# Patient Record
Sex: FEMALE | Race: WHITE | NOT HISPANIC OR LATINO | Employment: OTHER | URBAN - METROPOLITAN AREA
[De-identification: names, ages, dates, MRNs, and addresses within clinical notes are randomized per-mention and may not be internally consistent; named-entity substitution may affect disease eponyms.]

---

## 2018-05-22 ENCOUNTER — CONVERSION ENCOUNTER (OUTPATIENT)
Dept: FAMILY MEDICINE CLINIC | Facility: CLINIC | Age: 73
End: 2018-05-22

## 2018-05-22 ENCOUNTER — OFFICE VISIT CONVERTED (OUTPATIENT)
Dept: FAMILY MEDICINE CLINIC | Facility: CLINIC | Age: 73
End: 2018-05-22
Attending: FAMILY MEDICINE

## 2018-06-29 ENCOUNTER — OFFICE VISIT CONVERTED (OUTPATIENT)
Dept: FAMILY MEDICINE CLINIC | Facility: CLINIC | Age: 73
End: 2018-06-29
Attending: FAMILY MEDICINE

## 2018-06-29 ENCOUNTER — CONVERSION ENCOUNTER (OUTPATIENT)
Dept: FAMILY MEDICINE CLINIC | Facility: CLINIC | Age: 73
End: 2018-06-29

## 2018-09-25 ENCOUNTER — OFFICE VISIT CONVERTED (OUTPATIENT)
Dept: FAMILY MEDICINE CLINIC | Facility: CLINIC | Age: 73
End: 2018-09-25
Attending: FAMILY MEDICINE

## 2018-10-22 ENCOUNTER — OFFICE VISIT CONVERTED (OUTPATIENT)
Dept: FAMILY MEDICINE CLINIC | Facility: CLINIC | Age: 73
End: 2018-10-22
Attending: FAMILY MEDICINE

## 2018-10-22 ENCOUNTER — CONVERSION ENCOUNTER (OUTPATIENT)
Dept: FAMILY MEDICINE CLINIC | Facility: CLINIC | Age: 73
End: 2018-10-22

## 2018-11-26 ENCOUNTER — OFFICE VISIT CONVERTED (OUTPATIENT)
Dept: FAMILY MEDICINE CLINIC | Facility: CLINIC | Age: 73
End: 2018-11-26
Attending: FAMILY MEDICINE

## 2018-12-14 ENCOUNTER — CONVERSION ENCOUNTER (OUTPATIENT)
Dept: ORTHOPEDIC SURGERY | Facility: CLINIC | Age: 73
End: 2018-12-14

## 2018-12-14 ENCOUNTER — OFFICE VISIT CONVERTED (OUTPATIENT)
Dept: ORTHOPEDIC SURGERY | Facility: CLINIC | Age: 73
End: 2018-12-14
Attending: ORTHOPAEDIC SURGERY

## 2019-02-27 ENCOUNTER — HOSPITAL ENCOUNTER (OUTPATIENT)
Dept: OTHER | Facility: HOSPITAL | Age: 74
Discharge: HOME | End: 2019-02-27
Attending: FAMILY MEDICINE

## 2019-02-27 LAB
T4 FREE SERPL-MCNC: 2 NG/DL (ref 0.9–1.8)
TSH SERPL-ACNC: 0.93 M[IU]/L (ref 0.27–4.2)

## 2019-03-04 ENCOUNTER — OFFICE VISIT CONVERTED (OUTPATIENT)
Dept: FAMILY MEDICINE CLINIC | Facility: CLINIC | Age: 74
End: 2019-03-04
Attending: FAMILY MEDICINE

## 2019-03-06 ENCOUNTER — HOSPITAL ENCOUNTER (OUTPATIENT)
Dept: CARDIOLOGY | Facility: HOSPITAL | Age: 74
Discharge: HOME | End: 2019-03-06
Attending: FAMILY MEDICINE

## 2019-05-16 ENCOUNTER — HOSPITAL ENCOUNTER (OUTPATIENT)
Dept: OTHER | Facility: HOSPITAL | Age: 74
Discharge: HOME | End: 2019-05-16
Attending: FAMILY MEDICINE

## 2019-05-16 LAB
25(OH)D3 SERPL-MCNC: 15.6 NG/ML (ref 30–100)
ALBUMIN SERPL-MCNC: 4.1 G/DL (ref 3.5–5)
ALBUMIN/GLOB SERPL: 1.3 {RATIO} (ref 1.4–2.6)
ALP SERPL-CCNC: 87 U/L (ref 43–160)
ALT SERPL-CCNC: 10 U/L (ref 10–40)
ANION GAP SERPL CALC-SCNC: 17 MMOL/L (ref 8–19)
AST SERPL-CCNC: 15 U/L (ref 15–50)
BASOPHILS # BLD AUTO: 0.07 10*3/UL (ref 0–0.2)
BASOPHILS NFR BLD AUTO: 1.1 % (ref 0–3)
BILIRUB SERPL-MCNC: 0.68 MG/DL (ref 0.2–1.3)
BUN SERPL-MCNC: 23 MG/DL (ref 5–25)
BUN/CREAT SERPL: 22 {RATIO} (ref 6–20)
CALCIUM SERPL-MCNC: 9 MG/DL (ref 8.7–10.4)
CHLORIDE SERPL-SCNC: 102 MMOL/L (ref 99–111)
CHOLEST SERPL-MCNC: 175 MG/DL (ref 107–200)
CHOLEST/HDLC SERPL: 2.3 {RATIO} (ref 3–6)
CONV ABS IMM GRAN: 0.02 10*3/UL (ref 0–0.2)
CONV CO2: 25 MMOL/L (ref 22–32)
CONV IMMATURE GRAN: 0.3 % (ref 0–1.8)
CONV TOTAL PROTEIN: 7.2 G/DL (ref 6.3–8.2)
CREAT UR-MCNC: 1.05 MG/DL (ref 0.5–0.9)
DEPRECATED RDW RBC AUTO: 42 FL (ref 36.4–46.3)
EOSINOPHIL # BLD AUTO: 0.15 10*3/UL (ref 0–0.7)
EOSINOPHIL # BLD AUTO: 2.4 % (ref 0–7)
ERYTHROCYTE [DISTWIDTH] IN BLOOD BY AUTOMATED COUNT: 12.7 % (ref 11.7–14.4)
FOLATE SERPL-MCNC: >20 NG/ML (ref 4.8–20)
GFR SERPLBLD BASED ON 1.73 SQ M-ARVRAT: 51 ML/MIN/{1.73_M2}
GLOBULIN UR ELPH-MCNC: 3.1 G/DL (ref 2–3.5)
GLUCOSE SERPL-MCNC: 88 MG/DL (ref 65–99)
HBA1C MFR BLD: 11.9 G/DL (ref 12–16)
HCT VFR BLD AUTO: 37.9 % (ref 37–47)
HDLC SERPL-MCNC: 76 MG/DL (ref 40–60)
LDLC SERPL CALC-MCNC: 90 MG/DL (ref 70–100)
LYMPHOCYTES # BLD AUTO: 1.83 10*3/UL (ref 1–5)
MCH RBC QN AUTO: 28.2 PG (ref 27–31)
MCHC RBC AUTO-ENTMCNC: 31.4 G/DL (ref 33–37)
MCV RBC AUTO: 89.8 FL (ref 81–99)
MONOCYTES # BLD AUTO: 0.4 10*3/UL (ref 0.2–1.2)
MONOCYTES NFR BLD AUTO: 6.3 % (ref 3–10)
NEUTROPHILS # BLD AUTO: 3.88 10*3/UL (ref 2–8)
NEUTROPHILS NFR BLD AUTO: 61.1 % (ref 30–85)
NRBC CBCN: 0 % (ref 0–0.7)
OSMOLALITY SERPL CALC.SUM OF ELEC: 293 MOSM/KG (ref 273–304)
PLATELET # BLD AUTO: 276 10*3/UL (ref 130–400)
PMV BLD AUTO: 9.6 FL (ref 9.4–12.3)
POTASSIUM SERPL-SCNC: 3.7 MMOL/L (ref 3.5–5.3)
RBC # BLD AUTO: 4.22 10*6/UL (ref 4.2–5.4)
SODIUM SERPL-SCNC: 140 MMOL/L (ref 135–147)
T4 FREE SERPL-MCNC: 1.5 NG/DL (ref 0.9–1.8)
TRIGL SERPL-MCNC: 45 MG/DL (ref 40–150)
TSH SERPL-ACNC: 0.84 M[IU]/L (ref 0.27–4.2)
VARIANT LYMPHS NFR BLD MANUAL: 28.8 % (ref 20–45)
VIT B12 SERPL-MCNC: 434 PG/ML (ref 211–911)
VLDLC SERPL-MCNC: 9 MG/DL (ref 5–37)
WBC # BLD AUTO: 6.35 10*3/UL (ref 4.8–10.8)

## 2019-05-20 ENCOUNTER — CONVERSION ENCOUNTER (OUTPATIENT)
Dept: FAMILY MEDICINE CLINIC | Facility: CLINIC | Age: 74
End: 2019-05-20

## 2019-05-20 ENCOUNTER — HOSPITAL ENCOUNTER (OUTPATIENT)
Dept: GENERAL RADIOLOGY | Facility: HOSPITAL | Age: 74
Discharge: HOME | End: 2019-05-20
Attending: FAMILY MEDICINE

## 2019-05-20 ENCOUNTER — OFFICE VISIT CONVERTED (OUTPATIENT)
Dept: FAMILY MEDICINE CLINIC | Facility: CLINIC | Age: 74
End: 2019-05-20
Attending: FAMILY MEDICINE

## 2019-06-27 ENCOUNTER — HOSPITAL ENCOUNTER (OUTPATIENT)
Dept: OTHER | Facility: HOSPITAL | Age: 74
Discharge: HOME | End: 2019-06-27
Attending: INTERNAL MEDICINE

## 2019-06-27 LAB
ALBUMIN SERPL-MCNC: 3.9 G/DL (ref 3.5–5)
ALBUMIN/GLOB SERPL: 1.3 {RATIO} (ref 1.4–2.6)
ALP SERPL-CCNC: 81 U/L (ref 43–160)
ALT SERPL-CCNC: 10 U/L (ref 10–40)
ANION GAP SERPL CALC-SCNC: 19 MMOL/L (ref 8–19)
APPEARANCE UR: CLEAR
AST SERPL-CCNC: 11 U/L (ref 15–50)
BASOPHILS # BLD AUTO: 0.07 10*3/UL (ref 0–0.2)
BASOPHILS NFR BLD AUTO: 1 % (ref 0–3)
BILIRUB SERPL-MCNC: 0.65 MG/DL (ref 0.2–1.3)
BILIRUB UR QL: NEGATIVE
BUN SERPL-MCNC: 17 MG/DL (ref 5–25)
BUN/CREAT SERPL: 15 {RATIO} (ref 6–20)
CALCIUM SERPL-MCNC: 9.4 MG/DL (ref 8.7–10.4)
CHLORIDE SERPL-SCNC: 107 MMOL/L (ref 99–111)
COLOR UR: YELLOW
CONV ABS IMM GRAN: 0.02 10*3/UL (ref 0–0.2)
CONV BACTERIA: NEGATIVE
CONV CO2: 25 MMOL/L (ref 22–32)
CONV COLLECTION SOURCE (UA): ABNORMAL
CONV CREATININE URINE, RANDOM: 143.8 MG/DL (ref 10–300)
CONV IMMATURE GRAN: 0.3 % (ref 0–1.8)
CONV TOTAL PROTEIN: 7 G/DL (ref 6.3–8.2)
CONV UROBILINOGEN IN URINE BY AUTOMATED TEST STRIP: 0.2 {EHRLICHU}/DL (ref 0.1–1)
CREAT UR-MCNC: 1.17 MG/DL (ref 0.5–0.9)
DEPRECATED RDW RBC AUTO: 43.4 FL (ref 36.4–46.3)
EOSINOPHIL # BLD AUTO: 0.4 10*3/UL (ref 0–0.7)
EOSINOPHIL # BLD AUTO: 5.8 % (ref 0–7)
ERYTHROCYTE [DISTWIDTH] IN BLOOD BY AUTOMATED COUNT: 12.8 % (ref 11.7–14.4)
GFR SERPLBLD BASED ON 1.73 SQ M-ARVRAT: 45 ML/MIN/{1.73_M2}
GLOBULIN UR ELPH-MCNC: 3.1 G/DL (ref 2–3.5)
GLUCOSE SERPL-MCNC: 96 MG/DL (ref 65–99)
GLUCOSE UR QL: NEGATIVE MG/DL
HBA1C MFR BLD: 12.1 G/DL (ref 12–16)
HCT VFR BLD AUTO: 40.2 % (ref 37–47)
HGB UR QL STRIP: NEGATIVE
KETONES UR QL STRIP: NEGATIVE MG/DL
LEUKOCYTE ESTERASE UR QL STRIP: ABNORMAL
LYMPHOCYTES # BLD AUTO: 2.56 10*3/UL (ref 1–5)
MCH RBC QN AUTO: 27.7 PG (ref 27–31)
MCHC RBC AUTO-ENTMCNC: 30.1 G/DL (ref 33–37)
MCV RBC AUTO: 92 FL (ref 81–99)
MONOCYTES # BLD AUTO: 0.5 10*3/UL (ref 0.2–1.2)
MONOCYTES NFR BLD AUTO: 7.3 % (ref 3–10)
NEUTROPHILS # BLD AUTO: 3.33 10*3/UL (ref 2–8)
NEUTROPHILS NFR BLD AUTO: 48.4 % (ref 30–85)
NITRITE UR QL STRIP: NEGATIVE
NRBC CBCN: 0 % (ref 0–0.7)
OSMOLALITY SERPL CALC.SUM OF ELEC: 303 MOSM/KG (ref 273–304)
PH UR STRIP.AUTO: 5.5 [PH] (ref 5–8)
PHOSPHATE SERPL-MCNC: 2.6 MG/DL (ref 2.4–4.5)
PLATELET # BLD AUTO: 277 10*3/UL (ref 130–400)
PMV BLD AUTO: 10 FL (ref 9.4–12.3)
POTASSIUM SERPL-SCNC: 5.4 MMOL/L (ref 3.5–5.3)
PROT UR QL: NEGATIVE MG/DL
PROT UR-MCNC: 14.8 MG/DL
PTH-INTACT SERPL-MCNC: 108.1 PG/ML (ref 11.1–79.5)
RBC # BLD AUTO: 4.37 10*6/UL (ref 4.2–5.4)
RBC #/AREA URNS HPF: ABNORMAL /[HPF]
SODIUM SERPL-SCNC: 146 MMOL/L (ref 135–147)
SP GR UR: 1.02 (ref 1–1.03)
SQUAMOUS SPT QL MICRO: ABNORMAL /[HPF]
VARIANT LYMPHS NFR BLD MANUAL: 37.2 % (ref 20–45)
WBC # BLD AUTO: 6.88 10*3/UL (ref 4.8–10.8)
WBC #/AREA URNS HPF: ABNORMAL /[HPF]

## 2019-07-15 ENCOUNTER — CONVERSION ENCOUNTER (OUTPATIENT)
Dept: FAMILY MEDICINE CLINIC | Facility: CLINIC | Age: 74
End: 2019-07-15

## 2019-07-15 ENCOUNTER — OFFICE VISIT CONVERTED (OUTPATIENT)
Dept: FAMILY MEDICINE CLINIC | Facility: CLINIC | Age: 74
End: 2019-07-15
Attending: NURSE PRACTITIONER

## 2019-10-25 ENCOUNTER — CONVERSION ENCOUNTER (OUTPATIENT)
Dept: FAMILY MEDICINE CLINIC | Facility: CLINIC | Age: 74
End: 2019-10-25

## 2019-10-25 ENCOUNTER — OFFICE VISIT CONVERTED (OUTPATIENT)
Dept: FAMILY MEDICINE CLINIC | Facility: CLINIC | Age: 74
End: 2019-10-25
Attending: FAMILY MEDICINE

## 2019-11-05 ENCOUNTER — HOSPITAL ENCOUNTER (OUTPATIENT)
Dept: OTHER | Facility: HOSPITAL | Age: 74
Discharge: HOME | End: 2019-11-05
Attending: FAMILY MEDICINE

## 2019-11-05 LAB
ALBUMIN SERPL-MCNC: 4.5 G/DL (ref 3.5–5)
ALBUMIN/GLOB SERPL: 1.4 {RATIO} (ref 1.4–2.6)
ALP SERPL-CCNC: 91 U/L (ref 43–160)
ALT SERPL-CCNC: 10 U/L (ref 10–40)
ANION GAP SERPL CALC-SCNC: 21 MMOL/L (ref 8–19)
APPEARANCE UR: CLEAR
AST SERPL-CCNC: 16 U/L (ref 15–50)
BASOPHILS # BLD AUTO: 0.11 10*3/UL (ref 0–0.2)
BASOPHILS NFR BLD AUTO: 1.3 % (ref 0–3)
BILIRUB SERPL-MCNC: 0.52 MG/DL (ref 0.2–1.3)
BILIRUB UR QL: NEGATIVE
BUN SERPL-MCNC: 22 MG/DL (ref 5–25)
BUN/CREAT SERPL: 17 {RATIO} (ref 6–20)
CALCIUM SERPL-MCNC: 10.4 MG/DL (ref 8.7–10.4)
CHLORIDE SERPL-SCNC: 106 MMOL/L (ref 99–111)
COLOR UR: YELLOW
CONV ABS IMM GRAN: 0.02 10*3/UL (ref 0–0.2)
CONV BACTERIA: NEGATIVE
CONV CO2: 26 MMOL/L (ref 22–32)
CONV COLLECTION SOURCE (UA): ABNORMAL
CONV HYALINE CASTS IN URINE MICRO: ABNORMAL /[LPF]
CONV IMMATURE GRAN: 0.2 % (ref 0–1.8)
CONV TOTAL PROTEIN: 7.7 G/DL (ref 6.3–8.2)
CONV UROBILINOGEN IN URINE BY AUTOMATED TEST STRIP: 1 {EHRLICHU}/DL (ref 0.1–1)
CREAT UR-MCNC: 1.3 MG/DL (ref 0.5–0.9)
DEPRECATED RDW RBC AUTO: 43 FL (ref 36.4–46.3)
EOSINOPHIL # BLD AUTO: 0.3 10*3/UL (ref 0–0.7)
EOSINOPHIL # BLD AUTO: 3.5 % (ref 0–7)
ERYTHROCYTE [DISTWIDTH] IN BLOOD BY AUTOMATED COUNT: 12.7 % (ref 11.7–14.4)
FOLATE SERPL-MCNC: 15.6 NG/ML (ref 4.8–20)
GFR SERPLBLD BASED ON 1.73 SQ M-ARVRAT: 40 ML/MIN/{1.73_M2}
GLOBULIN UR ELPH-MCNC: 3.2 G/DL (ref 2–3.5)
GLUCOSE SERPL-MCNC: 111 MG/DL (ref 65–99)
GLUCOSE UR QL: NEGATIVE MG/DL
HCT VFR BLD AUTO: 41.6 % (ref 37–47)
HGB BLD-MCNC: 12.8 G/DL (ref 12–16)
HGB UR QL STRIP: NEGATIVE
KETONES UR QL STRIP: NEGATIVE MG/DL
LEUKOCYTE ESTERASE UR QL STRIP: ABNORMAL
LYMPHOCYTES # BLD AUTO: 2.41 10*3/UL (ref 1–5)
LYMPHOCYTES NFR BLD AUTO: 28 % (ref 20–45)
MAGNESIUM SERPL-MCNC: 2.21 MG/DL (ref 1.6–2.3)
MCH RBC QN AUTO: 28.3 PG (ref 27–31)
MCHC RBC AUTO-ENTMCNC: 30.8 G/DL (ref 33–37)
MCV RBC AUTO: 92 FL (ref 81–99)
MONOCYTES # BLD AUTO: 0.44 10*3/UL (ref 0.2–1.2)
MONOCYTES NFR BLD AUTO: 5.1 % (ref 3–10)
NEUTROPHILS # BLD AUTO: 5.33 10*3/UL (ref 2–8)
NEUTROPHILS NFR BLD AUTO: 61.9 % (ref 30–85)
NITRITE UR QL STRIP: NEGATIVE
NRBC CBCN: 0 % (ref 0–0.7)
OSMOLALITY SERPL CALC.SUM OF ELEC: 310 MOSM/KG (ref 273–304)
PH UR STRIP.AUTO: 5 [PH] (ref 5–8)
PLATELET # BLD AUTO: 309 10*3/UL (ref 130–400)
PMV BLD AUTO: 9.7 FL (ref 9.4–12.3)
POTASSIUM SERPL-SCNC: 5.2 MMOL/L (ref 3.5–5.3)
PROT UR QL: NEGATIVE MG/DL
RBC # BLD AUTO: 4.52 10*6/UL (ref 4.2–5.4)
RBC #/AREA URNS HPF: ABNORMAL /[HPF]
SODIUM SERPL-SCNC: 148 MMOL/L (ref 135–147)
SP GR UR: 1.02 (ref 1–1.03)
SQUAMOUS SPT QL MICRO: ABNORMAL /[HPF]
T4 FREE SERPL-MCNC: 1.9 NG/DL (ref 0.9–1.8)
TSH SERPL-ACNC: 0.36 M[IU]/L (ref 0.27–4.2)
VIT B12 SERPL-MCNC: 539 PG/ML (ref 211–911)
WBC # BLD AUTO: 8.61 10*3/UL (ref 4.8–10.8)
WBC #/AREA URNS HPF: ABNORMAL /[HPF]

## 2019-11-08 ENCOUNTER — OFFICE VISIT CONVERTED (OUTPATIENT)
Dept: FAMILY MEDICINE CLINIC | Facility: CLINIC | Age: 74
End: 2019-11-08
Attending: FAMILY MEDICINE

## 2019-12-19 ENCOUNTER — HOSPITAL ENCOUNTER (OUTPATIENT)
Dept: SURGERY | Facility: HOSPITAL | Age: 74
Setting detail: HOSPITAL OUTPATIENT SURGERY
Discharge: HOME | End: 2019-12-19
Attending: OPHTHALMOLOGY

## 2020-01-02 ENCOUNTER — HOSPITAL ENCOUNTER (OUTPATIENT)
Dept: OTHER | Facility: HOSPITAL | Age: 75
Discharge: HOME | End: 2020-01-02
Attending: FAMILY MEDICINE

## 2020-01-02 LAB
ALBUMIN SERPL-MCNC: 4.5 G/DL (ref 3.5–5)
ALBUMIN/GLOB SERPL: 1.3 {RATIO} (ref 1.4–2.6)
ALP SERPL-CCNC: 98 U/L (ref 43–160)
ALT SERPL-CCNC: 9 U/L (ref 10–40)
ANION GAP SERPL CALC-SCNC: 19 MMOL/L (ref 8–19)
AST SERPL-CCNC: 14 U/L (ref 15–50)
BILIRUB SERPL-MCNC: 0.68 MG/DL (ref 0.2–1.3)
BUN SERPL-MCNC: 21 MG/DL (ref 5–25)
BUN/CREAT SERPL: 19 {RATIO} (ref 6–20)
CALCIUM SERPL-MCNC: 9.8 MG/DL (ref 8.7–10.4)
CHLORIDE SERPL-SCNC: 102 MMOL/L (ref 99–111)
CONV CO2: 26 MMOL/L (ref 22–32)
CONV TOTAL PROTEIN: 7.9 G/DL (ref 6.3–8.2)
CREAT UR-MCNC: 1.12 MG/DL (ref 0.5–0.9)
EST. AVERAGE GLUCOSE BLD GHB EST-MCNC: 111 MG/DL
GFR SERPLBLD BASED ON 1.73 SQ M-ARVRAT: 47 ML/MIN/{1.73_M2}
GLOBULIN UR ELPH-MCNC: 3.4 G/DL (ref 2–3.5)
GLUCOSE SERPL-MCNC: 99 MG/DL (ref 65–99)
HBA1C MFR BLD: 5.5 % (ref 3.5–5.7)
OSMOLALITY SERPL CALC.SUM OF ELEC: 297 MOSM/KG (ref 273–304)
POTASSIUM SERPL-SCNC: 4.6 MMOL/L (ref 3.5–5.3)
SODIUM SERPL-SCNC: 142 MMOL/L (ref 135–147)

## 2020-01-07 ENCOUNTER — OFFICE VISIT CONVERTED (OUTPATIENT)
Dept: FAMILY MEDICINE CLINIC | Facility: CLINIC | Age: 75
End: 2020-01-07
Attending: FAMILY MEDICINE

## 2020-01-09 ENCOUNTER — HOSPITAL ENCOUNTER (OUTPATIENT)
Dept: SURGERY | Facility: HOSPITAL | Age: 75
Setting detail: HOSPITAL OUTPATIENT SURGERY
Discharge: HOME | End: 2020-01-09
Attending: OPHTHALMOLOGY

## 2020-02-03 ENCOUNTER — OFFICE VISIT CONVERTED (OUTPATIENT)
Dept: NEUROLOGY | Facility: CLINIC | Age: 75
End: 2020-02-03
Attending: PSYCHIATRY & NEUROLOGY

## 2020-02-13 ENCOUNTER — OFFICE VISIT CONVERTED (OUTPATIENT)
Dept: FAMILY MEDICINE CLINIC | Facility: CLINIC | Age: 75
End: 2020-02-13
Attending: NURSE PRACTITIONER

## 2020-02-14 ENCOUNTER — CONVERSION ENCOUNTER (OUTPATIENT)
Dept: NEUROLOGY | Facility: CLINIC | Age: 75
End: 2020-02-14

## 2020-02-14 ENCOUNTER — OFFICE VISIT CONVERTED (OUTPATIENT)
Dept: NEUROLOGY | Facility: CLINIC | Age: 75
End: 2020-02-14
Attending: PSYCHIATRY & NEUROLOGY

## 2020-03-02 ENCOUNTER — OFFICE VISIT CONVERTED (OUTPATIENT)
Dept: ORTHOPEDIC SURGERY | Facility: CLINIC | Age: 75
End: 2020-03-02
Attending: ORTHOPAEDIC SURGERY

## 2020-06-23 ENCOUNTER — HOSPITAL ENCOUNTER (OUTPATIENT)
Dept: OTHER | Facility: HOSPITAL | Age: 75
Discharge: HOME | End: 2020-06-23
Attending: INTERNAL MEDICINE

## 2020-06-23 LAB
ALBUMIN SERPL-MCNC: 4.2 G/DL (ref 3.5–5)
ANION GAP SERPL CALC-SCNC: 16 MMOL/L (ref 8–19)
APPEARANCE UR: CLEAR
BASOPHILS # BLD AUTO: 0.07 10*3/UL (ref 0–0.2)
BASOPHILS NFR BLD AUTO: 0.8 % (ref 0–3)
BILIRUB UR QL: NEGATIVE
BUN SERPL-MCNC: 26 MG/DL (ref 5–25)
BUN/CREAT SERPL: 23 {RATIO} (ref 6–20)
CALCIUM SERPL-MCNC: 9.5 MG/DL (ref 8.7–10.4)
CHLORIDE SERPL-SCNC: 106 MMOL/L (ref 99–111)
COLOR UR: YELLOW
CONV ABS IMM GRAN: 0.03 10*3/UL (ref 0–0.2)
CONV BACTERIA: NEGATIVE
CONV CO2: 26 MMOL/L (ref 22–32)
CONV COLLECTION SOURCE (UA): ABNORMAL
CONV CREATININE URINE, RANDOM: 143.7 MG/DL (ref 10–300)
CONV HYALINE CASTS IN URINE MICRO: ABNORMAL /[LPF]
CONV IMMATURE GRAN: 0.3 % (ref 0–1.8)
CONV PROTEIN TO CREATININE RATIO (RANDOM URINE): 0.12 {RATIO} (ref 0–0.1)
CONV UROBILINOGEN IN URINE BY AUTOMATED TEST STRIP: 0.2 {EHRLICHU}/DL (ref 0.1–1)
CREAT UR-MCNC: 1.11 MG/DL (ref 0.5–0.9)
DEPRECATED RDW RBC AUTO: 41 FL (ref 36.4–46.3)
EOSINOPHIL # BLD AUTO: 0.35 10*3/UL (ref 0–0.7)
EOSINOPHIL # BLD AUTO: 4 % (ref 0–7)
ERYTHROCYTE [DISTWIDTH] IN BLOOD BY AUTOMATED COUNT: 12.5 % (ref 11.7–14.4)
GFR SERPLBLD BASED ON 1.73 SQ M-ARVRAT: 48 ML/MIN/{1.73_M2}
GLUCOSE SERPL-MCNC: 100 MG/DL (ref 65–99)
GLUCOSE UR QL: NEGATIVE MG/DL
HCT VFR BLD AUTO: 40.9 % (ref 37–47)
HGB BLD-MCNC: 12.8 G/DL (ref 12–16)
HGB UR QL STRIP: NEGATIVE
KETONES UR QL STRIP: NEGATIVE MG/DL
LEUKOCYTE ESTERASE UR QL STRIP: ABNORMAL
LYMPHOCYTES # BLD AUTO: 2.54 10*3/UL (ref 1–5)
LYMPHOCYTES NFR BLD AUTO: 29.4 % (ref 20–45)
MCH RBC QN AUTO: 28.2 PG (ref 27–31)
MCHC RBC AUTO-ENTMCNC: 31.3 G/DL (ref 33–37)
MCV RBC AUTO: 90.1 FL (ref 81–99)
MONOCYTES # BLD AUTO: 0.64 10*3/UL (ref 0.2–1.2)
MONOCYTES NFR BLD AUTO: 7.4 % (ref 3–10)
MUCOUS THREADS URNS QL MICRO: ABNORMAL
NEUTROPHILS # BLD AUTO: 5.02 10*3/UL (ref 2–8)
NEUTROPHILS NFR BLD AUTO: 58.1 % (ref 30–85)
NITRITE UR QL STRIP: NEGATIVE
NRBC CBCN: 0 % (ref 0–0.7)
PH UR STRIP.AUTO: 5 [PH] (ref 5–8)
PHOSPHATE SERPL-MCNC: 3.5 MG/DL (ref 2.4–4.5)
PLATELET # BLD AUTO: 285 10*3/UL (ref 130–400)
PMV BLD AUTO: 9.6 FL (ref 9.4–12.3)
POTASSIUM SERPL-SCNC: 6.1 MMOL/L (ref 3.5–5.3)
PROT UR QL: NEGATIVE MG/DL
PROT UR-MCNC: 17 MG/DL
RBC # BLD AUTO: 4.54 10*6/UL (ref 4.2–5.4)
RBC #/AREA URNS HPF: ABNORMAL /[HPF]
SODIUM SERPL-SCNC: 142 MMOL/L (ref 135–147)
SP GR UR: 1.02 (ref 1–1.03)
SQUAMOUS SPT QL MICRO: ABNORMAL /[HPF]
WBC # BLD AUTO: 8.65 10*3/UL (ref 4.8–10.8)
WBC #/AREA URNS HPF: ABNORMAL /[HPF]

## 2020-07-16 ENCOUNTER — HOSPITAL ENCOUNTER (OUTPATIENT)
Dept: OTHER | Facility: HOSPITAL | Age: 75
Discharge: HOME | End: 2020-07-16
Attending: FAMILY MEDICINE

## 2020-07-16 LAB
25(OH)D3 SERPL-MCNC: 45 NG/ML (ref 30–100)
ALBUMIN SERPL-MCNC: 4.2 G/DL (ref 3.5–5)
ALBUMIN/GLOB SERPL: 1.3 {RATIO} (ref 1.4–2.6)
ALP SERPL-CCNC: 72 U/L (ref 43–160)
ALT SERPL-CCNC: 14 U/L (ref 10–40)
ANION GAP SERPL CALC-SCNC: 18 MMOL/L (ref 8–19)
APPEARANCE UR: CLEAR
AST SERPL-CCNC: 13 U/L (ref 15–50)
BASOPHILS # BLD AUTO: 0.06 10*3/UL (ref 0–0.2)
BASOPHILS NFR BLD AUTO: 0.9 % (ref 0–3)
BILIRUB SERPL-MCNC: 0.46 MG/DL (ref 0.2–1.3)
BILIRUB UR QL: NEGATIVE
BUN SERPL-MCNC: 18 MG/DL (ref 5–25)
BUN/CREAT SERPL: 15 {RATIO} (ref 6–20)
CALCIUM SERPL-MCNC: 9.8 MG/DL (ref 8.7–10.4)
CHLORIDE SERPL-SCNC: 99 MMOL/L (ref 99–111)
CHOLEST SERPL-MCNC: 158 MG/DL (ref 107–200)
CHOLEST/HDLC SERPL: 2.3 {RATIO} (ref 3–6)
COLOR UR: YELLOW
CONV ABS IMM GRAN: 0.01 10*3/UL (ref 0–0.2)
CONV BACTERIA: NEGATIVE
CONV CO2: 25 MMOL/L (ref 22–32)
CONV COLLECTION SOURCE (UA): ABNORMAL
CONV HYALINE CASTS IN URINE MICRO: ABNORMAL /[LPF]
CONV IMMATURE GRAN: 0.2 % (ref 0–1.8)
CONV TOTAL PROTEIN: 7.4 G/DL (ref 6.3–8.2)
CONV UROBILINOGEN IN URINE BY AUTOMATED TEST STRIP: 0.2 {EHRLICHU}/DL (ref 0.1–1)
CREAT UR-MCNC: 1.22 MG/DL (ref 0.5–0.9)
DEPRECATED RDW RBC AUTO: 41.4 FL (ref 36.4–46.3)
EOSINOPHIL # BLD AUTO: 0.25 10*3/UL (ref 0–0.7)
EOSINOPHIL # BLD AUTO: 3.8 % (ref 0–7)
ERYTHROCYTE [DISTWIDTH] IN BLOOD BY AUTOMATED COUNT: 12.6 % (ref 11.7–14.4)
FOLATE SERPL-MCNC: 19.1 NG/ML (ref 4.8–20)
GFR SERPLBLD BASED ON 1.73 SQ M-ARVRAT: 43 ML/MIN/{1.73_M2}
GLOBULIN UR ELPH-MCNC: 3.2 G/DL (ref 2–3.5)
GLUCOSE SERPL-MCNC: 104 MG/DL (ref 65–99)
GLUCOSE UR QL: NEGATIVE MG/DL
HCT VFR BLD AUTO: 41.1 % (ref 37–47)
HDLC SERPL-MCNC: 68 MG/DL (ref 40–60)
HGB BLD-MCNC: 13 G/DL (ref 12–16)
HGB UR QL STRIP: NEGATIVE
KETONES UR QL STRIP: NEGATIVE MG/DL
LDLC SERPL CALC-MCNC: 74 MG/DL (ref 70–100)
LEUKOCYTE ESTERASE UR QL STRIP: ABNORMAL
LYMPHOCYTES # BLD AUTO: 2.61 10*3/UL (ref 1–5)
LYMPHOCYTES NFR BLD AUTO: 39.7 % (ref 20–45)
MCH RBC QN AUTO: 28.6 PG (ref 27–31)
MCHC RBC AUTO-ENTMCNC: 31.6 G/DL (ref 33–37)
MCV RBC AUTO: 90.3 FL (ref 81–99)
MONOCYTES # BLD AUTO: 0.42 10*3/UL (ref 0.2–1.2)
MONOCYTES NFR BLD AUTO: 6.4 % (ref 3–10)
NEUTROPHILS # BLD AUTO: 3.23 10*3/UL (ref 2–8)
NEUTROPHILS NFR BLD AUTO: 49 % (ref 30–85)
NITRITE UR QL STRIP: NEGATIVE
NRBC CBCN: 0 % (ref 0–0.7)
OSMOLALITY SERPL CALC.SUM OF ELEC: 288 MOSM/KG (ref 273–304)
PH UR STRIP.AUTO: 5 [PH] (ref 5–8)
PLATELET # BLD AUTO: 276 10*3/UL (ref 130–400)
PMV BLD AUTO: 9.9 FL (ref 9.4–12.3)
POTASSIUM SERPL-SCNC: 4.3 MMOL/L (ref 3.5–5.3)
PROT UR QL: NEGATIVE MG/DL
RBC # BLD AUTO: 4.55 10*6/UL (ref 4.2–5.4)
RBC #/AREA URNS HPF: ABNORMAL /[HPF]
SODIUM SERPL-SCNC: 138 MMOL/L (ref 135–147)
SP GR UR: 1.02 (ref 1–1.03)
SQUAMOUS SPT QL MICRO: ABNORMAL /[HPF]
T4 FREE SERPL-MCNC: 2.1 NG/DL (ref 0.9–1.8)
TRIGL SERPL-MCNC: 82 MG/DL (ref 40–150)
TSH SERPL-ACNC: 0.13 M[IU]/L (ref 0.27–4.2)
VIT B12 SERPL-MCNC: 862 PG/ML (ref 211–911)
VLDLC SERPL-MCNC: 16 MG/DL (ref 5–37)
WBC # BLD AUTO: 6.58 10*3/UL (ref 4.8–10.8)
WBC #/AREA URNS HPF: ABNORMAL /[HPF]

## 2020-07-20 ENCOUNTER — OFFICE VISIT CONVERTED (OUTPATIENT)
Dept: FAMILY MEDICINE CLINIC | Facility: CLINIC | Age: 75
End: 2020-07-20
Attending: FAMILY MEDICINE

## 2020-07-25 ENCOUNTER — HOSPITAL ENCOUNTER (OUTPATIENT)
Dept: OTHER | Facility: HOSPITAL | Age: 75
Discharge: HOME | End: 2020-07-25
Attending: INTERNAL MEDICINE

## 2020-07-25 LAB
ALBUMIN SERPL-MCNC: 3.9 G/DL (ref 3.5–5)
ANION GAP SERPL CALC-SCNC: 18 MMOL/L (ref 8–19)
BASOPHILS # BLD AUTO: 0.07 10*3/UL (ref 0–0.2)
BASOPHILS NFR BLD AUTO: 1.1 % (ref 0–3)
BUN SERPL-MCNC: 19 MG/DL (ref 5–25)
BUN/CREAT SERPL: 15 {RATIO} (ref 6–20)
CALCIUM SERPL-MCNC: 10.2 MG/DL (ref 8.7–10.4)
CHLORIDE SERPL-SCNC: 105 MMOL/L (ref 99–111)
CONV ABS IMM GRAN: 0.01 10*3/UL (ref 0–0.2)
CONV CO2: 25 MMOL/L (ref 22–32)
CONV IMMATURE GRAN: 0.2 % (ref 0–1.8)
CREAT UR-MCNC: 1.27 MG/DL (ref 0.5–0.9)
DEPRECATED RDW RBC AUTO: 41.8 FL (ref 36.4–46.3)
EOSINOPHIL # BLD AUTO: 0.25 10*3/UL (ref 0–0.7)
EOSINOPHIL # BLD AUTO: 3.8 % (ref 0–7)
ERYTHROCYTE [DISTWIDTH] IN BLOOD BY AUTOMATED COUNT: 12.7 % (ref 11.7–14.4)
GFR SERPLBLD BASED ON 1.73 SQ M-ARVRAT: 41 ML/MIN/{1.73_M2}
GLUCOSE SERPL-MCNC: 97 MG/DL (ref 65–99)
HCT VFR BLD AUTO: 40.4 % (ref 37–47)
HGB BLD-MCNC: 12.5 G/DL (ref 12–16)
LYMPHOCYTES # BLD AUTO: 2.44 10*3/UL (ref 1–5)
LYMPHOCYTES NFR BLD AUTO: 36.9 % (ref 20–45)
MCH RBC QN AUTO: 27.9 PG (ref 27–31)
MCHC RBC AUTO-ENTMCNC: 30.9 G/DL (ref 33–37)
MCV RBC AUTO: 90.2 FL (ref 81–99)
MONOCYTES # BLD AUTO: 0.51 10*3/UL (ref 0.2–1.2)
MONOCYTES NFR BLD AUTO: 7.7 % (ref 3–10)
NEUTROPHILS # BLD AUTO: 3.34 10*3/UL (ref 2–8)
NEUTROPHILS NFR BLD AUTO: 50.3 % (ref 30–85)
NRBC CBCN: 0 % (ref 0–0.7)
PHOSPHATE SERPL-MCNC: 3.1 MG/DL (ref 2.4–4.5)
PLATELET # BLD AUTO: 271 10*3/UL (ref 130–400)
PMV BLD AUTO: 10 FL (ref 9.4–12.3)
POTASSIUM SERPL-SCNC: 5.4 MMOL/L (ref 3.5–5.3)
RBC # BLD AUTO: 4.48 10*6/UL (ref 4.2–5.4)
SODIUM SERPL-SCNC: 143 MMOL/L (ref 135–147)
WBC # BLD AUTO: 6.62 10*3/UL (ref 4.8–10.8)

## 2020-09-16 ENCOUNTER — HOSPITAL ENCOUNTER (OUTPATIENT)
Dept: OTHER | Facility: HOSPITAL | Age: 75
Discharge: HOME | End: 2020-09-16
Attending: FAMILY MEDICINE

## 2020-09-16 LAB
POTASSIUM SERPL-SCNC: 5.1 MMOL/L (ref 3.5–5.3)
T4 FREE SERPL-MCNC: 1.5 NG/DL (ref 0.9–1.8)
TSH SERPL-ACNC: 0.47 M[IU]/L (ref 0.27–4.2)

## 2020-09-21 ENCOUNTER — OFFICE VISIT CONVERTED (OUTPATIENT)
Dept: FAMILY MEDICINE CLINIC | Facility: CLINIC | Age: 75
End: 2020-09-21
Attending: FAMILY MEDICINE

## 2020-10-15 ENCOUNTER — HOSPITAL ENCOUNTER (OUTPATIENT)
Dept: OTHER | Facility: HOSPITAL | Age: 75
Discharge: HOME | End: 2020-10-15
Attending: FAMILY MEDICINE

## 2020-10-15 LAB
POTASSIUM SERPL-SCNC: 4.8 MMOL/L (ref 3.5–5.3)
T4 FREE SERPL-MCNC: 1.3 NG/DL (ref 0.9–1.8)
TSH SERPL-ACNC: 3.08 M[IU]/L (ref 0.27–4.2)

## 2020-11-18 ENCOUNTER — OFFICE VISIT CONVERTED (OUTPATIENT)
Dept: ORTHOPEDIC SURGERY | Facility: CLINIC | Age: 75
End: 2020-11-18
Attending: ORTHOPAEDIC SURGERY

## 2020-11-24 ENCOUNTER — HOSPITAL ENCOUNTER (OUTPATIENT)
Dept: OTHER | Facility: HOSPITAL | Age: 75
Discharge: HOME | End: 2020-11-24
Attending: NURSE PRACTITIONER

## 2020-11-24 LAB
ALBUMIN SERPL-MCNC: 4.5 G/DL (ref 3.5–5)
ANION GAP SERPL CALC-SCNC: 18 MMOL/L (ref 8–19)
APPEARANCE UR: ABNORMAL
BASOPHILS # BLD AUTO: 0.08 10*3/UL (ref 0–0.2)
BASOPHILS NFR BLD AUTO: 1.2 % (ref 0–3)
BILIRUB UR QL: NEGATIVE
BUN SERPL-MCNC: 22 MG/DL (ref 5–25)
BUN/CREAT SERPL: 18 {RATIO} (ref 6–20)
CALCIUM SERPL-MCNC: 10.5 MG/DL (ref 8.7–10.4)
CHLORIDE SERPL-SCNC: 105 MMOL/L (ref 99–111)
COLOR UR: YELLOW
CONV ABS IMM GRAN: 0.01 10*3/UL (ref 0–0.2)
CONV BACTERIA: NEGATIVE
CONV CO2: 28 MMOL/L (ref 22–32)
CONV COLLECTION SOURCE (UA): ABNORMAL
CONV HYALINE CASTS IN URINE MICRO: ABNORMAL /[LPF]
CONV IMMATURE GRAN: 0.1 % (ref 0–1.8)
CONV UROBILINOGEN IN URINE BY AUTOMATED TEST STRIP: 1 {EHRLICHU}/DL (ref 0.1–1)
CREAT UR-MCNC: 1.2 MG/DL (ref 0.5–0.9)
DEPRECATED RDW RBC AUTO: 41.2 FL (ref 36.4–46.3)
EOSINOPHIL # BLD AUTO: 0.21 10*3/UL (ref 0–0.7)
EOSINOPHIL # BLD AUTO: 3 % (ref 0–7)
ERYTHROCYTE [DISTWIDTH] IN BLOOD BY AUTOMATED COUNT: 12.5 % (ref 11.7–14.4)
GFR SERPLBLD BASED ON 1.73 SQ M-ARVRAT: 43 ML/MIN/{1.73_M2}
GLUCOSE SERPL-MCNC: 98 MG/DL (ref 65–99)
GLUCOSE UR QL: NEGATIVE MG/DL
HCT VFR BLD AUTO: 43.7 % (ref 37–47)
HGB BLD-MCNC: 13.8 G/DL (ref 12–16)
HGB UR QL STRIP: NEGATIVE
KETONES UR QL STRIP: NEGATIVE MG/DL
LEUKOCYTE ESTERASE UR QL STRIP: ABNORMAL
LYMPHOCYTES # BLD AUTO: 2.24 10*3/UL (ref 1–5)
LYMPHOCYTES NFR BLD AUTO: 32.4 % (ref 20–45)
MCH RBC QN AUTO: 28.6 PG (ref 27–31)
MCHC RBC AUTO-ENTMCNC: 31.6 G/DL (ref 33–37)
MCV RBC AUTO: 90.5 FL (ref 81–99)
MONOCYTES # BLD AUTO: 0.34 10*3/UL (ref 0.2–1.2)
MONOCYTES NFR BLD AUTO: 4.9 % (ref 3–10)
NEUTROPHILS # BLD AUTO: 4.03 10*3/UL (ref 2–8)
NEUTROPHILS NFR BLD AUTO: 58.4 % (ref 30–85)
NITRITE UR QL STRIP: NEGATIVE
NRBC CBCN: 0 % (ref 0–0.7)
PH UR STRIP.AUTO: 5 [PH] (ref 5–8)
PHOSPHATE SERPL-MCNC: 4.2 MG/DL (ref 2.4–4.5)
PLATELET # BLD AUTO: 321 10*3/UL (ref 130–400)
PMV BLD AUTO: 9.6 FL (ref 9.4–12.3)
POTASSIUM SERPL-SCNC: 5 MMOL/L (ref 3.5–5.3)
PROT UR QL: NEGATIVE MG/DL
RBC # BLD AUTO: 4.83 10*6/UL (ref 4.2–5.4)
RBC #/AREA URNS HPF: ABNORMAL /[HPF]
SODIUM SERPL-SCNC: 146 MMOL/L (ref 135–147)
SP GR UR: 1.02 (ref 1–1.03)
WBC # BLD AUTO: 6.91 10*3/UL (ref 4.8–10.8)
WBC #/AREA URNS HPF: ABNORMAL /[HPF]

## 2020-12-10 ENCOUNTER — HOSPITAL ENCOUNTER (OUTPATIENT)
Dept: OTHER | Facility: HOSPITAL | Age: 75
Discharge: HOME | End: 2020-12-10
Attending: FAMILY MEDICINE

## 2020-12-10 LAB
25(OH)D3 SERPL-MCNC: 37.7 NG/ML (ref 30–100)
ALBUMIN SERPL-MCNC: 4 G/DL (ref 3.5–5)
ALBUMIN/GLOB SERPL: 1.3 {RATIO} (ref 1.4–2.6)
ALP SERPL-CCNC: 89 U/L (ref 43–160)
ALT SERPL-CCNC: 10 U/L (ref 10–40)
ANION GAP SERPL CALC-SCNC: 15 MMOL/L (ref 8–19)
AST SERPL-CCNC: 14 U/L (ref 15–50)
BASOPHILS # BLD AUTO: 0.1 10*3/UL (ref 0–0.2)
BASOPHILS NFR BLD AUTO: 1.3 % (ref 0–3)
BILIRUB SERPL-MCNC: 0.29 MG/DL (ref 0.2–1.3)
BUN SERPL-MCNC: 23 MG/DL (ref 5–25)
BUN/CREAT SERPL: 22 {RATIO} (ref 6–20)
CALCIUM SERPL-MCNC: 9.7 MG/DL (ref 8.7–10.4)
CHLORIDE SERPL-SCNC: 109 MMOL/L (ref 99–111)
CHOLEST SERPL-MCNC: 186 MG/DL (ref 107–200)
CHOLEST/HDLC SERPL: 2.7 {RATIO} (ref 3–6)
CONV ABS IMM GRAN: 0.01 10*3/UL (ref 0–0.2)
CONV CO2: 30 MMOL/L (ref 22–32)
CONV IMMATURE GRAN: 0.1 % (ref 0–1.8)
CONV TOTAL PROTEIN: 7.2 G/DL (ref 6.3–8.2)
CREAT UR-MCNC: 1.04 MG/DL (ref 0.5–0.9)
DEPRECATED RDW RBC AUTO: 43.1 FL (ref 36.4–46.3)
EOSINOPHIL # BLD AUTO: 0.42 10*3/UL (ref 0–0.7)
EOSINOPHIL # BLD AUTO: 5.4 % (ref 0–7)
ERYTHROCYTE [DISTWIDTH] IN BLOOD BY AUTOMATED COUNT: 12.6 % (ref 11.7–14.4)
FOLATE SERPL-MCNC: 12.8 NG/ML (ref 4.8–20)
GFR SERPLBLD BASED ON 1.73 SQ M-ARVRAT: 51 ML/MIN/{1.73_M2}
GLOBULIN UR ELPH-MCNC: 3.2 G/DL (ref 2–3.5)
GLUCOSE SERPL-MCNC: 108 MG/DL (ref 65–99)
HCT VFR BLD AUTO: 40.9 % (ref 37–47)
HDLC SERPL-MCNC: 70 MG/DL (ref 40–60)
HGB BLD-MCNC: 12.5 G/DL (ref 12–16)
LDLC SERPL CALC-MCNC: 103 MG/DL (ref 70–100)
LYMPHOCYTES # BLD AUTO: 3.39 10*3/UL (ref 1–5)
LYMPHOCYTES NFR BLD AUTO: 43.6 % (ref 20–45)
MCH RBC QN AUTO: 28.3 PG (ref 27–31)
MCHC RBC AUTO-ENTMCNC: 30.6 G/DL (ref 33–37)
MCV RBC AUTO: 92.7 FL (ref 81–99)
MONOCYTES # BLD AUTO: 0.48 10*3/UL (ref 0.2–1.2)
MONOCYTES NFR BLD AUTO: 6.2 % (ref 3–10)
NEUTROPHILS # BLD AUTO: 3.38 10*3/UL (ref 2–8)
NEUTROPHILS NFR BLD AUTO: 43.4 % (ref 30–85)
NRBC CBCN: 0 % (ref 0–0.7)
OSMOLALITY SERPL CALC.SUM OF ELEC: 312 MOSM/KG (ref 273–304)
PLATELET # BLD AUTO: 285 10*3/UL (ref 130–400)
PMV BLD AUTO: 9.6 FL (ref 9.4–12.3)
POTASSIUM SERPL-SCNC: 5.1 MMOL/L (ref 3.5–5.3)
RBC # BLD AUTO: 4.41 10*6/UL (ref 4.2–5.4)
SODIUM SERPL-SCNC: 149 MMOL/L (ref 135–147)
T4 FREE SERPL-MCNC: 1.6 NG/DL (ref 0.9–1.8)
TRIGL SERPL-MCNC: 64 MG/DL (ref 40–150)
TSH SERPL-ACNC: 1.49 M[IU]/L (ref 0.27–4.2)
VIT B12 SERPL-MCNC: 499 PG/ML (ref 211–911)
VLDLC SERPL-MCNC: 13 MG/DL (ref 5–37)
WBC # BLD AUTO: 7.78 10*3/UL (ref 4.8–10.8)

## 2021-02-25 ENCOUNTER — HOSPITAL ENCOUNTER (OUTPATIENT)
Dept: VACCINE CLINIC | Facility: HOSPITAL | Age: 76
Discharge: HOME | End: 2021-02-25
Attending: INTERNAL MEDICINE

## 2021-03-24 ENCOUNTER — HOSPITAL ENCOUNTER (OUTPATIENT)
Dept: OTHER | Facility: HOSPITAL | Age: 76
Discharge: HOME | End: 2021-03-24
Attending: FAMILY MEDICINE

## 2021-03-24 LAB
25(OH)D3 SERPL-MCNC: 34.6 NG/ML (ref 30–100)
ALBUMIN SERPL-MCNC: 3.7 G/DL (ref 3.5–5)
ALBUMIN/GLOB SERPL: 1.1 {RATIO} (ref 1.4–2.6)
ALP SERPL-CCNC: 97 U/L (ref 43–160)
ALT SERPL-CCNC: 11 U/L (ref 10–40)
ANION GAP SERPL CALC-SCNC: 14 MMOL/L (ref 8–19)
APPEARANCE UR: CLEAR
AST SERPL-CCNC: 12 U/L (ref 15–50)
BASOPHILS # BLD AUTO: 0.07 10*3/UL (ref 0–0.2)
BASOPHILS NFR BLD AUTO: 1.1 % (ref 0–3)
BILIRUB SERPL-MCNC: 0.4 MG/DL (ref 0.2–1.3)
BILIRUB UR QL: NEGATIVE
BUN SERPL-MCNC: 18 MG/DL (ref 5–25)
BUN/CREAT SERPL: 18 {RATIO} (ref 6–20)
CALCIUM SERPL-MCNC: 9.5 MG/DL (ref 8.7–10.4)
CHLORIDE SERPL-SCNC: 105 MMOL/L (ref 99–111)
CHOLEST SERPL-MCNC: 184 MG/DL (ref 107–200)
CHOLEST/HDLC SERPL: 2.7 {RATIO} (ref 3–6)
COLOR UR: YELLOW
CONV ABS IMM GRAN: 0 10*3/UL (ref 0–0.2)
CONV BACTERIA: NEGATIVE
CONV CO2: 27 MMOL/L (ref 22–32)
CONV COLLECTION SOURCE (UA): ABNORMAL
CONV IMMATURE GRAN: 0 % (ref 0–1.8)
CONV TOTAL PROTEIN: 7.1 G/DL (ref 6.3–8.2)
CONV UROBILINOGEN IN URINE BY AUTOMATED TEST STRIP: 0.2 {EHRLICHU}/DL (ref 0.1–1)
CREAT UR-MCNC: 0.98 MG/DL (ref 0.5–0.9)
DEPRECATED RDW RBC AUTO: 41.4 FL (ref 36.4–46.3)
EOSINOPHIL # BLD AUTO: 0.35 10*3/UL (ref 0–0.7)
EOSINOPHIL # BLD AUTO: 5.7 % (ref 0–7)
ERYTHROCYTE [DISTWIDTH] IN BLOOD BY AUTOMATED COUNT: 12.5 % (ref 11.7–14.4)
FOLATE SERPL-MCNC: 19.6 NG/ML (ref 4.8–20)
GFR SERPLBLD BASED ON 1.73 SQ M-ARVRAT: 55 ML/MIN/{1.73_M2}
GLOBULIN UR ELPH-MCNC: 3.4 G/DL (ref 2–3.5)
GLUCOSE SERPL-MCNC: 102 MG/DL (ref 65–99)
GLUCOSE UR QL: NEGATIVE MG/DL
HCT VFR BLD AUTO: 39.2 % (ref 37–47)
HDLC SERPL-MCNC: 68 MG/DL (ref 40–60)
HGB BLD-MCNC: 12.4 G/DL (ref 12–16)
HGB UR QL STRIP: NEGATIVE
KETONES UR QL STRIP: NEGATIVE MG/DL
LDLC SERPL CALC-MCNC: 94 MG/DL (ref 70–100)
LEUKOCYTE ESTERASE UR QL STRIP: ABNORMAL
LYMPHOCYTES # BLD AUTO: 2.2 10*3/UL (ref 1–5)
LYMPHOCYTES NFR BLD AUTO: 35.5 % (ref 20–45)
MCH RBC QN AUTO: 28.6 PG (ref 27–31)
MCHC RBC AUTO-ENTMCNC: 31.6 G/DL (ref 33–37)
MCV RBC AUTO: 90.3 FL (ref 81–99)
MONOCYTES # BLD AUTO: 0.44 10*3/UL (ref 0.2–1.2)
MONOCYTES NFR BLD AUTO: 7.1 % (ref 3–10)
NEUTROPHILS # BLD AUTO: 3.13 10*3/UL (ref 2–8)
NEUTROPHILS NFR BLD AUTO: 50.6 % (ref 30–85)
NITRITE UR QL STRIP: NEGATIVE
NRBC CBCN: 0 % (ref 0–0.7)
OSMOLALITY SERPL CALC.SUM OF ELEC: 294 MOSM/KG (ref 273–304)
PH UR STRIP.AUTO: 6 [PH] (ref 5–8)
PLATELET # BLD AUTO: 262 10*3/UL (ref 130–400)
PMV BLD AUTO: 9.4 FL (ref 9.4–12.3)
POTASSIUM SERPL-SCNC: 4.5 MMOL/L (ref 3.5–5.3)
PROT UR QL: NEGATIVE MG/DL
RBC # BLD AUTO: 4.34 10*6/UL (ref 4.2–5.4)
RBC #/AREA URNS HPF: ABNORMAL /[HPF]
SODIUM SERPL-SCNC: 141 MMOL/L (ref 135–147)
SP GR UR: 1.01 (ref 1–1.03)
T4 FREE SERPL-MCNC: 1.5 NG/DL (ref 0.9–1.8)
TRIGL SERPL-MCNC: 111 MG/DL (ref 40–150)
TSH SERPL-ACNC: 1.8 M[IU]/L (ref 0.27–4.2)
VIT B12 SERPL-MCNC: 737 PG/ML (ref 211–911)
VLDLC SERPL-MCNC: 22 MG/DL (ref 5–37)
WBC # BLD AUTO: 6.19 10*3/UL (ref 4.8–10.8)
WBC #/AREA URNS HPF: ABNORMAL /[HPF]

## 2021-03-25 ENCOUNTER — HOSPITAL ENCOUNTER (OUTPATIENT)
Dept: VACCINE CLINIC | Facility: HOSPITAL | Age: 76
Discharge: HOME | End: 2021-03-25
Attending: INTERNAL MEDICINE

## 2021-03-30 ENCOUNTER — OFFICE VISIT CONVERTED (OUTPATIENT)
Dept: FAMILY MEDICINE CLINIC | Facility: CLINIC | Age: 76
End: 2021-03-30
Attending: FAMILY MEDICINE

## 2021-03-30 ENCOUNTER — CONVERSION ENCOUNTER (OUTPATIENT)
Dept: FAMILY MEDICINE CLINIC | Facility: CLINIC | Age: 76
End: 2021-03-30

## 2021-05-10 NOTE — H&P
History and Physical      Patient Name: Berna Leong   Patient ID: [id number]   Sex: Female   YOB: 1945    Primary Care Provider: Cesar Weber MD   Referring Provider: Cesar Weber MD    Visit Date: November 18, 2020    Provider: Lorenzo Wick MD   Location: [facility]   Location Address: [address] [place] [address]  [id number]   Location Phone: [phone]          Chief Complaint  · Right Knee Pain      History Of Present Illness  Berna Leong is a 75 year old /White female who presents today to [facility].      Patient presents today for an evaluation of right knee pain. Patient states her thyroid was low and potassium was high and she was experiencing dizzy spells. She went out to get [employer] the following day and when she was leaving, she had a fall sustaining a right knee injury. She states this happened 10 days ago. The pain has improved since but she still is having a lot of pain.       Past Medical History  Arthritis; Heart Attack; Hypothyroidism; Kidney Disease; Leg pain; Leg swelling; Limb Swelling; Muscle cramps; Shortness of Breath; Thyroid disease; Thyroid disorder         Past Surgical History  Hysterectomy; Joint Surgery         Medication List  Eye Caps; Florajen3 460 mg (7.5-6- 1.5 bill. cell) oral capsule; Synthroid 75 mcg oral tablet; Vitamin C 1,000 mg oral tablet; Vitamin D3 125 mcg (5,000 unit) oral tablet         Allergy List  PENICILLINS; SULFA (SULFONAMIDES)       Allergies Reconciled  Family Medical History  Heart Disease; Cancer, Unspecified; Family history of certain chronic disabling diseases; arthritis; Family history of Arthritis; Family history of cancer; Family history of heart disease         Social History  Alcohol (Never); Alcohol Use (Never); lives alone; Recreational Drug Use (Never); Retired; Retired.; Single.; Tobacco (Never)         Immunizations  Name Date Admin   Influenza Refused   Influenza Refused         Review  "of Systems  · Constitutional  o Denies  o : fever, chills, weight loss  · Cardiovascular  o Denies  o : chest pain, shortness of breath  · Gastrointestinal  o Denies  o : liver disease, heartburn, nausea, blood in stools  · Genitourinary  o Denies  o : painful urination, blood in urine  · Integument  o Denies  o : rash, itching  · Neurologic  o Denies  o : headache, weakness, loss of consciousness  · Musculoskeletal  o Denies  o : painful, swollen joints  · Psychiatric  o Denies  o : drug/alcohol addiction, anxiety, depression      Vitals  Date Time BP Position Site L\R Cuff Size HR RR TEMP (F) WT  HT  BMI kg/m2 BSA m2 O2 Sat FR L/min FiO2        11/18/2020 08:50 AM         174lbs 0oz 5'  2\" 31.82 1.86             Physical Examination  · Constitutional  o Appearance  o : well developed, well-nourished, no obvious deformities present  · Head and Face  o Head  o :   § Inspection  § : normocephalic  o Face  o :   § Inspection  § : no facial lesions  · Eyes  o Conjunctivae  o : conjunctivae normal  o Sclerae  o : sclerae white  · Ears, Nose, Mouth and Throat  o Ears  o :   § External Ears  § : appearance within normal limits  § Hearing  § : intact  o Nose  o :   § External Nose  § : appearance normal  · Neck  o Inspection/Palpation  o : normal appearance  o Range of Motion  o : full range of motion  · Respiratory  o Respiratory Effort  o : breathing unlabored  o Inspection of Chest  o : normal appearance  o Auscultation of Lungs  o : no audible wheezing or rales  · Cardiovascular  o Heart  o : regular rate  · Gastrointestinal  o Abdominal Examination  o : soft and non-tender  · Skin and Subcutaneous Tissue  o General Inspection  o : intact, no rashes  · Psychiatric  o General  o : Alert and oriented x3  o Judgement and Insight  o : judgment and insight intact  o Mood and Affect  o : mood normal, affect appropriate  · Right Knee  o Inspection  o : Sensation grossly intact. Neurovascular intact. Skin intact. Mild " swelling. No skin discoloration or atrophy. Calf supple, non-tender. Full weightbearing. Positive crepitus. Stable to valgus/varus stress. Good strength in quadriceps, hamstrings, dorsiflexors, and plantar flexors. Dorsal Pedal Pulse 2+, posterior tibialis pulse 2+. Full flexion and extension.   · Imaging  o Imaging  o : 11/8/20 XRAY: 1. No acute fracture or malalignment. 2. Moderate osteoarthritis 3. Small knee joint effusion           Assessment  · Primary osteoarthritis of right knee     715.16/M17.11  · Right knee pain, unspecified chronicity     719.46/M25.561      Plan  · Medications  o Medications have been Reconciled  o Transition of Care or Provider Policy  · Instructions  o Dr. Wick saw and examined the patient and agrees with plan.   o X-rays reviewed by Dr. Wick.  o Reviewed the patient's Past Medical, Social, and Family history as well as the ROS at today's visit, no changes.  o Call or return if worsening symptoms.  o Follow Up PRN.  o This note was transcribed by Naida Deleon. jose r  o Discussed diagnosis and treatment options with the patient. Patient opted to save the injection for when her knee worsens. Since pain is improving she will just continue her at home conservative treatment and watch her right knee.            Electronically Signed by: Naida Deleon-, Other -Author on November 19, 2020 02:48:16 PM  Electronically Co-signed by: Lorenzo Wick MD -Reviewer on November 20, 2020 10:45:42 PM

## 2021-05-13 NOTE — PROGRESS NOTES
Progress Note      Patient Name: Berna Leong   Patient ID: [id number]   Sex: Female   YOB: 1945    Primary Care Provider: Cesar Weber MD   Referring Provider: Cesar Weber MD    Visit Date: July 20, 2020    Provider: Cesar Weber MD   Location: [facility]   Location Address: [address] [place] [address], [address] [place] [address]  [id number]   Location Phone: [phone]          Chief Complaint     Follow up 3 months       History Of Present Illness  Berna Leong is a 75 year old /White female who presents for evaluation and treatment of:      Pt presents for f/u.     Hx of hypothyroidism. She is compliaint with synthroid. She has seen Dr. Arreguin in past.   She is on 88 mcg..the thyroid is hyperfunctioning today.      Hx of low vitamin C. she is on vitamin C.. helps with arthritis also per pt.     Hx of low vitamin D. she is currently on 5000 from 1000..the vitamin D is now normal.       Hx of CKD. stable.    hx of b12 was low normal. The b12 injections cuases side effects per pt. She can tolerate pill.    hx of elevated potassium level.... Dr. Dobbins correted it. today potassium is normal.       Past Medical History  Disease Name Date Onset Notes   Arthritis --  --    Heart Attack --  --    Hypothyroidism --  --    Kidney Disease --  --    Leg pain --  --    Leg swelling --  --    Limb Swelling --  --    Muscle cramps --  --    Shortness of Breath --  --    Thyroid disease --  --    Thyroid disorder --  --          Past Surgical History  Procedure Name Date Notes   Hysterectomy --  --    Joint Surgery 2008 Rt knee         Medication List  Name Date Started Instructions   Eye Caps  --    Florajen3 460 mg (7.5-6- 1.5 bill. cell) oral capsule 07/20/2020 take 1 capsule by oral route daily for 90 days   Synthroid 75 mcg oral tablet 07/20/2020 take 1 tablet po daily   Vitamin C 1,000 mg oral tablet  --    Vitamin D3 125 mcg (5,000 unit) oral tablet 03/17/2020 take 1 tablet by oral route  "daily for 90 days         Allergy List  Allergen Name Date Reaction Notes   PENICILLINS --  --  --    SULFA (SULFONAMIDES) --  --  --        Allergies Reconciled  Family Medical History  Disease Name Relative/Age Notes   Heart Disease Father/  Mother/   Mother; Father  Father; Mother   Cancer, Unspecified Brother/   Brother   Family history of certain chronic disabling diseases; arthritis Mother/   Mother   Family history of Arthritis Mother/   Mother   Family history of cancer Brother/   Brother   Family history of heart disease Father/  Mother/   Mother; Father         Social History  Finding Status Start/Stop Quantity Notes   Alcohol Never --/-- --  02/03/2020 - does not drink   Alcohol Use Never --/-- --  does not drink   lives alone --  --/-- --  --    Recreational Drug Use Never --/-- --  no  02/14/2020 - no   Retired --  --/-- --  --    Retired. --  --/-- --  --    Single. --  --/-- --  --    Tobacco Never --/-- --  never smoker  02/14/2020 - 02/03/2020 - never smoker         Immunizations  NameDate Admin Mfg Trade Name Lot Number Route Inj VIS Given VIS Publication   InfluenzaRefused 10/25/2019 NE Not Entered  NE NE     Comments:    InfluenzaRefused 05/20/2019 NE Not Entered  NE NE     Comments:          Review of Systems  · Constitutional  o Denies  o : fever, weight loss, weight gain  · Cardiovascular  o Denies  o : pedal edema, claudication, chest pressure, palpitations  · Respiratory  o Denies  o : shortness of breath, wheezing, cough, hemoptysis, dyspnea on exertion  · Gastrointestinal  o Denies  o : nausea, vomiting, diarrhea, constipation, abdominal pain      Vitals  Date Time BP Position Site L\R Cuff Size HR RR TEMP (F) WT  HT  BMI kg/m2 BSA m2 O2 Sat        07/20/2020 09:54 /52 Sitting    82 - R  98.5 176lbs 16oz 4'  5.2\" 43.97 1.74 97 %          Physical Examination  · Constitutional  o Appearance  o : alert, in no acute distress, well developed, well-nourished  · Head and " Face  o Head  o : normocephalic, atraumatic, non tender, no palpable masses or nodules.  o Face  o : no facial lesions  · Eyes  o Vision  o : Acuity: grossly normal at distance, Conjuntivae: Normal, Sclerae white  · Respiratory  o Auscultation of Lungs  o : normal breath sounds throughout  · Cardiovascular  o Heart  o : Regular rate and rhythm, Normal S1,S2   · Psychiatric  o Mood and Affect  o : normal mood and affect          Assessment  · Hypothyroidism     244.9/E03.9  · Screening for cardiovascular condition     V81.2/Z13.6  · Vitamin D deficiency     268.9/E55.9  · Vitamin B12 deficiency     266.2/E53.8  · Chronic kidney disease     585.9/N18.9  · Routine lab draw     V72.60/Z01.89  · Hypokalemia     276.8/E87.6       f/u as directe.  reduce the thyroid med to 75 mcg.  repeat in 1 month along with potassoium level  next viist medicare CPX.    also reduce vitamin D to 2000 units next visit.     Problems Reconciled  Plan  · Orders  o Urinalysis with Reflex Microscopy if abnormal ([facility]) (83084) - 585.9/N18.9, V72.60/Z01.89 - 01/20/2021  o Urine Culture [facility] (12798) - 585.9/N18.9, V72.60/Z01.89 - 01/20/2021  o Physical, Primary Care Panel (CBC, CMP, Lipid, TSH) [facility] (65774, 36733, 17577, 22273) - 244.9/E03.9, V81.2/Z13.6, V72.60/Z01.89 - 01/20/2021  o Free T4 (27910) - 244.9/E03.9, V72.60/Z01.89 - 01/20/2021  o Vitamin D (25-Hydroxy) Level (45027) - 268.9/E55.9, V72.60/Z01.89 - 01/20/2021  o B12 Folate levels (B12FO) - 266.2/E53.8, V72.60/Z01.89 - 01/20/2021  o Thyroid Profile (TSH, FT4) (01040, 37729, THYII) - 244.9/E03.9 - 08/17/2020  o Potassium (Serum) (57249) - 276.8/E87.6 - 08/17/2020  o ACO-14: Influenza immunization was not administered for reasons documented () - - 07/20/2020  o ACO-39: Current medications updated and reviewed () - - 07/20/2020  · Medications  o Medications have been Reconciled  o Transition of Care or Provider Policy  · Instructions  o Electronically Identified Patient Education  Materials Provided Electronically  · Disposition  o Call or Return if symptoms worsen or persist.  o Care Transition            Electronically Signed by: Cesar Weber MD -Author on July 20, 2020 12:21:57 PM

## 2021-05-13 NOTE — PROGRESS NOTES
Progress Note      Patient Name: Berna Leong   Patient ID: [id number]   Sex: Female   YOB: 1945    Primary Care Provider: Cesar Weber MD   Referring Provider: Cesar Weber MD    Visit Date: September 21, 2020    Provider: Cesar Weber MD   [facility]   Location Address: [address] [place] [address], [address] [place] [address]  [id number]   Location Phone: [phone]          Chief Complaint     Follow up on medicines       History Of Present Illness  Berna Leong is a 75 year old /White female who presents for evaluation and treatment of:      Hx of hypothyroidism. She is compliaint with synthroid. She has seen Dr. Arreguin in past.   She is on 75 mcg daily... the thyroid is now controlled.         hx of elevated potassium level.... Dr. Dobbins correted it. THe potassium is normal.       Past Medical History  Disease Name Date Onset Notes   Arthritis --  --    Heart Attack --  --    Hypothyroidism --  --    Kidney Disease --  --    Leg pain --  --    Leg swelling --  --    Limb Swelling --  --    Muscle cramps --  --    Shortness of Breath --  --    Thyroid disease --  --    Thyroid disorder --  --          Past Surgical History  Procedure Name Date Notes   Hysterectomy --  --    Joint Surgery 2008 Rt knee         Medication List  Name Date Started Instructions   Eye Caps  --    Florajen3 460 mg (7.5-6- 1.5 bill. cell) oral capsule 07/20/2020 take 1 capsule by oral route daily for 90 days   Synthroid 75 mcg oral tablet 07/20/2020 take 1 tablet po daily   Vitamin C 1,000 mg oral tablet  --    Vitamin D3 125 mcg (5,000 unit) oral tablet 03/17/2020 take 1 tablet by oral route daily for 90 days         Allergy List  Allergen Name Date Reaction Notes   PENICILLINS --  --  --    SULFA (SULFONAMIDES) --  --  --        Allergies Reconciled  Family Medical History  Disease Name Relative/Age Notes   Heart Disease Father/  Mother/   Mother; Father  Father; Mother   Cancer,  "Unspecified Brother/   Brother   Family history of certain chronic disabling diseases; arthritis Mother/   Mother   Family history of Arthritis Mother/   Mother   Family history of cancer Brother/   Brother   Family history of heart disease Father/  Mother/   Mother; Father         Social History  Finding Status Start/Stop Quantity Notes   Alcohol Never --/-- --  02/03/2020 - does not drink   Alcohol Use Never --/-- --  does not drink   lives alone --  --/-- --  --    Recreational Drug Use Never --/-- --  no  02/14/2020 - no   Retired --  --/-- --  --    Retired. --  --/-- --  --    Single. --  --/-- --  --    Tobacco Never --/-- --  never smoker  02/14/2020 - 02/03/2020 - never smoker         Immunizations  NameDate Admin Mfg Trade Name Lot Number Route Inj VIS Given VIS Publication   InfluenzaRefused 10/25/2019 NE Not Entered  NE NE     Comments:    InfluenzaRefused 05/20/2019 NE Not Entered  NE NE     Comments:          Review of Systems  · Constitutional  o Denies  o : fever, weight loss, weight gain  · Cardiovascular  o Denies  o : pedal edema, claudication, chest pressure, palpitations  · Respiratory  o Denies  o : shortness of breath, wheezing, cough, hemoptysis, dyspnea on exertion  · Gastrointestinal  o Denies  o : nausea, vomiting, diarrhea, constipation, abdominal pain      Vitals  Date Time BP Position Site L\R Cuff Size HR RR TEMP (F) WT  HT  BMI kg/m2 BSA m2 O2 Sat        09/21/2020 09:25 /67 Sitting    78 - R  97.8 174lbs 7oz 5'  2\" 31.9 1.86 94 %          Physical Examination  · Constitutional  o Appearance  o : alert, in no acute distress, well developed, well-nourished  · Head and Face  o Head  o : normocephalic, atraumatic, non tender, no palpable masses or nodules.  o Face  o : no facial lesions  · Eyes  o Vision  o : Acuity: grossly normal at distance, Conjuntivae: Normal, Sclerae white  · Respiratory  o Auscultation of Lungs  o : normal breath sounds " throughout  · Cardiovascular  o Heart  o : Regular rate and rhythm, Normal S1,S2   · Psychiatric  o Mood and Affect  o : normal mood and affect          Assessment  · Hypothyroidism     244.9/E03.9  · Elevated glucose     790.29/R73.09       f/u as directed  recheck labs in 1 month  will monitor potassium level.       Plan  · Orders  o Hgb A1c [facility] (07283) - 244.9/E03.9, 790.29/R73.09 - 10/21/2020  o CMP [facility] (41643) - 244.9/E03.9, 790.29/R73.09 - 10/21/2020  o TSH and FT4 (18406, 06429, THYII) - 244.9/E03.9, 790.29/R73.09 - 10/21/2020  o ACO-14: Influenza immunization was not administered for reasons documented () - - 09/22/2020  o ACO-39: Current medications updated and reviewed () - - 09/22/2020  · Medications  o Medications have been Reconciled  o Transition of Care or Provider Policy  · Instructions  o Electronically Identified Patient Education Materials Provided Electronically  · Disposition  o Call or Return if symptoms worsen or persist.  o Care Transition            Electronically Signed by: Cesar Weber MD -Author on September 22, 2020 05:16:12 AM

## 2021-05-14 VITALS
BODY MASS INDEX: 32.94 KG/M2 | TEMPERATURE: 97.3 F | WEIGHT: 179 LBS | DIASTOLIC BLOOD PRESSURE: 80 MMHG | RESPIRATION RATE: 18 BRPM | OXYGEN SATURATION: 95 % | SYSTOLIC BLOOD PRESSURE: 132 MMHG | HEART RATE: 75 BPM | HEIGHT: 62 IN

## 2021-05-14 VITALS — WEIGHT: 174 LBS | HEIGHT: 62 IN | BODY MASS INDEX: 32.02 KG/M2

## 2021-05-14 VITALS
WEIGHT: 174.44 LBS | DIASTOLIC BLOOD PRESSURE: 67 MMHG | BODY MASS INDEX: 32.1 KG/M2 | TEMPERATURE: 97.8 F | OXYGEN SATURATION: 94 % | HEART RATE: 78 BPM | HEIGHT: 62 IN | SYSTOLIC BLOOD PRESSURE: 138 MMHG

## 2021-05-14 NOTE — PROGRESS NOTES
Progress Note      Patient Name: Berna Leong   Patient ID: [id number]   Sex: Female   YOB: 1945    Primary Care Provider: Cesar Weber MD   Referring Provider: Cesar Weber MD    Visit Date: March 30, 2021    Provider: Cesar Weber MD   Location: [facility]   Location Address: [address] [place] [address], [address] [place] [address]  [id number]   Location Phone: [phone]          Chief Complaint     Follow up       History Of Present Illness  Berna Leong is a 76 year old /White female who presents for evaluation and treatment of:      Hx of hypothyroidism. She is compliaint with synthroid. She has seen Dr. Arreguin in past.   She is on 75 mcg daily...Controlled.        hx of elevated potassium level.... It is normal today.    labs otherwise in good range.          Past Medical History  Disease Name Date Onset Notes   Arthritis --  --    Heart Attack --  --    Hypothyroidism --  --    Kidney Disease --  --    Leg pain --  --    Leg swelling --  --    Limb Swelling --  --    Muscle cramps --  --    Shortness of Breath --  --    Thyroid disease --  --    Thyroid disorder --  --          Past Surgical History  Procedure Name Date Notes   Hysterectomy --  --    Joint Surgery 2008 Rt knee         Medication List  Name Date Started Instructions   Eye Caps  --    Florajen3 460 mg (7.5-6- 1.5 bill. cell) oral capsule 07/20/2020 take 1 capsule by oral route daily for 90 days   Synthroid 75 mcg oral tablet 03/30/2021 TAKE ONE TABLET BY MOUTH EVERY DAY   Vitamin C 1,000 mg oral tablet  --    Vitamin D3 125 mcg (5,000 unit) oral tablet 03/17/2020 take 1 tablet by oral route daily for 90 days         Allergy List  Allergen Name Date Reaction Notes   PENICILLINS --  --  --    SULFA (SULFONAMIDES) --  --  --        Allergies Reconciled  Family Medical History  Disease Name Relative/Age Notes   Heart Disease Father/  Mother/   Mother; Father  Father; Mother   Cancer, Unspecified  "Brother/   Brother   Family history of certain chronic disabling diseases; arthritis Mother/   Mother   Family history of Arthritis Mother/   Mother   Family history of cancer Brother/   Brother   Family history of heart disease Father/  Mother/   Mother; Father         Social History  Finding Status Start/Stop Quantity Notes   Alcohol Never --/-- --  02/03/2020 - does not drink   Alcohol Use Never --/-- --  does not drink   lives alone --  --/-- --  --    Recreational Drug Use Never --/-- --  no  02/14/2020 - no   Retired --  --/-- --  --    Retired. --  --/-- --  --    Single. --  --/-- --  --    Tobacco Never --/-- --  never smoker  02/14/2020 - 02/03/2020 - never smoker         Immunizations  NameDate Admin Mfg Trade Name Lot Number Route Inj VIS Given VIS Publication   COVID [id number]/25/2020 MOD Moderna COVID-19 Vaccine  NE NE 03/30/2021    Comments:    COVID Ixietyv9102/25/2020 MOD Moderna COVID-19 Vaccine  NE NE 03/30/2021    Comments:    InfluenzaRefused 10/25/2019 NE Not Entered  NE NE     Comments:          Review of Systems  · Constitutional  o Denies  o : fever, weight loss, weight gain  · Cardiovascular  o Denies  o : pedal edema, claudication, chest pressure, palpitations  · Respiratory  o Denies  o : shortness of breath, wheezing, cough, hemoptysis, dyspnea on exertion  · Gastrointestinal  o Denies  o : nausea, vomiting, diarrhea, constipation, abdominal pain      Vitals  Date Time BP Position Site L\R Cuff Size HR RR TEMP (F) WT  HT  BMI kg/m2 BSA m2 O2 Sat FR L/min FiO2        03/30/2021 09:35 /80 Sitting    75 - R 18 97.3 179lbs 0oz 5'  2\" 32.74 1.88 95 %  21%          Physical Examination  · Constitutional  o Appearance  o : alert, in no acute distress, well developed, well-nourished  · Head and Face  o Head  o : normocephalic, atraumatic, non tender, no palpable masses or nodules.  o Face  o : no facial lesions  · Eyes  o Vision  o : Acuity: grossly normal at distance, Conjuntivae: " Normal, Sclerae white  · Respiratory  o Auscultation of Lungs  o : normal breath sounds throughout  · Cardiovascular  o Heart  o : Regular rate and rhythm, Normal S1,S2   · Psychiatric  o Mood and Affect  o : normal mood and affect          Assessment  · Vitamin D deficiency     268.9/E55.9  · Hypothyroidism     244.9/E03.9       f/u as directed.  labs in 3 months  she wants to be seen every 3 months.       Plan  · Orders  o CMP [facility] (48243) - 268.9/E55.9, 244.9/E03.9 - 06/30/2021  o TSH and FT4 (78148, 72543, THYII) - 268.9/E55.9, 244.9/E03.9 - 06/30/2021  o Vitamin D (25-Hydroxy) Level (82558) - 268.9/E55.9, 244.9/E03.9 - 06/30/2021  o ACO-14: Influenza immunization was not administered for reasons documented () - - 03/30/2021  o ACO-39: Current medications updated and reviewed (1159F, ) - - 03/30/2021  · Medications  o Medications have been Reconciled  o Transition of Care or Provider Policy  · Instructions  o Electronically Identified Patient Education Materials Provided Electronically  · Disposition  o Call or Return if symptoms worsen or persist.  o Care Transition            Electronically Signed by: Cesar Weber MD -Author on March 30, 2021 12:40:38 PM

## 2021-05-15 VITALS
BODY MASS INDEX: 33.36 KG/M2 | HEART RATE: 96 BPM | WEIGHT: 181.31 LBS | SYSTOLIC BLOOD PRESSURE: 162 MMHG | DIASTOLIC BLOOD PRESSURE: 62 MMHG | HEIGHT: 62 IN | OXYGEN SATURATION: 98 % | RESPIRATION RATE: 21 BRPM | TEMPERATURE: 95.8 F

## 2021-05-15 VITALS
DIASTOLIC BLOOD PRESSURE: 47 MMHG | WEIGHT: 181 LBS | SYSTOLIC BLOOD PRESSURE: 129 MMHG | HEART RATE: 83 BPM | HEIGHT: 62 IN | BODY MASS INDEX: 33.31 KG/M2

## 2021-05-15 VITALS
BODY MASS INDEX: 34.14 KG/M2 | OXYGEN SATURATION: 97 % | HEIGHT: 62 IN | HEART RATE: 76 BPM | WEIGHT: 185.5 LBS | DIASTOLIC BLOOD PRESSURE: 57 MMHG | SYSTOLIC BLOOD PRESSURE: 140 MMHG | TEMPERATURE: 97.6 F

## 2021-05-15 VITALS
BODY MASS INDEX: 32.76 KG/M2 | DIASTOLIC BLOOD PRESSURE: 46 MMHG | HEART RATE: 65 BPM | OXYGEN SATURATION: 94 % | TEMPERATURE: 97.9 F | WEIGHT: 178 LBS | SYSTOLIC BLOOD PRESSURE: 133 MMHG | HEIGHT: 62 IN

## 2021-05-15 VITALS
BODY MASS INDEX: 40.96 KG/M2 | TEMPERATURE: 98.5 F | HEART RATE: 82 BPM | WEIGHT: 177 LBS | HEIGHT: 55 IN | DIASTOLIC BLOOD PRESSURE: 52 MMHG | OXYGEN SATURATION: 97 % | SYSTOLIC BLOOD PRESSURE: 133 MMHG

## 2021-05-15 VITALS
TEMPERATURE: 97.8 F | WEIGHT: 177.5 LBS | DIASTOLIC BLOOD PRESSURE: 68 MMHG | BODY MASS INDEX: 32.66 KG/M2 | HEIGHT: 62 IN | SYSTOLIC BLOOD PRESSURE: 140 MMHG | HEART RATE: 73 BPM | OXYGEN SATURATION: 96 %

## 2021-05-15 VITALS — WEIGHT: 185.25 LBS | HEART RATE: 80 BPM | HEIGHT: 62 IN | OXYGEN SATURATION: 95 % | BODY MASS INDEX: 34.09 KG/M2

## 2021-05-15 VITALS
BODY MASS INDEX: 32.85 KG/M2 | DIASTOLIC BLOOD PRESSURE: 56 MMHG | HEART RATE: 78 BPM | OXYGEN SATURATION: 98 % | HEIGHT: 62 IN | WEIGHT: 178.5 LBS | TEMPERATURE: 97.6 F | SYSTOLIC BLOOD PRESSURE: 136 MMHG

## 2021-05-15 VITALS
RESPIRATION RATE: 24 BRPM | SYSTOLIC BLOOD PRESSURE: 135 MMHG | BODY MASS INDEX: 33.53 KG/M2 | WEIGHT: 182.19 LBS | TEMPERATURE: 97.4 F | OXYGEN SATURATION: 98 % | HEART RATE: 80 BPM | DIASTOLIC BLOOD PRESSURE: 56 MMHG | HEIGHT: 62 IN

## 2021-05-15 VITALS
SYSTOLIC BLOOD PRESSURE: 152 MMHG | HEART RATE: 90 BPM | DIASTOLIC BLOOD PRESSURE: 69 MMHG | WEIGHT: 178 LBS | BODY MASS INDEX: 32.76 KG/M2 | HEIGHT: 62 IN

## 2021-05-16 VITALS
WEIGHT: 185.12 LBS | TEMPERATURE: 96.6 F | DIASTOLIC BLOOD PRESSURE: 74 MMHG | SYSTOLIC BLOOD PRESSURE: 155 MMHG | HEIGHT: 62 IN | OXYGEN SATURATION: 96 % | BODY MASS INDEX: 34.07 KG/M2 | HEART RATE: 71 BPM

## 2021-05-16 VITALS
HEIGHT: 62 IN | BODY MASS INDEX: 33.58 KG/M2 | HEART RATE: 73 BPM | WEIGHT: 182.5 LBS | OXYGEN SATURATION: 98 % | SYSTOLIC BLOOD PRESSURE: 131 MMHG | DIASTOLIC BLOOD PRESSURE: 56 MMHG | RESPIRATION RATE: 21 BRPM | TEMPERATURE: 97.4 F

## 2021-05-16 VITALS — HEIGHT: 62 IN | WEIGHT: 185 LBS | BODY MASS INDEX: 34.04 KG/M2

## 2021-05-16 VITALS
RESPIRATION RATE: 20 BRPM | HEIGHT: 62 IN | HEART RATE: 82 BPM | SYSTOLIC BLOOD PRESSURE: 144 MMHG | WEIGHT: 181.06 LBS | BODY MASS INDEX: 33.32 KG/M2 | TEMPERATURE: 98.1 F | OXYGEN SATURATION: 98 % | DIASTOLIC BLOOD PRESSURE: 62 MMHG

## 2021-05-16 VITALS
WEIGHT: 181.44 LBS | HEIGHT: 62 IN | TEMPERATURE: 97.5 F | DIASTOLIC BLOOD PRESSURE: 61 MMHG | BODY MASS INDEX: 33.39 KG/M2 | OXYGEN SATURATION: 98 % | SYSTOLIC BLOOD PRESSURE: 152 MMHG | HEART RATE: 86 BPM

## 2021-05-16 VITALS
DIASTOLIC BLOOD PRESSURE: 68 MMHG | HEIGHT: 62 IN | HEART RATE: 79 BPM | OXYGEN SATURATION: 97 % | WEIGHT: 180.56 LBS | TEMPERATURE: 96.9 F | SYSTOLIC BLOOD PRESSURE: 148 MMHG | BODY MASS INDEX: 33.23 KG/M2

## 2021-05-16 VITALS
TEMPERATURE: 97.5 F | HEIGHT: 62 IN | BODY MASS INDEX: 33.38 KG/M2 | DIASTOLIC BLOOD PRESSURE: 68 MMHG | HEART RATE: 96 BPM | WEIGHT: 181.37 LBS | SYSTOLIC BLOOD PRESSURE: 128 MMHG | OXYGEN SATURATION: 97 %

## 2021-05-28 ENCOUNTER — HOSPITAL ENCOUNTER (OUTPATIENT)
Dept: OTHER | Facility: HOSPITAL | Age: 76
Discharge: HOME | End: 2021-05-28
Attending: INTERNAL MEDICINE

## 2021-05-28 LAB
25(OH)D3 SERPL-MCNC: 36 NG/ML (ref 30–100)
ALBUMIN SERPL-MCNC: 4.1 G/DL (ref 3.5–5)
ANION GAP SERPL CALC-SCNC: 16 MMOL/L (ref 8–19)
APPEARANCE UR: CLEAR
BILIRUB UR QL: NEGATIVE
BUN SERPL-MCNC: 21 MG/DL (ref 5–25)
BUN/CREAT SERPL: 17 {RATIO} (ref 6–20)
CALCIUM SERPL-MCNC: 8.8 MG/DL (ref 8.7–10.4)
CHLORIDE SERPL-SCNC: 103 MMOL/L (ref 99–111)
COLOR UR: ABNORMAL
CONV BACTERIA: ABNORMAL
CONV CO2: 25 MMOL/L (ref 22–32)
CONV COLLECTION SOURCE (UA): ABNORMAL
CONV CREATININE URINE, RANDOM: 265.9 MG/DL (ref 10–300)
CONV PROTEIN TO CREATININE RATIO (RANDOM URINE): 0.07 {RATIO} (ref 0–0.1)
CONV UROBILINOGEN IN URINE BY AUTOMATED TEST STRIP: 0.2 {EHRLICHU}/DL (ref 0.1–1)
CREAT UR-MCNC: 1.22 MG/DL (ref 0.5–0.9)
ERYTHROCYTE [DISTWIDTH] IN BLOOD BY AUTOMATED COUNT: 12.6 % (ref 11.7–14.4)
GFR SERPLBLD BASED ON 1.73 SQ M-ARVRAT: 42 ML/MIN/{1.73_M2}
GLUCOSE SERPL-MCNC: 93 MG/DL (ref 65–99)
GLUCOSE UR QL: NEGATIVE MG/DL
HCT VFR BLD AUTO: 37.6 % (ref 37–47)
HGB BLD-MCNC: 12.1 G/DL (ref 12–16)
HGB UR QL STRIP: NEGATIVE
KETONES UR QL STRIP: NEGATIVE MG/DL
LEUKOCYTE ESTERASE UR QL STRIP: ABNORMAL
MCH RBC QN AUTO: 28.5 PG (ref 27–31)
MCHC RBC AUTO-ENTMCNC: 32.2 G/DL (ref 33–37)
MCV RBC AUTO: 88.5 FL (ref 81–99)
NITRITE UR QL STRIP: NEGATIVE
PH UR STRIP.AUTO: 5.5 [PH] (ref 5–8)
PHOSPHATE SERPL-MCNC: 2.8 MG/DL (ref 2.4–4.5)
PLATELET # BLD AUTO: 239 10*3/UL (ref 130–400)
PMV BLD AUTO: 9.4 FL (ref 9.4–12.3)
POTASSIUM SERPL-SCNC: 4.6 MMOL/L (ref 3.5–5.3)
PROT UR QL: NEGATIVE MG/DL
PROT UR-MCNC: 17.4 MG/DL
PTH-INTACT SERPL-MCNC: 97.9 PG/ML (ref 11.1–79.5)
RBC # BLD AUTO: 4.25 10*6/UL (ref 4.2–5.4)
RBC #/AREA URNS HPF: ABNORMAL /[HPF]
SODIUM SERPL-SCNC: 139 MMOL/L (ref 135–147)
SP GR UR: 1.02 (ref 1–1.03)
SQUAMOUS SPT QL MICRO: ABNORMAL /[HPF]
WBC # BLD AUTO: 8.17 10*3/UL (ref 4.8–10.8)
WBC #/AREA URNS HPF: ABNORMAL /[HPF]

## 2021-09-07 ENCOUNTER — OFFICE VISIT (OUTPATIENT)
Dept: FAMILY MEDICINE CLINIC | Facility: CLINIC | Age: 76
End: 2021-09-07

## 2021-09-07 VITALS
SYSTOLIC BLOOD PRESSURE: 118 MMHG | HEART RATE: 86 BPM | DIASTOLIC BLOOD PRESSURE: 72 MMHG | TEMPERATURE: 96.9 F | OXYGEN SATURATION: 98 % | RESPIRATION RATE: 18 BRPM | WEIGHT: 173.3 LBS | BODY MASS INDEX: 31.7 KG/M2

## 2021-09-07 DIAGNOSIS — M79.89 SWELLING OF LEFT LOWER EXTREMITY: ICD-10-CM

## 2021-09-07 DIAGNOSIS — M79.605 PAIN OF LEFT LOWER EXTREMITY: Primary | ICD-10-CM

## 2021-09-07 DIAGNOSIS — L03.116 CELLULITIS OF LEFT LOWER EXTREMITY: ICD-10-CM

## 2021-09-07 PROCEDURE — 96372 THER/PROPH/DIAG INJ SC/IM: CPT | Performed by: FAMILY MEDICINE

## 2021-09-07 PROCEDURE — 99213 OFFICE O/P EST LOW 20 MIN: CPT | Performed by: FAMILY MEDICINE

## 2021-09-07 RX ORDER — METHYLPREDNISOLONE ACETATE 80 MG/ML
80 INJECTION, SUSPENSION INTRA-ARTICULAR; INTRALESIONAL; INTRAMUSCULAR; SOFT TISSUE ONCE
Status: COMPLETED | OUTPATIENT
Start: 2021-09-07 | End: 2021-09-07

## 2021-09-07 RX ADMIN — METHYLPREDNISOLONE ACETATE 80 MG: 80 INJECTION, SUSPENSION INTRA-ARTICULAR; INTRALESIONAL; INTRAMUSCULAR; SOFT TISSUE at 17:12

## 2021-09-07 NOTE — PROGRESS NOTES
Chief Complaint   Patient presents with   • Edema     REDDNESS LEFT LEG     Subjective   Berna Leong is a 76 y.o. female who presents to the office for follow-up.   Pt presents for eval. She was seen in Urgent care twice,  On Sept 1st, and 3rd for a redness/swollen leg. Per pt, it occurred after a hard fall at home. She was initially given keflex, but didn't help much, so she was then given doxycyline on the 3rd. Per pt, she still has some redness and warmth of the area. The redness has not spread. No fever or chills.     The following portions of the patient's history were reviewed and updated as appropriate: allergies, current medications, past family history, past medical history, past social history, past surgical history and problem list.    Review of Systems   Constitutional: Negative for chills, fever and unexpected weight loss.   Respiratory: Negative for cough, chest tightness and shortness of breath.    Cardiovascular: Negative for chest pain and palpitations.   Gastrointestinal: Negative for abdominal pain, constipation, diarrhea, nausea and vomiting.        Objective   Vitals:    09/07/21 1611   BP: 118/72   BP Location: Left arm   Patient Position: Sitting   Cuff Size: Adult   Pulse: 86   Resp: 18   Temp: 96.9 °F (36.1 °C)   SpO2: 98%   Weight: 78.6 kg (173 lb 4.8 oz)     Body mass index is 31.7 kg/m².  Physical Exam  Vitals reviewed.   Constitutional:       General: She is not in acute distress.     Appearance: Normal appearance. She is not ill-appearing.   HENT:      Head: Normocephalic.   Eyes:      Conjunctiva/sclera: Conjunctivae normal.   Cardiovascular:      Rate and Rhythm: Normal rate and regular rhythm.   Pulmonary:      Effort: Pulmonary effort is normal.      Breath sounds: Normal breath sounds.   Musculoskeletal:      Left lower leg: Edema (left lower extremity localized. ) present.   Skin:     Findings: Bruising (inflammation/redness/hematoma of the left  mid lower extremity) present.  No lesion or rash.   Neurological:      Mental Status: She is alert and oriented to person, place, and time.   Psychiatric:         Mood and Affect: Mood normal.          Assessment/Plan   Diagnoses and all orders for this visit:    1. Pain of left lower extremity (Primary)  -     Duplex Venous Lower Extremity - Left CAR    2. Swelling of left lower extremity  -     Duplex Venous Lower Extremity - Left CAR    3. Cellulitis of left lower extremity  -     cefTRIAXone (ROCEPHIN) 350 mg/ml in lidocaine 1% IM syringe (1 gm vial)  -     methylPREDNISolone acetate (DEPO-medrol) injection 80 mg         More inflammation,very little cellulitis. Rocephin shot. Continue with doxycycline given at urgent care  Alternate between ice and heat.  Steroid shot.    F/u on next week.      Return in about 1 week (around 9/14/2021).   PHQ-2/PHQ-9 Depression Screening 9/7/2021   Little interest or pleasure in doing things 0   Feeling down, depressed, or hopeless 0   Total Score 0

## 2021-09-14 ENCOUNTER — OFFICE VISIT (OUTPATIENT)
Dept: FAMILY MEDICINE CLINIC | Facility: CLINIC | Age: 76
End: 2021-09-14

## 2021-09-14 VITALS
BODY MASS INDEX: 31.76 KG/M2 | OXYGEN SATURATION: 97 % | WEIGHT: 172.6 LBS | HEART RATE: 75 BPM | RESPIRATION RATE: 17 BRPM | TEMPERATURE: 97.5 F | HEIGHT: 62 IN | SYSTOLIC BLOOD PRESSURE: 138 MMHG | DIASTOLIC BLOOD PRESSURE: 79 MMHG

## 2021-09-14 DIAGNOSIS — K59.04 CHRONIC IDIOPATHIC CONSTIPATION: ICD-10-CM

## 2021-09-14 DIAGNOSIS — M79.605 LEFT LEG PAIN: Primary | ICD-10-CM

## 2021-09-14 PROBLEM — N28.9 KIDNEY DISEASE: Status: ACTIVE | Noted: 2021-09-14

## 2021-09-14 PROBLEM — N94.9 ADNEXAL CYST: Status: ACTIVE | Noted: 2019-10-18

## 2021-09-14 PROBLEM — M19.90 ARTHRITIS: Status: ACTIVE | Noted: 2021-09-14

## 2021-09-14 PROBLEM — I21.9 HEART ATTACK (HCC): Status: ACTIVE | Noted: 2021-09-14

## 2021-09-14 PROBLEM — R06.02 SHORTNESS OF BREATH: Status: ACTIVE | Noted: 2021-09-14

## 2021-09-14 PROBLEM — E03.9 HYPOTHYROIDISM: Status: ACTIVE | Noted: 2021-09-14

## 2021-09-14 PROBLEM — R25.2 MUSCLE CRAMPS: Status: ACTIVE | Noted: 2021-09-14

## 2021-09-14 PROBLEM — M79.89 LIMB SWELLING: Status: ACTIVE | Noted: 2021-09-14

## 2021-09-14 PROBLEM — M79.606 LEG PAIN: Status: ACTIVE | Noted: 2021-09-14

## 2021-09-14 PROCEDURE — 99214 OFFICE O/P EST MOD 30 MIN: CPT | Performed by: FAMILY MEDICINE

## 2021-09-14 PROCEDURE — 96372 THER/PROPH/DIAG INJ SC/IM: CPT | Performed by: FAMILY MEDICINE

## 2021-09-14 RX ORDER — DOCUSATE SODIUM 100 MG/1
100 CAPSULE, LIQUID FILLED ORAL DAILY
Qty: 30 CAPSULE | Refills: 6 | Status: SHIPPED | OUTPATIENT
Start: 2021-09-14 | End: 2021-09-14 | Stop reason: SDUPTHER

## 2021-09-14 RX ORDER — DOCUSATE SODIUM 100 MG/1
100 CAPSULE, LIQUID FILLED ORAL DAILY
Qty: 30 CAPSULE | Refills: 6 | Status: SHIPPED | OUTPATIENT
Start: 2021-09-14 | End: 2022-04-21

## 2021-09-14 RX ORDER — METHYLPREDNISOLONE ACETATE 80 MG/ML
80 INJECTION, SUSPENSION INTRA-ARTICULAR; INTRALESIONAL; INTRAMUSCULAR; SOFT TISSUE ONCE
Status: COMPLETED | OUTPATIENT
Start: 2021-09-14 | End: 2021-09-14

## 2021-09-14 RX ADMIN — METHYLPREDNISOLONE ACETATE 80 MG: 80 INJECTION, SUSPENSION INTRA-ARTICULAR; INTRALESIONAL; INTRAMUSCULAR; SOFT TISSUE at 08:36

## 2021-09-14 NOTE — PROGRESS NOTES
"  Chief Complaint   Patient presents with   • Leg Pain     edema of left leg     Subjective   Berna Leong is a 76 y.o. female who presents to the office for follow-up.     Pt presents for f/u. On last visit the left lower extremity was evaluated. Diagnosed with inflaqmmation/very mild celluliitis after a fall.   She was given rocephin/and steroid shot along with doxycycline. Redness is better. She still has periods of pain of the left leg, especially with walking. The xrays showed no fracture, and the ultrasound showed no DVT.    She also c/o chronic constipation. And would like a stool softener.    The following portions of the patient's history were reviewed and updated as appropriate: allergies, current medications, past family history, past medical history, past social history, past surgical history and problem list.    Review of Systems   Constitutional: Negative for chills, fever and unexpected weight loss.   Respiratory: Negative for cough, chest tightness and shortness of breath.    Cardiovascular: Negative for chest pain and palpitations.   Gastrointestinal: Negative for abdominal pain, constipation, diarrhea, nausea and vomiting.        Objective   Vitals:    09/14/21 0736   BP: 138/79   Pulse: 75   Resp: 17   Temp: 97.5 °F (36.4 °C)   SpO2: 97%   Weight: 78.3 kg (172 lb 9.6 oz)   Height: 157.5 cm (62\")     Body mass index is 31.57 kg/m².  Physical Exam  Vitals reviewed.   Constitutional:       General: She is not in acute distress.     Appearance: Normal appearance. She is not ill-appearing.   HENT:      Head: Normocephalic.   Eyes:      Conjunctiva/sclera: Conjunctivae normal.   Cardiovascular:      Rate and Rhythm: Normal rate and regular rhythm.   Pulmonary:      Effort: Pulmonary effort is normal.      Breath sounds: Normal breath sounds.   Skin:     Findings: No lesion or rash.   Neurological:      Mental Status: She is alert and oriented to person, place, and time.   Psychiatric:         Mood and " Affect: Mood normal.          Assessment/Plan   Diagnoses and all orders for this visit:    1. Left leg pain (Primary)  -     methylPREDNISolone acetate (DEPO-medrol) injection 80 mg    2. Chronic idiopathic constipation    Other orders  -     Discontinue: docusate sodium (Colace) 100 MG capsule; Take 1 capsule by mouth Daily.  Dispense: 30 capsule; Refill: 6  -     docusate sodium (Colace) 100 MG capsule; Take 1 capsule by mouth Daily.  Dispense: 30 capsule; Refill: 6           Will do one more steroid injection.   Continue with ice/and heat.    Docusate for stool softener.    No follow-ups on file.   PHQ-2/PHQ-9 Depression Screening 9/7/2021   Little interest or pleasure in doing things 0   Feeling down, depressed, or hopeless 0   Total Score 0

## 2021-10-15 RX ORDER — LEVOTHYROXINE SODIUM 75 MCG
TABLET ORAL
Qty: 90 TABLET | Refills: 1 | Status: SHIPPED | OUTPATIENT
Start: 2021-10-15 | End: 2021-11-04

## 2021-10-28 ENCOUNTER — LAB (OUTPATIENT)
Dept: LAB | Facility: HOSPITAL | Age: 76
End: 2021-10-28

## 2021-10-28 ENCOUNTER — TRANSCRIBE ORDERS (OUTPATIENT)
Dept: FAMILY MEDICINE CLINIC | Facility: CLINIC | Age: 76
End: 2021-10-28

## 2021-10-28 ENCOUNTER — TRANSCRIBE ORDERS (OUTPATIENT)
Dept: LAB | Facility: HOSPITAL | Age: 76
End: 2021-10-28

## 2021-10-28 DIAGNOSIS — E55.9 VITAMIN D DEFICIENCY: ICD-10-CM

## 2021-10-28 DIAGNOSIS — E03.9 HYPOTHYROIDISM, UNSPECIFIED TYPE: ICD-10-CM

## 2021-10-28 DIAGNOSIS — E55.9 VITAMIN D DEFICIENCY: Primary | ICD-10-CM

## 2021-10-29 ENCOUNTER — LAB (OUTPATIENT)
Dept: LAB | Facility: HOSPITAL | Age: 76
End: 2021-10-29

## 2021-10-29 ENCOUNTER — TRANSCRIBE ORDERS (OUTPATIENT)
Dept: ADMINISTRATIVE | Facility: HOSPITAL | Age: 76
End: 2021-10-29

## 2021-10-29 DIAGNOSIS — E03.9 HYPOTHYROIDISM, UNSPECIFIED TYPE: ICD-10-CM

## 2021-10-29 DIAGNOSIS — E55.9 VITAMIN D DEFICIENCY: ICD-10-CM

## 2021-10-29 DIAGNOSIS — E55.9 VITAMIN D DEFICIENCY: Primary | ICD-10-CM

## 2021-10-29 LAB
25(OH)D3 SERPL-MCNC: 38.2 NG/ML (ref 30–100)
ALBUMIN SERPL-MCNC: 4.2 G/DL (ref 3.5–5.2)
ALBUMIN/GLOB SERPL: 1.3 G/DL
ALP SERPL-CCNC: 115 U/L (ref 39–117)
ALT SERPL W P-5'-P-CCNC: 22 U/L (ref 1–33)
ANION GAP SERPL CALCULATED.3IONS-SCNC: 9.1 MMOL/L (ref 5–15)
AST SERPL-CCNC: 24 U/L (ref 1–32)
BILIRUB SERPL-MCNC: 0.3 MG/DL (ref 0–1.2)
BUN SERPL-MCNC: 25 MG/DL (ref 8–23)
BUN/CREAT SERPL: 22.5 (ref 7–25)
CALCIUM SPEC-SCNC: 9.5 MG/DL (ref 8.6–10.5)
CHLORIDE SERPL-SCNC: 104 MMOL/L (ref 98–107)
CO2 SERPL-SCNC: 29.9 MMOL/L (ref 22–29)
CREAT SERPL-MCNC: 1.11 MG/DL (ref 0.57–1)
GFR SERPL CREATININE-BSD FRML MDRD: 48 ML/MIN/1.73
GLOBULIN UR ELPH-MCNC: 3.2 GM/DL
GLUCOSE SERPL-MCNC: 108 MG/DL (ref 65–99)
POTASSIUM SERPL-SCNC: 4.6 MMOL/L (ref 3.5–5.2)
PROT SERPL-MCNC: 7.4 G/DL (ref 6–8.5)
SODIUM SERPL-SCNC: 143 MMOL/L (ref 136–145)
T4 FREE SERPL-MCNC: 1.34 NG/DL (ref 0.93–1.7)
TSH SERPL DL<=0.05 MIU/L-ACNC: 4.42 UIU/ML (ref 0.27–4.2)

## 2021-10-29 PROCEDURE — 84443 ASSAY THYROID STIM HORMONE: CPT

## 2021-10-29 PROCEDURE — 84439 ASSAY OF FREE THYROXINE: CPT

## 2021-10-29 PROCEDURE — 82306 VITAMIN D 25 HYDROXY: CPT

## 2021-10-29 PROCEDURE — 80053 COMPREHEN METABOLIC PANEL: CPT

## 2021-10-29 PROCEDURE — 36415 COLL VENOUS BLD VENIPUNCTURE: CPT

## 2021-11-04 ENCOUNTER — OFFICE VISIT (OUTPATIENT)
Dept: FAMILY MEDICINE CLINIC | Facility: CLINIC | Age: 76
End: 2021-11-04

## 2021-11-04 VITALS
TEMPERATURE: 98.3 F | HEIGHT: 62 IN | RESPIRATION RATE: 19 BRPM | OXYGEN SATURATION: 98 % | SYSTOLIC BLOOD PRESSURE: 125 MMHG | HEART RATE: 65 BPM | WEIGHT: 178.6 LBS | BODY MASS INDEX: 32.87 KG/M2 | DIASTOLIC BLOOD PRESSURE: 74 MMHG

## 2021-11-04 DIAGNOSIS — R73.09 ELEVATED GLUCOSE: ICD-10-CM

## 2021-11-04 DIAGNOSIS — E03.9 ACQUIRED HYPOTHYROIDISM: ICD-10-CM

## 2021-11-04 DIAGNOSIS — E03.9 HYPOTHYROIDISM, ACQUIRED: ICD-10-CM

## 2021-11-04 DIAGNOSIS — R53.83 FATIGUE, UNSPECIFIED TYPE: ICD-10-CM

## 2021-11-04 DIAGNOSIS — E55.9 VITAMIN D DEFICIENCY: ICD-10-CM

## 2021-11-04 PROCEDURE — 99213 OFFICE O/P EST LOW 20 MIN: CPT | Performed by: FAMILY MEDICINE

## 2021-11-04 RX ORDER — LEVOTHYROXINE SODIUM 75 MCG
75 TABLET ORAL DAILY
Qty: 90 TABLET | Refills: 3 | Status: CANCELLED | OUTPATIENT
Start: 2021-11-04

## 2021-11-04 RX ORDER — LEVOTHYROXINE SODIUM 88 MCG
88 TABLET ORAL DAILY
Qty: 30 TABLET | Refills: 2 | Status: SHIPPED | OUTPATIENT
Start: 2021-11-04 | End: 2022-02-11 | Stop reason: SDUPTHER

## 2021-11-04 NOTE — PROGRESS NOTES
"  Chief Complaint   Patient presents with   • Results     3 months labs      Subjective   Berna Leong is a 76 y.o. female who presents to the office for follow-up.          Pt presents for f/u.     Hx of hypothyroidism. She is compliaint with synthroid. She has seen Dr. Arreguin in past.   She is on 88 mcg..the thyroid is underrfunctioning today.      Hx of low vitamin C. she is on vitamin C.. helps with arthritis also per pt.     Hx of low vitamin D. she is currently on vitamin D. The vitamin D is normal.            hx of b12 was low normal. The b12 injections cuases side effects per pt. She can tolerate pill.    hx of elevated potassium level. Normal today/    Hx of abnoraml kidney function CKD. Stable. Established with Dr. Dobbins         The following portions of the patient's history were reviewed and updated as appropriate: allergies, current medications, past family history, past medical history, past social history, past surgical history and problem list.    Review of Systems   Constitutional: Negative for chills, fever and unexpected weight loss.   Respiratory: Negative for cough, chest tightness and shortness of breath.    Cardiovascular: Negative for chest pain and palpitations.   Gastrointestinal: Negative for abdominal pain, constipation, diarrhea, nausea and vomiting.        Objective   Vitals:    11/04/21 0808   BP: 125/74   Pulse: 65   Resp: 19   Temp: 98.3 °F (36.8 °C)   SpO2: 98%   Weight: 81 kg (178 lb 9.6 oz)   Height: 157.5 cm (62\")     Body mass index is 32.67 kg/m².  Physical Exam  Vitals reviewed.   Constitutional:       General: She is not in acute distress.     Appearance: Normal appearance. She is not ill-appearing.   HENT:      Head: Normocephalic.   Eyes:      Conjunctiva/sclera: Conjunctivae normal.   Cardiovascular:      Rate and Rhythm: Normal rate and regular rhythm.   Pulmonary:      Effort: Pulmonary effort is normal.      Breath sounds: Normal breath sounds.   Skin:     Findings: No " lesion or rash.   Neurological:      Mental Status: She is alert and oriented to person, place, and time.   Psychiatric:         Mood and Affect: Mood normal.          Assessment/Plan   Diagnoses and all orders for this visit:    1. Acquired hypothyroidism  Comments:  increase synthroid from 75 to 88 mcg  Orders:  -     TSH+Free T4; Future  -     CBC Auto Differential; Future  -     Comprehensive Metabolic Panel; Future  -     Lipid Panel; Future  -     TSH; Future  -     T4, Free; Future  -     Vitamin D 25 Hydroxy; Future    2. Hypothyroidism, acquired  -     Microalbumin / Creatinine Urine Ratio - Urine, Clean Catch; Future    3. Vitamin D deficiency  Comments:  controlled.  Orders:  -     Vitamin D 25 Hydroxy; Future    4. Fatigue, unspecified type  -     Vitamin B12 & Folate; Future    5. Elevated glucose  Comments:  monitor.   Orders:  -     Hemoglobin A1c; Future    Other orders  -     Synthroid 88 MCG tablet; Take 1 tablet by mouth Daily.  Dispense: 30 tablet; Refill: 2               Return in about 3 months (around 2/4/2022).   PHQ-2/PHQ-9 Depression Screening 9/7/2021   Little interest or pleasure in doing things 0   Feeling down, depressed, or hopeless 0   Total Score 0

## 2021-12-20 ENCOUNTER — APPOINTMENT (OUTPATIENT)
Dept: GENERAL RADIOLOGY | Facility: HOSPITAL | Age: 76
End: 2021-12-20

## 2021-12-20 ENCOUNTER — HOSPITAL ENCOUNTER (EMERGENCY)
Facility: HOSPITAL | Age: 76
Discharge: HOME | End: 2021-12-20
Attending: EMERGENCY MEDICINE | Admitting: EMERGENCY MEDICINE

## 2021-12-20 VITALS
HEART RATE: 80 BPM | BODY MASS INDEX: 33.47 KG/M2 | DIASTOLIC BLOOD PRESSURE: 55 MMHG | WEIGHT: 181.88 LBS | TEMPERATURE: 97.7 F | SYSTOLIC BLOOD PRESSURE: 138 MMHG | RESPIRATION RATE: 17 BRPM | OXYGEN SATURATION: 93 % | HEIGHT: 62 IN

## 2021-12-20 VITALS
HEART RATE: 80 BPM | WEIGHT: 178.35 LBS | BODY MASS INDEX: 32.82 KG/M2 | SYSTOLIC BLOOD PRESSURE: 138 MMHG | RESPIRATION RATE: 17 BRPM | TEMPERATURE: 97.7 F | OXYGEN SATURATION: 93 % | HEIGHT: 62 IN | DIASTOLIC BLOOD PRESSURE: 55 MMHG

## 2021-12-20 DIAGNOSIS — S81.811A LACERATION OF RIGHT LOWER LEG, INITIAL ENCOUNTER: Primary | ICD-10-CM

## 2021-12-20 DIAGNOSIS — M79.606 PAIN OF LOWER EXTREMITY, UNSPECIFIED LATERALITY: ICD-10-CM

## 2021-12-20 DIAGNOSIS — Z79.899 PRESCRIPTION MEDICATION STARTED: Primary | ICD-10-CM

## 2021-12-20 PROCEDURE — 90471 IMMUNIZATION ADMIN: CPT | Performed by: EMERGENCY MEDICINE

## 2021-12-20 PROCEDURE — 99283 EMERGENCY DEPT VISIT LOW MDM: CPT

## 2021-12-20 PROCEDURE — 25010000002 TETANUS-DIPHTH-ACELL PERTUSSIS 5-2.5-18.5 LF-MCG/0.5 SUSPENSION PREFILLED SYRINGE: Performed by: EMERGENCY MEDICINE

## 2021-12-20 PROCEDURE — 90715 TDAP VACCINE 7 YRS/> IM: CPT | Performed by: EMERGENCY MEDICINE

## 2021-12-20 PROCEDURE — 73590 X-RAY EXAM OF LOWER LEG: CPT

## 2021-12-20 RX ORDER — HYDROCODONE BITARTRATE AND ACETAMINOPHEN 5; 325 MG/1; MG/1
1 TABLET ORAL EVERY 6 HOURS PRN
Status: DISCONTINUED | OUTPATIENT
Start: 2021-12-20 | End: 2021-12-20 | Stop reason: HOSPADM

## 2021-12-20 RX ORDER — LIDOCAINE HYDROCHLORIDE 10 MG/ML
10 INJECTION, SOLUTION EPIDURAL; INFILTRATION; INTRACAUDAL; PERINEURAL ONCE
Status: COMPLETED | OUTPATIENT
Start: 2021-12-20 | End: 2021-12-20

## 2021-12-20 RX ORDER — CEPHALEXIN 500 MG/1
500 CAPSULE ORAL 2 TIMES DAILY
Qty: 14 CAPSULE | Refills: 0 | Status: SHIPPED | OUTPATIENT
Start: 2021-12-20 | End: 2021-12-29

## 2021-12-20 RX ORDER — GINSENG 100 MG
1 CAPSULE ORAL ONCE
Status: COMPLETED | OUTPATIENT
Start: 2021-12-20 | End: 2021-12-20

## 2021-12-20 RX ORDER — TRAMADOL HYDROCHLORIDE 50 MG/1
50 TABLET ORAL EVERY 8 HOURS PRN
Qty: 3 TABLET | Refills: 0 | Status: SHIPPED | OUTPATIENT
Start: 2021-12-20 | End: 2022-04-21

## 2021-12-20 RX ORDER — CEPHALEXIN 500 MG/1
500 CAPSULE ORAL 2 TIMES DAILY
Qty: 14 CAPSULE | Refills: 0 | Status: SHIPPED | OUTPATIENT
Start: 2021-12-20 | End: 2021-12-20 | Stop reason: SDUPTHER

## 2021-12-20 RX ORDER — IBUPROFEN 400 MG/1
800 TABLET ORAL ONCE
Status: COMPLETED | OUTPATIENT
Start: 2021-12-20 | End: 2021-12-20

## 2021-12-20 RX ADMIN — HYDROCODONE BITARTRATE AND ACETAMINOPHEN 1 TABLET: 5; 325 TABLET ORAL at 11:47

## 2021-12-20 RX ADMIN — IBUPROFEN 800 MG: 400 TABLET, FILM COATED ORAL at 19:29

## 2021-12-20 RX ADMIN — LIDOCAINE HYDROCHLORIDE 10 ML: 10 INJECTION, SOLUTION EPIDURAL; INFILTRATION; INTRACAUDAL; PERINEURAL at 14:16

## 2021-12-20 RX ADMIN — Medication 1 APPLICATION: at 14:09

## 2021-12-20 RX ADMIN — TETANUS TOXOID, REDUCED DIPHTHERIA TOXOID AND ACELLULAR PERTUSSIS VACCINE, ADSORBED 0.5 ML: 5; 2.5; 8; 8; 2.5 SUSPENSION INTRAMUSCULAR at 11:48

## 2021-12-20 RX ADMIN — HYDROCODONE BITARTRATE AND ACETAMINOPHEN 1 TABLET: 5; 325 TABLET ORAL at 14:08

## 2021-12-20 NOTE — ED PROVIDER NOTES
Subjective   Pt states she was picking up plastic tote this AM and tote accidentally hit right lower leg causing a laceration.      History provided by:  Patient  Leg Injury  Location:  R lower leg  Severity:  Moderate  Onset quality:  Sudden  Duration:  2 hours  Timing:  Constant  Progression:  Unchanged  Chronicity:  New  Associated symptoms: no fatigue and no fever        Review of Systems   Constitutional: Negative for appetite change, chills, fatigue and fever.   HENT: Negative.    Eyes: Negative.  Negative for photophobia.   Respiratory: Negative.    Gastrointestinal: Negative.    Endocrine: Negative.    Genitourinary: Negative.    Musculoskeletal: Negative.    Skin: Positive for wound.   Allergic/Immunologic: Negative.  Negative for immunocompromised state.   Neurological: Negative.    Hematological: Negative.    Psychiatric/Behavioral: Negative.    All other systems reviewed and are negative.      Past Medical History:   Diagnosis Date   • Arthritis    • Heart attack (HCC)    • Hypothyroidism    • Kidney disease    • Leg pain    • Leg swelling    • Limb swelling    • Muscle cramps    • SOB (shortness of breath)    • Thyroid disease    • Thyroid disorder        Allergies   Allergen Reactions   • Penicillins Rash   • Sulfa Antibiotics Rash       Past Surgical History:   Procedure Laterality Date   • HYSTERECTOMY     • KNEE SURGERY  2008    JOINT SURGERY       Family History   Problem Relation Age of Onset   • Heart disease Mother    • Arthritis Mother    • Heart disease Father    • Cancer Brother    • Arthritis Other    • Cancer Other    • Heart disease Other        Social History     Socioeconomic History   • Marital status: Single   Tobacco Use   • Smoking status: Never Smoker   • Smokeless tobacco: Never Used   Vaping Use   • Vaping Use: Never used   Substance and Sexual Activity   • Alcohol use: Never   • Drug use: Never   • Sexual activity: Defer           Objective   Physical Exam  Vitals and nursing  note reviewed.   Constitutional:       General: She is not in acute distress.     Appearance: Normal appearance. She is not ill-appearing or toxic-appearing.   HENT:      Head: Normocephalic and atraumatic.   Pulmonary:      Effort: Pulmonary effort is normal.   Musculoskeletal:        Legs:       Comments: Laceration present  NV in tact   Neurological:      Mental Status: She is alert and oriented to person, place, and time. Mental status is at baseline.   Psychiatric:         Mood and Affect: Mood normal.         Behavior: Behavior normal.         Thought Content: Thought content normal.         Judgment: Judgment normal.         Laceration Repair    Date/Time: 12/20/2021 1:36 PM  Performed by: Cj Metzger PA  Authorized by: Paco Shen MD     Consent:     Consent obtained:  Verbal    Consent given by:  Patient    Risks discussed:  Infection, need for additional repair, poor cosmetic result and pain  Anesthesia (see MAR for exact dosages):     Anesthesia method:  Local infiltration    Local anesthetic:  Lidocaine 1% w/o epi  Laceration details:     Location: right lower leg.    Length (cm):  10  Repair type:     Repair type:  Simple  Pre-procedure details:     Preparation:  Patient was prepped and draped in usual sterile fashion  Exploration:     Wound exploration: wound explored through full range of motion      Contaminated: no    Treatment:     Area cleansed with:  Betadine    Amount of cleaning:  Extensive    Irrigation solution:  Sterile saline    Irrigation method:  Pressure wash  Skin repair:     Repair method:  Sutures    Suture size:  5-0    Suture material:  Nylon    Suture technique:  Simple interrupted    Number of sutures:  12  Approximation:     Approximation:  Loose  Post-procedure details:     Dressing:  Antibiotic ointment    Patient tolerance of procedure:  Tolerated well, no immediate complications          MDM  Number of Diagnoses or Management Options  Laceration of right lower leg,  initial encounter  Diagnosis management comments:          Amount and/or Complexity of Data Reviewed  Tests in the radiology section of CPT®: reviewed  Independent visualization of images, tracings, or specimens: yes    Risk of Complications, Morbidity, and/or Mortality  Presenting problems: moderate  Diagnostic procedures: moderate  Management options: moderate    Patient Progress  Patient progress: stable      Final diagnoses:   Laceration of right lower leg, initial encounter       ED Disposition  ED Disposition     ED Disposition Condition Comment    Discharge Stable           Cesar Weber MD  [address] [facility] [address]  [place] [address]  [phone]    Schedule an appointment as soon as possible for a visit in 1 week  For suture removal         Medication List      New Prescriptions    cephalexin 500 MG capsule  Commonly known as: KEFLEX  Take 1 capsule by mouth 2 (Two) Times a Day.           Where to Get Your Medications      These medications were sent to [id number] - [place], [place] - [address] AT [facility] - [phone] [facility] [phone] FX  [address] [place] [address] [place] [address]    Hours: 24-hours Phone: [phone]   · cephalexin 500 MG capsule          Cj Metzger PA  12/20/21 5266

## 2021-12-21 ENCOUNTER — TELEPHONE (OUTPATIENT)
Dept: FAMILY MEDICINE CLINIC | Facility: CLINIC | Age: 76
End: 2021-12-21

## 2021-12-21 DIAGNOSIS — E03.9 HYPOTHYROIDISM, UNSPECIFIED TYPE: Primary | ICD-10-CM

## 2021-12-21 NOTE — ED PROVIDER NOTES
Subjective   Patient is a 76-year-old female that drove herself to the emergency department this evening for pain medication from her previous encounter on the same date.  Patient states that she was here this morning and seen in the emergency department for a laceration repair, which she received 12 sutures for.  She states the provider advised her she would receive pain medications but she did not receive any.  Patient states her pain to her right lower leg (near sutures) is a 10 on a scale of 0-10.  She states that she was promised recent pain medication and that she cannot tolerate the pain.  Patient is currently awake alert and oriented and ambulating without assistance.      History provided by:  Patient   used: No        Review of Systems   Constitutional: Negative for chills and fever.   HENT: Negative for congestion, ear pain and sore throat.    Eyes: Negative for pain.   Respiratory: Negative for cough, chest tightness and shortness of breath.    Cardiovascular: Negative for chest pain.   Gastrointestinal: Negative for abdominal pain, diarrhea, nausea and vomiting.   Genitourinary: Negative for flank pain and hematuria.   Musculoskeletal: Negative for joint swelling.        Pain to left lower leg near suture line.   Skin: Negative for pallor.   Neurological: Negative for seizures and headaches.   All other systems reviewed and are negative.      Past Medical History:   Diagnosis Date   • Arthritis    • Heart attack (HCC)    • Hypothyroidism    • Kidney disease    • Leg pain    • Leg swelling    • Limb swelling    • Muscle cramps    • SOB (shortness of breath)    • Thyroid disease    • Thyroid disorder        Allergies   Allergen Reactions   • Penicillins Rash   • Sulfa Antibiotics Rash       Past Surgical History:   Procedure Laterality Date   • HYSTERECTOMY     • KNEE SURGERY  2008    JOINT SURGERY       Family History   Problem Relation Age of Onset   • Heart disease Mother    •  Arthritis Mother    • Heart disease Father    • Cancer Brother    • Arthritis Other    • Cancer Other    • Heart disease Other        Social History     Socioeconomic History   • Marital status: Single   Tobacco Use   • Smoking status: Never Smoker   • Smokeless tobacco: Never Used   Vaping Use   • Vaping Use: Never used   Substance and Sexual Activity   • Alcohol use: Never   • Drug use: Never   • Sexual activity: Defer           Objective   Physical Exam  Vitals and nursing note reviewed.   Constitutional:       General: She is not in acute distress.     Appearance: Normal appearance. She is not toxic-appearing.   HENT:      Head: Normocephalic and atraumatic.      Mouth/Throat:      Mouth: Mucous membranes are moist.   Eyes:      General: No scleral icterus.  Cardiovascular:      Rate and Rhythm: Normal rate and regular rhythm.      Pulses: Normal pulses.      Heart sounds: Normal heart sounds.   Pulmonary:      Effort: Pulmonary effort is normal. No respiratory distress.      Breath sounds: Normal breath sounds.   Abdominal:      General: Abdomen is flat.      Palpations: Abdomen is soft.      Tenderness: There is no abdominal tenderness.   Musculoskeletal:         General: Signs of injury present. Normal range of motion.      Cervical back: Normal range of motion and neck supple.      Comments: Patient has a laceration to the left lower leg that was recently prepared at this facility earlier today.  Area is bandaged at this time and is clean dry and intact.  Bandages not removed at this time.  PMS intact cap refill within normal limits.  Patient was able to ambulate back to [facility] without assistance.  Patient drove herself here.   Skin:     General: Skin is warm and dry.   Neurological:      Mental Status: She is alert and oriented to person, place, and time. Mental status is at baseline.      Cranial Nerves: No cranial nerve deficit.      Sensory: No sensory deficit.      Motor: No weakness.          Procedures           ED Course                                               Medications   ibuprofen (ADVIL,MOTRIN) tablet 800 mg (800 mg Oral Given 12/20/21 1929)       MDM  Number of Diagnoses or Management Options  Pain of lower extremity, unspecified laterality  Prescription medication started  Diagnosis management comments: I have spoke with the patient and I have explained the patient´s condition, diagnoses and treatment plan based on the information available to me at this time. I have answered all questions and addressed any concerns. The patient has a good understanding of the patient´s diagnosis, condition, and treatment plan as can be expected at this point. The vital signs have been stable. The patient´s condition is stable and appropriate for discharge from the emergency department.      The patient will pursue further outpatient evaluation with the primary care physician or other designated or consulting physician as outlined in the discharge instructions. They are agreeable to this plan of care and follow-up instructions have been explained in detail. The patient has received these instructions in written format and have expressed an understanding of the discharge instructions. The patient is aware that any significant change in condition or worsening of symptoms should prompt an immediate return to this or the closest emergency department or call to 911.         Amount and/or Complexity of Data Reviewed  Review and summarize past medical records: yes (I have personally reviewed patient's previous medical encounters.  )    Risk of Complications, Morbidity, and/or Mortality  Presenting problems: minimal  Diagnostic procedures: minimal  Management options: minimal    Patient Progress  Patient progress: stable      Final diagnoses:   Prescription medication started   Pain of lower extremity, unspecified laterality       ED Disposition  ED Disposition     ED Disposition Condition Comment     Discharge Stable           Cesar Weber MD  [address] [facility] [address]  [place] [address]  [phone]      As needed         Medication List      New Prescriptions    traMADol 50 MG tablet  Commonly known as: ULTRAM  Take 1 tablet by mouth Every 8 (Eight) Hours As Needed for Moderate Pain .           Where to Get Your Medications      These medications were sent to [id number] [place] [address] [facility] [address] - [phone] [facility] [phone] FX  [address] [facility] [address], [place] [address]    Phone: [phone]   · traMADol 50 MG tablet          Berna Calvin, APRN  12/20/21 5218

## 2021-12-21 NOTE — TELEPHONE ENCOUNTER
PATIENT FEEL AND BUSTED LEG OPEN 12/20/2021 AND GO 12 STITCHES, ER WANTS HER TO BE SEEN IN OFFICE BY 12/27 TO GET IT LOOKED AT & STITCHES REMOVED. PATIENT ALSO WOULD LIKE TO CHECK THYROIDS.

## 2021-12-23 ENCOUNTER — NURSE TRIAGE (OUTPATIENT)
Dept: CALL CENTER | Facility: HOSPITAL | Age: 76
End: 2021-12-23

## 2021-12-23 NOTE — TELEPHONE ENCOUNTER
"Reviewed AVS with caller and instructed to use antibiotic ointment of choice    Reason for Disposition  • Health Information question, no triage required and triager able to answer question    Additional Information  • Negative: [1] Caller is not with the adult (patient) AND [2] reporting urgent symptoms  • Negative: Lab result questions  • Negative: Medication questions  • Negative: Caller can't be reached by phone  • Negative: Caller has already spoken to PCP or another triager  • Negative: RN needs further essential information from caller in order to complete triage  • Negative: Requesting regular office appointment  • Negative: [1] Caller requesting NON-URGENT health information AND [2] PCP's office is the best resource    Answer Assessment - Initial Assessment Questions  1. REASON FOR CALL or QUESTION: \"What is your reason for calling today?\" or \"How can I best help you?\" or \"What question do you have that I can help answer?\"      \"What kind of ointment do I put on my stitches\"    Protocols used: INFORMATION ONLY CALL - NO TRIAGE-ADULT-      "
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 Additional area of induration and erythema extending from cyst anteriorly towards pre-auricular area. full ROM of neck  CVS: S1S2, RRR, no m/r/g  RESPI: CTAB/L  ABD: soft, NTND, +BS  EXT: Full ROM, no c/c/e, no TTP, pulses 2+ bilaterally  NEURO: affect appropriate, good tone  SKIN: no rash or nodules visible

## 2021-12-29 ENCOUNTER — OFFICE VISIT (OUTPATIENT)
Dept: FAMILY MEDICINE CLINIC | Facility: CLINIC | Age: 76
End: 2021-12-29

## 2021-12-29 VITALS
BODY MASS INDEX: 33.62 KG/M2 | HEART RATE: 81 BPM | WEIGHT: 182.7 LBS | TEMPERATURE: 98 F | OXYGEN SATURATION: 98 % | HEIGHT: 62 IN | SYSTOLIC BLOOD PRESSURE: 124 MMHG | DIASTOLIC BLOOD PRESSURE: 82 MMHG

## 2021-12-29 DIAGNOSIS — S81.802D WOUND OF LEFT LOWER EXTREMITY, SUBSEQUENT ENCOUNTER: ICD-10-CM

## 2021-12-29 DIAGNOSIS — M79.605 PAIN OF LEFT LOWER EXTREMITY: ICD-10-CM

## 2021-12-29 DIAGNOSIS — L03.116 CELLULITIS OF LEFT LOWER EXTREMITY: Primary | ICD-10-CM

## 2021-12-29 PROCEDURE — 99214 OFFICE O/P EST MOD 30 MIN: CPT

## 2021-12-29 RX ORDER — CYCLOBENZAPRINE HCL 5 MG
5 TABLET ORAL 3 TIMES DAILY PRN
Qty: 30 TABLET | Refills: 0 | Status: SHIPPED | OUTPATIENT
Start: 2021-12-29 | End: 2022-04-21

## 2021-12-29 RX ORDER — SACCHAROMYCES BOULARDII 250 MG
250 CAPSULE ORAL 2 TIMES DAILY
Qty: 30 CAPSULE | Refills: 1 | Status: SHIPPED | OUTPATIENT
Start: 2021-12-29 | End: 2022-04-21

## 2021-12-29 RX ORDER — CLINDAMYCIN HYDROCHLORIDE 300 MG/1
300 CAPSULE ORAL 3 TIMES DAILY
Qty: 21 CAPSULE | Refills: 0 | Status: SHIPPED | OUTPATIENT
Start: 2021-12-29 | End: 2022-04-21

## 2021-12-29 NOTE — PROGRESS NOTES
"Berna Leong presents to [facility] with complaints of right lower leg wound pain with associated drainage/swelling.       History of Present Illness  This is a 76 year old female who presents to the clinic for further evaluation and treatment of a right lower leg wound that occurred on 12/20 at her home. Patient states she was moving a box of [employer], she dropped the box, which created a deep wound on the anterior part of her lower right leg. She said there was a lot of blood initially, she was able to get this slowed down with ice packs and bandages, and then went to ER for further evaluation. She had 14 stitches placed and was given some pain medicine to help. She was also given a course of keflex to prevent any infection. She was then told to come to our office for suture removal in 1 week and further management.   She presents today with persistent pain with associated redness and swelling. Patient states she does have some drainage noted. It is bloody and does have some slight yellow color. She has completed the course of Keflex, has been taking the tramadol pain medication as needed for severe pain, and has been using ice and elevation to assist with pain. She states she is still in severe pain. She cannot sleep at night because the pain is so severe.     The following portions of the patient's history were personally reviewed and updated as appropriate: allergies, current medications, past medical history, past surgical history, past family history, and past social history.       Objective   Vital Signs:   /82   Pulse 81   Temp 98 °F (36.7 °C)   Ht 157.5 cm (62\")   Wt 82.9 kg (182 lb 11.2 oz)   SpO2 98%   BMI 33.42 kg/m²     Body mass index is 33.42 kg/m².    All labs, imaging, test results, and specialty provider notes reviewed with patient.     Physical Exam  Skin:              There are no signs of compartment syndrome at this time. "         Assessment and Plan:  Diagnoses and all orders for this visit:    1. Cellulitis of left lower extremity (Primary)  -     clindamycin (CLEOCIN) 300 MG capsule; Take 1 capsule by mouth 3 (Three) Times a Day.  Dispense: 21 capsule; Refill: 0  -     saccharomyces boulardii (Florastor) 250 MG capsule; Take 1 capsule by mouth 2 (Two) Times a Day.  Dispense: 30 capsule; Refill: 1    2. Pain of left lower extremity  -     cyclobenzaprine (FLEXERIL) 5 MG tablet; Take 1 tablet by mouth 3 (Three) Times a Day As Needed for Muscle Spasms.  Dispense: 30 tablet; Refill: 0    3. Wound of left lower extremity, subsequent encounter  -     clindamycin (CLEOCIN) 300 MG capsule; Take 1 capsule by mouth 3 (Three) Times a Day.  Dispense: 21 capsule; Refill: 0  -     saccharomyces boulardii (Florastor) 250 MG capsule; Take 1 capsule by mouth 2 (Two) Times a Day.  Dispense: 30 capsule; Refill: 1      We will start patient on clindamycin for further coverage and noticeable cellulitis. We will see patient back in 1 week to further evaluate and determine if further treatment is needed. We will consider taking out sutures at that time as the sutures are not ready to be removed today. Also discussed with patient to continue ice, elevation, and to watch for worsening signs of cellulitis/compartment syndrome. We also discussed using a muscle relaxer to assist with some of the associated muscle spasms. Will also give patient a probiotic due to multiple antibiotic use.     Follow Up:  Return in about 1 week (around 1/5/2022) for Recheck.    Patient was given instructions and counseling regarding her condition or for health maintenance advice. Please see specific information pulled into the AVS if appropriate.

## 2022-01-05 ENCOUNTER — OFFICE VISIT (OUTPATIENT)
Dept: FAMILY MEDICINE CLINIC | Facility: CLINIC | Age: 77
End: 2022-01-05

## 2022-01-05 VITALS
SYSTOLIC BLOOD PRESSURE: 128 MMHG | OXYGEN SATURATION: 98 % | TEMPERATURE: 98.2 F | HEART RATE: 96 BPM | DIASTOLIC BLOOD PRESSURE: 82 MMHG | WEIGHT: 182.7 LBS | BODY MASS INDEX: 33.62 KG/M2 | HEIGHT: 62 IN

## 2022-01-05 DIAGNOSIS — S81.801D OPEN WOUND OF RIGHT LOWER EXTREMITY WITH COMPLICATION, SUBSEQUENT ENCOUNTER: Primary | ICD-10-CM

## 2022-01-05 DIAGNOSIS — Z48.02 VISIT FOR SUTURE REMOVAL: ICD-10-CM

## 2022-01-05 PROCEDURE — 99212 OFFICE O/P EST SF 10 MIN: CPT

## 2022-01-05 NOTE — PROGRESS NOTES
"Berna Leong presents to [facility] is here for follow-up from 1 week ago for right lower extremity wound and cellulitis.      History of Present Illness  This is a 76-year-old female who presents to the clinic for follow-up from 1 week ago when she presented for right lower extremity wound and had associated cellulitis.  Patient has been taking clindamycin, states she is only able to tolerate taking it twice a day as taking it 3 times gets a really bad acid reflux.  Patient states that her pain is completely resolved, there is no longer any pain at all in that right lower wound.  Patient also notes that the redness has decreased significantly, and notices a drastic improvement in the wound altogether.  Patient was concerned as previously she had a brown recluse bite to that same area, ended up developing MRSA, and was difficulties for several months to years with wound healing.  Patient is also here to have sutures removed from the same wound.    The following portions of the patient's history were personally reviewed and updated as appropriate: allergies, current medications, past medical history, past surgical history, past family history, and past social history.       Objective   Vital Signs:   /82   Pulse 96   Temp 98.2 °F (36.8 °C)   Ht 157.5 cm (62\")   Wt 82.9 kg (182 lb 11.2 oz)   SpO2 98%   BMI 33.42 kg/m²     Body mass index is 33.42 kg/m².    All labs, imaging, test results, and specialty provider notes reviewed with patient.     Physical Exam  Vitals reviewed.   Constitutional:       Appearance: Normal appearance.   Skin:         Neurological:      General: No focal deficit present.      Mental Status: She is alert and oriented to person, place, and time.            Assessment and Plan:  Diagnoses and all orders for this visit:    1. Open wound of right lower extremity with complication, subsequent encounter (Primary)  Comments:  Drastically improved.  " Patient to continue full course of clindamycin.  Patient may cover at night, otherwise keep wound open during the day for further healing    2. Visit for suture removal    12 sutures removed, margins well approximated, wound clean/dry/intact.      Follow-up as scheduled, instructed patient to report signs immediately of increased redness, increased drainage, increased pain, fever/chills or other signs of infection.  Otherwise treatment plan discussed, to include continuing elevation, rest, and may cover at night with a bandage to prevent any irritation.  Otherwise keep wound open to air during the day.  May use bacitracin as needed, but recommend not using as wound will need to stay dry.      Follow Up:  No follow-ups on file.    Patient was given instructions and counseling regarding her condition or for health maintenance advice. Please see specific information pulled into the AVS if appropriate.

## 2022-01-11 ENCOUNTER — OFFICE VISIT (OUTPATIENT)
Dept: FAMILY MEDICINE CLINIC | Facility: CLINIC | Age: 77
End: 2022-01-11

## 2022-01-11 VITALS
WEIGHT: 182 LBS | BODY MASS INDEX: 33.49 KG/M2 | HEIGHT: 62 IN | RESPIRATION RATE: 18 BRPM | TEMPERATURE: 98 F | HEART RATE: 91 BPM | DIASTOLIC BLOOD PRESSURE: 80 MMHG | OXYGEN SATURATION: 100 % | SYSTOLIC BLOOD PRESSURE: 140 MMHG

## 2022-01-11 DIAGNOSIS — S81.802D WOUND OF LEFT LOWER EXTREMITY, SUBSEQUENT ENCOUNTER: Primary | ICD-10-CM

## 2022-01-11 PROCEDURE — 99212 OFFICE O/P EST SF 10 MIN: CPT | Performed by: STUDENT IN AN ORGANIZED HEALTH CARE EDUCATION/TRAINING PROGRAM

## 2022-01-11 NOTE — PROGRESS NOTES
"Chief Complaint  Concern about wound    Subjective         Berna Leong is a 76 y.o. female who presents to [facility]    76 years old female comes to the clinic today to follow-up on wound.    Patient had right lower extremity injury on December/20th and had stitches done which were removed after 9 to 10 days.  Patient was already treated with antibiotics for possible cellulitis.  Patient currently reports improved pain, no discharge/bleeding or any open skin noted.  Patient just wanted to make sure everything is healing well and just was concerned.  Patient has been changing her dressing every day, cleaning wound and using triple antibiotics ointment.  Currently not taking any oral antibiotics.    Patient denies any other acute complaint at this time  Review of Systems   Objective   Vital Signs:   Vitals:    01/11/22 1132   BP: 140/80   Pulse: 91   Resp: 18   Temp: 98 °F (36.7 °C)   SpO2: 100%   Weight: 82.6 kg (182 lb)   Height: 157.5 cm (62\")      Body mass index is 33.29 kg/m².   Physical Exam  Skin:            Comments: Irritated external skin with mild inflammation/erythema noted but no sign of any cellulitis/discharge, dressing was clean/dry/intact, no tenderness or swelling or bleeding noted                   Assessment and Plan   Diagnoses and all orders for this visit:    1. Wound of left lower extremity, subsequent encounter (Primary)  Comments:  No sign of infection, sutures were removed in last visit; continue with topical antibiotics and dressing changes.      Patient to continue with wound dressing/cleaning, may use antibiotic ointment.  Patient to call if any worsening or no improvement noted.  At this time there is no concern of any cellulitis or active infection, wound is healing well.  Wound precautions discussed    All the questions were answered      Follow Up   Return if symptoms worsen or fail to improve.  Patient was given instructions and counseling " regarding her condition or for health maintenance advice. Please see specific information pulled into the AVS if appropriate.

## 2022-01-24 ENCOUNTER — TELEPHONE (OUTPATIENT)
Dept: FAMILY MEDICINE CLINIC | Facility: CLINIC | Age: 77
End: 2022-01-24

## 2022-01-24 NOTE — TELEPHONE ENCOUNTER
Caller: Berna Leong    Relationship to patient: Self    Best call back number: [phone]    Chief complaint: SPIDER BITE OOZING    Type of visit: OFFICE    Requested date: ASAP     Additional notes: THE PATIENT STATED HER SPIDER BITE WHERE SUTURES WERE REMOVED IS OOZING AND SHE WOULD LIKE A CALL BACK TO SCHEDULE WITH AN MD OR PCP ASAP

## 2022-01-27 ENCOUNTER — OFFICE VISIT (OUTPATIENT)
Dept: FAMILY MEDICINE CLINIC | Facility: CLINIC | Age: 77
End: 2022-01-27

## 2022-01-27 VITALS
OXYGEN SATURATION: 98 % | HEIGHT: 62 IN | DIASTOLIC BLOOD PRESSURE: 76 MMHG | HEART RATE: 97 BPM | TEMPERATURE: 98.2 F | BODY MASS INDEX: 34.54 KG/M2 | SYSTOLIC BLOOD PRESSURE: 126 MMHG | WEIGHT: 187.7 LBS

## 2022-01-27 DIAGNOSIS — S81.801D WOUND OF RIGHT LOWER EXTREMITY, SUBSEQUENT ENCOUNTER: ICD-10-CM

## 2022-01-27 PROCEDURE — 99213 OFFICE O/P EST LOW 20 MIN: CPT | Performed by: FAMILY MEDICINE

## 2022-01-27 RX ORDER — DOXYCYCLINE HYCLATE 100 MG/1
100 CAPSULE ORAL 2 TIMES DAILY
Qty: 20 CAPSULE | Refills: 0 | Status: SHIPPED | OUTPATIENT
Start: 2022-01-27 | End: 2022-04-21

## 2022-01-27 NOTE — PROGRESS NOTES
"  Chief Complaint   Patient presents with   • Wound Check     Right lower extremity injury 12/20/21   • Follow-up     Treated for cellulitis, pt d/c antibiotics early. Wound draining yellow fluid, pt denies it being warm to the touch     Subjective   Berna Leong is a 76 y.o. female who presents to the office for follow-up.    Pt presents for f/u of a deep wound/laceration of the right lower (anterior ) leg. She was evaluated in the ED on 12/20 and 12 sutures were placed. They were reomved by NP on 1/5/22. Pt states she still has some drainage and pain of the area. She wants to be seen by wound care.   Denies fever or chills.    The following portions of the patient's history were reviewed and updated as appropriate: allergies, current medications, past family history, past medical history, past social history, past surgical history and problem list.    Review of Systems   Constitutional: Negative for chills, fever and unexpected weight loss.   Respiratory: Negative for cough, chest tightness and shortness of breath.    Cardiovascular: Negative for chest pain and palpitations.   Gastrointestinal: Negative for abdominal pain, constipation, diarrhea, nausea and vomiting.        Objective   Vitals:    01/27/22 1447   BP: 126/76   BP Location: Left arm   Pulse: 97   Temp: 98.2 °F (36.8 °C)   SpO2: 98%   Weight: 85.1 kg (187 lb 11.2 oz)   Height: 157.5 cm (62\")   PainSc:   2   PainLoc: Leg     Body mass index is 34.33 kg/m².  Physical Exam  Musculoskeletal:         General: Signs of injury (v shaped deep laceration of the right anterior leg (dark tissue). no active drainage.) present.          Assessment/Plan   Diagnoses and all orders for this visit:    1. Wound of right lower extremity, subsequent encounter  -     Ambulatory Referral to Wound Clinic    Other orders  -     doxycycline (VIBRAMYCIN) 100 MG capsule; Take 1 capsule by mouth 2 (Two) Times a Day.  Dispense: 20 capsule; Refill: 0             Wound " care  Doxycycline  F/u as directed    No follow-ups on file.   PHQ-2/PHQ-9 Depression Screening 9/7/2021   Little interest or pleasure in doing things 0   Feeling down, depressed, or hopeless 0   Total Score 0

## 2022-01-28 ENCOUNTER — LAB (OUTPATIENT)
Dept: LAB | Facility: HOSPITAL | Age: 77
End: 2022-01-28

## 2022-01-28 DIAGNOSIS — R73.09 ELEVATED GLUCOSE: ICD-10-CM

## 2022-01-28 DIAGNOSIS — R53.83 FATIGUE, UNSPECIFIED TYPE: ICD-10-CM

## 2022-01-28 DIAGNOSIS — E55.9 VITAMIN D DEFICIENCY: ICD-10-CM

## 2022-01-28 DIAGNOSIS — E03.9 ACQUIRED HYPOTHYROIDISM: ICD-10-CM

## 2022-01-28 DIAGNOSIS — E03.9 HYPOTHYROIDISM, ACQUIRED: ICD-10-CM

## 2022-01-28 LAB
25(OH)D3 SERPL-MCNC: 39.1 NG/ML (ref 30–100)
ALBUMIN SERPL-MCNC: 4.1 G/DL (ref 3.5–5.2)
ALBUMIN/GLOB SERPL: 1.3 G/DL
ALP SERPL-CCNC: 125 U/L (ref 39–117)
ALT SERPL W P-5'-P-CCNC: 18 U/L (ref 1–33)
ANION GAP SERPL CALCULATED.3IONS-SCNC: 11.5 MMOL/L (ref 5–15)
AST SERPL-CCNC: 23 U/L (ref 1–32)
BASOPHILS # BLD AUTO: 0.08 10*3/MM3 (ref 0–0.2)
BASOPHILS NFR BLD AUTO: 1 % (ref 0–1.5)
BILIRUB SERPL-MCNC: 0.3 MG/DL (ref 0–1.2)
BUN SERPL-MCNC: 21 MG/DL (ref 8–23)
BUN/CREAT SERPL: 20.8 (ref 7–25)
CALCIUM SPEC-SCNC: 9.8 MG/DL (ref 8.6–10.5)
CHLORIDE SERPL-SCNC: 103 MMOL/L (ref 98–107)
CHOLEST SERPL-MCNC: 219 MG/DL (ref 0–200)
CO2 SERPL-SCNC: 27.5 MMOL/L (ref 22–29)
CREAT SERPL-MCNC: 1.01 MG/DL (ref 0.57–1)
DEPRECATED RDW RBC AUTO: 37.3 FL (ref 37–54)
EOSINOPHIL # BLD AUTO: 0.4 10*3/MM3 (ref 0–0.4)
EOSINOPHIL NFR BLD AUTO: 5.1 % (ref 0.3–6.2)
ERYTHROCYTE [DISTWIDTH] IN BLOOD BY AUTOMATED COUNT: 11.8 % (ref 12.3–15.4)
FOLATE SERPL-MCNC: 16.3 NG/ML (ref 4.78–24.2)
GFR SERPL CREATININE-BSD FRML MDRD: 53 ML/MIN/1.73
GLOBULIN UR ELPH-MCNC: 3.1 GM/DL
GLUCOSE SERPL-MCNC: 102 MG/DL (ref 65–99)
HBA1C MFR BLD: 5.99 % (ref 4.8–5.6)
HCT VFR BLD AUTO: 38.6 % (ref 34–46.6)
HDLC SERPL-MCNC: 87 MG/DL (ref 40–60)
HGB BLD-MCNC: 12.9 G/DL (ref 12–15.9)
IMM GRANULOCYTES # BLD AUTO: 0.02 10*3/MM3 (ref 0–0.05)
IMM GRANULOCYTES NFR BLD AUTO: 0.3 % (ref 0–0.5)
LDLC SERPL CALC-MCNC: 122 MG/DL (ref 0–100)
LDLC/HDLC SERPL: 1.38 {RATIO}
LYMPHOCYTES # BLD AUTO: 2.47 10*3/MM3 (ref 0.7–3.1)
LYMPHOCYTES NFR BLD AUTO: 31.5 % (ref 19.6–45.3)
MCH RBC QN AUTO: 28.9 PG (ref 26.6–33)
MCHC RBC AUTO-ENTMCNC: 33.4 G/DL (ref 31.5–35.7)
MCV RBC AUTO: 86.5 FL (ref 79–97)
MONOCYTES # BLD AUTO: 0.39 10*3/MM3 (ref 0.1–0.9)
MONOCYTES NFR BLD AUTO: 5 % (ref 5–12)
NEUTROPHILS NFR BLD AUTO: 4.49 10*3/MM3 (ref 1.7–7)
NEUTROPHILS NFR BLD AUTO: 57.1 % (ref 42.7–76)
NRBC BLD AUTO-RTO: 0 /100 WBC (ref 0–0.2)
PLATELET # BLD AUTO: 324 10*3/MM3 (ref 140–450)
PMV BLD AUTO: 10.2 FL (ref 6–12)
POTASSIUM SERPL-SCNC: 4.4 MMOL/L (ref 3.5–5.2)
PROT SERPL-MCNC: 7.2 G/DL (ref 6–8.5)
RBC # BLD AUTO: 4.46 10*6/MM3 (ref 3.77–5.28)
SODIUM SERPL-SCNC: 142 MMOL/L (ref 136–145)
T4 FREE SERPL-MCNC: 1.3 NG/DL (ref 0.93–1.7)
TRIGL SERPL-MCNC: 58 MG/DL (ref 0–150)
TSH SERPL DL<=0.05 MIU/L-ACNC: 3.99 UIU/ML (ref 0.27–4.2)
VIT B12 BLD-MCNC: 822 PG/ML (ref 211–946)
VLDLC SERPL-MCNC: 10 MG/DL (ref 5–40)
WBC NRBC COR # BLD: 7.85 10*3/MM3 (ref 3.4–10.8)

## 2022-01-28 PROCEDURE — 84439 ASSAY OF FREE THYROXINE: CPT

## 2022-01-28 PROCEDURE — 36415 COLL VENOUS BLD VENIPUNCTURE: CPT

## 2022-01-28 PROCEDURE — 80061 LIPID PANEL: CPT

## 2022-01-28 PROCEDURE — 80053 COMPREHEN METABOLIC PANEL: CPT

## 2022-01-28 PROCEDURE — 82746 ASSAY OF FOLIC ACID SERUM: CPT

## 2022-01-28 PROCEDURE — 82306 VITAMIN D 25 HYDROXY: CPT

## 2022-01-28 PROCEDURE — 83036 HEMOGLOBIN GLYCOSYLATED A1C: CPT

## 2022-01-28 PROCEDURE — 85025 COMPLETE CBC W/AUTO DIFF WBC: CPT

## 2022-01-28 PROCEDURE — 84443 ASSAY THYROID STIM HORMONE: CPT

## 2022-01-28 PROCEDURE — 82607 VITAMIN B-12: CPT

## 2022-02-01 ENCOUNTER — OFFICE VISIT (OUTPATIENT)
Dept: WOUND CARE | Facility: HOSPITAL | Age: 77
End: 2022-02-01

## 2022-02-01 VITALS
TEMPERATURE: 96.8 F | DIASTOLIC BLOOD PRESSURE: 68 MMHG | BODY MASS INDEX: 34.41 KG/M2 | RESPIRATION RATE: 18 BRPM | HEIGHT: 62 IN | SYSTOLIC BLOOD PRESSURE: 122 MMHG | WEIGHT: 187 LBS | HEART RATE: 80 BPM

## 2022-02-01 DIAGNOSIS — E03.9 HYPOTHYROIDISM, UNSPECIFIED TYPE: ICD-10-CM

## 2022-02-01 DIAGNOSIS — T81.33XA TRAUMATIC WOUND DEHISCENCE, INITIAL ENCOUNTER: Primary | ICD-10-CM

## 2022-02-01 DIAGNOSIS — T14.8XXA NONHEALING NONSURGICAL WOUND LIMITED TO BREAKDOWN OF SKIN: ICD-10-CM

## 2022-02-01 DIAGNOSIS — S81.811A LACERATION OF RIGHT LOWER EXTREMITY, INITIAL ENCOUNTER: ICD-10-CM

## 2022-02-01 PROCEDURE — G0463 HOSPITAL OUTPT CLINIC VISIT: HCPCS | Performed by: EMERGENCY MEDICINE

## 2022-02-01 PROCEDURE — 99204 OFFICE O/P NEW MOD 45 MIN: CPT | Performed by: EMERGENCY MEDICINE

## 2022-02-01 NOTE — SIGNIFICANT NOTE
Epithelial %: 0%    Exposed Bone: no    Exposed Tendon: no    Impression:  NEW    Short Term Goals: INCREASE GRANULATION AND DECREASE SIZE

## 2022-02-01 NOTE — PROGRESS NOTES
Chief Complaint  Wound Check (12/20/21: TRAUMATIC WOUND TO RIGHT LEG)    Subjective        History of Present Illness    Patient is a very pleasant 76-year-old female who on 12/20 sustained a laceration to her right anterior lower leg.  She had sutures placed in the emergency department and removed about 10 days later by her PCP.  The wound subsequently broke open and the patient had a lot of drainage.  She has intermittently been on antibiotics for this problem but no wound culture has been done.  She says they told her to leave it open so that it would dry out and she now has a lot of dry crusted scab all over the wound areas but says it is no longer hurting now that it is not draining anymore.  She has been seen here previously but it has been many years she says.    Allergies:  Penicillins and Sulfa antibiotics      Current Outpatient Medications:   •  ascorbic acid (VITAMIN C) 1000 MG tablet, Take 1,000 mg by mouth Daily., Disp: , Rfl:   •  Cholecalciferol 125 MCG (5000 UT) tablet, Take  by mouth Daily., Disp: , Rfl:   •  clindamycin (CLEOCIN) 300 MG capsule, Take 1 capsule by mouth 3 (Three) Times a Day., Disp: 21 capsule, Rfl: 0  •  cyclobenzaprine (FLEXERIL) 5 MG tablet, Take 1 tablet by mouth 3 (Three) Times a Day As Needed for Muscle Spasms., Disp: 30 tablet, Rfl: 0  •  docusate sodium (Colace) 100 MG capsule, Take 1 capsule by mouth Daily., Disp: 30 capsule, Rfl: 6  •  doxycycline (VIBRAMYCIN) 100 MG capsule, Take 1 capsule by mouth 2 (Two) Times a Day., Disp: 20 capsule, Rfl: 0  •  Multiple Vitamins-Minerals (Eye Health) capsule, , Disp: , Rfl:   •  multivitamin (THERAGRAN) tablet tablet, Take  by mouth Daily., Disp: , Rfl:   •  saccharomyces boulardii (Florastor) 250 MG capsule, Take 1 capsule by mouth 2 (Two) Times a Day., Disp: 30 capsule, Rfl: 1  •  Synthroid 88 MCG tablet, Take 1 tablet by mouth Daily., Disp: 30 tablet, Rfl: 2  •  traMADol (ULTRAM) 50 MG tablet, Take 1 tablet by mouth Every 8  "(Eight) Hours As Needed for Moderate Pain ., Disp: 3 tablet, Rfl: 0    Past Medical History:   Diagnosis Date   • Arthritis    • Coma due to low thyroid (HCC) 2015   • Heart attack (HCC)    • Hypothyroidism    • Kidney disease    • Leg pain    • Leg swelling    • Limb swelling    • Muscle cramps    • SOB (shortness of breath)    • Thyroid disease    • Thyroid disorder      Past Surgical History:   Procedure Laterality Date   • HYSTERECTOMY     • KNEE SURGERY  2008    JOINT SURGERY     Social History     Socioeconomic History   • Marital status: Single   Tobacco Use   • Smoking status: Never Smoker   • Smokeless tobacco: Never Used   Vaping Use   • Vaping Use: Never used   Substance and Sexual Activity   • Alcohol use: Never   • Drug use: Never   • Sexual activity: Defer     Family History   Problem Relation Age of Onset   • Heart disease Mother    • Arthritis Mother    • Heart disease Father    • Cancer Brother    • Arthritis Other    • Cancer Other    • Heart disease Other          Objective     Vitals:    02/01/22 1301   BP: 122/68   BP Location: Left arm   Patient Position: Sitting   Pulse: 80   Resp: 18   Temp: 96.8 °F (36 °C)   TempSrc: Temporal   Weight: 84.8 kg (187 lb)   Height: 157.5 cm (62\")   PainSc: 0-No pain     Body mass index is 34.2 kg/m².    STEADI Fall Risk Assessment has not been completed.     Review of Systems     ROS:  No fevers, chills, sweats, or body aches.      I have reviewed the HPI and ROS as documented by MA/RN. Laina Bales MD    Physical Exam     NAD, well dressed, well nourished  Normocephalic, atraumatic  EOMI, no scleral icterus  No respiratory distress, no cough  AAOx3, pleasant, cooperative  Inverted V shaped wound anterior distal RLE with dried drainage and diffuse hard scabbing.  No erythema, induration, fluctuance, or drainage.  Minimal appropriate TTP.  Lidocaine gel applied to numb and soften the scabs.  This was then able to be more thoroughly cleaned and the dried " drainage and scabs were able to be removed.  Significant amount of epithelialization noted with healthy granulation tissue along the portions of the wound that remains open. See photos for details.                Result Review :  The following data was reviewed by: Laina Bales MD on 02/01/2022:    Urgent care notes, PCP notes, labs.    Wound Avatar Documentation     Active Wound LDAs        Wound 12/20/21 1126 Right anterior  Laceration    Placement date 12/20/21   -MM 12/20/21 1128      Placement time 1126   -MM 12/20/21 1128 Days 43    Present on Hospital Admission: Y   -MM 12/20/21 1128 Side: Right   -MM 12/20/21 1128    Orientation: anterior   -MM 12/20/21 1128 Location: --  CALF   - 12/20/21 1128    Primary Wound Type: Laceration   - 12/20/21 1128 Additional Comments: V SHAPED LACERATION TO ANTERIOR R CALF   - 12/20/21 1128    Assessments       02/01/22 1310 12/20/21 11:28:19        Wound Image  View All Images View Images   - 02/01/22 1312    --    Dressing Appearance open to air   - 02/01/22 1312 dressing loose   - 12/20/21 1129   Closure --  None   - 12/20/21 1129   Base --  red   - 12/20/21 1129   Black (%), Wound Tissue Color --  SCAB COVERED   - 02/01/22 1312 --    Red (%), Wound Tissue Color --  SCAB COVERED   - 02/01/22 1312 --    Yellow (%), Wound Tissue Color --  SCAB COVERED   - 02/01/22 1312 --    Periwound intact   - 02/01/22 1312 intact   - 12/20/21 1129   Periwound Temperature warm   - 02/01/22 1312 --    Periwound Skin Turgor soft   - 02/01/22 1312 --    Edges irregular   - 02/01/22 1312 open; irregular   - 12/20/21 1129   Wound Length (cm) 5.4 cm   - 02/01/22 1312 --    Wound Width (cm) 6.1 cm   - 02/01/22 1312 --    Wound Depth (cm) 0.1 cm   - 02/01/22 1312 --    Tunneling [Depth (cm)/Location] 0   - 02/01/22 1312 --    Undermining [Depth (cm)/Location] 0   - 02/01/22 1312 --    Drainage Characteristics/Odor serous   - 02/01/22 1312 bleeding  controlled   - 12/20/21 1129   Drainage Amount moderate   - 02/01/22 1312 --    Care, Wound cleansed with; sterile normal saline   - 02/01/22 1312 dressing removed  NEW DRSG APPLIED   - 12/20/21 1129   Dressing Care --  dressing applied   - 12/20/21 1129   Periwound Care --  --    Adhesive Closure Strips --  --    Number of Adhesive Closure Strips --  --    Sutures Removed Intact --  --    Number of Sutures Removed --  --    Staples Removed Intact --  --    Number of Staples Removed --  --    Wound Output (mL) --  --                    User Key  (r) = Recorded By, (t) = Taken By, (c) = Cosigned By    Initials Name Effective Dates Provider Type Discipline    Oleksandr Fernandez, RN 03/30/21 -  Registered Nurse Nurse    Catrachita Romeo RN 06/09/21 -  Registered Nurse --                         Assessment and Plan   Diagnoses and all orders for this visit:    1. Traumatic wound dehiscence, initial encounter (Primary)    2. Laceration of right lower extremity, initial encounter    3. Nonhealing nonsurgical wound limited to breakdown of skin    4. Hypothyroidism, unspecified type      PolyMem silver to open areas of wound.  Keep covered at all times.  Change dressing every other day.  Recheck 3 weeks.    Patient was given instructions and counseling regarding their condition or for health maintenance advice, as well as the wound care plan and recommendations. They understand and agree with the plan.  They will follow back up here in the clinic but are instructed to contact us in the interim should they have any new, returning, or worsening symptoms or concerns. Please see specific information pulled into the AVS if appropriate.       Follow Up   Return in about 3 weeks (around 2/22/2022) for Recheck.      Laina Bales MD

## 2022-02-02 ENCOUNTER — TELEPHONE (OUTPATIENT)
Dept: WOUND CARE | Facility: HOSPITAL | Age: 77
End: 2022-02-02

## 2022-02-02 NOTE — TELEPHONE ENCOUNTER
SPOKE WITH PATIENT AND ADVISED HER THAT SHE MAY CHANGE THE  POLYMEM SILVER EVERY 3 DAYS IF SHE CONTINUES TO HAVE MINIMAL DRAINAGE. IF SHE IS HAVING INCREASED DRAINAGE, SHE MAY CONTINUE TO CHANGE THE DRESSING EVERY 2 DAYS, AND I INSTRUCTED HER TO LET US KNOW IF SHE RUNS OUT OF POLYMEM SILVER.

## 2022-02-02 NOTE — TELEPHONE ENCOUNTER
RECEIVED A NOTICE FROM [employer] THAT PATIENT'S INSURANCE WILL ONLY COVER FOR THE FOAM TO BE CHANGED 3 TIMES PER WEEK. THEY CONTACTED PATIENT AND ADVISED HER OF THIS, AND SENT HER THE COVERED QUANTITY OF ITEMS.

## 2022-02-09 ENCOUNTER — TELEPHONE (OUTPATIENT)
Dept: FAMILY MEDICINE CLINIC | Facility: CLINIC | Age: 77
End: 2022-02-09

## 2022-02-09 NOTE — TELEPHONE ENCOUNTER
Patient was calling in regard to lab results, let her know dr jeong was not in today but someone would give her a call tomorrow.

## 2022-02-11 ENCOUNTER — TELEPHONE (OUTPATIENT)
Dept: FAMILY MEDICINE CLINIC | Facility: CLINIC | Age: 77
End: 2022-02-11

## 2022-02-11 RX ORDER — LEVOTHYROXINE SODIUM 88 MCG
88 TABLET ORAL DAILY
Qty: 30 TABLET | Refills: 2 | Status: SHIPPED | OUTPATIENT
Start: 2022-02-11 | End: 2022-04-21

## 2022-02-11 NOTE — TELEPHONE ENCOUNTER
Please call and tell pt thanks for calling about her results. I believe she had the appt that fell on the snow day and her next appt is not till March. So her kidney funcition is improving. The liver is normal. Sugars are mildly elevated but stable. The thyroid is 3.9. so it is back in range. I went ahead and sent a refill to her pharmacy. Also the cholesterol has trended up since last visit. Tell pt it could be her diet or the fact that she isnt as active during the winter months. Needs to be monitored and rechecked in 6 months.  Thanks.

## 2022-02-11 NOTE — TELEPHONE ENCOUNTER
Patient is wanting her lab results. Patient stated that she needs her thyroid results so she needs to know if she needs to change her thyroid medication she only has 1 pill left.

## 2022-02-22 ENCOUNTER — OFFICE VISIT (OUTPATIENT)
Dept: WOUND CARE | Facility: HOSPITAL | Age: 77
End: 2022-02-22

## 2022-02-22 VITALS
RESPIRATION RATE: 18 BRPM | TEMPERATURE: 96.7 F | HEART RATE: 105 BPM | DIASTOLIC BLOOD PRESSURE: 76 MMHG | OXYGEN SATURATION: 98 % | SYSTOLIC BLOOD PRESSURE: 148 MMHG

## 2022-02-22 DIAGNOSIS — E03.9 HYPOTHYROIDISM, UNSPECIFIED TYPE: ICD-10-CM

## 2022-02-22 DIAGNOSIS — E66.9 OBESITY (BMI 30.0-34.9): ICD-10-CM

## 2022-02-22 DIAGNOSIS — T14.8XXA NONHEALING NONSURGICAL WOUND LIMITED TO BREAKDOWN OF SKIN: ICD-10-CM

## 2022-02-22 DIAGNOSIS — S81.811D LACERATION OF RIGHT LOWER EXTREMITY, SUBSEQUENT ENCOUNTER: ICD-10-CM

## 2022-02-22 DIAGNOSIS — T81.33XD TRAUMATIC WOUND DEHISCENCE, SUBSEQUENT ENCOUNTER: Primary | ICD-10-CM

## 2022-02-22 PROCEDURE — 99214 OFFICE O/P EST MOD 30 MIN: CPT | Performed by: EMERGENCY MEDICINE

## 2022-02-22 PROCEDURE — G0463 HOSPITAL OUTPT CLINIC VISIT: HCPCS | Performed by: EMERGENCY MEDICINE

## 2022-02-22 NOTE — PROGRESS NOTES
Chief Complaint  Wound Check (RIGHT LEG ULCER)    Subjective        History of Present Illness    Patient is a very pleasant 76-year-old female who on 12/20 sustained a laceration to her right anterior lower leg.  She had sutures placed in the emergency department and removed about 10 days later by her PCP.  The wound subsequently broke open and the patient had a lot of drainage.  She has intermittently been on antibiotics for this problem but no wound culture has been done.  She says they told her to leave it open so that it would dry out and she now has a lot of dry crusted scab all over the wound areas but says it is no longer hurting now that it is not draining anymore.  She has been seen here previously but it has been many years she says.    Allergies:  Penicillins and Sulfa antibiotics      Current Outpatient Medications:   •  ascorbic acid (VITAMIN C) 1000 MG tablet, Take 1,000 mg by mouth Daily., Disp: , Rfl:   •  Cholecalciferol 125 MCG (5000 UT) tablet, Take  by mouth Daily., Disp: , Rfl:   •  clindamycin (CLEOCIN) 300 MG capsule, Take 1 capsule by mouth 3 (Three) Times a Day., Disp: 21 capsule, Rfl: 0  •  cyclobenzaprine (FLEXERIL) 5 MG tablet, Take 1 tablet by mouth 3 (Three) Times a Day As Needed for Muscle Spasms., Disp: 30 tablet, Rfl: 0  •  docusate sodium (Colace) 100 MG capsule, Take 1 capsule by mouth Daily., Disp: 30 capsule, Rfl: 6  •  doxycycline (VIBRAMYCIN) 100 MG capsule, Take 1 capsule by mouth 2 (Two) Times a Day., Disp: 20 capsule, Rfl: 0  •  Multiple Vitamins-Minerals (Eye Health) capsule, , Disp: , Rfl:   •  multivitamin (THERAGRAN) tablet tablet, Take  by mouth Daily., Disp: , Rfl:   •  saccharomyces boulardii (Florastor) 250 MG capsule, Take 1 capsule by mouth 2 (Two) Times a Day., Disp: 30 capsule, Rfl: 1  •  silver sulfadiazine (Silvadene) 1 % cream, Apply 1 application topically to the appropriate area as directed Daily., Disp: 50 g, Rfl: 1  •  Synthroid 88 MCG tablet, Take 1  tablet by mouth Daily., Disp: 30 tablet, Rfl: 2  •  traMADol (ULTRAM) 50 MG tablet, Take 1 tablet by mouth Every 8 (Eight) Hours As Needed for Moderate Pain ., Disp: 3 tablet, Rfl: 0    Past Medical History:   Diagnosis Date   • Arthritis    • Coma due to low thyroid (HCC) 2015   • Heart attack (HCC)    • Hypothyroidism    • Kidney disease    • Leg pain    • Leg swelling    • Limb swelling    • Muscle cramps    • SOB (shortness of breath)    • Thyroid disease    • Thyroid disorder      Past Surgical History:   Procedure Laterality Date   • HYSTERECTOMY     • KNEE SURGERY  2008    JOINT SURGERY     Social History     Socioeconomic History   • Marital status: Single   Tobacco Use   • Smoking status: Never Smoker   • Smokeless tobacco: Never Used   Vaping Use   • Vaping Use: Never used   Substance and Sexual Activity   • Alcohol use: Never   • Drug use: Never   • Sexual activity: Defer     Family History   Problem Relation Age of Onset   • Heart disease Mother    • Arthritis Mother    • Heart disease Father    • Cancer Brother    • Arthritis Other    • Cancer Other    • Heart disease Other          Objective     Vitals:    02/22/22 1331   BP: 148/76   BP Location: Left arm   Patient Position: Sitting   Pulse: 105   Resp: 18   Temp: 96.7 °F (35.9 °C)   TempSrc: Temporal   SpO2: 98%   PainSc: 0-No pain     There is no height or weight on file to calculate BMI.    STEADI Fall Risk Assessment has not been completed.     Review of Systems     ROS:  No fevers, chills, sweats, or body aches.      I have reviewed the HPI and ROS as documented by MA/RN. Laina Bales MD    Physical Exam     NAD, well dressed, well nourished  Normocephalic, atraumatic  EOMI, no scleral icterus  No respiratory distress, no cough  AAOx3, pleasant, cooperative  Inverted V shaped wound anterior distal RLE with dried drainage and diffuse hard scabbing.  No erythema, induration, fluctuance, or drainage.  Minimal appropriate TTP.  Lidocaine gel  applied to numb and soften the scabs.  This was then able to be more thoroughly cleaned and the dried drainage and scabs were able to be removed.  Significant amount of epithelialization noted with healthy granulation tissue along the portions of the wound that remains open. See photos for details.                        Result Review :  The following data was reviewed by: Laina Bales MD on 02/22/2022:    Urgent care notes, PCP notes, labs.    Wound Avatar Documentation     Active Wound LDAs        Wound 12/20/21 1126 Right anterior  Laceration    Placement date 12/20/21   -MM 12/20/21 1128      Placement time 1126   -MM 12/20/21 1128 Days 64    Present on Hospital Admission: Y   -MM 12/20/21 1128 Side: Right   - 12/20/21 1128    Orientation: anterior   -MM 12/20/21 1128 Location: --  CALF   - 12/20/21 1128    Primary Wound Type: Laceration   - 12/20/21 1128 Additional Comments: V SHAPED LACERATION TO ANTERIOR R CALF   - 12/20/21 1128    Assessments       02/22/22 1334 02/01/22 1310 12/20/21 11:28:19         Wound Image  View All Images View Images   - 02/22/22 1346    View Images   - 02/01/22 1410    --    Dressing Appearance moist drainage; dried drainage; intact   - 02/22/22 1335 open to air   - 02/01/22 1312 dressing loose   - 12/20/21 1129   Closure --  --  None   - 12/20/21 1129   Base --  --  red   - 12/20/21 1129   Black (%), Wound Tissue Color --  0   - 02/01/22 1410 --    Red (%), Wound Tissue Color 100   - 02/22/22 1335 0   - 02/01/22 1410 --    Yellow (%), Wound Tissue Color --  0   - 02/01/22 1410 --    Periwound intact; blanchable; edematous   - 02/22/22 1335 intact   - 02/01/22 1312 intact   - 12/20/21 1129   Periwound Temperature warm   - 02/22/22 1335 warm   - 02/01/22 1312 --    Periwound Skin Turgor soft   - 02/22/22 1335 soft   -JOSE 02/01/22 1312 --    Edges open   -BA 02/22/22 1335 irregular   -JOSE 02/01/22 1312 open; irregular   -MM 12/20/21 1128    Wound Length (cm) 2 cm   - 02/22/22 1335 5.4 cm   - 02/01/22 1312 --    Wound Width (cm) 0.7 cm   - 02/22/22 1335 6.1 cm   - 02/01/22 1312 --    Wound Depth (cm) 0.1 cm   - 02/22/22 1335 0.1 cm   - 02/01/22 1312 --    Tunneling [Depth (cm)/Location] --  0   - 02/01/22 1312 --    Undermining [Depth (cm)/Location] --  0   - 02/01/22 1312 --    Drainage Characteristics/Odor serous; serosanguineous   - 02/22/22 1335 serous   - 02/01/22 1312 bleeding controlled   - 12/20/21 1129   Drainage Amount moderate   - 02/22/22 1335 moderate   - 02/01/22 1312 --    Care, Wound cleansed with; sterile normal saline   - 02/22/22 1335 cleansed with; sterile normal saline   - 02/01/22 1312 dressing removed  NEW DRSG APPLIED   - 12/20/21 1129   Dressing Care --  dressing applied; other (see comments)  POLYMEM SILVER, MEPILEX   - 02/01/22 1410 dressing applied   - 12/20/21 1129   Periwound Care --  dry periwound area maintained   - 02/01/22 1410 --    Adhesive Closure Strips --  --  --    Number of Adhesive Closure Strips --  --  --    Sutures Removed Intact --  --  --    Number of Sutures Removed --  --  --    Staples Removed Intact --  --  --    Number of Staples Removed --  --  --    Wound Output (mL) --  --  --                    User Key  (r) = Recorded By, (t) = Taken By, (c) = Cosigned By    Initials Name Effective Dates Provider Type Discipline    Oleksandr Fernandez, RN 03/30/21 -  Registered Nurse Nurse    Aurora Melendez, RN 06/09/21 -  Registered Nurse Nurse    Catrachita Romeo RN 06/09/21 -  Registered Nurse --                         Assessment and Plan   Diagnoses and all orders for this visit:    1. Traumatic wound dehiscence, subsequent encounter (Primary)  -     silver sulfadiazine (Silvadene) 1 % cream; Apply 1 application topically to the appropriate area as directed Daily.  Dispense: 50 g; Refill: 1    2. Laceration of right lower extremity, subsequent encounter    3.  Nonhealing nonsurgical wound limited to breakdown of skin    4. Hypothyroidism, unspecified type    5. Obesity (BMI 30.0-34.9)      Silvadene daily or twice daily to wound.  Keep covered at all times.      Return as needed    Patient was given instructions and counseling regarding their condition or for health maintenance advice, as well as the wound care plan and recommendations. They understand and agree with the plan.  They will follow back up here in the clinic but are instructed to contact us in the interim should they have any new, returning, or worsening symptoms or concerns. Please see specific information pulled into the AVS if appropriate.       Follow Up   Return if symptoms worsen or fail to improve.      Laina Bales MD

## 2022-03-17 ENCOUNTER — LAB (OUTPATIENT)
Dept: LAB | Facility: HOSPITAL | Age: 77
End: 2022-03-17

## 2022-03-17 DIAGNOSIS — E03.9 ACQUIRED HYPOTHYROIDISM: ICD-10-CM

## 2022-03-17 DIAGNOSIS — E03.9 HYPOTHYROIDISM, UNSPECIFIED TYPE: ICD-10-CM

## 2022-03-17 LAB
T4 FREE SERPL-MCNC: 2.08 NG/DL (ref 0.93–1.7)
TSH SERPL DL<=0.05 MIU/L-ACNC: 0.84 UIU/ML (ref 0.27–4.2)

## 2022-03-17 PROCEDURE — 36415 COLL VENOUS BLD VENIPUNCTURE: CPT

## 2022-03-17 PROCEDURE — 84443 ASSAY THYROID STIM HORMONE: CPT

## 2022-03-17 PROCEDURE — 84439 ASSAY OF FREE THYROXINE: CPT

## 2022-03-21 ENCOUNTER — TELEPHONE (OUTPATIENT)
Dept: FAMILY MEDICINE CLINIC | Facility: CLINIC | Age: 77
End: 2022-03-21

## 2022-03-21 ENCOUNTER — OFFICE VISIT (OUTPATIENT)
Dept: FAMILY MEDICINE CLINIC | Facility: CLINIC | Age: 77
End: 2022-03-21

## 2022-03-21 VITALS
HEIGHT: 62 IN | HEART RATE: 80 BPM | DIASTOLIC BLOOD PRESSURE: 77 MMHG | SYSTOLIC BLOOD PRESSURE: 138 MMHG | BODY MASS INDEX: 35.26 KG/M2 | WEIGHT: 191.6 LBS | OXYGEN SATURATION: 97 % | RESPIRATION RATE: 20 BRPM | TEMPERATURE: 98.2 F

## 2022-03-21 DIAGNOSIS — S81.811D LACERATION OF RIGHT LOWER EXTREMITY, SUBSEQUENT ENCOUNTER: ICD-10-CM

## 2022-03-21 DIAGNOSIS — E03.9 HYPOTHYROIDISM, ACQUIRED: ICD-10-CM

## 2022-03-21 PROCEDURE — 99213 OFFICE O/P EST LOW 20 MIN: CPT | Performed by: FAMILY MEDICINE

## 2022-03-21 RX ORDER — PNV NO.95/FERROUS FUM/FOLIC AC 28MG-0.8MG
TABLET ORAL
COMMUNITY
End: 2022-11-02

## 2022-03-21 RX ORDER — LEVOTHYROXINE SODIUM 0.05 MG/1
TABLET ORAL
Qty: 30 TABLET | Refills: 0 | Status: SHIPPED | OUTPATIENT
Start: 2022-03-21 | End: 2022-04-21

## 2022-03-21 NOTE — TELEPHONE ENCOUNTER
Caller: Berna Leong    Relationship to patient: Self    Best call back number: [phone]     Chief complaint:FOLLOW UP FOR THYROID AND RIGHT LEG INJURY     Type of visit: NEW PATIENT, OFF BOARDING FROM DR. ROBLES JOHNSTON     Requested date: AROUND 04/21/2022     If rescheduling, when is the original appointment: 05/26/2022     Additional notes:PATIENT SAW DR. JOHNSTON TODAY 03/21/2022 AND WAS INFORMED THAT SHE NEEDED A FOLLOW UP APPOINTMENT IN FOUR WEEKS.         [facility] SCHEDULED PATIENT BUT HAD NO AVAILABILITY WITH DR. RUSSELL UNTIL 05/26/2022. DR. RUSSELL IS THE PATIENT'S PREFERRED PROVIDER.

## 2022-03-22 NOTE — SIGNIFICANT NOTE
Epithelial %: 25-50%    Exposed Bone: no    Exposed Tendon: no    Impression: improved    Short Term Goals: INCREASE GRANULATION, DECREASE SIZE         Moderna dose 1 and 2

## 2022-04-20 PROBLEM — T81.30XA WOUND DEHISCENCE: Status: ACTIVE | Noted: 2022-04-20

## 2022-04-20 NOTE — PROGRESS NOTES
"CHIEF COMPLAINT  Berna Leong presents to [facility] to Establish Care (Right leg needs checked )     HPI   77-year-old pleasant female here to establish care as a new patient.  She has multiple medical conditions some chronic summary recent and is a former patient of Cesar Weber.  Her med list was reviewed in detail, all records from several other physicians were reviewed as well as prior labs.  Plan of care is as written below.    H/o right leg injury 12/2021-had picked up a heavy plastic container and hit leg with pullling of skin--seen at ER--12 stitches placed--sutures removed by her PCP shorthly then with wound dehiscensce. Followed at wound care until discharged 2 weeks ago.  Right ant leg with good wound healing-no erythema.  H/o bitten by brown recluse surgery--2004.  S/p knee surg 2008  Chronic soa-no wheezing-  H/o chronic fatigue syndrome    Patient was last seen by Dr. Weber 3/21/22: and 11/2021--notes reviewed  Pt presents for f/u of a deep wound/laceration of the right lower (anterior ) leg. The leg pain is better.     The thyroid is mildly hyperfunctioning. She is on the 75 mcg daily.   Only taking Vit D -not B12    Also seen by Dr Weber prior on 11/4/21:   \" Hx of hypothyroidism. She is compliaint with synthroid. She has seen Dr. Arreguin in past.   She is on 88 mcg..the thyroid is underrfunctioning today.  Hx of low vitamin C. she is on vitamin C.. helps with arthritis also per pt.   Hx of low vitamin D. she is currently on vitamin D. The vitamin D is normal.  hx of b12 was low normal. The b12 injections cuases side effects per pt. She can tolerate pill.  hx of elevated potassium level. Normal today  Hx of abnormal kidney function CKD. Stable. Established with Dr. Dobbins\"      SH--single, lives alone-no cigs/no alcohol    Past History:  Allergies: Penicillins and Sulfa antibiotics   Medical History: has a past medical history of Arthritis, Coma due to low " "thyroid (HCC) (2015), Heart attack (HCC), Hypothyroidism, Kidney disease, Leg pain, Leg swelling, Limb swelling, Muscle cramps, SOB (shortness of breath), Thyroid disease, Thyroid disorder, and Wound dehiscence (4/20/2022).   Surgical History: has a past surgical history that includes Knee surgery (2008).   Family History: family history includes Arthritis in her mother and another family member; Cancer in her brother and another family member; Heart disease in her father, mother, and another family member.   Social History: reports that she has never smoked. She has never used smokeless tobacco. She reports that she does not drink alcohol and does not use drugs.  Social History     Social History Narrative   • Not on file         OBJECTIVE  Vital Signs  Vitals:    04/21/22 0926 04/21/22 1055   BP: 143/80 138/80   BP Location:  Left arm   Patient Position:  Sitting   Cuff Size:  Adult   Pulse: 97    Temp: 97.3 °F (36.3 °C)    SpO2: 98%    Weight: 87.3 kg (192 lb 6.4 oz)    Height: 157.5 cm (62.01\")       Body mass index is 35.18 kg/m².    Review of Systems -right leg pain-abnormal weight gain    Physical Exam  Vitals and nursing note reviewed.   Constitutional:       Appearance: Normal appearance.   HENT:      Head: Normocephalic and atraumatic.   Cardiovascular:      Rate and Rhythm: Normal rate and regular rhythm.   Pulmonary:      Effort: Pulmonary effort is normal.      Breath sounds: Normal breath sounds.   Abdominal:      Palpations: Abdomen is soft.   Musculoskeletal:         General: Normal range of motion.      Cervical back: Normal range of motion and neck supple.   Skin:     General: Skin is warm and dry.      Comments: Right lower leg with hyperpigmented changes-well healing scar-no erythema   Neurological:      General: No focal deficit present.      Mental Status: She is alert and oriented to person, place, and time.         RESULTS REVIEW  No results found for: PROBNP, BNP  CMP    CMP 5/28/21 10/29/21 " 1/28/22   Glucose 93 108 (A) 102 (A)   BUN 21 25 (A) 21   Creatinine 1.22 (A) 1.11 (A) 1.01 (A)   eGFR Non  Am  48 (A) 53 (A)   Sodium 139 143 142   Potassium 4.6 4.6 4.4   Chloride 103 104 103   Calcium 8.8 9.5 9.8   Albumin 4.1 4.20 4.10   Total Bilirubin  0.3 0.3   Alkaline Phosphatase  115 125 (A)   AST (SGOT)  24 23   ALT (SGPT)  22 18   (A) Abnormal value            CBC w/diff    CBC w/Diff 5/28/21 1/28/22   WBC 8.17 7.85   RBC 4.25 4.46   Hemoglobin 12.1 12.9   Hematocrit 37.6 38.6   MCV 88.5 86.5   MCH 28.5 28.9   MCHC 32.2 (A) 33.4   RDW 12.6 11.8 (A)   Platelets 239 324   Neutrophil Rel %  57.1   Immature Granulocyte Rel %  0.3   Lymphocyte Rel %  31.5   Monocyte Rel %  5.0   Eosinophil Rel %  5.1   Basophil Rel %  1.0   (A) Abnormal value             Lipid Panel    Lipid Panel 1/28/22   Total Cholesterol 219 (A)   Triglycerides 58   HDL Cholesterol 87 (A)   VLDL Cholesterol 10   LDL Cholesterol  122 (A)   LDL/HDL Ratio 1.38   (A) Abnormal value             Lab Results   Component Value Date    TSH 0.841 03/17/2022    TSH 3.990 01/28/2022    TSH 4.420 (H) 10/29/2021      Lab Results   Component Value Date    FREET4 2.08 (H) 03/17/2022    FREET4 1.30 01/28/2022    FREET4 1.34 10/29/2021      A1C Last 3 Results    HGBA1C Last 3 Results 1/28/22   Hemoglobin A1C 5.99 (A)   (A) Abnormal value                No Images in the past 120 days found..              ASSESSMENT & PLAN  Diagnoses and all orders for this visit:    1. Hypothyroidism, unspecified type (Primary)  Assessment & Plan:  S/p   Radiation in past--now with hypothyroidism-Currently taking levothyroxine 75 mcg every day except 50 mcg Saturday and Sunday.  Last TSH normal in February with slightly elevated free T4.  Continue with Synthroid brand name 75 mcg once daily for now    Orders:  -     Comprehensive Metabolic Panel; Future  -     Lipid Panel; Future  -     CBC & Differential; Future  -     Vitamin B12; Future  -     Folate; Future  -      Vitamin D 25 Hydroxy; Future  -     Magnesium; Future  -     Microalbumin / Creatinine Urine Ratio - Urine, Clean Catch; Future  -     Hemoglobin A1c; Future  -     TSH+Free T4; Future  -     T3, Free; Future    2. Arthritis  Assessment & Plan:  On legs and back-but not needing any meds-    Orders:  -     Comprehensive Metabolic Panel; Future  -     Lipid Panel; Future  -     CBC & Differential; Future  -     Vitamin B12; Future  -     Folate; Future  -     Vitamin D 25 Hydroxy; Future  -     Magnesium; Future  -     Microalbumin / Creatinine Urine Ratio - Urine, Clean Catch; Future  -     Hemoglobin A1c; Future  -     TSH+Free T4; Future  -     T3, Free; Future    3. History of MI (myocardial infarction)  -     Comprehensive Metabolic Panel; Future  -     Lipid Panel; Future  -     CBC & Differential; Future  -     Vitamin B12; Future  -     Folate; Future  -     Vitamin D 25 Hydroxy; Future  -     Magnesium; Future  -     Microalbumin / Creatinine Urine Ratio - Urine, Clean Catch; Future  -     Hemoglobin A1c; Future  -     TSH+Free T4; Future  -     T3, Free; Future  -     Adult Transthoracic Echo Complete W/ Cont if Necessary Per Protocol; Future    4. Stage 3a chronic kidney disease (HCC)  Assessment & Plan:  Followed by Dr. Oh has appointment April 25.      5. Obesity (BMI 35.0-39.9 without comorbidity)  -     Comprehensive Metabolic Panel; Future  -     Lipid Panel; Future  -     CBC & Differential; Future  -     Vitamin B12; Future  -     Folate; Future  -     Vitamin D 25 Hydroxy; Future  -     Magnesium; Future  -     Microalbumin / Creatinine Urine Ratio - Urine, Clean Catch; Future  -     Hemoglobin A1c; Future  -     TSH+Free T4; Future  -     T3, Free; Future    6. Macular degeneration, unspecified laterality, unspecified type  -     Comprehensive Metabolic Panel; Future  -     Lipid Panel; Future  -     CBC & Differential; Future  -     Vitamin B12; Future  -     Folate; Future  -     Vitamin D 25  Hydroxy; Future  -     Magnesium; Future  -     Microalbumin / Creatinine Urine Ratio - Urine, Clean Catch; Future  -     Hemoglobin A1c; Future  -     TSH+Free T4; Future  -     T3, Free; Future    7. Adnexal cyst  Assessment & Plan:  Follow-up with OB/GYN in [place] in May.    Orders:  -     Comprehensive Metabolic Panel; Future  -     Lipid Panel; Future  -     CBC & Differential; Future  -     Vitamin B12; Future  -     Folate; Future  -     Vitamin D 25 Hydroxy; Future  -     Magnesium; Future  -     Microalbumin / Creatinine Urine Ratio - Urine, Clean Catch; Future  -     Hemoglobin A1c; Future  -     TSH+Free T4; Future  -     T3, Free; Future    8. Vitamin D deficiency  -     Comprehensive Metabolic Panel; Future  -     Vitamin D 25 Hydroxy; Future    9. Hyperglycemia   -     Hemoglobin A1c; Future    10. Osteoporosis with current pathological fracture, unspecified osteoporosis type, initial encounter  -     DEXA Bone Density Axial; Future    11. Shortness of breath  -     Adult Transthoracic Echo Complete W/ Cont if Necessary Per Protocol; Future        FOLLOW UP  Return in about 2 weeks (around 5/5/2022) for Medicare Wellness.     Recommend Shingrix vaccine at pharmacy  Due for Tdap vaccine at next visit.  Needs DEXA scan scheduled.  And wellness visit.  Do Echo to evaluate shortness of air and history of MI in the past.  Do labs  Has appointment with Dr. Dobbins/renal end of April    Patient was given instructions and counseling regarding her condition or for health maintenance advice. Please see specific information pulled into the AVS if appropriate.

## 2022-04-20 NOTE — ASSESSMENT & PLAN NOTE
S/p   Radiation in past--now with hypothyroidism-Currently taking levothyroxine 75 mcg every day except 50 mcg Saturday and Sunday.  Last TSH normal in February with slightly elevated free T4.  Continue with Synthroid brand name 75 mcg once daily for now

## 2022-04-21 ENCOUNTER — OFFICE VISIT (OUTPATIENT)
Dept: INTERNAL MEDICINE | Facility: CLINIC | Age: 77
End: 2022-04-21

## 2022-04-21 VITALS
TEMPERATURE: 97.3 F | OXYGEN SATURATION: 98 % | HEART RATE: 97 BPM | HEIGHT: 62 IN | WEIGHT: 192.4 LBS | DIASTOLIC BLOOD PRESSURE: 80 MMHG | SYSTOLIC BLOOD PRESSURE: 138 MMHG | BODY MASS INDEX: 35.41 KG/M2

## 2022-04-21 DIAGNOSIS — N94.9 ADNEXAL CYST: ICD-10-CM

## 2022-04-21 DIAGNOSIS — I25.2 HISTORY OF MI (MYOCARDIAL INFARCTION): ICD-10-CM

## 2022-04-21 DIAGNOSIS — E55.9 VITAMIN D DEFICIENCY: ICD-10-CM

## 2022-04-21 DIAGNOSIS — H35.30 MACULAR DEGENERATION, UNSPECIFIED LATERALITY, UNSPECIFIED TYPE: ICD-10-CM

## 2022-04-21 DIAGNOSIS — N18.31 STAGE 3A CHRONIC KIDNEY DISEASE: ICD-10-CM

## 2022-04-21 DIAGNOSIS — R06.02 SHORTNESS OF BREATH: ICD-10-CM

## 2022-04-21 DIAGNOSIS — E03.9 HYPOTHYROIDISM, UNSPECIFIED TYPE: Primary | ICD-10-CM

## 2022-04-21 DIAGNOSIS — R73.9 HYPERGLYCEMIA: ICD-10-CM

## 2022-04-21 DIAGNOSIS — M80.00XA OSTEOPOROSIS WITH CURRENT PATHOLOGICAL FRACTURE, UNSPECIFIED OSTEOPOROSIS TYPE, INITIAL ENCOUNTER: ICD-10-CM

## 2022-04-21 DIAGNOSIS — M19.90 ARTHRITIS: ICD-10-CM

## 2022-04-21 DIAGNOSIS — E66.9 OBESITY (BMI 35.0-39.9 WITHOUT COMORBIDITY): ICD-10-CM

## 2022-04-21 PROBLEM — Z87.898 HISTORY OF COMA: Status: ACTIVE | Noted: 2022-04-21

## 2022-04-21 PROBLEM — Z86.39 HISTORY OF GRAVES' DISEASE: Status: ACTIVE | Noted: 2022-04-21

## 2022-04-21 PROBLEM — N28.9 KIDNEY DISEASE: Status: RESOLVED | Noted: 2021-09-14 | Resolved: 2022-04-21

## 2022-04-21 PROBLEM — R25.2 MUSCLE CRAMPS: Status: RESOLVED | Noted: 2021-09-14 | Resolved: 2022-04-21

## 2022-04-21 PROBLEM — I21.9 HEART ATTACK: Status: RESOLVED | Noted: 2021-09-14 | Resolved: 2022-04-21

## 2022-04-21 PROBLEM — T81.30XA WOUND DEHISCENCE: Status: RESOLVED | Noted: 2022-04-20 | Resolved: 2022-04-21

## 2022-04-21 PROBLEM — H40.9 GLAUCOMA: Status: ACTIVE | Noted: 2022-04-21

## 2022-04-21 PROBLEM — M79.89 LIMB SWELLING: Status: RESOLVED | Noted: 2021-09-14 | Resolved: 2022-04-21

## 2022-04-21 PROBLEM — Z98.890 HX OF KNEE SURGERY: Status: ACTIVE | Noted: 2022-04-21

## 2022-04-21 PROBLEM — M79.606 LEG PAIN: Status: RESOLVED | Noted: 2021-09-14 | Resolved: 2022-04-21

## 2022-04-21 PROCEDURE — 99204 OFFICE O/P NEW MOD 45 MIN: CPT | Performed by: INTERNAL MEDICINE

## 2022-04-21 RX ORDER — LEVOTHYROXINE SODIUM 0.07 MG/1
75 TABLET ORAL DAILY
COMMUNITY
End: 2022-06-08 | Stop reason: SDUPTHER

## 2022-04-21 RX ORDER — VITAMIN B COMPLEX
1 CAPSULE ORAL DAILY
COMMUNITY
End: 2022-11-16

## 2022-04-21 NOTE — PATIENT INSTRUCTIONS
Recommend Shingrix vaccine at pharmacy  Needs DEXA scan scheduled.  And wellness visit    Do Labs  F/u in 2 weeks -needs Wellness

## 2022-04-22 ENCOUNTER — LAB (OUTPATIENT)
Dept: LAB | Facility: HOSPITAL | Age: 77
End: 2022-04-22

## 2022-04-22 ENCOUNTER — TRANSCRIBE ORDERS (OUTPATIENT)
Dept: LAB | Facility: HOSPITAL | Age: 77
End: 2022-04-22

## 2022-04-22 DIAGNOSIS — M19.90 ARTHRITIS: ICD-10-CM

## 2022-04-22 DIAGNOSIS — N18.30 STAGE 3 CHRONIC KIDNEY DISEASE, UNSPECIFIED WHETHER STAGE 3A OR 3B CKD: Primary | ICD-10-CM

## 2022-04-22 DIAGNOSIS — E55.9 VITAMIN D DEFICIENCY: ICD-10-CM

## 2022-04-22 DIAGNOSIS — H35.30 MACULAR DEGENERATION, UNSPECIFIED LATERALITY, UNSPECIFIED TYPE: ICD-10-CM

## 2022-04-22 DIAGNOSIS — N94.9 ADNEXAL CYST: ICD-10-CM

## 2022-04-22 DIAGNOSIS — I25.2 HISTORY OF MI (MYOCARDIAL INFARCTION): ICD-10-CM

## 2022-04-22 DIAGNOSIS — E03.9 HYPOTHYROIDISM, UNSPECIFIED TYPE: ICD-10-CM

## 2022-04-22 DIAGNOSIS — N18.30 STAGE 3 CHRONIC KIDNEY DISEASE, UNSPECIFIED WHETHER STAGE 3A OR 3B CKD: ICD-10-CM

## 2022-04-22 DIAGNOSIS — R73.9 HYPERGLYCEMIA: ICD-10-CM

## 2022-04-22 DIAGNOSIS — E66.9 OBESITY (BMI 35.0-39.9 WITHOUT COMORBIDITY): ICD-10-CM

## 2022-04-22 LAB
25(OH)D3 SERPL-MCNC: 36.9 NG/ML (ref 30–100)
ALBUMIN SERPL-MCNC: 3.9 G/DL (ref 3.5–5.2)
ALBUMIN UR-MCNC: 3.1 MG/DL
ALBUMIN/GLOB SERPL: 1.2 G/DL
ALP SERPL-CCNC: 118 U/L (ref 39–117)
ALT SERPL W P-5'-P-CCNC: 12 U/L (ref 1–33)
ANION GAP SERPL CALCULATED.3IONS-SCNC: 13.3 MMOL/L (ref 5–15)
AST SERPL-CCNC: 18 U/L (ref 1–32)
BACTERIA UR QL AUTO: ABNORMAL /HPF
BASOPHILS # BLD AUTO: 0.09 10*3/MM3 (ref 0–0.2)
BASOPHILS NFR BLD AUTO: 1.2 % (ref 0–1.5)
BILIRUB SERPL-MCNC: 0.6 MG/DL (ref 0–1.2)
BILIRUB UR QL STRIP: NEGATIVE
BUN SERPL-MCNC: 21 MG/DL (ref 8–23)
BUN/CREAT SERPL: 16.5 (ref 7–25)
CALCIUM SPEC-SCNC: 9.3 MG/DL (ref 8.6–10.5)
CHLORIDE SERPL-SCNC: 100 MMOL/L (ref 98–107)
CHOLEST SERPL-MCNC: 203 MG/DL (ref 0–200)
CLARITY UR: ABNORMAL
CO2 SERPL-SCNC: 23.7 MMOL/L (ref 22–29)
COD CRY URNS QL: ABNORMAL /HPF
COLOR UR: YELLOW
CREAT SERPL-MCNC: 1.27 MG/DL (ref 0.57–1)
CREAT UR-MCNC: 314.2 MG/DL
CREAT UR-MCNC: 354 MG/DL
DEPRECATED RDW RBC AUTO: 39.4 FL (ref 37–54)
EGFRCR SERPLBLD CKD-EPI 2021: 43.1 ML/MIN/1.73
EOSINOPHIL # BLD AUTO: 0.23 10*3/MM3 (ref 0–0.4)
EOSINOPHIL NFR BLD AUTO: 3 % (ref 0.3–6.2)
ERYTHROCYTE [DISTWIDTH] IN BLOOD BY AUTOMATED COUNT: 12.8 % (ref 12.3–15.4)
FOLATE SERPL-MCNC: >20 NG/ML (ref 4.78–24.2)
GLOBULIN UR ELPH-MCNC: 3.3 GM/DL
GLUCOSE SERPL-MCNC: 89 MG/DL (ref 65–99)
GLUCOSE UR STRIP-MCNC: NEGATIVE MG/DL
HBA1C MFR BLD: 5.5 % (ref 4.8–5.6)
HCT VFR BLD AUTO: 39.1 % (ref 34–46.6)
HDLC SERPL-MCNC: 69 MG/DL (ref 40–60)
HGB BLD-MCNC: 12.7 G/DL (ref 12–15.9)
HGB UR QL STRIP.AUTO: NEGATIVE
HYALINE CASTS UR QL AUTO: ABNORMAL /LPF
IMM GRANULOCYTES # BLD AUTO: 0.02 10*3/MM3 (ref 0–0.05)
IMM GRANULOCYTES NFR BLD AUTO: 0.3 % (ref 0–0.5)
KETONES UR QL STRIP: ABNORMAL
LDLC SERPL CALC-MCNC: 122 MG/DL (ref 0–100)
LDLC/HDLC SERPL: 1.75 {RATIO}
LEUKOCYTE ESTERASE UR QL STRIP.AUTO: ABNORMAL
LYMPHOCYTES # BLD AUTO: 2.34 10*3/MM3 (ref 0.7–3.1)
LYMPHOCYTES NFR BLD AUTO: 30.9 % (ref 19.6–45.3)
MAGNESIUM SERPL-MCNC: 2 MG/DL (ref 1.6–2.4)
MCH RBC QN AUTO: 28 PG (ref 26.6–33)
MCHC RBC AUTO-ENTMCNC: 32.5 G/DL (ref 31.5–35.7)
MCV RBC AUTO: 86.3 FL (ref 79–97)
MICROALBUMIN/CREAT UR: 9.9 MG/G
MONOCYTES # BLD AUTO: 0.46 10*3/MM3 (ref 0.1–0.9)
MONOCYTES NFR BLD AUTO: 6.1 % (ref 5–12)
NEUTROPHILS NFR BLD AUTO: 4.44 10*3/MM3 (ref 1.7–7)
NEUTROPHILS NFR BLD AUTO: 58.5 % (ref 42.7–76)
NITRITE UR QL STRIP: NEGATIVE
NRBC BLD AUTO-RTO: 0 /100 WBC (ref 0–0.2)
PH UR STRIP.AUTO: 5.5 [PH] (ref 5–8)
PHOSPHATE SERPL-MCNC: 3.1 MG/DL (ref 2.5–4.5)
PLATELET # BLD AUTO: 312 10*3/MM3 (ref 140–450)
PMV BLD AUTO: 10.1 FL (ref 6–12)
POTASSIUM SERPL-SCNC: 4.3 MMOL/L (ref 3.5–5.2)
PROT ?TM UR-MCNC: 25.9 MG/DL
PROT SERPL-MCNC: 7.2 G/DL (ref 6–8.5)
PROT UR QL STRIP: ABNORMAL
PROT/CREAT UR: 0.07 MG/G{CREAT}
RBC # BLD AUTO: 4.53 10*6/MM3 (ref 3.77–5.28)
RBC # UR STRIP: ABNORMAL /HPF
REF LAB TEST METHOD: ABNORMAL
SODIUM SERPL-SCNC: 137 MMOL/L (ref 136–145)
SP GR UR STRIP: 1.02 (ref 1–1.03)
SQUAMOUS #/AREA URNS HPF: ABNORMAL /HPF
T3FREE SERPL-MCNC: 1.9 PG/ML (ref 2–4.4)
T4 FREE SERPL-MCNC: 1.28 NG/DL (ref 0.93–1.7)
TRIGL SERPL-MCNC: 65 MG/DL (ref 0–150)
TSH SERPL DL<=0.05 MIU/L-ACNC: 3.64 UIU/ML (ref 0.27–4.2)
UROBILINOGEN UR QL STRIP: ABNORMAL
VIT B12 BLD-MCNC: 1182 PG/ML (ref 211–946)
VLDLC SERPL-MCNC: 12 MG/DL (ref 5–40)
WBC # UR STRIP: ABNORMAL /HPF
WBC NRBC COR # BLD: 7.58 10*3/MM3 (ref 3.4–10.8)

## 2022-04-22 PROCEDURE — 84100 ASSAY OF PHOSPHORUS: CPT

## 2022-04-22 PROCEDURE — 83036 HEMOGLOBIN GLYCOSYLATED A1C: CPT

## 2022-04-22 PROCEDURE — 81001 URINALYSIS AUTO W/SCOPE: CPT

## 2022-04-22 PROCEDURE — 82043 UR ALBUMIN QUANTITATIVE: CPT

## 2022-04-22 PROCEDURE — 36415 COLL VENOUS BLD VENIPUNCTURE: CPT

## 2022-04-22 PROCEDURE — 84443 ASSAY THYROID STIM HORMONE: CPT

## 2022-04-22 PROCEDURE — 84156 ASSAY OF PROTEIN URINE: CPT

## 2022-04-22 PROCEDURE — 84439 ASSAY OF FREE THYROXINE: CPT

## 2022-04-22 PROCEDURE — 82570 ASSAY OF URINE CREATININE: CPT

## 2022-04-22 PROCEDURE — 85025 COMPLETE CBC W/AUTO DIFF WBC: CPT

## 2022-04-22 PROCEDURE — 80061 LIPID PANEL: CPT

## 2022-04-22 PROCEDURE — 82746 ASSAY OF FOLIC ACID SERUM: CPT

## 2022-04-22 PROCEDURE — 83735 ASSAY OF MAGNESIUM: CPT

## 2022-04-22 PROCEDURE — 84481 FREE ASSAY (FT-3): CPT

## 2022-04-22 PROCEDURE — 82607 VITAMIN B-12: CPT

## 2022-04-22 PROCEDURE — 80053 COMPREHEN METABOLIC PANEL: CPT

## 2022-04-22 PROCEDURE — 82306 VITAMIN D 25 HYDROXY: CPT

## 2022-04-25 ENCOUNTER — PATIENT ROUNDING (BHMG ONLY) (OUTPATIENT)
Dept: INTERNAL MEDICINE | Facility: CLINIC | Age: 77
End: 2022-04-25

## 2022-04-25 NOTE — PROGRESS NOTES
April 25, 2022    Hello, may I speak with Berna Leong?    My name is Roly Cai      I am  with [facility]  [facility]  [address] [place] [address]  [facility] [address].    Before we get started may I verify your date of birth? 1945    I am calling to officially welcome you to our practice and ask about your recent visit. Is this a good time to talk? yes    Tell me about your visit with us. What things went well?  the visit was great, I was very happy with how it went        We're always looking for ways to make our patients' experiences even better. Do you have recommendations on ways we may improve?  no    Overall were you satisfied with your first visit to our practice? yes       I appreciate you taking the time to speak with me today. Is there anything else I can do for you? no      Thank you, and have a great day.

## 2022-05-09 ENCOUNTER — OFFICE VISIT (OUTPATIENT)
Dept: INTERNAL MEDICINE | Facility: CLINIC | Age: 77
End: 2022-05-09

## 2022-05-09 VITALS
OXYGEN SATURATION: 98 % | DIASTOLIC BLOOD PRESSURE: 76 MMHG | HEIGHT: 62 IN | HEART RATE: 84 BPM | WEIGHT: 192.6 LBS | TEMPERATURE: 97.9 F | SYSTOLIC BLOOD PRESSURE: 138 MMHG | BODY MASS INDEX: 35.44 KG/M2

## 2022-05-09 DIAGNOSIS — E55.9 VITAMIN D DEFICIENCY: ICD-10-CM

## 2022-05-09 DIAGNOSIS — M47.816 SPONDYLOSIS OF LUMBAR REGION WITHOUT MYELOPATHY OR RADICULOPATHY: ICD-10-CM

## 2022-05-09 DIAGNOSIS — Z00.00 ENCOUNTER FOR INITIAL ANNUAL WELLNESS VISIT (AWV) IN MEDICARE PATIENT: Primary | ICD-10-CM

## 2022-05-09 DIAGNOSIS — M85.859 OSTEOPENIA OF HIP, UNSPECIFIED LATERALITY: ICD-10-CM

## 2022-05-09 DIAGNOSIS — E03.8 HYPOTHYROIDISM DUE TO HASHIMOTO'S THYROIDITIS: ICD-10-CM

## 2022-05-09 DIAGNOSIS — R73.03 PREDIABETES: ICD-10-CM

## 2022-05-09 DIAGNOSIS — E06.3 HYPOTHYROIDISM DUE TO HASHIMOTO'S THYROIDITIS: ICD-10-CM

## 2022-05-09 DIAGNOSIS — M54.2 CERVICALGIA: ICD-10-CM

## 2022-05-09 DIAGNOSIS — G25.81 RESTLESS LEG SYNDROME: ICD-10-CM

## 2022-05-09 DIAGNOSIS — I25.2 HISTORY OF MI (MYOCARDIAL INFARCTION): ICD-10-CM

## 2022-05-09 DIAGNOSIS — E78.5 HYPERLIPIDEMIA, UNSPECIFIED HYPERLIPIDEMIA TYPE: ICD-10-CM

## 2022-05-09 PROCEDURE — G0439 PPPS, SUBSEQ VISIT: HCPCS | Performed by: INTERNAL MEDICINE

## 2022-05-09 PROCEDURE — 1159F MED LIST DOCD IN RCRD: CPT | Performed by: INTERNAL MEDICINE

## 2022-05-09 PROCEDURE — 1170F FXNL STATUS ASSESSED: CPT | Performed by: INTERNAL MEDICINE

## 2022-05-09 PROCEDURE — 99213 OFFICE O/P EST LOW 20 MIN: CPT | Performed by: INTERNAL MEDICINE

## 2022-05-09 RX ORDER — PNV NO.95/FERROUS FUM/FOLIC AC 28MG-0.8MG
1 TABLET ORAL
COMMUNITY
End: 2022-05-09 | Stop reason: SDUPTHER

## 2022-05-09 RX ORDER — SIMVASTATIN 20 MG
20 TABLET ORAL NIGHTLY
Qty: 90 TABLET | Refills: 1 | Status: SHIPPED | OUTPATIENT
Start: 2022-05-09 | End: 2022-07-11

## 2022-05-09 RX ORDER — VITAMIN E 268 MG
180 CAPSULE ORAL
COMMUNITY

## 2022-05-09 NOTE — ASSESSMENT & PLAN NOTE
Not needing medications at present.  Wants to try her over-the-counter treatment using magnets on her insoles.

## 2022-05-09 NOTE — PROGRESS NOTES
"The ABCs of the Annual Wellness Visit  Initial Medicare Wellness Visit    Chief Complaint   Patient presents with   • Hypothyroidism     Pt here for wellness visit and pt wants her right ear checked.     Subjective   History of Present Illness:  Berna Leong is a 77 y.o. female who presents for an Initial Medicare Wellness Visit.    C/o arthritis-daily -in neck and lower back-worse last 2 months-was using otc \"magnets\" on her insoles in past which helped--and waiting for it in mail.     Main concern is-DDD-l spine 1/2020-does not want meds-or xrays    The following portions of the patient's history were reviewed and   updated as appropriate: allergies, current medications, past family history, past medical history, past social history, past surgical history and problem list.     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    Recent Hospitalizations:  She was not admitted to the hospital during the last year.       Current Medical Providers:  Patient Care Team:  Reyna Chacon MD as PCP - General (Internal Medicine)   Dr Bailey [facility]  Dr Wick -Ortho  Dr Batista-Neurology    Outpatient Medications Prior to Visit   Medication Sig Dispense Refill   • ascorbic acid (VITAMIN C) 1000 MG tablet Take 1,000 mg by mouth Daily.     • B Complex Vitamins (vitamin b complex) capsule capsule Take 1 capsule by mouth Daily.     • Cholecalciferol 125 MCG (5000 UT) tablet Take  by mouth Daily.     • levothyroxine (SYNTHROID, LEVOTHROID) 75 MCG tablet Take 75 mcg by mouth Daily.     • Multiple Vitamins-Minerals (Eye Health) capsule      • Vitamin E 134 MG (200 UNIT) capsule Take  by mouth.     • vitamin E 400 UNIT capsule Take 400 Units by mouth.     • Vitamin E " "134 MG (200 UNIT) capsule Take 1 capsule by mouth.       No facility-administered medications prior to visit.       No opioid medication identified on active medication list. I have reviewed chart for other potential  high risk medication/s and harmful drug interactions in the elderly.          Aspirin is not on active medication list.  Aspirin use is not indicated based on review of current medical condition/s. Risk of harm outweighs potential benefits.  .    Patient Active Problem List   Diagnosis   • Adnexal cyst   • Arthritis   • Hypothyroidism   • Hx of knee surgery   • Macular degeneration   • Glaucoma   • Obesity (BMI 35.0-39.9 without comorbidity)   • History of MI (myocardial infarction)   • Stage 3a chronic kidney disease (HCC)   • History of Graves' disease   • History of coma   • Spondylosis of lumbar region without myelopathy or radiculopathy   • Cervicalgia   • Restless leg syndrome     Advance Care Planning  Advance Directive is not on file.  ACP discussion was held with the patient during this visit. Patient does not have an advance directive, information provided.    ROS--weight gain >18 lbs x 2 yrs, constipation, arthritis        Objective       Vitals:    05/09/22 0916   BP: 138/76   Pulse: 84   Temp: 97.9 °F (36.6 °C)   SpO2: 98%   Weight: 87.4 kg (192 lb 9.6 oz)   Height: 157.5 cm (62.01\")     BMI Readings from Last 1 Encounters:   05/09/22 35.22 kg/m²   BMI is above normal parameters. Recommendations include: nutrition counseling    Does the patient have evidence of cognitive impairment? No    Physical Exam  Vitals and nursing note reviewed.   Constitutional:       Appearance: Normal appearance.   HENT:      Head: Normocephalic and atraumatic.   Cardiovascular:      Rate and Rhythm: Normal rate and regular rhythm.   Pulmonary:      Effort: Pulmonary effort is normal.      Breath sounds: Normal breath sounds.   Abdominal:      Palpations: Abdomen is soft.   Musculoskeletal:      Cervical back: " Normal range of motion and neck supple.   Skin:     General: Skin is warm and dry.      Comments: Significant varicose veins    Neurological:      General: No focal deficit present.      Mental Status: She is alert and oriented to person, place, and time.       Lab Results   Component Value Date    TRIG 65 04/22/2022    HDL 69 (H) 04/22/2022     (H) 04/22/2022    VLDL 12 04/22/2022    HGBA1C 5.50 04/22/2022          HEALTH RISK ASSESSMENT    Smoking Status:  Social History     Tobacco Use   Smoking Status Never Smoker   Smokeless Tobacco Never Used     Alcohol Consumption:  Social History     Substance and Sexual Activity   Alcohol Use Never     Fall Risk Screen:    KELVINADI Fall Risk Assessment was completed, and patient is at LOW risk for falls.Assessment completed on:5/9/2022    Depression Screen:   PHQ-2/PHQ-9 Depression Screening 5/9/2022   Retired PHQ-9 Total Score -   Retired Total Score -   Little Interest or Pleasure in Doing Things 0-->not at all   Feeling Down, Depressed or Hopeless 0-->not at all   PHQ-9: Brief Depression Severity Measure Score 0       Health Habits and Functional and Cognitive Screening:  Functional & Cognitive Status 5/9/2022   Do you have difficulty preparing food and eating? No   Do you have difficulty bathing yourself, getting dressed or grooming yourself? No   Do you have difficulty using the toilet? No   Do you have difficulty moving around from place to place? No   Do you have trouble with steps or getting out of a bed or a chair? No   Current Diet Other   Dental Exam Up to date   Eye Exam Up to date   Exercise (times per week) 0 times per week   Current Exercises Include No Regular Exercise   Do you need help using the phone?  No   Are you deaf or do you have serious difficulty hearing?  No   Do you need help with transportation? No   Do you need help shopping? No   Do you need help preparing meals?  No   Do you need help with housework?  No   Do you need help with laundry?  No   Do you need help taking your medications? No   Do you need help managing money? No   Do you ever drive or ride in a car without wearing a seat belt? No   Have you felt unusual stress, anger or loneliness in the last month? No   Who do you live with? Alone   If you need help, do you have trouble finding someone available to you? No   Have you been bothered in the last four weeks by sexual problems? No   Do you have difficulty concentrating, remembering or making decisions? No       Age-appropriate Screening Schedule:  Refer to the list below for future screening recommendations based on patient's age, sex and/or medical conditions. Orders for these recommended tests are listed in the plan section. The patient has been provided with a written plan.    Health Maintenance   Topic Date Due   • DXA SCAN  Never done   • ZOSTER VACCINE (1 of 2) Never done   • INFLUENZA VACCINE  08/01/2022   • LIPID PANEL  04/22/2023   • TDAP/TD VACCINES (2 - Td or Tdap) 12/20/2031            Assessment/Plan   CMS Preventative Services Quick Reference  Risk Factors Identified During Encounter  Cardiovascular Disease  Glaucoma or Family History of Glaucoma  Immunizations Discussed/Encouraged (specific Immunizations; Prevnar 20 (Pneumococcal 20-valent conjugate)  Obesity/Overweight   The above risks/problems have been discussed with the patient.  Follow up actions/plans if indicated are seen below in the Assessment/Plan Section.  Pertinent information has been shared with the patient in the After Visit Summary.    Diagnoses and all orders for this visit:    1. Encounter for initial annual wellness visit (AWV) in Medicare patient (Primary)  -     Vitamin D 25 Hydroxy; Future  -     TSH+Free T4; Future  -     Vitamin B12; Future  -     Folate; Future  -     Hemoglobin A1c; Future  -     Comprehensive Metabolic Panel; Future    2. Hyperlipidemia, unspecified hyperlipidemia type  -     simvastatin (Zocor) 20 MG tablet; Take 1 tablet by mouth  "Every Night.  Dispense: 90 tablet; Refill: 1  -     Vitamin D 25 Hydroxy; Future  -     TSH+Free T4; Future  -     Vitamin B12; Future  -     Folate; Future  -     Hemoglobin A1c; Future  -     Comprehensive Metabolic Panel; Future    3. Hypothyroidism due to Hashimoto's thyroiditis  Assessment & Plan:  Due to Graves Disease-on Synthroid 75 mcg daily    Orders:  -     Vitamin D 25 Hydroxy; Future  -     TSH+Free T4; Future  -     Vitamin B12; Future  -     Folate; Future  -     Hemoglobin A1c; Future  -     Comprehensive Metabolic Panel; Future    4. History of MI (myocardial infarction)  -     simvastatin (Zocor) 20 MG tablet; Take 1 tablet by mouth Every Night.  Dispense: 90 tablet; Refill: 1  -     Vitamin D 25 Hydroxy; Future  -     TSH+Free T4; Future  -     Vitamin B12; Future  -     Folate; Future  -     Hemoglobin A1c; Future  -     Comprehensive Metabolic Panel; Future    5. Restless leg syndrome  Assessment & Plan:  Generally awakens patient at night.  Declines any need for medication at present.    Orders:  -     Vitamin D 25 Hydroxy; Future  -     TSH+Free T4; Future  -     Vitamin B12; Future  -     Folate; Future  -     Hemoglobin A1c; Future  -     Comprehensive Metabolic Panel; Future    6. Spondylosis of lumbar region without myelopathy or radiculopathy  Assessment & Plan:  Not needing medications at present.  Wants to try her over-the-counter treatment using magnets on her insoles.    Orders:  -     Vitamin D 25 Hydroxy; Future  -     TSH+Free T4; Future  -     Vitamin B12; Future  -     Folate; Future  -     Hemoglobin A1c; Future  -     Comprehensive Metabolic Panel; Future    7. Cervicalgia  Assessment & Plan:  Can use Tylenol as needed.  Patient declined any medications or x-rays at present.  Wants to try her remedy using \"magnet insoles\"    Orders:  -     Vitamin D 25 Hydroxy; Future  -     TSH+Free T4; Future  -     Vitamin B12; Future  -     Folate; Future  -     Hemoglobin A1c; Future  -    "  Comprehensive Metabolic Panel; Future    8. Prediabetes   -     Hemoglobin A1c; Future    9. Vitamin D deficiency   -     Vitamin D 25 Hydroxy; Future    RECOMMENDATIONS  Reviewed recommend Prevnar-20 patient wants to think about this and declines today  Do labs in 3 months and follow-up for checkup.  Recommend Shingrix vaccines at pharmacy  Info regarding low-cholesterol and diabetic diet given.  Lose 5 to 10 pounds by next visit.  Cut back on cheese and peanut butter.        Follow Up:  Return in about 3 months (around 8/9/2022).     Addendum 6/8/2022  DEXA scan shows osteopenia of the spine and hips.  Start [place] 5 mg 1 tablet twice a day    An After Visit Summary and PPPS were made available to the patient.

## 2022-05-09 NOTE — ASSESSMENT & PLAN NOTE
"Can use Tylenol as needed.  Patient declined any medications or x-rays at present.  Wants to try her remedy using \"magnet insoles\"  "

## 2022-05-09 NOTE — PATIENT INSTRUCTIONS
Do labs continue all medications make appointment to be seen back in 3 months with labs 1 week before  Start simvastatin 20 mg once daily goal is LDL less than 70.  Stop if myalgias recur  Recommend Shingrix vaccines at pharmacy  Use Tylenol as needed for pain-declined stronger medications like Cymbalta.

## 2022-05-18 ENCOUNTER — HOSPITAL ENCOUNTER (OUTPATIENT)
Dept: CARDIOLOGY | Facility: HOSPITAL | Age: 77
Discharge: HOME | End: 2022-05-18
Admitting: INTERNAL MEDICINE

## 2022-05-18 DIAGNOSIS — R06.02 SHORTNESS OF BREATH: ICD-10-CM

## 2022-05-18 DIAGNOSIS — I25.2 HISTORY OF MI (MYOCARDIAL INFARCTION): ICD-10-CM

## 2022-05-18 PROCEDURE — 93306 TTE W/DOPPLER COMPLETE: CPT

## 2022-05-26 LAB
BH CV ECHO MEAS - AO ROOT DIAM: 1.8 CM
BH CV ECHO MEAS - EF(MOD-BP): 65 %
BH CV ECHO MEAS - IVSD: 0.9 CM
BH CV ECHO MEAS - LA DIMENSION(2D): 3.5 CM
BH CV ECHO MEAS - LAT PEAK E' VEL: 6 CM/SEC
BH CV ECHO MEAS - LVIDD: 3.6 CM
BH CV ECHO MEAS - LVIDS: 1.6 CM
BH CV ECHO MEAS - LVPWD: 0.8 CM
BH CV ECHO MEAS - MED PEAK E' VEL: 9 CM/SEC
BH CV ECHO MEAS - MV A MAX VEL: 75 CM/SEC
BH CV ECHO MEAS - MV DEC TIME: 147 MSEC
BH CV ECHO MEAS - MV E MAX VEL: 68 CM/SEC
BH CV ECHO MEAS - MV E/A: 0.9
BH CV ECHO MEASUREMENTS AVERAGE E/E' RATIO: 9.07
IVRT: 63 MSEC
LEFT ATRIUM VOLUME INDEX: 19 ML/M2
MAXIMAL PREDICTED HEART RATE: 141 BPM
STRESS TARGET HR: 120 BPM

## 2022-06-08 ENCOUNTER — HOSPITAL ENCOUNTER (OUTPATIENT)
Dept: BONE DENSITY | Facility: HOSPITAL | Age: 77
Discharge: HOME | End: 2022-06-08
Admitting: INTERNAL MEDICINE

## 2022-06-08 DIAGNOSIS — M80.00XA OSTEOPOROSIS WITH CURRENT PATHOLOGICAL FRACTURE, UNSPECIFIED OSTEOPOROSIS TYPE, INITIAL ENCOUNTER: ICD-10-CM

## 2022-06-08 PROBLEM — M85.80 OSTEOPENIA: Status: ACTIVE | Noted: 2022-06-08

## 2022-06-08 PROCEDURE — 77080 DXA BONE DENSITY AXIAL: CPT

## 2022-06-08 RX ORDER — LEVOTHYROXINE SODIUM 0.07 MG/1
75 TABLET ORAL DAILY
Qty: 90 TABLET | Refills: 1 | Status: SHIPPED | OUTPATIENT
Start: 2022-06-08 | End: 2022-06-09 | Stop reason: SDUPTHER

## 2022-06-08 NOTE — TELEPHONE ENCOUNTER
Caller: Berna Leong    Relationship: Self    Best call back number: [phone]    Requested Prescriptions:   Requested Prescriptions     Pending Prescriptions Disp Refills   • levothyroxine (SYNTHROID, LEVOTHROID) 75 MCG tablet       Sig: Take 1 tablet by mouth Daily.        Pharmacy where request should be sent: [facility] [place] [address] [facility] [address] - [phone] [facility] [phone] FX     Additional details provided by patient: PATIENT HAS A 3 DAY SUPPLY LEFT AND GETS A 90 DAY SUPPLY     Does the patient have less than a 3 day supply:  [x] Yes  [] No    Jacinda Rosales Rep   06/08/22 09:54 EDT

## 2022-06-09 ENCOUNTER — TELEPHONE (OUTPATIENT)
Dept: INTERNAL MEDICINE | Facility: CLINIC | Age: 77
End: 2022-06-09

## 2022-06-09 RX ORDER — LEVOTHYROXINE SODIUM 0.07 MG/1
75 TABLET ORAL DAILY
Qty: 90 TABLET | Refills: 1 | Status: SHIPPED | OUTPATIENT
Start: 2022-06-09 | End: 2022-12-15 | Stop reason: SDUPTHER

## 2022-06-09 NOTE — TELEPHONE ENCOUNTER
Pharmacy Name:  [facility]    Pharmacy representative name: ALLI    Pharmacy representative phone number: [phone]    What medication are you calling in regards to: LEVOTHYROXINE    Who is the provider that prescribed the medication: DR LAINEZ    Additional notes: PHARMACY EXPLAINED THAT PATIENT CAN ONLY TAKE THE BRAND NAME OF THE MEDICATION AND WOULD LIKE THE SCRIPT UPDATED TO INCLUDE SYNTHROID. ALLI SAID THERE IS A CO-PAY DIFFERENCE.

## 2022-07-10 PROBLEM — G56.03 BILATERAL CARPAL TUNNEL SYNDROME: Status: ACTIVE | Noted: 2022-07-10

## 2022-07-10 NOTE — PROGRESS NOTES
CHIEF COMPLAINT  Berna Leong presents to [facility] for follow-up of Knot  (Pt has a knot on left leg right above the foot/ankle. Pt states she hit it on a box about a week ago and the knot is still there. Pt states it hurt at first and was warm to the touch at first however pt states there is no more pain. ) and Imaging Only (Pt would like to follow-up on echo and bone scan. ).    HPI    77-year-old white female last seen 3 months ago here for 3 month check up. No labs done as ordered.   Since then patient has   Had a DEXA 6/8/22- osteopenia of hips and spine    Echo-EF=61-65%, LV diastolic dysfunction    C/o hit left leg by ankle on plastic container 1 week ago--red/and warm-not bigger-    H/o graves--palpitations then--now with hypothyroidism-had heart racing lat week with exertion    H/o MI--LDL =122- on simvastatin 20 mg--    She  is a former patient of Cesar Weber. And seen initially 3 months ago.  Her med list was reviewed in detail, all records from several other physicians were reviewed as well as prior labs.  Plan of care is as written below.  Osteopenia--s/p dexa-results reviewed-taking calcium  PMH-  H/o right leg injury 12/2021-had picked up a heavy plastic container and hit leg with pullling of skin--seen at ER--12 stitches placed--sutures removed by her PCP shorthly then with wound dehiscensce. Followed at wound care until discharged 2 weeks ago.  Right ant leg with good wound healing-no erythema.  H/o bitten by brown recluse surgery--2004.  S/p knee surg 2008  Chronic soa-no wheezing-  H/o chronic fatigue syndrome  RLS-was on pramixole from Neuro in 2/2020-not on this anymore  H/o CTS-s/p steroid injections     Hypothyroidism- She is on the 75 mcg daily.. She has seen Dr. Arreguin in past.     Hx of low vitamin C. she is on vitamin C.. helps with arthritis also per pt.   Hx of low vitamin D. she is currently on vitamin D. The vitamin D is normal.  hx of b12 was  "low normal. The b12 injections cuases side effects per pt. She can tolerate pill.  H x of abnormal kidney function CKD. Stable. Established with Dr. Dobbins\"      SH--single, lives alone-no cigs/no alcohol      PFSH reviewed.  ROS left leg injury,back pain    OBJECTIVE  Vital Signs  Vitals:    07/11/22 0931   BP: 128/71   BP Location: Left arm   Patient Position: Sitting   Cuff Size: Adult   Pulse: 76   Resp: 16   Temp: 96.4 °F (35.8 °C)   TempSrc: Temporal   SpO2: 97%   Weight: 84.2 kg (185 lb 9.6 oz)   Height: 157.5 cm (62.01\")      Body mass index is 33.94 kg/m².    Physical Exam  Vitals and nursing note reviewed.   Constitutional:       Appearance: Normal appearance.   HENT:      Head: Normocephalic and atraumatic.   Cardiovascular:      Rate and Rhythm: Normal rate and regular rhythm.   Pulmonary:      Effort: Pulmonary effort is normal.      Breath sounds: Normal breath sounds.   Abdominal:      Palpations: Abdomen is soft.   Musculoskeletal:      Cervical back: Normal range of motion and neck supple.   Skin:     General: Skin is warm.      Comments: Left lower leg with 3 cm erythematous area-nodule-mildly tender to touch   Neurological:      General: No focal deficit present.      Mental Status: She is alert and oriented to person, place, and time.          RESULTS REVIEW  No results found for: PROBNP, BNP  CMP    CMP 10/29/21 1/28/22 4/22/22   Glucose 108 (A) 102 (A) 89   BUN 25 (A) 21 21   Creatinine 1.11 (A) 1.01 (A) 1.27 (A)   eGFR Non  Am 48 (A) 53 (A)    Sodium 143 142 137   Potassium 4.6 4.4 4.3   Chloride 104 103 100   Calcium 9.5 9.8 9.3   Albumin 4.20 4.10 3.90   Total Bilirubin 0.3 0.3 0.6   Alkaline Phosphatase 115 125 (A) 118 (A)   AST (SGOT) 24 23 18   ALT (SGPT) 22 18 12   (A) Abnormal value            CBC w/diff    CBC w/Diff 1/28/22 4/22/22   WBC 7.85 7.58   RBC 4.46 4.53   Hemoglobin 12.9 12.7   Hematocrit 38.6 39.1   MCV 86.5 86.3   MCH 28.9 28.0   MCHC 33.4 32.5   RDW 11.8 (A) 12.8 "   Platelets 324 312   Neutrophil Rel % 57.1 58.5   Immature Granulocyte Rel % 0.3 0.3   Lymphocyte Rel % 31.5 30.9   Monocyte Rel % 5.0 6.1   Eosinophil Rel % 5.1 3.0   Basophil Rel % 1.0 1.2   (A) Abnormal value             Lipid Panel    Lipid Panel 1/28/22 4/22/22   Total Cholesterol 219 (A) 203 (A)   Triglycerides 58 65   HDL Cholesterol 87 (A) 69 (A)   VLDL Cholesterol 10 12   LDL Cholesterol  122 (A) 122 (A)   LDL/HDL Ratio 1.38 1.75   (A) Abnormal value             Lab Results   Component Value Date    TSH 3.640 04/22/2022    TSH 0.841 03/17/2022    TSH 3.990 01/28/2022      Lab Results   Component Value Date    FREET4 1.28 04/22/2022    FREET4 2.08 (H) 03/17/2022    FREET4 1.30 01/28/2022      A1C Last 3 Results    HGBA1C Last 3 Results 1/28/22 4/22/22   Hemoglobin A1C 5.99 (A) 5.50   (A) Abnormal value             Lab Results   Component Value Date    EMVUOOTW56 1,182 (H) 04/22/2022    UFLU02JX 36.9 04/22/2022    MG 2.0 04/22/2022        DEXA Bone Density Axial    Result Date: 6/8/2022    1. Lumbar Spine:   Osteopenia 2. Femoral Neck:    Osteopenia     CHOCO PARKS MD       Electronically Signed and Approved By: CHOCO PARKS MD on 6/08/2022 at 14:06                        ASSESSMENT & PLAN  Diagnoses and all orders for this visit:    1. Cellulitis of left lower extremity (Primary)  Comments:  treat with keflex for one week  Orders:  -     cephalexin (Keflex) 500 MG capsule; Take 1 capsule by mouth 3 (Three) Times a Day.  Dispense: 21 capsule; Refill: 0    2. Pure hypercholesterolemia  Assessment & Plan:  LDL not at goal equals 120s.  Stop simvastatin and switch to atorvastatin 10 mg once a day    Orders:  -     atorvastatin (LIPITOR) 10 MG tablet; Take 1 tablet by mouth Daily.  Dispense: 90 tablet; Refill: 1  -     Lipid Panel; Future    3. Osteopenia of hip, unspecified laterality  Comments:  Take total of 1200 mg of calcium daily patient taking calcium with vitamin D    4. Hypothyroidism due to  Hashimoto's thyroiditis  Comments:  stable with meds-cont synthroid      5. History of MI (myocardial infarction)  -     atorvastatin (LIPITOR) 10 MG tablet; Take 1 tablet by mouth Daily.  Dispense: 90 tablet; Refill: 1    6. Obesity (BMI 35.0-39.9 without comorbidity)    7. Macular degeneration, unspecified laterality, unspecified type  Comments:  Going to ophthalmology    8. Stage 3a chronic kidney disease (HCC)    9. Spondylosis of lumbar region without myelopathy or radiculopathy        FOLLOW UP  Return for Next scheduled follow up.    Patient was given instructions and counseling regarding her condition or for health maintenance advice. Please see specific information pulled into the AVS if appropriate.

## 2022-07-11 ENCOUNTER — OFFICE VISIT (OUTPATIENT)
Dept: INTERNAL MEDICINE | Facility: CLINIC | Age: 77
End: 2022-07-11

## 2022-07-11 VITALS
OXYGEN SATURATION: 97 % | RESPIRATION RATE: 16 BRPM | WEIGHT: 185.6 LBS | HEIGHT: 62 IN | BODY MASS INDEX: 34.16 KG/M2 | TEMPERATURE: 96.4 F | DIASTOLIC BLOOD PRESSURE: 71 MMHG | SYSTOLIC BLOOD PRESSURE: 128 MMHG | HEART RATE: 76 BPM

## 2022-07-11 DIAGNOSIS — E06.3 HYPOTHYROIDISM DUE TO HASHIMOTO'S THYROIDITIS: ICD-10-CM

## 2022-07-11 DIAGNOSIS — I25.2 HISTORY OF MI (MYOCARDIAL INFARCTION): ICD-10-CM

## 2022-07-11 DIAGNOSIS — N18.31 STAGE 3A CHRONIC KIDNEY DISEASE: ICD-10-CM

## 2022-07-11 DIAGNOSIS — E03.8 HYPOTHYROIDISM DUE TO HASHIMOTO'S THYROIDITIS: ICD-10-CM

## 2022-07-11 DIAGNOSIS — E66.9 OBESITY (BMI 35.0-39.9 WITHOUT COMORBIDITY): ICD-10-CM

## 2022-07-11 DIAGNOSIS — M47.816 SPONDYLOSIS OF LUMBAR REGION WITHOUT MYELOPATHY OR RADICULOPATHY: ICD-10-CM

## 2022-07-11 DIAGNOSIS — L03.116 CELLULITIS OF LEFT LOWER EXTREMITY: Primary | ICD-10-CM

## 2022-07-11 DIAGNOSIS — M85.859 OSTEOPENIA OF HIP, UNSPECIFIED LATERALITY: ICD-10-CM

## 2022-07-11 DIAGNOSIS — E78.00 PURE HYPERCHOLESTEROLEMIA: ICD-10-CM

## 2022-07-11 DIAGNOSIS — H35.30 MACULAR DEGENERATION, UNSPECIFIED LATERALITY, UNSPECIFIED TYPE: ICD-10-CM

## 2022-07-11 PROBLEM — E55.9 VITAMIN D DEFICIENCY: Status: ACTIVE | Noted: 2022-04-25

## 2022-07-11 PROBLEM — I10 HYPERTENSION: Status: ACTIVE | Noted: 2022-04-25

## 2022-07-11 PROBLEM — G93.32 CHRONIC FATIGUE SYNDROME: Status: ACTIVE | Noted: 2022-04-25

## 2022-07-11 PROCEDURE — 99214 OFFICE O/P EST MOD 30 MIN: CPT | Performed by: INTERNAL MEDICINE

## 2022-07-11 RX ORDER — CEPHALEXIN 500 MG/1
500 CAPSULE ORAL 3 TIMES DAILY
Qty: 21 CAPSULE | Refills: 0 | Status: SHIPPED | OUTPATIENT
Start: 2022-07-11 | End: 2022-08-09

## 2022-07-11 RX ORDER — ATORVASTATIN CALCIUM 10 MG/1
10 TABLET, FILM COATED ORAL DAILY
Qty: 90 TABLET | Refills: 1 | Status: SHIPPED | OUTPATIENT
Start: 2022-07-11 | End: 2022-11-17

## 2022-08-05 ENCOUNTER — LAB (OUTPATIENT)
Dept: LAB | Facility: HOSPITAL | Age: 77
End: 2022-08-05

## 2022-08-05 DIAGNOSIS — E78.00 PURE HYPERCHOLESTEROLEMIA: ICD-10-CM

## 2022-08-05 DIAGNOSIS — I25.2 HISTORY OF MI (MYOCARDIAL INFARCTION): ICD-10-CM

## 2022-08-05 DIAGNOSIS — R73.03 PREDIABETES: ICD-10-CM

## 2022-08-05 DIAGNOSIS — E06.3 HYPOTHYROIDISM DUE TO HASHIMOTO'S THYROIDITIS: ICD-10-CM

## 2022-08-05 DIAGNOSIS — G25.81 RESTLESS LEG SYNDROME: ICD-10-CM

## 2022-08-05 DIAGNOSIS — M54.2 CERVICALGIA: ICD-10-CM

## 2022-08-05 DIAGNOSIS — E03.8 HYPOTHYROIDISM DUE TO HASHIMOTO'S THYROIDITIS: ICD-10-CM

## 2022-08-05 DIAGNOSIS — M47.816 SPONDYLOSIS OF LUMBAR REGION WITHOUT MYELOPATHY OR RADICULOPATHY: ICD-10-CM

## 2022-08-05 DIAGNOSIS — E55.9 VITAMIN D DEFICIENCY: ICD-10-CM

## 2022-08-05 DIAGNOSIS — E78.5 HYPERLIPIDEMIA, UNSPECIFIED HYPERLIPIDEMIA TYPE: ICD-10-CM

## 2022-08-05 DIAGNOSIS — Z00.00 ENCOUNTER FOR INITIAL ANNUAL WELLNESS VISIT (AWV) IN MEDICARE PATIENT: ICD-10-CM

## 2022-08-05 LAB
25(OH)D3 SERPL-MCNC: 44.7 NG/ML (ref 30–100)
ALBUMIN SERPL-MCNC: 4.1 G/DL (ref 3.5–5.2)
ALBUMIN/GLOB SERPL: 1.4 G/DL
ALP SERPL-CCNC: 95 U/L (ref 39–117)
ALT SERPL W P-5'-P-CCNC: 12 U/L (ref 1–33)
ANION GAP SERPL CALCULATED.3IONS-SCNC: 13.6 MMOL/L (ref 5–15)
AST SERPL-CCNC: 15 U/L (ref 1–32)
BILIRUB SERPL-MCNC: 0.4 MG/DL (ref 0–1.2)
BUN SERPL-MCNC: 21 MG/DL (ref 8–23)
BUN/CREAT SERPL: 19.4 (ref 7–25)
CALCIUM SPEC-SCNC: 9.3 MG/DL (ref 8.6–10.5)
CHLORIDE SERPL-SCNC: 105 MMOL/L (ref 98–107)
CHOLEST SERPL-MCNC: 181 MG/DL (ref 0–200)
CO2 SERPL-SCNC: 25.4 MMOL/L (ref 22–29)
CREAT SERPL-MCNC: 1.08 MG/DL (ref 0.57–1)
EGFRCR SERPLBLD CKD-EPI 2021: 52.4 ML/MIN/1.73
FOLATE SERPL-MCNC: >20 NG/ML (ref 4.78–24.2)
GLOBULIN UR ELPH-MCNC: 3 GM/DL
GLUCOSE SERPL-MCNC: 90 MG/DL (ref 65–99)
HBA1C MFR BLD: 5.5 % (ref 4.8–5.6)
HDLC SERPL-MCNC: 62 MG/DL (ref 40–60)
LDLC SERPL CALC-MCNC: 102 MG/DL (ref 0–100)
LDLC/HDLC SERPL: 1.62 {RATIO}
POTASSIUM SERPL-SCNC: 4.2 MMOL/L (ref 3.5–5.2)
PROT SERPL-MCNC: 7.1 G/DL (ref 6–8.5)
SODIUM SERPL-SCNC: 144 MMOL/L (ref 136–145)
T4 FREE SERPL-MCNC: 1.37 NG/DL (ref 0.93–1.7)
TRIGL SERPL-MCNC: 93 MG/DL (ref 0–150)
TSH SERPL DL<=0.05 MIU/L-ACNC: 0.38 UIU/ML (ref 0.27–4.2)
VIT B12 BLD-MCNC: 829 PG/ML (ref 211–946)
VLDLC SERPL-MCNC: 17 MG/DL (ref 5–40)

## 2022-08-05 PROCEDURE — 82607 VITAMIN B-12: CPT

## 2022-08-05 PROCEDURE — 84443 ASSAY THYROID STIM HORMONE: CPT

## 2022-08-05 PROCEDURE — 83036 HEMOGLOBIN GLYCOSYLATED A1C: CPT

## 2022-08-05 PROCEDURE — 80061 LIPID PANEL: CPT

## 2022-08-05 PROCEDURE — 80053 COMPREHEN METABOLIC PANEL: CPT

## 2022-08-05 PROCEDURE — 82746 ASSAY OF FOLIC ACID SERUM: CPT

## 2022-08-05 PROCEDURE — 36415 COLL VENOUS BLD VENIPUNCTURE: CPT

## 2022-08-05 PROCEDURE — 82306 VITAMIN D 25 HYDROXY: CPT

## 2022-08-05 PROCEDURE — 84439 ASSAY OF FREE THYROXINE: CPT

## 2022-08-09 ENCOUNTER — DOCUMENTATION (OUTPATIENT)
Dept: INTERNAL MEDICINE | Facility: CLINIC | Age: 77
End: 2022-08-09

## 2022-08-09 ENCOUNTER — OFFICE VISIT (OUTPATIENT)
Dept: INTERNAL MEDICINE | Facility: CLINIC | Age: 77
End: 2022-08-09

## 2022-08-09 VITALS
SYSTOLIC BLOOD PRESSURE: 126 MMHG | WEIGHT: 181 LBS | BODY MASS INDEX: 33.31 KG/M2 | HEART RATE: 80 BPM | DIASTOLIC BLOOD PRESSURE: 69 MMHG | TEMPERATURE: 97.5 F | HEIGHT: 62 IN

## 2022-08-09 DIAGNOSIS — M65.312 TRIGGER FINGER OF LEFT THUMB: ICD-10-CM

## 2022-08-09 DIAGNOSIS — M17.0 OSTEOARTHRITIS OF BOTH KNEES, UNSPECIFIED OSTEOARTHRITIS TYPE: ICD-10-CM

## 2022-08-09 DIAGNOSIS — M85.88 OSTEOPENIA OF LUMBAR SPINE: ICD-10-CM

## 2022-08-09 DIAGNOSIS — I25.2 HISTORY OF MI (MYOCARDIAL INFARCTION): ICD-10-CM

## 2022-08-09 DIAGNOSIS — I10 PRIMARY HYPERTENSION: Primary | ICD-10-CM

## 2022-08-09 DIAGNOSIS — E78.00 PURE HYPERCHOLESTEROLEMIA: ICD-10-CM

## 2022-08-09 DIAGNOSIS — M47.816 SPONDYLOSIS OF LUMBAR REGION WITHOUT MYELOPATHY OR RADICULOPATHY: ICD-10-CM

## 2022-08-09 DIAGNOSIS — M79.605 LEG PAIN, ANTERIOR, LEFT: ICD-10-CM

## 2022-08-09 DIAGNOSIS — N18.31 STAGE 3A CHRONIC KIDNEY DISEASE (CKD): ICD-10-CM

## 2022-08-09 DIAGNOSIS — E03.9 HYPOTHYROIDISM, UNSPECIFIED TYPE: ICD-10-CM

## 2022-08-09 DIAGNOSIS — E55.9 VITAMIN D DEFICIENCY: ICD-10-CM

## 2022-08-09 DIAGNOSIS — R73.9 ELEVATED BLOOD SUGAR: ICD-10-CM

## 2022-08-09 DIAGNOSIS — E03.8 OTHER SPECIFIED HYPOTHYROIDISM: ICD-10-CM

## 2022-08-09 DIAGNOSIS — G25.81 RESTLESS LEG SYNDROME: ICD-10-CM

## 2022-08-09 PROCEDURE — 99214 OFFICE O/P EST MOD 30 MIN: CPT | Performed by: INTERNAL MEDICINE

## 2022-08-09 NOTE — ASSESSMENT & PLAN NOTE
Radiation in past--now with hypothyroidism-Currently taking levothyroxine 75 mcg every day except 50 mcg Saturday and Sunday.  Last TSH normal in February with slightly elevated free T4.  Continue with Synthroid brand name 75 mcg once daily for now

## 2022-08-09 NOTE — ASSESSMENT & PLAN NOTE
Stable with blood pressure 126/69 today.  Checks blood pressure at home and systolic blood pressure is in the 120s to 130s range.  Not on any medications.

## 2022-08-09 NOTE — PROGRESS NOTES
"CHIEF COMPLAINT  Berna Leong presents to [facility] for follow-up of Hypertension (Pt here for follow up. Pt has bump on her left leg and is concerned about it.).    HPI    77-year-old female here for 3-month follow-up  FH reviewed, labs reviewed, meds reviewed in detail.  Plan of care is as follows.  Past medical history significant for-C/o arthritis-daily -in neck and lower back-worse last 2 months-was using otc \"magnets\" on her insoles in past which helped--and waiting for it in mail.     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 She is compliaint with synthroid. She has seen Dr. Arreguin in past.   She is on 88 mcg..the thyroid is underrfunctioning today.  Hx of low vitamin C. she is on vitamin C.. helps with arthritis also per pt.   Hx of low vitamin D. she is currently on vitamin D. The vitamin D is normal.  hx of b12 was low normal. The b12 injections cuases side effects per pt. She can tolerate pill.  hx of elevated potassium level. Normal today  Hx of abnormal kidney function CKD. Stable. Established with Dr. Dobbins\"        [facility]  [facility] -left ant leg contusion, left thumb pain/popping, back and knee pain /arthritis  OBJECTIVE  Vital Signs  Vitals:    08/09/22 0909   BP: 126/69   Pulse: 80   Temp: 97.5 °F (36.4 °C)   Weight: 82.1 kg (181 lb)   Height: 157.5 cm (62.01\")      Body mass index is 33.1 kg/m².    Physical Exam  Vitals and nursing note reviewed.   Constitutional:       Appearance: Normal appearance.   HENT:      Head: Normocephalic and atraumatic.   Cardiovascular:      Rate and Rhythm: Normal rate and regular rhythm.   Pulmonary:      Effort: Pulmonary effort is normal.      Breath sounds: Normal breath sounds.   Abdominal: "      Palpations: Abdomen is soft.   Musculoskeletal:      Cervical back: Normal range of motion and neck supple.   Neurological:      General: No focal deficit present.      Mental Status: She is alert and oriented to person, place, and time.          RESULTS REVIEW  No results found for: PROBNP, BNP  CMP    CMP 1/28/22 4/22/22 8/5/22   Glucose 102 (A) 89 90   BUN 21 21 21   Creatinine 1.01 (A) 1.27 (A) 1.08 (A)   eGFR Non African Am 53 (A)     Sodium 142 137 144   Potassium 4.4 4.3 4.2   Chloride 103 100 105   Calcium 9.8 9.3 9.3   Albumin 4.10 3.90 4.10   Total Bilirubin 0.3 0.6 0.4   Alkaline Phosphatase 125 (A) 118 (A) 95   AST (SGOT) 23 18 15   ALT (SGPT) 18 12 12   (A) Abnormal value            CBC w/diff    CBC w/Diff 1/28/22 4/22/22   WBC 7.85 7.58   RBC 4.46 4.53   Hemoglobin 12.9 12.7   Hematocrit 38.6 39.1   MCV 86.5 86.3   MCH 28.9 28.0   MCHC 33.4 32.5   RDW 11.8 (A) 12.8   Platelets 324 312   Neutrophil Rel % 57.1 58.5   Immature Granulocyte Rel % 0.3 0.3   Lymphocyte Rel % 31.5 30.9   Monocyte Rel % 5.0 6.1   Eosinophil Rel % 5.1 3.0   Basophil Rel % 1.0 1.2   (A) Abnormal value             Lipid Panel    Lipid Panel 1/28/22 4/22/22 8/5/22   Total Cholesterol 219 (A) 203 (A) 181   Triglycerides 58 65 93   HDL Cholesterol 87 (A) 69 (A) 62 (A)   VLDL Cholesterol 10 12 17   LDL Cholesterol  122 (A) 122 (A) 102 (A)   LDL/HDL Ratio 1.38 1.75 1.62   (A) Abnormal value             Lab Results   Component Value Date    TSH 0.376 08/05/2022    TSH 3.640 04/22/2022    TSH 0.841 03/17/2022      Lab Results   Component Value Date    FREET4 1.37 08/05/2022    FREET4 1.28 04/22/2022    FREET4 2.08 (H) 03/17/2022      A1C Last 3 Results    HGBA1C Last 3 Results 1/28/22 4/22/22 8/5/22   Hemoglobin A1C 5.99 (A) 5.50 5.50   (A) Abnormal value             Lab Results   Component Value Date    YHVVFEFW27 829 08/05/2022    ECUK45CC 44.7 08/05/2022    MG 2.0 04/22/2022        DEXA Bone Density Axial    Result Date:  6/8/2022    1. Lumbar Spine:   Osteopenia 2. Femoral Neck:    Osteopenia     CHOCO PARKS MD       Electronically Signed and Approved By: CHOCO PARKS MD on 6/08/2022 at 14:06                        ASSESSMENT & PLAN  Diagnoses and all orders for this visit:    1. Primary hypertension (Primary)  Assessment & Plan:  Stable with blood pressure 126/69 today.  Checks blood pressure at home and systolic blood pressure is in the 120s to 130s range.  Not on any medications.    Orders:  -     Comprehensive metabolic panel; Future    2. Pure hypercholesterolemia  -     Lipid panel; Future    3. Leg pain, anterior, left  Comments:  xray of left leg--bump there for past 2 months-bumped leg on chair leg--likely contusion -r/o other pathology-pt concerned  Orders:  -     XR Tibia Fibula 2 View Left; Future    4. History of MI (myocardial infarction)  Assessment & Plan:  1974 and 1983--no CP-has not started atorvastatin 10 mg      Orders:  -     Comprehensive metabolic panel; Future  -     Lipid panel; Future    5. Other specified hypothyroidism  Assessment & Plan:  Radiation in past--now with hypothyroidism-Currently taking levothyroxine 75 mcg every day except 50 mcg Saturday and Sunday.  Last TSH normal in February with slightly elevated free T4.  Continue with Synthroid brand name 75 mcg once daily for now       Orders:  -     TSH+Free T4; Future    6. Spondylosis of lumbar region without myelopathy or radiculopathy  Comments:  Avoid NSAIDs.  Sits on a recliner which helps her back and uses massager which gives good relief.    7. Hypothyroidism, unspecified type  Assessment & Plan:  Radiation in past--now with hypothyroidism-Currently taking levothyroxine 75 mcg every day except 50 mcg Saturday and Sunday.  Last TSH normal in February with slightly elevated free T4.  Continue with Synthroid brand name 75 mcg once daily for now       Orders:  -     TSH+Free T4; Future    8. Osteopenia of lumbar spine  Comments:  And  hips DEXA scan done April 2022 reveals osteopenia of back and hips continue with calcium/vitamin D once daily.  Repeat in 2 years    9. Vitamin D deficiency  -     Vitamin D 25 hydroxy; Future    10. Stage 3a chronic kidney disease (CKD) ([facility])  Comments:  Stable sees Dr. Sánchez-Cr=1.07-Avoid NASIDs  Orders:  -     CBC w AUTO Differential; Future  -     Comprehensive metabolic panel; Future    11. Restless leg syndrome  Comments:  No more jerking at night no medications being taken.  Orders:  -     CBC w AUTO Differential; Future    12. Elevated blood sugar  Comments:  A1c equals 5.4.  Orders:  -     Hemoglobin A1c; Future    13. Trigger finger of left thumb  Comments:  refer to hand surgeon  Orders:  -     Ambulatory Referral to Hand Surgery; Future  Plan  Needs prevnar-20 -never had any pneumonia vaccines per patient.  Had tetanus shot 2021  Needs Shingrix vaccines at pharmacy.  Refer to [facility] for evaluation of her left popping sensation probable trigger finger  Follow-up in 3 months    Patient is requesting for a transport chair she has a diagnosis of osteoarthritis and has mobility issues.    FOLLOW UP  Return in about 3 months (around 11/9/2022) for Recheck.    Patient was given instructions and counseling regarding her condition or for health maintenance advice. Please see specific information pulled into the AVS if appropriate.            no chest pain and no edema.

## 2022-08-09 NOTE — PATIENT INSTRUCTIONS
Needs Shingrix vaccines at pharmacy.  Refer to [facility] for evaluation of her left popping sensation probable trigger finger  Follow-up in 3 months

## 2022-09-28 ENCOUNTER — LAB (OUTPATIENT)
Dept: LAB | Facility: HOSPITAL | Age: 77
End: 2022-09-28

## 2022-09-28 DIAGNOSIS — G25.81 RESTLESS LEG SYNDROME: ICD-10-CM

## 2022-09-28 DIAGNOSIS — N18.31 STAGE 3A CHRONIC KIDNEY DISEASE (CKD): ICD-10-CM

## 2022-09-30 ENCOUNTER — TRANSCRIBE ORDERS (OUTPATIENT)
Dept: LAB | Facility: HOSPITAL | Age: 77
End: 2022-09-30

## 2022-09-30 ENCOUNTER — LAB (OUTPATIENT)
Dept: LAB | Facility: HOSPITAL | Age: 77
End: 2022-09-30

## 2022-09-30 DIAGNOSIS — N18.31 CHRONIC KIDNEY DISEASE (CKD) STAGE G3A/A1, MODERATELY DECREASED GLOMERULAR FILTRATION RATE (GFR) BETWEEN 45-59 ML/MIN/1.73 SQUARE METER AND ALBUMINURIA CREATININE RATIO LESS THAN 30 MG/G (CMS/H*: Primary | ICD-10-CM

## 2022-09-30 DIAGNOSIS — N18.31 CHRONIC KIDNEY DISEASE (CKD) STAGE G3A/A1, MODERATELY DECREASED GLOMERULAR FILTRATION RATE (GFR) BETWEEN 45-59 ML/MIN/1.73 SQUARE METER AND ALBUMINURIA CREATININE RATIO LESS THAN 30 MG/G (CMS/H*: ICD-10-CM

## 2022-09-30 LAB
ALBUMIN SERPL-MCNC: 4.2 G/DL (ref 3.5–5.2)
ANION GAP SERPL CALCULATED.3IONS-SCNC: 12.7 MMOL/L (ref 5–15)
BACTERIA UR QL AUTO: ABNORMAL /HPF
BASOPHILS # BLD AUTO: 0.1 10*3/MM3 (ref 0–0.2)
BASOPHILS NFR BLD AUTO: 1.3 % (ref 0–1.5)
BILIRUB UR QL STRIP: NEGATIVE
BUN SERPL-MCNC: 21 MG/DL (ref 8–23)
BUN/CREAT SERPL: 18.6 (ref 7–25)
CALCIUM SPEC-SCNC: 10 MG/DL (ref 8.6–10.5)
CHLORIDE SERPL-SCNC: 104 MMOL/L (ref 98–107)
CLARITY UR: ABNORMAL
CO2 SERPL-SCNC: 26.3 MMOL/L (ref 22–29)
COLOR UR: YELLOW
CREAT SERPL-MCNC: 1.13 MG/DL (ref 0.57–1)
CREAT UR-MCNC: 174.1 MG/DL
DEPRECATED RDW RBC AUTO: 39.9 FL (ref 37–54)
EGFRCR SERPLBLD CKD-EPI 2021: 49.6 ML/MIN/1.73
EOSINOPHIL # BLD AUTO: 0.35 10*3/MM3 (ref 0–0.4)
EOSINOPHIL NFR BLD AUTO: 4.5 % (ref 0.3–6.2)
ERYTHROCYTE [DISTWIDTH] IN BLOOD BY AUTOMATED COUNT: 12.7 % (ref 12.3–15.4)
GLUCOSE SERPL-MCNC: 104 MG/DL (ref 65–99)
GLUCOSE UR STRIP-MCNC: NEGATIVE MG/DL
HCT VFR BLD AUTO: 39.7 % (ref 34–46.6)
HGB BLD-MCNC: 12.7 G/DL (ref 12–15.9)
HGB UR QL STRIP.AUTO: NEGATIVE
HYALINE CASTS UR QL AUTO: ABNORMAL /LPF
IMM GRANULOCYTES # BLD AUTO: 0.02 10*3/MM3 (ref 0–0.05)
IMM GRANULOCYTES NFR BLD AUTO: 0.3 % (ref 0–0.5)
KETONES UR QL STRIP: NEGATIVE
LEUKOCYTE ESTERASE UR QL STRIP.AUTO: ABNORMAL
LYMPHOCYTES # BLD AUTO: 2.5 10*3/MM3 (ref 0.7–3.1)
LYMPHOCYTES NFR BLD AUTO: 31.9 % (ref 19.6–45.3)
MCH RBC QN AUTO: 27.7 PG (ref 26.6–33)
MCHC RBC AUTO-ENTMCNC: 32 G/DL (ref 31.5–35.7)
MCV RBC AUTO: 86.5 FL (ref 79–97)
MONOCYTES # BLD AUTO: 0.51 10*3/MM3 (ref 0.1–0.9)
MONOCYTES NFR BLD AUTO: 6.5 % (ref 5–12)
NEUTROPHILS NFR BLD AUTO: 4.36 10*3/MM3 (ref 1.7–7)
NEUTROPHILS NFR BLD AUTO: 55.5 % (ref 42.7–76)
NITRITE UR QL STRIP: NEGATIVE
NRBC BLD AUTO-RTO: 0.1 /100 WBC (ref 0–0.2)
PH UR STRIP.AUTO: 6 [PH] (ref 5–8)
PHOSPHATE SERPL-MCNC: 3.1 MG/DL (ref 2.5–4.5)
PLATELET # BLD AUTO: 288 10*3/MM3 (ref 140–450)
PMV BLD AUTO: 10.3 FL (ref 6–12)
POTASSIUM SERPL-SCNC: 5.2 MMOL/L (ref 3.5–5.2)
PROT ?TM UR-MCNC: 22.9 MG/DL
PROT UR QL STRIP: ABNORMAL
PROT/CREAT UR: 0.13 MG/G{CREAT}
RBC # BLD AUTO: 4.59 10*6/MM3 (ref 3.77–5.28)
RBC # UR STRIP: ABNORMAL /HPF
REF LAB TEST METHOD: ABNORMAL
SODIUM SERPL-SCNC: 143 MMOL/L (ref 136–145)
SP GR UR STRIP: 1.02 (ref 1–1.03)
SQUAMOUS #/AREA URNS HPF: ABNORMAL /HPF
T4 FREE SERPL-MCNC: 1.62 NG/DL (ref 0.93–1.7)
TSH SERPL DL<=0.05 MIU/L-ACNC: 2.34 UIU/ML (ref 0.27–4.2)
UROBILINOGEN UR QL STRIP: ABNORMAL
WBC # UR STRIP: ABNORMAL /HPF
WBC NRBC COR # BLD: 7.84 10*3/MM3 (ref 3.4–10.8)

## 2022-09-30 PROCEDURE — 84156 ASSAY OF PROTEIN URINE: CPT

## 2022-09-30 PROCEDURE — 82570 ASSAY OF URINE CREATININE: CPT

## 2022-09-30 PROCEDURE — 36415 COLL VENOUS BLD VENIPUNCTURE: CPT

## 2022-09-30 PROCEDURE — 84443 ASSAY THYROID STIM HORMONE: CPT

## 2022-09-30 PROCEDURE — 80069 RENAL FUNCTION PANEL: CPT

## 2022-09-30 PROCEDURE — 81001 URINALYSIS AUTO W/SCOPE: CPT

## 2022-09-30 PROCEDURE — 85025 COMPLETE CBC W/AUTO DIFF WBC: CPT

## 2022-09-30 PROCEDURE — 84439 ASSAY OF FREE THYROXINE: CPT

## 2022-11-01 ENCOUNTER — HOSPITAL ENCOUNTER (EMERGENCY)
Facility: HOSPITAL | Age: 77
Discharge: HOME | End: 2022-11-01
Attending: EMERGENCY MEDICINE | Admitting: EMERGENCY MEDICINE

## 2022-11-01 ENCOUNTER — APPOINTMENT (OUTPATIENT)
Dept: ULTRASOUND IMAGING | Facility: HOSPITAL | Age: 77
End: 2022-11-01

## 2022-11-01 ENCOUNTER — APPOINTMENT (OUTPATIENT)
Dept: CT IMAGING | Facility: HOSPITAL | Age: 77
End: 2022-11-01

## 2022-11-01 VITALS
DIASTOLIC BLOOD PRESSURE: 79 MMHG | SYSTOLIC BLOOD PRESSURE: 150 MMHG | BODY MASS INDEX: 31.64 KG/M2 | HEIGHT: 62 IN | TEMPERATURE: 98.3 F | WEIGHT: 171.96 LBS | HEART RATE: 99 BPM | OXYGEN SATURATION: 100 % | RESPIRATION RATE: 16 BRPM

## 2022-11-01 DIAGNOSIS — R19.03 RIGHT LOWER QUADRANT ABDOMINAL MASS: Primary | ICD-10-CM

## 2022-11-01 LAB
ALBUMIN SERPL-MCNC: 4.3 G/DL (ref 3.5–5.2)
ALBUMIN/GLOB SERPL: 1.2 G/DL
ALP SERPL-CCNC: 111 U/L (ref 39–117)
ALT SERPL W P-5'-P-CCNC: 11 U/L (ref 1–33)
ANION GAP SERPL CALCULATED.3IONS-SCNC: 12 MMOL/L (ref 5–15)
AST SERPL-CCNC: 15 U/L (ref 1–32)
BASOPHILS # BLD AUTO: 0.09 10*3/MM3 (ref 0–0.2)
BASOPHILS NFR BLD AUTO: 0.6 % (ref 0–1.5)
BILIRUB SERPL-MCNC: 0.5 MG/DL (ref 0–1.2)
BILIRUB UR QL STRIP: NEGATIVE
BUN SERPL-MCNC: 18 MG/DL (ref 8–23)
BUN/CREAT SERPL: 15.9 (ref 7–25)
CALCIUM SPEC-SCNC: 9.2 MG/DL (ref 8.6–10.5)
CHLORIDE SERPL-SCNC: 104 MMOL/L (ref 98–107)
CLARITY UR: CLEAR
CO2 SERPL-SCNC: 26 MMOL/L (ref 22–29)
COLOR UR: YELLOW
CREAT SERPL-MCNC: 1.13 MG/DL (ref 0.57–1)
D-LACTATE SERPL-SCNC: 1.6 MMOL/L (ref 0.5–2)
D-LACTATE SERPL-SCNC: 2.2 MMOL/L (ref 0.5–2)
DEPRECATED RDW RBC AUTO: 43.3 FL (ref 37–54)
EGFRCR SERPLBLD CKD-EPI 2021: 49.6 ML/MIN/1.73
EOSINOPHIL # BLD AUTO: 0.22 10*3/MM3 (ref 0–0.4)
EOSINOPHIL NFR BLD AUTO: 1.5 % (ref 0.3–6.2)
ERYTHROCYTE [DISTWIDTH] IN BLOOD BY AUTOMATED COUNT: 13.3 % (ref 12.3–15.4)
GLOBULIN UR ELPH-MCNC: 3.5 GM/DL
GLUCOSE SERPL-MCNC: 116 MG/DL (ref 65–99)
GLUCOSE UR STRIP-MCNC: NEGATIVE MG/DL
HCT VFR BLD AUTO: 40.6 % (ref 34–46.6)
HGB BLD-MCNC: 13 G/DL (ref 12–15.9)
HGB UR QL STRIP.AUTO: NEGATIVE
HOLD SPECIMEN: NORMAL
HOLD SPECIMEN: NORMAL
IMM GRANULOCYTES # BLD AUTO: 0.06 10*3/MM3 (ref 0–0.05)
IMM GRANULOCYTES NFR BLD AUTO: 0.4 % (ref 0–0.5)
KETONES UR QL STRIP: NEGATIVE
LEUKOCYTE ESTERASE UR QL STRIP.AUTO: NEGATIVE
LIPASE SERPL-CCNC: 53 U/L (ref 13–60)
LYMPHOCYTES # BLD AUTO: 1.82 10*3/MM3 (ref 0.7–3.1)
LYMPHOCYTES NFR BLD AUTO: 12.2 % (ref 19.6–45.3)
MCH RBC QN AUTO: 28.5 PG (ref 26.6–33)
MCHC RBC AUTO-ENTMCNC: 32 G/DL (ref 31.5–35.7)
MCV RBC AUTO: 89 FL (ref 79–97)
MONOCYTES # BLD AUTO: 1.08 10*3/MM3 (ref 0.1–0.9)
MONOCYTES NFR BLD AUTO: 7.2 % (ref 5–12)
NEUTROPHILS NFR BLD AUTO: 11.67 10*3/MM3 (ref 1.7–7)
NEUTROPHILS NFR BLD AUTO: 78.1 % (ref 42.7–76)
NITRITE UR QL STRIP: NEGATIVE
NRBC BLD AUTO-RTO: 0 /100 WBC (ref 0–0.2)
PH UR STRIP.AUTO: 7 [PH] (ref 5–8)
PLATELET # BLD AUTO: 319 10*3/MM3 (ref 140–450)
PMV BLD AUTO: 8.8 FL (ref 6–12)
POTASSIUM SERPL-SCNC: 3.7 MMOL/L (ref 3.5–5.2)
PROT SERPL-MCNC: 7.8 G/DL (ref 6–8.5)
PROT UR QL STRIP: NEGATIVE
RBC # BLD AUTO: 4.56 10*6/MM3 (ref 3.77–5.28)
SODIUM SERPL-SCNC: 142 MMOL/L (ref 136–145)
SP GR UR STRIP: 1.01 (ref 1–1.03)
UROBILINOGEN UR QL STRIP: NORMAL
WBC NRBC COR # BLD: 14.94 10*3/MM3 (ref 3.4–10.8)
WHOLE BLOOD HOLD COAG: NORMAL
WHOLE BLOOD HOLD SPECIMEN: NORMAL

## 2022-11-01 PROCEDURE — 74176 CT ABD & PELVIS W/O CONTRAST: CPT

## 2022-11-01 PROCEDURE — 81003 URINALYSIS AUTO W/O SCOPE: CPT | Performed by: EMERGENCY MEDICINE

## 2022-11-01 PROCEDURE — 93975 VASCULAR STUDY: CPT

## 2022-11-01 PROCEDURE — 76856 US EXAM PELVIC COMPLETE: CPT

## 2022-11-01 PROCEDURE — 83605 ASSAY OF LACTIC ACID: CPT | Performed by: EMERGENCY MEDICINE

## 2022-11-01 PROCEDURE — 96360 HYDRATION IV INFUSION INIT: CPT

## 2022-11-01 PROCEDURE — 36415 COLL VENOUS BLD VENIPUNCTURE: CPT

## 2022-11-01 PROCEDURE — 85025 COMPLETE CBC W/AUTO DIFF WBC: CPT | Performed by: EMERGENCY MEDICINE

## 2022-11-01 PROCEDURE — 83690 ASSAY OF LIPASE: CPT | Performed by: EMERGENCY MEDICINE

## 2022-11-01 PROCEDURE — 80053 COMPREHEN METABOLIC PANEL: CPT | Performed by: EMERGENCY MEDICINE

## 2022-11-01 PROCEDURE — 99283 EMERGENCY DEPT VISIT LOW MDM: CPT

## 2022-11-01 RX ORDER — DICYCLOMINE HCL 20 MG
20 TABLET ORAL EVERY 6 HOURS PRN
Qty: 15 TABLET | Refills: 0 | Status: SHIPPED | OUTPATIENT
Start: 2022-11-01 | End: 2022-11-02

## 2022-11-01 RX ORDER — SODIUM CHLORIDE 0.9 % (FLUSH) 0.9 %
10 SYRINGE (ML) INJECTION AS NEEDED
Status: DISCONTINUED | OUTPATIENT
Start: 2022-11-01 | End: 2022-11-01 | Stop reason: HOSPADM

## 2022-11-01 RX ORDER — METRONIDAZOLE 500 MG/1
500 TABLET ORAL 2 TIMES DAILY
Qty: 14 TABLET | Refills: 0 | Status: SHIPPED | OUTPATIENT
Start: 2022-11-01 | End: 2022-11-08

## 2022-11-01 RX ORDER — ONDANSETRON 4 MG/1
4 TABLET, ORALLY DISINTEGRATING ORAL EVERY 6 HOURS PRN
Qty: 15 TABLET | Refills: 0 | Status: SHIPPED | OUTPATIENT
Start: 2022-11-01 | End: 2022-11-02

## 2022-11-01 RX ADMIN — SODIUM CHLORIDE 1000 ML: 9 INJECTION, SOLUTION INTRAVENOUS at 09:25

## 2022-11-01 NOTE — ED PROVIDER NOTES
Time: 8:19 AM EDT  Arrived by: private car  Chief Complaint: abdominal pain  History provided by: patient  History is limited by: N/A    History of Present Illness:  Patient is a 77 y.o. year old female who presents to the emergency department with constant, achy, pelvic pain that does not radiate. Patient states she has an ovarian cyst on right side that has grown in size and started causes pain last night at 10 pm. Patient states has still has her appendix. Patient nakita nausea, vomiting, dysuria, urinary frequency, urinary urgency, odor when urinating, hematuria. Patient states has hx of heart attack, stating she has had 2.      History provided by:  Patient   used: No        Patient Care Team  Primary Care Provider: eRyna Chacon MD    Past Medical History:     Allergies   Allergen Reactions   • Penicillins Rash   • Sulfa Antibiotics Rash     Past Medical History:   Diagnosis Date   • Arthritis    • Coma due to low thyroid (HCC) 2015   • Heart attack (HCC)    • Hypothyroidism    • Kidney disease    • Leg pain    • Leg swelling    • Limb swelling    • Muscle cramps    • SOB (shortness of breath)    • Thyroid disease    • Thyroid disorder    • Wound dehiscence 4/20/2022    Sustained laceration December 2020 1 sutures removed by PCP with dehiscence seen at wound clinicby Dr Laina Bales February 2022 and folowed there--given silver sulfadiazine creme (Silvadene)     Past Surgical History:   Procedure Laterality Date   • KNEE SURGERY  2008    JOINT SURGERY     Family History   Problem Relation Age of Onset   • Heart disease Mother    • Arthritis Mother    • Heart disease Father    • Cancer Brother    • Arthritis Other    • Cancer Other    • Heart disease Other        Home Medications:  Prior to Admission medications    Medication Sig Start Date End Date Taking? Authorizing Provider   ascorbic acid (VITAMIN C) 1000 MG tablet Take 1,000 mg by mouth Daily.    Emergency, Nurse  Epic, RN   atorvastatin (LIPITOR) 10 MG tablet Take 1 tablet by mouth Daily. 7/11/22   Reyna Chacon MD   B Complex Vitamins (vitamin b complex) capsule capsule Take 1 capsule by mouth Daily.    Provider, MD Paulette   calcium-vitamin D (Oscal 500/200 D-3) 500-200 MG-UNIT per tablet Take 1 tablet by mouth Daily. 6/8/22 6/8/23  Reyna Chacon MD   Cholecalciferol 125 MCG (5000 UT) tablet Take  by mouth Daily.    Emergency, Nurse EL Conte   levothyroxine (SYNTHROID, LEVOTHROID) 75 MCG tablet Take 1 tablet by mouth Daily. SYNTHROID NAME BRAND ONLY 6/9/22   Reyna Chacon MD   Multiple Vitamins-Minerals (Eye Health) capsule     Emergency, Nurse Epic, RN   Vitamin E 134 MG (200 UNIT) capsule Take  by mouth.    Provider, MD Paulette   vitamin E 400 UNIT capsule Take 400 Units by mouth.    Provider, MD Paulette        Social History:   Social History     Tobacco Use   • Smoking status: Never   • Smokeless tobacco: Never   Vaping Use   • Vaping Use: Never used   Substance Use Topics   • Alcohol use: Never   • Drug use: Never     Recent travel: not applicable    Review of Systems:  Review of Systems   Constitutional: Negative for chills and fever.   HENT: Negative for congestion, rhinorrhea and sore throat.    Eyes: Negative for photophobia.   Respiratory: Negative for apnea, cough, chest tightness and shortness of breath.    Cardiovascular: Negative for chest pain and palpitations.   Gastrointestinal: Negative for diarrhea, nausea and vomiting.   Endocrine: Negative.    Genitourinary: Positive for pelvic pain (constant, achy). Negative for difficulty urinating, dysuria, frequency and urgency.   Musculoskeletal: Negative for back pain, joint swelling and myalgias.   Skin: Negative for color change and wound.   Allergic/Immunologic: Negative.    Neurological: Negative for seizures and headaches.   Psychiatric/Behavioral: Negative.    All other systems reviewed  "and are negative.       Physical Exam:  /79 (BP Location: Left arm, Patient Position: Lying)   Pulse 99   Temp 98.3 °F (36.8 °C) (Oral)   Resp 16   Ht 157.5 cm (62\")   Wt 78 kg (171 lb 15.3 oz)   SpO2 100%   BMI 31.45 kg/m²     Physical Exam  Vitals and nursing note reviewed.   Constitutional:       General: She is awake.      Appearance: Normal appearance.   HENT:      Head: Normocephalic and atraumatic.      Nose: Nose normal.      Mouth/Throat:      Mouth: Mucous membranes are moist.   Eyes:      Extraocular Movements: Extraocular movements intact.      Pupils: Pupils are equal, round, and reactive to light.   Cardiovascular:      Rate and Rhythm: Normal rate and regular rhythm.      Heart sounds: Normal heart sounds.   Pulmonary:      Effort: Pulmonary effort is normal. No respiratory distress.      Breath sounds: Normal breath sounds. No wheezing, rhonchi or rales.   Abdominal:      General: Bowel sounds are normal. There is no distension.      Palpations: Abdomen is soft.      Tenderness: There is abdominal tenderness (moderate) in the right lower quadrant. There is no guarding or rebound.      Comments: No rigidity   Musculoskeletal:         General: No tenderness. Normal range of motion.      Cervical back: Normal range of motion and neck supple.   Skin:     General: Skin is warm and dry.      Coloration: Skin is not jaundiced.   Neurological:      General: No focal deficit present.      Mental Status: She is alert and oriented to person, place, and time. Mental status is at baseline.      Sensory: Sensation is intact.      Motor: Motor function is intact.      Coordination: Coordination is intact.   Psychiatric:         Attention and Perception: Attention and perception normal.         Mood and Affect: Mood and affect normal.         Speech: Speech normal.         Behavior: Behavior normal.         Judgment: Judgment normal.                Medications in the Emergency Department:  Medications "   sodium chloride 0.9 % bolus 1,000 mL (0 mL Intravenous Stopped 11/1/22 1247)        Labs  Lab Results (last 24 hours)     Procedure Component Value Units Date/Time    CBC & Differential [[id number]]  (Abnormal) Collected: 11/01/22 0819    Specimen: Blood Updated: 11/01/22 0829    Narrative:      The following orders were created for panel order CBC & Differential.  Procedure                               Abnormality         Status                     ---------                               -----------         ------                     CBC Auto Differential[[id number]]        Abnormal            Final result                 Please view results for these tests on the individual orders.    Comprehensive Metabolic Panel [[id number]]  (Abnormal) Collected: 11/01/22 0819    Specimen: Blood Updated: 11/01/22 0851     Glucose 116 mg/dL      BUN 18 mg/dL      Creatinine 1.13 mg/dL      Sodium 142 mmol/L      Potassium 3.7 mmol/L      Chloride 104 mmol/L      CO2 26.0 mmol/L      Calcium 9.2 mg/dL      Total Protein 7.8 g/dL      Albumin 4.30 g/dL      ALT (SGPT) 11 U/L      AST (SGOT) 15 U/L      Alkaline Phosphatase 111 U/L      Total Bilirubin 0.5 mg/dL      Globulin 3.5 gm/dL      A/G Ratio 1.2 g/dL      BUN/Creatinine Ratio 15.9     Anion Gap 12.0 mmol/L      eGFR 49.6 mL/min/1.73      Comment: National Kidney Foundation and American Society of Nephrology (ASN) Task Force recommended calculation based on the Chronic Kidney Disease Epidemiology Collaboration (CKD-EPI) equation refit without adjustment for race.       Narrative:      GFR Normal >60  Chronic Kidney Disease <60  Kidney Failure <15    The GFR formula is only valid for adults with stable renal function between ages 18 and 70.    Lipase [[id number]]  (Normal) Collected: 11/01/22 0819    Specimen: Blood Updated: 11/01/22 0851     Lipase 53 U/L     Lactic Acid, Plasma [[id number]]  (Abnormal) Collected: 11/01/22 0819    Specimen: Blood Updated: 11/01/22 0859      Lactate 2.2 mmol/L     CBC Auto Differential [[id number]]  (Abnormal) Collected: 11/01/22 0819    Specimen: Blood Updated: 11/01/22 0829     WBC 14.94 10*3/mm3      RBC 4.56 10*6/mm3      Hemoglobin 13.0 g/dL      Hematocrit 40.6 %      MCV 89.0 fL      MCH 28.5 pg      MCHC 32.0 g/dL      RDW 13.3 %      RDW-SD 43.3 fl      MPV 8.8 fL      Platelets 319 10*3/mm3      Neutrophil % 78.1 %      Lymphocyte % 12.2 %      Monocyte % 7.2 %      Eosinophil % 1.5 %      Basophil % 0.6 %      Immature Grans % 0.4 %      Neutrophils, Absolute 11.67 10*3/mm3      Lymphocytes, Absolute 1.82 10*3/mm3      Monocytes, Absolute 1.08 10*3/mm3      Eosinophils, Absolute 0.22 10*3/mm3      Basophils, Absolute 0.09 10*3/mm3      Immature Grans, Absolute 0.06 10*3/mm3      nRBC 0.0 /100 WBC     Urinalysis With Microscopic If Indicated (No Culture) - Urine, Clean Catch [[id number]]  (Normal) Collected: 11/01/22 0957    Specimen: Urine, Clean Catch Updated: 11/01/22 1016     Color, UA Yellow     Appearance, UA Clear     pH, UA 7.0     Specific Gravity, UA 1.008     Glucose, UA Negative     Ketones, UA Negative     Bilirubin, UA Negative     Blood, UA Negative     Protein, UA Negative     Leuk Esterase, UA Negative     Nitrite, UA Negative     Urobilinogen, UA 0.2 E.U./dL    Narrative:      Urine microscopic not indicated.    STAT Lactic Acid, Reflex [[id number]]  (Normal) Collected: 11/01/22 1136    Specimen: Blood Updated: 11/01/22 1220     Lactate 1.6 mmol/L            Imaging:  CT Abdomen Pelvis Without Contrast    Result Date: 11/1/2022  PROCEDURE: CT ABDOMEN PELVIS WO CONTRAST  COMPARISON: [facility], CT, ABD PEL W/O CONTRAST, 5/02/2018, 14:22.  INDICATIONS: right flank pain  TECHNIQUE: CT images were created without intravenous contrast.   PROTOCOL:   Standard imaging protocol performed    RADIATION:   DLP: 582.9mGy*cm   Automated exposure control was utilized to minimize radiation dose.  FINDINGS:  The lung bases are  clear.  There is a small hiatal hernia, unchanged.  The heart size is normal.  In there are coronary artery calcifications.  Subcutaneous fat and underlying musculature appear without acute abnormalities.  There are no acute osseous abnormalities or destructive bone lesions.  There is mild-to-moderate thoracolumbar spondylosis.  There is a bone island in the left femoral head.  The liver contains a small cyst or hemangioma within segment 4. The gallbladder, bile ducts, spleen, adrenal glands and left kidney appear within normal limits.  That there is fatty atrophy of the pancreas.  There is a 2 mm nonobstructing inferior right kidney stone.  There is no ureteral stone or hydronephrosis.  The bowel is nondistended.  The appendix is normal.  There is a cecal mass measuring up to 6.3 x 5.5 cm in the coronal plane.  There is an upstream rounded lymph node measuring 8 mm on axial image 112. There is a prominent 9 mm short axis lymph node in the pre caval space best seen on axial image 101. There is a cluster of abnormal lymph nodes posterior to the cecal mass on axial image 128 measuring up to 12 mm in the short axis.  There is a rounded lymph node in the right lower quadrant fat measuring 8 mm on axial image 117.  No small bowel distention.  The remainder of the colon is unremarkable.  The uterus is within normal limits.  There is a 46 mm simple right ovarian cyst.  There is a 36 mm simple left ovarian cyst.  There is a 28 mm left ovarian dermoid cyst.  No ascites or pneumoperitoneum.  There is minimal atherosclerotic disease.         1. 6 cm cecal mass concerning for colonic adenocarcinoma.  There is some stranding in the adjacent fat worrisome for tumor infiltration and there are several adjacent mildly enlarged morphologically abnormal lymph nodes concerning for metastasis. 2. 10 mm hypoattenuating lesion in liver segment 4 is unchanged from 2018, likely cyst or hemangioma.  3. Chronic changes including small hiatal  hernia, bilateral ovarian cysts (both have enlarged from the prior study), stable left ovarian dermoid cyst and osseous degenerative changes.      FAITH NGO MD       Electronically Signed and Approved By: FAITH NGO MD on 11/01/2022 at 9:21             US Pelvic NON-OB w Doppler    Result Date: 11/1/2022  PROCEDURE: US PELVIC NON-OB WITH DOPPLER  COMPARISON: [facility], CT, ABD PEL W/O CONTRAST, 5/02/2018, 14:22.  [facility], US, PELVIC TRANSVAGINAL, 6/08/2018, 12:05.  [facility], CT, CT ABDOMEN PELVIS WO CONTRAST, 11/01/2022, 8:51.  INDICATIONS: History of right ovarian cyst with severe adnexal pain  FINDINGS:  The uterus measures 4 cm in length.  The uterus measures 2.3 cm x 2.1 cm in transverse and AP dimension.  The myometrium is uniformly echoic.  The endometrial bi layer measures 8 mm.  No cystic intrauterine contents are seen.  4 cm simple cyst is seen in the right adnexa.  This measured 3.6 cm on 6/8/2018.  3.2 cm simple cyst is seen in the left adnexa.  Adjacent 2.3 cm echoic structure is consistent with fat seen on CT and consistent with ovarian dermoid.  This appears stable.  No vascular abnormality is seen.  No free fluid is evident.        Pelvic ultrasound demonstrating bilateral adnexal cysts, as above.  2.3 cm left ovarian dermoid.      MARYANN ESPINOSA MD       Electronically Signed and Approved By: MARYANN ESPINOSA MD on 11/01/2022 at 12:30               Procedures:  Procedures    Progress  ED Course as of 11/01/22 1608   Tue Nov 01, 2022   0841 Patient flagged positive for potential sepsis based on 2 SIRS criteria, however no confirmed or presumed source of infection.  In fact at the time of my initial exam her heart rate is in the 80s and her respiratory rate is less than 20 so no longer has two SIRS criteria.  She feels she is having pain related to an ovarian cyst. [RP]   5774 Case discussed with on-call general surgery, Dr. Perez.  He states  no indication for inpatient work-up unless patient is showing signs of obstruction.  Might consider antibiotics for concern of secondary infection.  Needs outpatient colonoscopy and will likely need surgery.  If there are signs of metastatic disease to the liver etc. we will need med/oncology work-up first. [RP]   1213 Case discussed with on-call oncology, Dr. Whiteside.  She states patient will be best served to see GI first for tissue biopsy prior to following up with oncology. [RP]   1238 Case discussed with on-call gastroenterology, Dr. Zacarias. [RP]      ED Course User Index  [RP] Paco Shen MD                            Medical Decision Making:  MDM  Number of Diagnoses or Management Options  Right lower quadrant abdominal mass: new and requires workup     Amount and/or Complexity of Data Reviewed  Clinical lab tests: reviewed  Tests in the radiology section of CPT®: reviewed and ordered  Discuss the patient with other providers: yes  Independent visualization of images, tracings, or specimens: yes    Risk of Complications, Morbidity, and/or Mortality  Presenting problems: moderate  Management options: low    Patient Progress  Patient progress: stable       Final diagnoses:   Right lower quadrant abdominal mass        Disposition:  ED Disposition     ED Disposition   Discharge    Condition   Stable    Comment   --             This medical record created using voice recognition software and a virtual scribe.    Documentation assistance provided by Cisco Wylie acting as scribe for No att. bree miller MD. Information recorded by the scribe was done at my direction and has been verified and validated by me.         Cisco Wylie  11/01/22 0837       Paco Shen MD  11/01/22 1604       Paco Shen MD  11/01/22 1606

## 2022-11-01 NOTE — DISCHARGE INSTRUCTIONS
The neck step in the process of making a formal diagnosis is likely to see a gastroenterologist about having a colonoscopy and a biopsy of this cecal mass.  Return to the ER for fever, uncontrolled vomiting or uncontrolled pain.  Stay well-hydrated.  Call your family doctor today so that he/she can guide your outpatient work-up.

## 2022-11-02 ENCOUNTER — PREP FOR SURGERY (OUTPATIENT)
Dept: OTHER | Facility: HOSPITAL | Age: 77
End: 2022-11-02

## 2022-11-02 ENCOUNTER — OFFICE VISIT (OUTPATIENT)
Dept: GASTROENTEROLOGY | Facility: CLINIC | Age: 77
End: 2022-11-02

## 2022-11-02 ENCOUNTER — TELEPHONE (OUTPATIENT)
Dept: GASTROENTEROLOGY | Facility: CLINIC | Age: 77
End: 2022-11-02

## 2022-11-02 VITALS
HEIGHT: 62 IN | OXYGEN SATURATION: 98 % | WEIGHT: 180 LBS | HEART RATE: 83 BPM | SYSTOLIC BLOOD PRESSURE: 118 MMHG | DIASTOLIC BLOOD PRESSURE: 43 MMHG | BODY MASS INDEX: 33.13 KG/M2

## 2022-11-02 DIAGNOSIS — R93.3 ABNORMAL CT SCAN, COLON: Primary | ICD-10-CM

## 2022-11-02 DIAGNOSIS — G89.29 CHRONIC RLQ PAIN: ICD-10-CM

## 2022-11-02 DIAGNOSIS — R10.31 CHRONIC RLQ PAIN: ICD-10-CM

## 2022-11-02 PROCEDURE — 99214 OFFICE O/P EST MOD 30 MIN: CPT | Performed by: NURSE PRACTITIONER

## 2022-11-02 RX ORDER — POLYETHYLENE GLYCOL 3350, SODIUM SULFATE ANHYDROUS, SODIUM BICARBONATE, SODIUM CHLORIDE, POTASSIUM CHLORIDE 227.1; 21.5; 6.36; 5.53; .754 G/L; G/L; G/L; G/L; G/L
4 POWDER, FOR SOLUTION ORAL DAILY
Qty: 1 EACH | Refills: 0 | Status: SHIPPED | OUTPATIENT
Start: 2022-11-02 | End: 2022-11-03

## 2022-11-02 RX ORDER — ERYTHROMYCIN 5 MG/G
OINTMENT OPHTHALMIC
COMMUNITY
Start: 2022-10-14 | End: 2022-11-16

## 2022-11-02 NOTE — TELEPHONE ENCOUNTER
2 x Called pt with colonoscopy date/time, [facility]  She has been added to 11/07/23 @ 10:30 am

## 2022-11-02 NOTE — PROGRESS NOTES
"Patient Name: Berna Leong   Visit Date: 11/02/2022   Patient ID: [id number]  Provider: DONNY Weston    Sex: female  Location:  Location Address:  Location Phone: [phone]  [place] [address] [phone]    YOB: 1945  Age: 77 y.o.   Primary Care Provider Reyna Chacon MD      Referring Provider: No ref. 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  There is a liver lesion but unchanged from 2018 likely cyst or hemangioma  There are right and left ovarian cyst described as simple --pt states she was seen by GYN in May and has had pelvic pain on/off since then. Pt still following w GYN.     Pt states last night pain in lower abd was more acute, so she went to ER.   Pt denies blood in stool or black stool, but states sometimes it is \"dark.\"   [place] # 1 - 2.   Pt states she has no abd pain today, last BM yesterday. No fever.   Pt has decreased appetite, not new, no unint wt loss.  Pt has never had a colonoscopy.     Pt describes in 2015 she was in a coma x 3 months, pt states was r/t stress, note epic states d/t low thyroid.     Pt states she hasn't been seen by cardio in years. Does have hx of MI 1974 and 1983.   Past Medical History:   Diagnosis Date   • Arthritis    • Coma due to low thyroid (HCC) 2015   • Heart attack (HCC)    • Hypothyroidism    • Kidney disease    • Leg pain    • Leg swelling    • Limb swelling    • Muscle cramps    • SOB (shortness of breath)    • Thyroid disease    • Thyroid disorder    • Wound dehiscence 4/20/2022    Sustained laceration December 2020 1 sutures removed by PCP with dehiscence seen at " "wound clinicby Dr Laina Bales February 2022 and folowed there--given silver sulfadiazine creme (Silvadene)       Past Surgical History:   Procedure Laterality Date   • DILATATION AND CURETTAGE  2018   • KNEE SURGERY  2008    JOINT SURGERY       Allergies   Allergen Reactions   • Penicillins Rash   • Sulfa Antibiotics Rash       Family History   Problem Relation Age of Onset   • Heart disease Mother    • Arthritis Mother    • Heart disease Father    • Cancer Brother    • Stomach cancer Maternal Aunt    • Arthritis Other    • Cancer Other    • Heart disease Other    • Colon cancer Neg Hx         Social History     Tobacco Use   • Smoking status: Never   • Smokeless tobacco: Never   Vaping Use   • Vaping Use: Never used   Substance Use Topics   • Alcohol use: Never   • Drug use: Never       Objective     Vital Signs:   /43 (BP Location: Left arm, Patient Position: Sitting, Cuff Size: Adult)   Pulse 83   Ht 157.5 cm (62\")   Wt 81.6 kg (180 lb)   SpO2 98%   BMI 32.92 kg/m²       Physical Exam  Constitutional:       General: The patient is not in acute distress.     Appearance: Normal appearance.   HENT:      Head: Normocephalic and atraumatic.      Nose: Nose normal.   Pulmonary:      Effort: Pulmonary effort is normal. No respiratory distress.   Abdominal:      General: Abdomen is flat.      Palpations: Abdomen is soft. There is no mass.      Tenderness: There is no abdominal tenderness--tender RLQ. There is no guarding.   Musculoskeletal:      Cervical back: Neck supple.      Right lower leg: No edema.      Left lower leg: No edema.   Skin:     General: Skin is warm and dry.   Neurological:      General: No focal deficit present.      Mental Status: The patient is alert and oriented to person, place, and time.      Gait: Gait normal.   Psychiatric:         Mood and Affect: Mood normal.         Speech: Speech normal.         Behavior: Behavior normal.         Thought Content: Thought content normal. "     Result Review :   The following data was reviewed by: DONNY Weston on 11/02/2022:    CBC w/diff    CBC w/Diff 4/22/22 9/30/22 11/1/22   WBC 7.58 7.84 14.94 (A)   RBC 4.53 4.59 4.56   Hemoglobin 12.7 12.7 13.0   Hematocrit 39.1 39.7 40.6   MCV 86.3 86.5 89.0   MCH 28.0 27.7 28.5   MCHC 32.5 32.0 32.0   RDW 12.8 12.7 13.3   Platelets 312 288 319   Neutrophil Rel % 58.5 55.5 78.1 (A)   Immature Granulocyte Rel % 0.3 0.3 0.4   Lymphocyte Rel % 30.9 31.9 12.2 (A)   Monocyte Rel % 6.1 6.5 7.2   Eosinophil Rel % 3.0 4.5 1.5   Basophil Rel % 1.2 1.3 0.6   (A) Abnormal value            CMP    CMP 8/5/22 9/30/22 11/1/22   Glucose 90 104 (A) 116 (A)   BUN 21 21 18   Creatinine 1.08 (A) 1.13 (A) 1.13 (A)   Sodium 144 143 142   Potassium 4.2 5.2 3.7   Chloride 105 104 104   Calcium 9.3 10.0 9.2   Albumin 4.10 4.20 4.30   Total Bilirubin 0.4  0.5   Alkaline Phosphatase 95  111   AST (SGOT) 15  15   ALT (SGPT) 12  11   (A) Abnormal value                          Assessment and Plan    Diagnoses and all orders for this visit:    1. Abnormal CT scan, colon (Primary)    Other orders  -     PEG 3350-KCl-NaBcb-NaCl-NaSulf (Golytely) 227.1 g pack; Take 4 L by mouth Daily for 1 day. Take per office instructions  Dispense: 1 each; Refill: 0            Follow Up   Return for follow up after procedure.   Colonoscopy Surgical Risk and Benefits: Possible risks/complications, benefits, and alternatives to surgical or invasive procedure have been explained to patient and/or legal guardian; risks include bleeding, infection, and perforation. Patient has been evaluated and can tolerate anesthesia and/or sedation. Risks, benefits, and alternatives to anesthesia and sedation have been explained to patient and/or legal guardian.     Patient was given instructions and counseling regarding her condition or for health maintenance advice. Please see specific information pulled into the AVS if appropriate.

## 2022-11-07 ENCOUNTER — ANESTHESIA EVENT (OUTPATIENT)
Dept: GASTROENTEROLOGY | Facility: HOSPITAL | Age: 77
End: 2022-11-07

## 2022-11-07 ENCOUNTER — HOSPITAL ENCOUNTER (OUTPATIENT)
Facility: HOSPITAL | Age: 77
Setting detail: HOSPITAL OUTPATIENT SURGERY
Discharge: HOME | End: 2022-11-07
Attending: INTERNAL MEDICINE | Admitting: INTERNAL MEDICINE

## 2022-11-07 ENCOUNTER — TELEPHONE (OUTPATIENT)
Dept: GASTROENTEROLOGY | Facility: CLINIC | Age: 77
End: 2022-11-07

## 2022-11-07 ENCOUNTER — ANESTHESIA (OUTPATIENT)
Dept: GASTROENTEROLOGY | Facility: HOSPITAL | Age: 77
End: 2022-11-07

## 2022-11-07 VITALS
TEMPERATURE: 97.1 F | WEIGHT: 174.6 LBS | BODY MASS INDEX: 31.94 KG/M2 | HEART RATE: 77 BPM | OXYGEN SATURATION: 99 % | SYSTOLIC BLOOD PRESSURE: 128 MMHG | RESPIRATION RATE: 22 BRPM | DIASTOLIC BLOOD PRESSURE: 79 MMHG

## 2022-11-07 DIAGNOSIS — G89.29 CHRONIC RLQ PAIN: ICD-10-CM

## 2022-11-07 DIAGNOSIS — R10.31 CHRONIC RLQ PAIN: ICD-10-CM

## 2022-11-07 DIAGNOSIS — R93.3 ABNORMAL CT SCAN, COLON: ICD-10-CM

## 2022-11-07 DIAGNOSIS — K63.89 CECUM MASS: Primary | ICD-10-CM

## 2022-11-07 PROCEDURE — 45380 COLONOSCOPY AND BIOPSY: CPT | Performed by: INTERNAL MEDICINE

## 2022-11-07 PROCEDURE — 25010000002 PROPOFOL 10 MG/ML EMULSION: Performed by: NURSE ANESTHETIST, CERTIFIED REGISTERED

## 2022-11-07 PROCEDURE — 88341 IMHCHEM/IMCYTCHM EA ADD ANTB: CPT

## 2022-11-07 PROCEDURE — 88305 TISSUE EXAM BY PATHOLOGIST: CPT | Performed by: INTERNAL MEDICINE

## 2022-11-07 PROCEDURE — 88342 IMHCHEM/IMCYTCHM 1ST ANTB: CPT

## 2022-11-07 RX ORDER — PROPOFOL 10 MG/ML
VIAL (ML) INTRAVENOUS AS NEEDED
Status: DISCONTINUED | OUTPATIENT
Start: 2022-11-07 | End: 2022-11-07 | Stop reason: SURG

## 2022-11-07 RX ORDER — SODIUM CHLORIDE, SODIUM LACTATE, POTASSIUM CHLORIDE, CALCIUM CHLORIDE 600; 310; 30; 20 MG/100ML; MG/100ML; MG/100ML; MG/100ML
30 INJECTION, SOLUTION INTRAVENOUS CONTINUOUS
Status: DISCONTINUED | OUTPATIENT
Start: 2022-11-07 | End: 2022-11-07 | Stop reason: HOSPADM

## 2022-11-07 RX ORDER — LIDOCAINE HYDROCHLORIDE 20 MG/ML
INJECTION, SOLUTION EPIDURAL; INFILTRATION; INTRACAUDAL; PERINEURAL AS NEEDED
Status: DISCONTINUED | OUTPATIENT
Start: 2022-11-07 | End: 2022-11-07 | Stop reason: SURG

## 2022-11-07 RX ADMIN — PROPOFOL 200 MCG/KG/MIN: 10 INJECTION, EMULSION INTRAVENOUS at 11:44

## 2022-11-07 RX ADMIN — LIDOCAINE HYDROCHLORIDE 100 MG: 20 INJECTION, SOLUTION EPIDURAL; INFILTRATION; INTRACAUDAL; PERINEURAL at 11:44

## 2022-11-07 RX ADMIN — SODIUM CHLORIDE, POTASSIUM CHLORIDE, SODIUM LACTATE AND CALCIUM CHLORIDE 30 ML/HR: 600; 310; 30; 20 INJECTION, SOLUTION INTRAVENOUS at 11:13

## 2022-11-07 RX ADMIN — PROPOFOL 100 MG: 10 INJECTION, EMULSION INTRAVENOUS at 11:44

## 2022-11-07 NOTE — ANESTHESIA POSTPROCEDURE EVALUATION
Patient: Berna Leong    Procedure Summary     Date: 11/07/22 Room / Location: [facility] ENDOSCOPY 4 / [facility] ENDOSCOPY    Anesthesia Start: 1142 Anesthesia Stop: 1208    Procedure: COLONOSCOPY, WITH bx Diagnosis:       Abnormal CT scan, colon      Chronic RLQ pain      (Abnormal CT scan, colon [R93.3])      (Chronic RLQ pain [R10.31, G89.29])    Surgeons: Jimmie Zacarias MD Provider: Gerry Snyder MD    Anesthesia Type: general ASA Status: 3          Anesthesia Type: general    Vitals  Vitals Value Taken Time   /79 11/07/22 1226   Temp 36.2 °C (97.1 °F) 11/07/22 1221   Pulse 77 11/07/22 1226   Resp 22 11/07/22 1226   SpO2 99 % 11/07/22 1226           Post Anesthesia Care and Evaluation    Patient location during evaluation: bedside  Patient participation: complete - patient participated  Level of consciousness: awake  Pain management: adequate    Airway patency: patent  Anesthetic complications: No anesthetic complications  PONV Status: none  Cardiovascular status: acceptable and stable  Respiratory status: acceptable  Hydration status: acceptable    Comments: An Anesthesiologist personally participated in the most demanding procedures (including induction and emergence if applicable) in the anesthesia plan, monitored the course of anesthesia administration at frequent intervals and remained physically present and available for immediate diagnosis and treatment of emergencies.

## 2022-11-07 NOTE — ANESTHESIA PREPROCEDURE EVALUATION
Anesthesia Evaluation     Patient summary reviewed and Nursing notes reviewed   no history of anesthetic complications:  NPO Solid Status: > 8 hours  NPO Liquid Status: > 2 hours           Airway   Mallampati: II  TM distance: >3 FB  Neck ROM: full  No difficulty expected  Dental      Comment: Multiple chipped teeth    Pulmonary - negative pulmonary ROS and normal exam    breath sounds clear to auscultation  Cardiovascular - normal exam  Exercise tolerance: good (4-7 METS)    Rhythm: regular  Rate: normal    (+) hypertension, past MI ,       Neuro/Psych- negative ROS  GI/Hepatic/Renal/Endo    (+)   renal disease CRI, thyroid problem     Musculoskeletal (-) negative ROS    Abdominal    Substance History - negative use     OB/GYN negative ob/gyn ROS         Other - negative ROS       ROS/Med Hx Other: PAT Nursing Notes unavailable.                   Anesthesia Plan    ASA 3     general       Anesthetic plan, risks, benefits, and alternatives have been provided, discussed and informed consent has been obtained with: patient.        CODE STATUS:

## 2022-11-07 NOTE — H&P
Pre Procedure History & Physical    Chief Complaint:   Right lower quadrant pain, CT scan of abdomen pelvis showing a cecal mass    Subjective     HPI:   Right lower quadrant pain.'s abnormal CT scan of the cecum    Past Medical History:   Past Medical History:   Diagnosis Date   • Arthritis    • Coma due to low thyroid (HCC) 2015   • Heart attack (HCC)    • Hypothyroidism    • Kidney disease    • Leg pain    • Leg swelling    • Limb swelling    • Muscle cramps    • SOB (shortness of breath)    • Thyroid disease    • Thyroid disorder    • Wound dehiscence 4/20/2022    Sustained laceration December 2020 1 sutures removed by PCP with dehiscence seen at wound clinicby Dr Laina Bales February 2022 and folowed there--given silver sulfadiazine creme (Silvadene)       Past Surgical History:  Past Surgical History:   Procedure Laterality Date   • DILATATION AND CURETTAGE  2018   • KNEE SURGERY  2008    JOINT SURGERY       Family History:  Family History   Problem Relation Age of Onset   • Heart disease Mother    • Arthritis Mother    • Heart disease Father    • Cancer Brother    • Stomach cancer Maternal Aunt    • Arthritis Other    • Cancer Other    • Heart disease Other    • Colon cancer Neg Hx        Social History:   reports that she has never smoked. She has never used smokeless tobacco. She reports that she does not drink alcohol and does not use drugs.    Medications:   Medications Prior to Admission   Medication Sig Dispense Refill Last Dose   • ascorbic acid (VITAMIN C) 1000 MG tablet Take 1,000 mg by mouth Daily.   Past Week   • atorvastatin (LIPITOR) 10 MG tablet Take 1 tablet by mouth Daily. 90 tablet 1 Past Week   • B Complex Vitamins (vitamin b complex) capsule capsule Take 1 capsule by mouth Daily.   Past Week   • Cholecalciferol 125 MCG (5000 UT) tablet Take  by mouth Daily.   Past Week   • erythromycin (ROMYCIN) 5 MG/GM ophthalmic ointment Apply A SMALL AMOUNT TO affected AREA TWICE A DAY FOR ONE WEEK,  THEN STOP   Past Week   • levothyroxine (SYNTHROID, LEVOTHROID) 75 MCG tablet Take 1 tablet by mouth Daily. SYNTHROID NAME BRAND ONLY 90 tablet 1 11/6/2022   • metroNIDAZOLE (FLAGYL) 500 MG tablet Take 1 tablet by mouth 2 (Two) Times a Day for 7 days. 14 tablet 0 Past Week   • Multiple Vitamins-Minerals (Eye Health) capsule    Past Week   • vitamin E 400 UNIT capsule Take 400 Units by mouth.   Past Week       Allergies:  Penicillins and Sulfa antibiotics        Objective     Blood pressure 156/73, pulse 84, temperature 97.3 °F (36.3 °C), temperature source Temporal, resp. rate 16, weight 79.2 kg (174 lb 9.7 oz), SpO2 100 %.    Physical Exam   Constitutional: Pt is oriented to person, place, and time and well-developed, well-nourished, and in no distress.   Mouth/Throat: Oropharynx is clear and moist.   Neck: Normal range of motion.   Cardiovascular: Normal rate, regular rhythm and normal heart sounds.    Pulmonary/Chest: Effort normal and breath sounds normal.   Abdominal: Soft. Nontender  Skin: Skin is warm and dry.   Psychiatric: Mood, memory, affect and judgment normal.     Assessment & Plan     Diagnosis:  Right lower quadrant pain and abnormal CT    Anticipated Surgical Procedure:  Colonoscopy    The risks, benefits, and alternatives of this procedure have been discussed with the patient or the responsible party- the patient understands and agrees to proceed.

## 2022-11-08 ENCOUNTER — TELEPHONE (OUTPATIENT)
Dept: GASTROENTEROLOGY | Facility: CLINIC | Age: 77
End: 2022-11-08

## 2022-11-08 PROBLEM — C18.0 ADENOCARCINOMA OF CECUM (HCC): Status: ACTIVE | Noted: 2022-11-08

## 2022-11-09 ENCOUNTER — HOSPITAL ENCOUNTER (OUTPATIENT)
Facility: HOSPITAL | Age: 77
Setting detail: SURGERY ADMIT
End: 2022-11-09
Attending: SURGERY | Admitting: SURGERY

## 2022-11-09 ENCOUNTER — PREP FOR SURGERY (OUTPATIENT)
Dept: OTHER | Facility: HOSPITAL | Age: 77
End: 2022-11-09

## 2022-11-09 DIAGNOSIS — C18.9 COLON CANCER: Primary | ICD-10-CM

## 2022-11-09 RX ORDER — SODIUM CHLORIDE, SODIUM LACTATE, POTASSIUM CHLORIDE, CALCIUM CHLORIDE 600; 310; 30; 20 MG/100ML; MG/100ML; MG/100ML; MG/100ML
50 INJECTION, SOLUTION INTRAVENOUS CONTINUOUS
Status: CANCELLED | OUTPATIENT
Start: 2022-11-09

## 2022-11-09 RX ORDER — CLINDAMYCIN PHOSPHATE 900 MG/50ML
900 INJECTION INTRAVENOUS ONCE
Status: CANCELLED | OUTPATIENT
Start: 2022-11-09 | End: 2022-11-09

## 2022-11-11 ENCOUNTER — OFFICE VISIT (OUTPATIENT)
Dept: SURGERY | Facility: CLINIC | Age: 77
End: 2022-11-11

## 2022-11-11 VITALS — RESPIRATION RATE: 16 BRPM | HEIGHT: 62 IN | BODY MASS INDEX: 32.2 KG/M2 | WEIGHT: 175 LBS

## 2022-11-11 DIAGNOSIS — C18.9 MALIGNANT NEOPLASM OF COLON, UNSPECIFIED PART OF COLON: Primary | ICD-10-CM

## 2022-11-11 PROCEDURE — 99203 OFFICE O/P NEW LOW 30 MIN: CPT | Performed by: SURGERY

## 2022-11-14 ENCOUNTER — LAB (OUTPATIENT)
Dept: LAB | Facility: HOSPITAL | Age: 77
End: 2022-11-14

## 2022-11-14 DIAGNOSIS — E03.8 OTHER SPECIFIED HYPOTHYROIDISM: ICD-10-CM

## 2022-11-14 DIAGNOSIS — I10 PRIMARY HYPERTENSION: ICD-10-CM

## 2022-11-14 DIAGNOSIS — R73.9 ELEVATED BLOOD SUGAR: ICD-10-CM

## 2022-11-14 DIAGNOSIS — I25.2 HISTORY OF MI (MYOCARDIAL INFARCTION): ICD-10-CM

## 2022-11-14 DIAGNOSIS — E55.9 VITAMIN D DEFICIENCY: ICD-10-CM

## 2022-11-14 DIAGNOSIS — E78.00 PURE HYPERCHOLESTEROLEMIA: ICD-10-CM

## 2022-11-14 DIAGNOSIS — N18.31 STAGE 3A CHRONIC KIDNEY DISEASE (CKD): ICD-10-CM

## 2022-11-14 DIAGNOSIS — E03.9 HYPOTHYROIDISM, UNSPECIFIED TYPE: ICD-10-CM

## 2022-11-14 LAB
25(OH)D3 SERPL-MCNC: 42.1 NG/ML (ref 30–100)
ALBUMIN SERPL-MCNC: 4.4 G/DL (ref 3.5–5.2)
ALBUMIN/GLOB SERPL: 1.2 G/DL
ALP SERPL-CCNC: 100 U/L (ref 39–117)
ALT SERPL W P-5'-P-CCNC: 19 U/L (ref 1–33)
ANION GAP SERPL CALCULATED.3IONS-SCNC: 13.2 MMOL/L (ref 5–15)
AST SERPL-CCNC: 23 U/L (ref 1–32)
BILIRUB SERPL-MCNC: 0.6 MG/DL (ref 0–1.2)
BUN SERPL-MCNC: 11 MG/DL (ref 8–23)
BUN/CREAT SERPL: 8.8 (ref 7–25)
CALCIUM SPEC-SCNC: 9.8 MG/DL (ref 8.6–10.5)
CHLORIDE SERPL-SCNC: 102 MMOL/L (ref 98–107)
CHOLEST SERPL-MCNC: 201 MG/DL (ref 0–200)
CO2 SERPL-SCNC: 24.8 MMOL/L (ref 22–29)
CREAT SERPL-MCNC: 1.25 MG/DL (ref 0.57–1)
EGFRCR SERPLBLD CKD-EPI 2021: 43.9 ML/MIN/1.73
GLOBULIN UR ELPH-MCNC: 3.7 GM/DL
GLUCOSE SERPL-MCNC: 111 MG/DL (ref 65–99)
HBA1C MFR BLD: 5.3 % (ref 4.8–5.6)
HDLC SERPL-MCNC: 79 MG/DL (ref 40–60)
LDLC SERPL CALC-MCNC: 108 MG/DL (ref 0–100)
LDLC/HDLC SERPL: 1.35 {RATIO}
POTASSIUM SERPL-SCNC: 4.1 MMOL/L (ref 3.5–5.2)
PROT SERPL-MCNC: 8.1 G/DL (ref 6–8.5)
SODIUM SERPL-SCNC: 140 MMOL/L (ref 136–145)
T4 FREE SERPL-MCNC: 1.63 NG/DL (ref 0.93–1.7)
TRIGL SERPL-MCNC: 76 MG/DL (ref 0–150)
TSH SERPL DL<=0.05 MIU/L-ACNC: 4.67 UIU/ML (ref 0.27–4.2)
VLDLC SERPL-MCNC: 14 MG/DL (ref 5–40)

## 2022-11-14 PROCEDURE — 83036 HEMOGLOBIN GLYCOSYLATED A1C: CPT

## 2022-11-14 PROCEDURE — 82306 VITAMIN D 25 HYDROXY: CPT

## 2022-11-14 PROCEDURE — 84439 ASSAY OF FREE THYROXINE: CPT

## 2022-11-14 PROCEDURE — 80061 LIPID PANEL: CPT

## 2022-11-14 PROCEDURE — 84443 ASSAY THYROID STIM HORMONE: CPT

## 2022-11-14 PROCEDURE — 80053 COMPREHEN METABOLIC PANEL: CPT

## 2022-11-14 PROCEDURE — 36415 COLL VENOUS BLD VENIPUNCTURE: CPT

## 2022-11-14 NOTE — TELEPHONE ENCOUNTER
I called pt. It sounded like someone picked up but there seemed to be an issue with the phone. I tried back a few times and kept getting a busy signal. I have added pt to 1 yr Colon Recall for 11.7.23.

## 2022-11-16 ENCOUNTER — PRE-ADMISSION TESTING (OUTPATIENT)
Dept: PREADMISSION TESTING | Facility: HOSPITAL | Age: 77
End: 2022-11-16

## 2022-11-16 ENCOUNTER — TELEPHONE (OUTPATIENT)
Dept: SURGERY | Facility: CLINIC | Age: 77
End: 2022-11-16

## 2022-11-16 VITALS
HEART RATE: 83 BPM | SYSTOLIC BLOOD PRESSURE: 144 MMHG | HEIGHT: 62 IN | BODY MASS INDEX: 31.77 KG/M2 | WEIGHT: 172.62 LBS | TEMPERATURE: 97.8 F | DIASTOLIC BLOOD PRESSURE: 78 MMHG | RESPIRATION RATE: 16 BRPM | OXYGEN SATURATION: 99 %

## 2022-11-16 DIAGNOSIS — Z01.818 PRE-OP TESTING: Primary | ICD-10-CM

## 2022-11-16 LAB
ABO GROUP BLD: NORMAL
BASOPHILS # BLD AUTO: 0.07 10*3/MM3 (ref 0–0.2)
BASOPHILS NFR BLD AUTO: 1 % (ref 0–1.5)
CYTO UR: NORMAL
DEPRECATED RDW RBC AUTO: 44.1 FL (ref 37–54)
EOSINOPHIL # BLD AUTO: 0.21 10*3/MM3 (ref 0–0.4)
EOSINOPHIL NFR BLD AUTO: 2.9 % (ref 0.3–6.2)
ERYTHROCYTE [DISTWIDTH] IN BLOOD BY AUTOMATED COUNT: 13.3 % (ref 12.3–15.4)
HCT VFR BLD AUTO: 42.3 % (ref 34–46.6)
HGB BLD-MCNC: 13.4 G/DL (ref 12–15.9)
IMM GRANULOCYTES # BLD AUTO: 0.03 10*3/MM3 (ref 0–0.05)
IMM GRANULOCYTES NFR BLD AUTO: 0.4 % (ref 0–0.5)
LAB AP CASE REPORT: NORMAL
LAB AP CLINICAL INFORMATION: NORMAL
LYMPHOCYTES # BLD AUTO: 2.3 10*3/MM3 (ref 0.7–3.1)
LYMPHOCYTES NFR BLD AUTO: 32 % (ref 19.6–45.3)
Lab: NORMAL
MCH RBC QN AUTO: 28.3 PG (ref 26.6–33)
MCHC RBC AUTO-ENTMCNC: 31.7 G/DL (ref 31.5–35.7)
MCV RBC AUTO: 89.2 FL (ref 79–97)
MONOCYTES # BLD AUTO: 0.44 10*3/MM3 (ref 0.1–0.9)
MONOCYTES NFR BLD AUTO: 6.1 % (ref 5–12)
NEUTROPHILS NFR BLD AUTO: 4.13 10*3/MM3 (ref 1.7–7)
NEUTROPHILS NFR BLD AUTO: 57.6 % (ref 42.7–76)
NRBC BLD AUTO-RTO: 0 /100 WBC (ref 0–0.2)
PATH REPORT.ADDENDUM SPEC: NORMAL
PATH REPORT.FINAL DX SPEC: NORMAL
PATH REPORT.GROSS SPEC: NORMAL
PLATELET # BLD AUTO: 334 10*3/MM3 (ref 140–450)
PMV BLD AUTO: 9.5 FL (ref 6–12)
RBC # BLD AUTO: 4.74 10*6/MM3 (ref 3.77–5.28)
RH BLD: NEGATIVE
WBC NRBC COR # BLD: 7.18 10*3/MM3 (ref 3.4–10.8)

## 2022-11-16 PROCEDURE — 86901 BLOOD TYPING SEROLOGIC RH(D): CPT

## 2022-11-16 PROCEDURE — 93005 ELECTROCARDIOGRAM TRACING: CPT

## 2022-11-16 PROCEDURE — 36415 COLL VENOUS BLD VENIPUNCTURE: CPT

## 2022-11-16 PROCEDURE — 93010 ELECTROCARDIOGRAM REPORT: CPT | Performed by: INTERNAL MEDICINE

## 2022-11-16 PROCEDURE — 86900 BLOOD TYPING SEROLOGIC ABO: CPT

## 2022-11-16 PROCEDURE — 85025 COMPLETE CBC W/AUTO DIFF WBC: CPT

## 2022-11-16 RX ORDER — DOXYCYCLINE HYCLATE 100 MG/1
100 CAPSULE ORAL 2 TIMES DAILY
COMMUNITY
Start: 2022-11-14 | End: 2022-11-17

## 2022-11-16 NOTE — PAT
SPOKE WITH FAUSTINA AT [facility] AND ASKED ABOUT PO ATB PT STATES WAS GIVEN DOXYCYCLINE BID  AND IF WANTED PO ENTERG

## 2022-11-16 NOTE — TELEPHONE ENCOUNTER
Samantha called from PAT.  Does Dr. Drew want po entereg?  Is the only antibiotic he wants her to take the doxycycline 100 mg twice a day until surgery?

## 2022-11-16 NOTE — PAT
PT CLARIFIED WAS NOT DR CALLAHAN THAT PRESCRIBED THE DOXYCYCLINE GIVEN BY DENTIST FOR BROKE TOOTH. CALLED FAUSTINA BACK TO TAKE MESSAGE TO MAKE DR CALLAHAN AWARE ON DOXY FOR BROKEN TOOTH.

## 2022-11-16 NOTE — TELEPHONE ENCOUNTER
Per Dr. Drew:  Yes for po entereg     I didn't order doxycycline.  No PO Ab needed from me.     Samantha called, and I relayed this message.    As FYI, Samantha said she spoke to patient again, and she told her that Dr. Drew didn't prescribe the doxycycline, and that her dentist prescribed it for a broken tooth.

## 2022-11-16 NOTE — DISCHARGE INSTRUCTIONS
IMPORTANT INSTRUCTIONS - PRE-ADMISSION TESTING  DO NOT EAT OR CHEW anything after midnight the night before your procedure.    You may have CLEAR liquids up to ______ hours prior to ARRIVAL time. FOLLOW CLEAR LIQUID INCLUDING  CLEAR ENSURE  Take the following medications the morning of your procedure with JUST A SIP OF WATER:  ________  ATORVASTATIN, DOCYCYCLINE, SYNTHROID_______________________________________________________________________________________________________________________________________________________________________________    DO NOT BRING your medications to the hospital with you, UNLESS something has changed since your PRE-Admission Testing appointment.  STARTING NOW Hold all vitamins, supplements, and NSAIDS (Non- steroidal anti-inflammatory meds) for one week prior to surgery (you MAY take Tylenol or Acetaminophen).  If you are diabetic, check your blood sugar the morning of your procedure. If it is less than 70 or if you are feeling symptomatic, call the following number for further instructions: [phone]  Use your inhalers/nebulizers as usual, the morning of your procedure. BRING YOUR INHALERS with you.   Bring your CPAP or BIPAP to hospital, ONLY IF YOU WILL BE SPENDING THE NIGHT.   Make sure you have a ride home and have someone who will stay with you the day of your procedure after you go home.  If you have any questions, please call your Pre-Admission Testing Nurse, CHEPE_______________ at [phone]____________.   Per anesthesia request, do not smoke for 24 hours before your procedure or as instructed by your surgeon.  WILL CALL 11/22/22 AND GIVE OFFICIAL ARRIVAL TIME FOR DAY OF PROCEDURE  DRINK CLEAR ENSURE AS DIRECTED. REVIEW CLEAR LIQUID DIET SHEET FOR OTHER APPROVED CLEAR LIQUIDS. FOLLOW DR CALLAHAN CLEAR LIQUID DIET IF DIFFERENT     REVIEW BATHING INSTRUCTIONS SHEET. NO JEWELRY OF ANY TYPE DAY OF PROCEDURE OR NAIL POLISH UPPER OR LOWER EXT  COME TO SAME AREA HAD PAT APPT  ELEVATOR A 3RD FLOOR  FOLLOW DR CALLAHAN INSTRUCTIONS INVOLVING BOWEL PREP

## 2022-11-17 ENCOUNTER — OFFICE VISIT (OUTPATIENT)
Dept: INTERNAL MEDICINE | Facility: CLINIC | Age: 77
End: 2022-11-17

## 2022-11-17 ENCOUNTER — ANESTHESIA EVENT (OUTPATIENT)
Dept: PERIOP | Facility: HOSPITAL | Age: 77
End: 2022-11-17

## 2022-11-17 ENCOUNTER — TELEPHONE (OUTPATIENT)
Dept: INTERNAL MEDICINE | Facility: CLINIC | Age: 77
End: 2022-11-17

## 2022-11-17 ENCOUNTER — TRANSCRIBE ORDERS (OUTPATIENT)
Dept: ADMINISTRATIVE | Facility: HOSPITAL | Age: 77
End: 2022-11-17

## 2022-11-17 ENCOUNTER — TELEPHONE (OUTPATIENT)
Dept: SURGERY | Facility: CLINIC | Age: 77
End: 2022-11-17

## 2022-11-17 VITALS
SYSTOLIC BLOOD PRESSURE: 137 MMHG | HEART RATE: 86 BPM | TEMPERATURE: 98 F | OXYGEN SATURATION: 98 % | DIASTOLIC BLOOD PRESSURE: 77 MMHG | BODY MASS INDEX: 31.47 KG/M2 | HEIGHT: 62 IN | WEIGHT: 171 LBS

## 2022-11-17 DIAGNOSIS — C18.0 ADENOCARCINOMA OF CECUM: ICD-10-CM

## 2022-11-17 DIAGNOSIS — G56.03 BILATERAL CARPAL TUNNEL SYNDROME: ICD-10-CM

## 2022-11-17 DIAGNOSIS — E78.00 PURE HYPERCHOLESTEROLEMIA: Primary | ICD-10-CM

## 2022-11-17 DIAGNOSIS — I10 PRIMARY HYPERTENSION: ICD-10-CM

## 2022-11-17 DIAGNOSIS — M85.88 OSTEOPENIA OF LUMBAR SPINE: ICD-10-CM

## 2022-11-17 DIAGNOSIS — E03.8 OTHER SPECIFIED HYPOTHYROIDISM: ICD-10-CM

## 2022-11-17 DIAGNOSIS — R07.9 CHEST PAIN, UNSPECIFIED TYPE: Primary | ICD-10-CM

## 2022-11-17 DIAGNOSIS — I25.2 HISTORY OF MI (MYOCARDIAL INFARCTION): ICD-10-CM

## 2022-11-17 PROCEDURE — 99214 OFFICE O/P EST MOD 30 MIN: CPT | Performed by: INTERNAL MEDICINE

## 2022-11-17 RX ORDER — ATORVASTATIN CALCIUM 20 MG/1
20 TABLET, FILM COATED ORAL DAILY
Qty: 30 TABLET | Refills: 5 | Status: SHIPPED | OUTPATIENT
Start: 2022-11-17 | End: 2022-12-05

## 2022-11-17 NOTE — SIGNIFICANT NOTE
Dr. Escobar reviewed pt chart and requests cardiac clearance. Dr. Drew /Emani notified. TSH 4.670 called as well.

## 2022-11-17 NOTE — ASSESSMENT & PLAN NOTE
Radiation in past--now with hypothyroidism-Currently taking  Synthroid 75 mcg every day .    TSH slightly elevated greater than 4.  Has chronic constipation no weight gain    Plan-continue Synthroid 75 mcg daily recheck thyroid function test in 3 months

## 2022-11-17 NOTE — ASSESSMENT & PLAN NOTE
Stable with blood pressure 137/77 today.  Checks blood pressure at home and systolic blood pressure is in the 120s to 130s range.  Not on any medications.  Asked patient to check blood pressure twice a day 4-5 times a week to record and bring in at next visit goal is less than 140/90

## 2022-11-17 NOTE — PROGRESS NOTES
"CHIEF COMPLAINT  Berna Leong presents to [facility] for follow-up of Labs Only (Routine office visit with labs. Pt was dx with colon cx and pt goes in next week to have surgery. ) and Lift chair  (Pt needs written script for lift chair. Pt is buying the chair, medicare is buying the lift so pt was told specifically to not to put a dx on the order. ).    HPI  77-year-old pleasant female here for 3-month checkup    Since last visit dx-with adenocarcinoma of cecum-for surgery -seen in ER 11/1/22-for right side pain    Pt getting a lift chair - will pay for LIFT for [facility]    Past medical history significant for arthritis of her neck and back/DDD of L-spine January 2020, DEXA scan June 2022 with osteopenia of L-spine, fracture of right wrist and left foot, hypothyroidism on Synthroid 88 mcg by Dr. Arreguin in the past, also with vitamin C D and B12 deficiency..  B12 injections caused side effects per patient she can tolerate the pill.  Chronic kidney disease established with        [facility] reviewed.  ROS -no abdominal pain, no nause or vomiting, chronic constipation    OBJECTIVE  Vital Signs  Vitals:    11/17/22 1036   BP: 137/77   BP Location: Left arm   Patient Position: Sitting   Cuff Size: Adult   Pulse: 86   Temp: 98 °F (36.7 °C)   TempSrc: Temporal   SpO2: 98%   Weight: 77.6 kg (171 lb)   Height: 157.5 cm (62.01\")      Body mass index is 31.27 kg/m².    Physical Exam  Vitals and nursing note reviewed.   Constitutional:       Appearance: Normal appearance.   HENT:      Head: Normocephalic and atraumatic.   Cardiovascular:      Rate and Rhythm: Normal rate and regular rhythm.   Pulmonary:      Effort: Pulmonary effort is normal.      Breath sounds: Normal breath sounds.   Abdominal:      Palpations: Abdomen is soft.   Musculoskeletal:      Cervical back: Normal range of motion and neck supple.   Neurological:      General: No focal deficit present.      Mental " Status: She is alert and oriented to person, place, and time.          RESULTS REVIEW  No results found for: PROBNP, BNP  CMP    CMP 9/30/22 11/1/22 11/14/22   Glucose 104 (A) 116 (A) 111 (A)   BUN 21 18 11   Creatinine 1.13 (A) 1.13 (A) 1.25 (A)   Sodium 143 142 140   Potassium 5.2 3.7 4.1   Chloride 104 104 102   Calcium 10.0 9.2 9.8   Albumin 4.20 4.30 4.40   Total Bilirubin  0.5 0.6   Alkaline Phosphatase  111 100   AST (SGOT)  15 23   ALT (SGPT)  11 19   (A) Abnormal value            CBC w/diff    CBC w/Diff 9/30/22 11/1/22 11/16/22   WBC 7.84 14.94 (A) 7.18   RBC 4.59 4.56 4.74   Hemoglobin 12.7 13.0 13.4   Hematocrit 39.7 40.6 42.3   MCV 86.5 89.0 89.2   MCH 27.7 28.5 28.3   MCHC 32.0 32.0 31.7   RDW 12.7 13.3 13.3   Platelets 288 319 334   Neutrophil Rel % 55.5 78.1 (A) 57.6   Immature Granulocyte Rel % 0.3 0.4 0.4   Lymphocyte Rel % 31.9 12.2 (A) 32.0   Monocyte Rel % 6.5 7.2 6.1   Eosinophil Rel % 4.5 1.5 2.9   Basophil Rel % 1.3 0.6 1.0   (A) Abnormal value             Lipid Panel    Lipid Panel 4/22/22 8/5/22 11/14/22   Total Cholesterol 203 (A) 181 201 (A)   Triglycerides 65 93 76   HDL Cholesterol 69 (A) 62 (A) 79 (A)   VLDL Cholesterol 12 17 14   LDL Cholesterol  122 (A) 102 (A) 108 (A)   LDL/HDL Ratio 1.75 1.62 1.35   (A) Abnormal value             Lab Results   Component Value Date    TSH 4.670 (H) 11/14/2022    TSH 2.340 09/30/2022    TSH 0.376 08/05/2022      Lab Results   Component Value Date    FREET4 1.63 11/14/2022    FREET4 1.62 09/30/2022    FREET4 1.37 08/05/2022      A1C Last 3 Results    HGBA1C Last 3 Results 4/22/22 8/5/22 11/14/22   Hemoglobin A1C 5.50 5.50 5.30            Lab Results   Component Value Date    ORKOCOLW24 829 08/05/2022    FGAK68JG 42.1 11/14/2022    MG 2.0 04/22/2022        CT Abdomen Pelvis Without Contrast    Result Date: 11/1/2022    1. 6 cm cecal mass concerning for colonic adenocarcinoma.  There is some stranding in the adjacent fat worrisome for tumor infiltration  and there are several adjacent mildly enlarged morphologically abnormal lymph nodes concerning for metastasis. 2. 10 mm hypoattenuating lesion in liver segment 4 is unchanged from 2018, likely cyst or hemangioma.  3. Chronic changes including small hiatal hernia, bilateral ovarian cysts (both have enlarged from the prior study), stable left ovarian dermoid cyst and osseous degenerative changes.      FAITH NGO MD       Electronically Signed and Approved By: FAITH NGO MD on 11/01/2022 at 9:21             US Pelvic NON-OB w Doppler    Result Date: 11/1/2022    Pelvic ultrasound demonstrating bilateral adnexal cysts, as above.  2.3 cm left ovarian dermoid.      MARYANN ESPINOSA MD       Electronically Signed and Approved By: MARYANN ESPINOSA MD on 11/01/2022 at 12:30                        ASSESSMENT & PLAN  Diagnoses and all orders for this visit:    1. Pure hypercholesterolemia (Primary)  Assessment & Plan:  LDL not at goal of <70.  (EGX=855)   Plan-atorvastatin to 20 mg daily monitor for any muscle cramps.    Orders:  -     atorvastatin (LIPITOR) 20 MG tablet; Take 1 tablet by mouth Daily.  Dispense: 30 tablet; Refill: 5    2. Adenocarcinoma of cecum (HCC)  Comments:  For robotic right colectomy November 23, 2022 by Dr. Drew    3. Primary hypertension  Assessment & Plan:  Stable with blood pressure 137/77 today.  Checks blood pressure at home and systolic blood pressure is in the 120s to 130s range.  Not on any medications.  Asked patient to check blood pressure twice a day 4-5 times a week to record and bring in at next visit goal is less than 140/90      4. Other specified hypothyroidism  Assessment & Plan:  Radiation in past--now with hypothyroidism-Currently taking  Synthroid 75 mcg every day .    TSH slightly elevated greater than 4.  Has chronic constipation no weight gain    Plan-continue Synthroid 75 mcg daily recheck thyroid function test in 3 months      5. History of MI (myocardial infarction)  -      atorvastatin (LIPITOR) 20 MG tablet; Take 1 tablet by mouth Daily.  Dispense: 30 tablet; Refill: 5    6. Bilateral carpal tunnel syndrome  -     Miscellaneous DME    7. Osteopenia of lumbar spine  -     Miscellaneous DME    Declines flu shot  Recommend shingles vaccines at the pharmacy  Patient Instructions   Monitor blood pressure twice a day for 5 times a week record and bring at next visit  Continue Synthroid 75 mcg once a day  Increase atorvastatin to 20 mg once a day monitor for any muscle aches  Recommend Shingrix vaccines at the pharmacy  For surgery in few weeks  Prescription for lift chair given to patient she wants to get this at [facility] she also has osteoarthritis carpal tunnel adenocarcinoma of the cecum.    FOLLOW UP  Return in about 3 months (around 2/17/2023) for Recheck.    Patient was given instructions and counseling regarding her condition or for health maintenance advice. Please see specific information pulled into the AVS if appropriate.

## 2022-11-17 NOTE — PATIENT INSTRUCTIONS
Monitor blood pressure twice a day for 5 times a week record and bring at next visit  Continue Synthroid 75 mcg once a day  Increase atorvastatin to 20 mg once a day monitor for any muscle aches  Recommend Shingrix vaccines at the pharmacy  For surgery in few weeks

## 2022-11-17 NOTE — ASSESSMENT & PLAN NOTE
LDL not at goal of <70.  (GHF=378)   Plan-atorvastatin to 20 mg daily monitor for any muscle cramps.

## 2022-11-18 ENCOUNTER — TRANSCRIBE ORDERS (OUTPATIENT)
Dept: LAB | Facility: HOSPITAL | Age: 77
End: 2022-11-18

## 2022-11-18 DIAGNOSIS — E03.9 HYPOTHYROIDISM, UNSPECIFIED TYPE: ICD-10-CM

## 2022-11-18 DIAGNOSIS — E78.5 HYPERLIPIDEMIA, UNSPECIFIED HYPERLIPIDEMIA TYPE: Primary | ICD-10-CM

## 2022-11-18 NOTE — TELEPHONE ENCOUNTER
Called and spoke with Dr. Hernandez's office for update on pt.     Pt is scheduled for a stress test on Monday and when Dr. Hernandez reviews results we will then get our clearance letter back. We should hear something Monday afternoon about if pt is cleared for surgery or not.   
I called Margarita and spoke with her. Dr. Escobar is requesting pt be cleared from a cardiac standpoint due to the surgery she is having and a hx of 2 MI's.     I have called Dr. Hernandez's office and set up appt for pt. It is tomorrow 11-18 at 11am. Address is [address] [place] [place] [facility] [address]. I have faxed over the clearance letter.    I tried to call pt and let her know all information, no answer. LMOM. When pt calls back please let me talk with her.   
I tried calling pt again, no answer. LMOM  
Margarita, in PAT, said anesthesia is requesting cardiac clearance before surgery for this pt.  
Pt called back and I gave her all info. She V/U and will go to appt tomorrow.   
no

## 2022-11-21 ENCOUNTER — HOSPITAL ENCOUNTER (OUTPATIENT)
Dept: NUCLEAR MEDICINE | Facility: HOSPITAL | Age: 77
Discharge: HOME | End: 2022-11-21

## 2022-11-21 DIAGNOSIS — R07.9 CHEST PAIN, UNSPECIFIED TYPE: ICD-10-CM

## 2022-11-21 LAB
BH CV IMMEDIATE POST RECOVERY TECH DATA SYMPTOMS: NORMAL
BH CV IMMEDIATE POST TECH DATA BLOOD PRESSURE: NORMAL MMHG
BH CV IMMEDIATE POST TECH DATA HEART RATE: 90 BPM
BH CV IMMEDIATE POST TECH DATA OXYGEN SATS: 99 %
BH CV REST NUCLEAR ISOTOPE DOSE: 10 MCI
BH CV SIX MINUTE RECOVERY TECH DATA BLOOD PRESSURE: NORMAL
BH CV SIX MINUTE RECOVERY TECH DATA HEART RATE: 85 BPM
BH CV SIX MINUTE RECOVERY TECH DATA OXYGEN SATURATION: 98 %
BH CV STRESS BP STAGE 1: NORMAL
BH CV STRESS COMMENTS STAGE 1: NORMAL
BH CV STRESS DOSE REGADENOSON STAGE 1: 0.4
BH CV STRESS DURATION MIN STAGE 1: 0
BH CV STRESS DURATION SEC STAGE 1: 10
BH CV STRESS HR STAGE 1: 75
BH CV STRESS NUCLEAR ISOTOPE DOSE: 37.2 MCI
BH CV STRESS O2 STAGE 1: 99
BH CV STRESS PROTOCOL 1: NORMAL
BH CV STRESS RECOVERY BP: NORMAL MMHG
BH CV STRESS RECOVERY HR: 85 BPM
BH CV STRESS RECOVERY O2: 98 %
BH CV STRESS STAGE 1: 1
BH CV THREE MINUTE POST TECH DATA BLOOD PRESSURE: NORMAL MMHG
BH CV THREE MINUTE POST TECH DATA HEART RATE: 85 BPM
BH CV THREE MINUTE POST TECH DATA OXYGEN SATURATION: 99 %
LV EF NUC BP: 75 %
MAXIMAL PREDICTED HEART RATE: 141 BPM
PERCENT MAX PREDICTED HR: 63.83 %
STRESS BASELINE BP: NORMAL MMHG
STRESS BASELINE HR: 68 BPM
STRESS O2 SAT REST: 98 %
STRESS PERCENT HR: 75 %
STRESS POST O2 SAT PEAK: 99 %
STRESS POST PEAK BP: NORMAL MMHG
STRESS POST PEAK HR: 90 BPM
STRESS TARGET HR: 120 BPM

## 2022-11-21 PROCEDURE — 93017 CV STRESS TEST TRACING ONLY: CPT

## 2022-11-21 PROCEDURE — 0 TECHNETIUM TETROFOSMIN KIT: Performed by: SPECIALIST

## 2022-11-21 PROCEDURE — A9502 TC99M TETROFOSMIN: HCPCS | Performed by: SPECIALIST

## 2022-11-21 PROCEDURE — 25010000002 REGADENOSON 0.4 MG/5ML SOLUTION: Performed by: SPECIALIST

## 2022-11-21 PROCEDURE — 78452 HT MUSCLE IMAGE SPECT MULT: CPT

## 2022-11-21 RX ADMIN — TETROFOSMIN 1 DOSE: 1.38 INJECTION, POWDER, LYOPHILIZED, FOR SOLUTION INTRAVENOUS at 08:54

## 2022-11-21 RX ADMIN — REGADENOSON 0.4 MG: 0.08 INJECTION, SOLUTION INTRAVENOUS at 10:18

## 2022-11-21 RX ADMIN — TETROFOSMIN 1 DOSE: 1.38 INJECTION, POWDER, LYOPHILIZED, FOR SOLUTION INTRAVENOUS at 10:18

## 2022-11-22 PROCEDURE — 99284 EMERGENCY DEPT VISIT MOD MDM: CPT

## 2022-11-22 PROCEDURE — 99283 EMERGENCY DEPT VISIT LOW MDM: CPT

## 2022-11-22 NOTE — TELEPHONE ENCOUNTER
Samantha, in PAT, wanted to know if you were going to prescribe PO entereg today or morning of surgery (tomorrow). CB ext# [phone].

## 2022-11-22 NOTE — PROGRESS NOTES
SPOKE WITH TORIN AT [facility] AND ADVISED THAT PER DR CALLAHAN HE WANTED PO ENTEREG AND HAD NOT BEEN ENTERED AS ORDER AS OF YET IF WANTED TO ORDER TODAY OR ORDER THE DAY OF PROCEDURE

## 2022-11-23 ENCOUNTER — TELEPHONE (OUTPATIENT)
Dept: SURGERY | Facility: CLINIC | Age: 77
End: 2022-11-23

## 2022-11-23 ENCOUNTER — HOSPITAL ENCOUNTER (OUTPATIENT)
Facility: HOSPITAL | Age: 77
Setting detail: SURGERY ADMIT
End: 2022-11-23
Attending: SURGERY | Admitting: SURGERY

## 2022-11-23 ENCOUNTER — ANESTHESIA (OUTPATIENT)
Dept: PERIOP | Facility: HOSPITAL | Age: 77
End: 2022-11-23

## 2022-11-23 ENCOUNTER — HOSPITAL ENCOUNTER (EMERGENCY)
Facility: HOSPITAL | Age: 77
Discharge: HOME | End: 2022-11-23
Attending: EMERGENCY MEDICINE | Admitting: EMERGENCY MEDICINE

## 2022-11-23 VITALS
OXYGEN SATURATION: 100 % | SYSTOLIC BLOOD PRESSURE: 139 MMHG | HEIGHT: 62 IN | HEART RATE: 95 BPM | DIASTOLIC BLOOD PRESSURE: 64 MMHG | BODY MASS INDEX: 33.1 KG/M2 | WEIGHT: 179.9 LBS | RESPIRATION RATE: 16 BRPM

## 2022-11-23 DIAGNOSIS — R19.7 DIARRHEA, UNSPECIFIED TYPE: ICD-10-CM

## 2022-11-23 DIAGNOSIS — R11.2 NAUSEA AND VOMITING, UNSPECIFIED VOMITING TYPE: Primary | ICD-10-CM

## 2022-11-23 DIAGNOSIS — C18.9 MALIGNANT NEOPLASM OF COLON, UNSPECIFIED PART OF COLON: Primary | ICD-10-CM

## 2022-11-23 LAB
ALBUMIN SERPL-MCNC: 4.1 G/DL (ref 3.5–5.2)
ALBUMIN/GLOB SERPL: 1.2 G/DL
ALP SERPL-CCNC: 101 U/L (ref 39–117)
ALT SERPL W P-5'-P-CCNC: 15 U/L (ref 1–33)
ANION GAP SERPL CALCULATED.3IONS-SCNC: 15.8 MMOL/L (ref 5–15)
AST SERPL-CCNC: 26 U/L (ref 1–32)
BACTERIA UR QL AUTO: ABNORMAL /HPF
BASOPHILS # BLD AUTO: 0.07 10*3/MM3 (ref 0–0.2)
BASOPHILS NFR BLD AUTO: 0.6 % (ref 0–1.5)
BILIRUB SERPL-MCNC: 0.6 MG/DL (ref 0–1.2)
BILIRUB UR QL STRIP: NEGATIVE
BUN SERPL-MCNC: 23 MG/DL (ref 8–23)
BUN/CREAT SERPL: 21.7 (ref 7–25)
CALCIUM SPEC-SCNC: 9.6 MG/DL (ref 8.6–10.5)
CHLORIDE SERPL-SCNC: 90 MMOL/L (ref 98–107)
CLARITY UR: ABNORMAL
CO2 SERPL-SCNC: 24.2 MMOL/L (ref 22–29)
COLOR UR: YELLOW
CREAT SERPL-MCNC: 1.06 MG/DL (ref 0.57–1)
D-LACTATE SERPL-SCNC: 1.4 MMOL/L (ref 0.5–2)
D-LACTATE SERPL-SCNC: 4.9 MMOL/L (ref 0.5–2)
DEPRECATED RDW RBC AUTO: 41.1 FL (ref 37–54)
EGFRCR SERPLBLD CKD-EPI 2021: 53.5 ML/MIN/1.73
EOSINOPHIL # BLD AUTO: 0.02 10*3/MM3 (ref 0–0.4)
EOSINOPHIL NFR BLD AUTO: 0.2 % (ref 0.3–6.2)
ERYTHROCYTE [DISTWIDTH] IN BLOOD BY AUTOMATED COUNT: 13.2 % (ref 12.3–15.4)
GLOBULIN UR ELPH-MCNC: 3.3 GM/DL
GLUCOSE SERPL-MCNC: 130 MG/DL (ref 65–99)
GLUCOSE UR STRIP-MCNC: NEGATIVE MG/DL
HCT VFR BLD AUTO: 39.1 % (ref 34–46.6)
HGB BLD-MCNC: 12.8 G/DL (ref 12–15.9)
HGB UR QL STRIP.AUTO: NEGATIVE
HOLD SPECIMEN: NORMAL
HOLD SPECIMEN: NORMAL
HYALINE CASTS UR QL AUTO: ABNORMAL /LPF
IMM GRANULOCYTES # BLD AUTO: 0.06 10*3/MM3 (ref 0–0.05)
IMM GRANULOCYTES NFR BLD AUTO: 0.5 % (ref 0–0.5)
KETONES UR QL STRIP: ABNORMAL
LEUKOCYTE ESTERASE UR QL STRIP.AUTO: ABNORMAL
LIPASE SERPL-CCNC: 47 U/L (ref 13–60)
LYMPHOCYTES # BLD AUTO: 1.72 10*3/MM3 (ref 0.7–3.1)
LYMPHOCYTES NFR BLD AUTO: 14.6 % (ref 19.6–45.3)
MCH RBC QN AUTO: 28.1 PG (ref 26.6–33)
MCHC RBC AUTO-ENTMCNC: 32.7 G/DL (ref 31.5–35.7)
MCV RBC AUTO: 85.9 FL (ref 79–97)
MONOCYTES # BLD AUTO: 0.48 10*3/MM3 (ref 0.1–0.9)
MONOCYTES NFR BLD AUTO: 4.1 % (ref 5–12)
NEUTROPHILS NFR BLD AUTO: 80 % (ref 42.7–76)
NEUTROPHILS NFR BLD AUTO: 9.45 10*3/MM3 (ref 1.7–7)
NITRITE UR QL STRIP: NEGATIVE
NRBC BLD AUTO-RTO: 0 /100 WBC (ref 0–0.2)
PH UR STRIP.AUTO: 7 [PH] (ref 5–8)
PLATELET # BLD AUTO: 295 10*3/MM3 (ref 140–450)
PMV BLD AUTO: 9.8 FL (ref 6–12)
POTASSIUM SERPL-SCNC: 4 MMOL/L (ref 3.5–5.2)
PROT SERPL-MCNC: 7.4 G/DL (ref 6–8.5)
PROT UR QL STRIP: NEGATIVE
RBC # BLD AUTO: 4.55 10*6/MM3 (ref 3.77–5.28)
RBC # UR STRIP: ABNORMAL /HPF
REF LAB TEST METHOD: ABNORMAL
SODIUM SERPL-SCNC: 130 MMOL/L (ref 136–145)
SP GR UR STRIP: <=1.005 (ref 1–1.03)
SQUAMOUS #/AREA URNS HPF: ABNORMAL /HPF
UROBILINOGEN UR QL STRIP: ABNORMAL
WBC # UR STRIP: ABNORMAL /HPF
WBC NRBC COR # BLD: 11.8 10*3/MM3 (ref 3.4–10.8)
WHOLE BLOOD HOLD COAG: NORMAL
WHOLE BLOOD HOLD SPECIMEN: NORMAL

## 2022-11-23 PROCEDURE — 83605 ASSAY OF LACTIC ACID: CPT

## 2022-11-23 PROCEDURE — 83605 ASSAY OF LACTIC ACID: CPT | Performed by: EMERGENCY MEDICINE

## 2022-11-23 PROCEDURE — 85025 COMPLETE CBC W/AUTO DIFF WBC: CPT

## 2022-11-23 PROCEDURE — 36415 COLL VENOUS BLD VENIPUNCTURE: CPT

## 2022-11-23 PROCEDURE — 83690 ASSAY OF LIPASE: CPT

## 2022-11-23 PROCEDURE — 80053 COMPREHEN METABOLIC PANEL: CPT

## 2022-11-23 PROCEDURE — 81001 URINALYSIS AUTO W/SCOPE: CPT

## 2022-11-23 RX ORDER — LIDOCAINE HYDROCHLORIDE 20 MG/ML
15 SOLUTION OROPHARYNGEAL ONCE
Status: COMPLETED | OUTPATIENT
Start: 2022-11-23 | End: 2022-11-23

## 2022-11-23 RX ORDER — ALUMINA, MAGNESIA, AND SIMETHICONE 2400; 2400; 240 MG/30ML; MG/30ML; MG/30ML
15 SUSPENSION ORAL ONCE
Status: COMPLETED | OUTPATIENT
Start: 2022-11-23 | End: 2022-11-23

## 2022-11-23 RX ORDER — SODIUM CHLORIDE 0.9 % (FLUSH) 0.9 %
10 SYRINGE (ML) INJECTION AS NEEDED
Status: DISCONTINUED | OUTPATIENT
Start: 2022-11-23 | End: 2022-11-23 | Stop reason: HOSPADM

## 2022-11-23 RX ADMIN — SODIUM CHLORIDE 1000 ML: 9 INJECTION, SOLUTION INTRAVENOUS at 03:33

## 2022-11-23 RX ADMIN — SODIUM CHLORIDE 1000 ML: 9 INJECTION, SOLUTION INTRAVENOUS at 03:32

## 2022-11-23 RX ADMIN — ALUMINUM HYDROXIDE, MAGNESIUM HYDROXIDE, AND DIMETHICONE 15 ML: 400; 400; 40 SUSPENSION ORAL at 03:30

## 2022-11-23 RX ADMIN — LIDOCAINE HYDROCHLORIDE 15 ML: 20 SOLUTION ORAL at 03:30

## 2022-11-23 NOTE — TELEPHONE ENCOUNTER
Patient needs her surgery for 11/23/22 rescheduled.    She accidentally ate oatmeal and drank coffee.  She drank 2/3 of her lemon prep and started Ensure.  Drank lots of water.    She became very cold, and chilled. She vomited black.    She went to the ER at [facility].    She wants to do the same bowel prep.

## 2022-11-23 NOTE — TELEPHONE ENCOUNTER
Called pt per ,    He wants to make sure there is no miscommunication. She is weak and does not feel well. She went to ED last night. The bowel prep made her sick. I voiced understanding and told her that [facility] would call her next week to r/s her sx.

## 2022-11-23 NOTE — ED PROVIDER NOTES
Time: 2:44 AM EST  Arrived by: private car  Chief Complaint:   Chief Complaint   Patient presents with   • Vomiting     History provided by: Patient  History is limited by: N/A     History of Present Illness:      Patient is a 77 y.o. year old female that presents to the emergency department with vomiting. Pt reports vomiting something black. Pt reports that she had a colon prep for surgery which has made her have multiple episodes of diarrhea. Pt denies fever, chest pain, and breathing issues. Pt reports she is having surgery today at 10:30 AM.       History provided by:  Patient   used: No        Similar Symptoms Previously: N/A  Recently seen: 11/21/22      Patient Care Team  Primary Care Provider: Reyna Chacon MD    Past Medical History:     Allergies   Allergen Reactions   • Penicillins Rash   • Sulfa Antibiotics Rash     Past Medical History:   Diagnosis Date   • Arthritis    • Brown recluse spider bite     HX OF RIGHT FRONT LEG 2004   • Coma due to low thyroid (HCC) 2015   • Glaucoma    • Heart attack (HCC)     72 AND 81. DENIES CP BUT GETS SOA WITH EXERTION. STATES HAS BEEN ISSUE FOR LONG TIME. FOLLOWED BY PCP DR JOSHI. DECREASED ACTIVITY   • Hypothyroidism    • Kidney disease    • Leg pain    • Leg swelling    • Macular degeneration    • Malignant neoplasm of colon (HCC)    • Restless leg syndrome    • SOB (shortness of breath)    • Thyroid disorder    • Wound dehiscence 04/20/2022    Sustained laceration December 2020 1 sutures removed by PCP with dehiscence seen at wound clinicby Dr Laina Bales February 2022 and folowed there--given silver sulfadiazine creme (Silvadene)     Past Surgical History:   Procedure Laterality Date   • COLONOSCOPY N/A 11/07/2022    Procedure: COLONOSCOPY, WITH bx;  Surgeon: Jimmie Zacarias MD;  Location: [facility];  Service: Gastroenterology;  Laterality: N/A;  CECUM MASS   • DILATATION AND CURETTAGE  2018   • KNEE SURGERY  Right 2008    JOINT SURGERY     Family History   Problem Relation Age of Onset   • Heart disease Mother    • Arthritis Mother    • Heart disease Father    • Cancer Brother    • Stomach cancer Maternal Aunt    • Arthritis Other    • Cancer Other    • Heart disease Other    • Colon cancer Neg Hx        Home Medications:  Prior to Admission medications    Medication Sig Start Date End Date Taking? Authorizing Provider   ascorbic acid (VITAMIN C) 1000 MG tablet Take 1,000 mg by mouth Daily.    Emergency, Nurse EL Conte   atorvastatin (LIPITOR) 20 MG tablet Take 1 tablet by mouth Daily. 11/17/22   Reyna Chacon MD   Cholecalciferol 125 MCG (5000 UT) tablet Take  by mouth Daily.    Emergency, Nurse EL Conte   levothyroxine (SYNTHROID, LEVOTHROID) 75 MCG tablet Take 1 tablet by mouth Daily. SYNTHROID NAME BRAND ONLY 6/9/22   Reyna Chacon MD   PEG 3350-KCl-NaBcb-NaCl-NaSulf 227.1 g pack Take 227.1 g by mouth Daily. 11/11/22   Abebe Drew MD   vitamin E 400 UNIT capsule Take 400 Units by mouth.    Provider, MD Paulette        Social History:   Social History     Tobacco Use   • Smoking status: Never   • Smokeless tobacco: Never   Vaping Use   • Vaping Use: Never used   Substance Use Topics   • Alcohol use: Never   • Drug use: Never     Recent travel: not applicable     Review of Systems:  Review of Systems   Constitutional: Negative for chills and fever.   HENT: Negative for congestion, ear pain and sore throat.    Eyes: Negative for pain.   Respiratory: Negative for cough, chest tightness and shortness of breath.    Cardiovascular: Negative for chest pain.   Gastrointestinal: Positive for diarrhea and vomiting. Negative for abdominal pain and nausea.   Genitourinary: Negative for flank pain and hematuria.   Musculoskeletal: Negative for joint swelling.   Skin: Negative for pallor.   Neurological: Negative for seizures and headaches.        Physical Exam:  /64 (BP  "Location: Left arm, Patient Position: Sitting)   Pulse 95   Resp 16   Ht 157.5 cm (62\")   Wt 81.6 kg (179 lb 14.3 oz)   SpO2 100%   BMI 32.90 kg/m²     Physical Exam  Vitals and nursing note reviewed.   Constitutional:       General: She is not in acute distress.     Appearance: Normal appearance. She is not toxic-appearing.   HENT:      Head: Normocephalic and atraumatic.      Mouth/Throat:      Mouth: Mucous membranes are moist.   Eyes:      General: No scleral icterus.  Cardiovascular:      Rate and Rhythm: Normal rate and regular rhythm.      Pulses: Normal pulses.      Heart sounds: Normal heart sounds.   Pulmonary:      Effort: Pulmonary effort is normal. No respiratory distress.      Breath sounds: Normal breath sounds.   Abdominal:      General: Abdomen is flat.      Palpations: Abdomen is soft.      Tenderness: There is no abdominal tenderness.   Musculoskeletal:         General: Normal range of motion.      Cervical back: Normal range of motion and neck supple.   Skin:     General: Skin is warm and dry.   Neurological:      Mental Status: She is alert and oriented to person, place, and time. Mental status is at baseline.                Medications in the Emergency Department:  Medications   sodium chloride 0.9 % flush 10 mL (has no administration in time range)   sodium chloride 0.9 % bolus 1,000 mL (0 mL Intravenous Stopped 11/23/22 0347)   sodium chloride 0.9 % bolus 1,000 mL (0 mL Intravenous Stopped 11/23/22 0348)   aluminum-magnesium hydroxide-simethicone (MAALOX MAX) 400-400-40 MG/5ML suspension 15 mL (15 mL Oral Given 11/23/22 0330)   Lidocaine Viscous HCl (XYLOCAINE) 2 % solution 15 mL (15 mL Mouth/Throat Given 11/23/22 0330)        Labs  Lab Results (last 24 hours)     Procedure Component Value Units Date/Time    CBC & Differential [[id number]]  (Abnormal) Collected: 11/23/22 0113    Specimen: Blood Updated: 11/23/22 0138    Narrative:      The following orders were created for panel order " CBC & Differential.  Procedure                               Abnormality         Status                     ---------                               -----------         ------                     CBC Auto Differential[[id number]]        Abnormal            Final result                 Please view results for these tests on the individual orders.    Comprehensive Metabolic Panel [[id number]]  (Abnormal) Collected: 11/23/22 0113    Specimen: Blood Updated: 11/23/22 0152     Glucose 130 mg/dL      BUN 23 mg/dL      Creatinine 1.06 mg/dL      Sodium 130 mmol/L      Potassium 4.0 mmol/L      Chloride 90 mmol/L      CO2 24.2 mmol/L      Calcium 9.6 mg/dL      Total Protein 7.4 g/dL      Albumin 4.10 g/dL      ALT (SGPT) 15 U/L      AST (SGOT) 26 U/L      Alkaline Phosphatase 101 U/L      Total Bilirubin 0.6 mg/dL      Globulin 3.3 gm/dL      A/G Ratio 1.2 g/dL      BUN/Creatinine Ratio 21.7     Anion Gap 15.8 mmol/L      eGFR 53.5 mL/min/1.73      Comment: National Kidney Foundation and American Society of Nephrology (ASN) Task Force recommended calculation based on the Chronic Kidney Disease Epidemiology Collaboration (CKD-EPI) equation refit without adjustment for race.       Narrative:      GFR Normal >60  Chronic Kidney Disease <60  Kidney Failure <15    The GFR formula is only valid for adults with stable renal function between ages 18 and 70.    Lipase [[id number]]  (Normal) Collected: 11/23/22 0113    Specimen: Blood Updated: 11/23/22 0152     Lipase 47 U/L     Lactic Acid, Plasma [[id number]]  (Abnormal) Collected: 11/23/22 0113    Specimen: Blood Updated: 11/23/22 0202     Lactate 4.9 mmol/L     CBC Auto Differential [[id number]]  (Abnormal) Collected: 11/23/22 0113    Specimen: Blood Updated: 11/23/22 0138     WBC 11.80 10*3/mm3      RBC 4.55 10*6/mm3      Hemoglobin 12.8 g/dL      Hematocrit 39.1 %      MCV 85.9 fL      MCH 28.1 pg      MCHC 32.7 g/dL      RDW 13.2 %      RDW-SD 41.1 fl      MPV 9.8 fL      Platelets  295 10*3/mm3      Neutrophil % 80.0 %      Lymphocyte % 14.6 %      Monocyte % 4.1 %      Eosinophil % 0.2 %      Basophil % 0.6 %      Immature Grans % 0.5 %      Neutrophils, Absolute 9.45 10*3/mm3      Lymphocytes, Absolute 1.72 10*3/mm3      Monocytes, Absolute 0.48 10*3/mm3      Eosinophils, Absolute 0.02 10*3/mm3      Basophils, Absolute 0.07 10*3/mm3      Immature Grans, Absolute 0.06 10*3/mm3      nRBC 0.0 /100 WBC     Urinalysis With Microscopic If Indicated (No Culture) - Urine, Clean Catch [[id number]]  (Abnormal) Collected: 11/23/22 0207    Specimen: Urine, Clean Catch Updated: 11/23/22 0221     Color, UA Yellow     Appearance, UA Cloudy     pH, UA 7.0     Specific Gravity, UA <=1.005     Glucose, UA Negative     Ketones, UA Trace     Bilirubin, UA Negative     Blood, UA Negative     Protein, UA Negative     Leuk Esterase, UA Trace     Nitrite, UA Negative     Urobilinogen, UA 0.2 E.U./dL    Urinalysis, Microscopic Only - Urine, Clean Catch [[id number]]  (Abnormal) Collected: 11/23/22 0207    Specimen: Urine, Clean Catch Updated: 11/23/22 0221     RBC, UA 0-2 /HPF      WBC, UA 0-2 /HPF      Bacteria, UA None Seen /HPF      Squamous Epithelial Cells, UA 0-2 /HPF      Hyaline Casts, UA None Seen /LPF      Methodology Automated Microscopy    STAT Lactic Acid, Reflex [[id number]]  (Normal) Collected: 11/23/22 0511    Specimen: Blood Updated: 11/23/22 0537     Lactate 1.4 mmol/L            Imaging:  No Radiology Exams Resulted Within Past 24 Hours    Procedures:  Procedures    Progress                            Medical Decision Making:  MDM  Number of Diagnoses or Management Options  Diarrhea, unspecified type  Nausea and vomiting, unspecified vomiting type  Diagnosis management comments: Patient is afebrile nontoxic-appearing.  Vital signs are stable.  At time of evaluation patient is walking around the room.  She appears well.  Patient states she is scheduled for surgery today shows she has been doing colon  prep.  She reports 1 episode of vomiting.  She states her vomit was darker than usual and she was concerned.  Labs were obtained and showed no significant abnormality.  Initial lactic acid 4.2.  Patient given IV fluids.  Lactic acid improved to 1.4.  Patient has no abdominal tenderness on exam.  She also reports burning sensation upper abdomen.  She was given GI cocktail with resolution of her symptoms.  Reevaluation states she feels well.  Recommend she follows up with her surgeon today as scheduled.  Discussed return precautions, discharge instructions and answered all her questions.       Amount and/or Complexity of Data Reviewed  Clinical lab tests: ordered and reviewed  Tests in the radiology section of CPT®: ordered and reviewed    Risk of Complications, Morbidity, and/or Mortality  Presenting problems: low  Management options: low    Patient Progress  Patient progress: stable       Final diagnoses:   Nausea and vomiting, unspecified vomiting type   Diarrhea, unspecified type        Disposition:  ED Disposition     ED Disposition   Discharge    Condition   Stable    Comment   --             This medical record created using voice recognition software.     Documentation assistance provided by Nahum Jameson acting as scribe for Micky He MD. Information recorded by the scribe was done at my direction and has been verified and validated by me.        Nahum Jameson  11/23/22 0322       Micky He MD  11/23/22 [id number]

## 2022-11-23 NOTE — TELEPHONE ENCOUNTER
Called [facility] [place] and spoke to Rukhsana to cx procedure. Yuko will r/s this when she returns to office. I will make  aware.

## 2022-11-26 LAB — QT INTERVAL: 371 MS

## 2022-11-28 ENCOUNTER — PREP FOR SURGERY (OUTPATIENT)
Dept: OTHER | Facility: HOSPITAL | Age: 77
End: 2022-11-28

## 2022-11-28 DIAGNOSIS — C18.9 COLON CANCER: Primary | ICD-10-CM

## 2022-11-28 RX ORDER — CEFAZOLIN SODIUM 2 G/100ML
2 INJECTION, SOLUTION INTRAVENOUS ONCE
Status: CANCELLED | OUTPATIENT
Start: 2022-11-28 | End: 2022-11-28

## 2022-11-28 RX ORDER — METRONIDAZOLE 500 MG/100ML
500 INJECTION, SOLUTION INTRAVENOUS ONCE
Status: CANCELLED | OUTPATIENT
Start: 2022-11-28 | End: 2022-11-28

## 2022-11-28 RX ORDER — ALVIMOPAN 12 MG/1
12 CAPSULE ORAL
Status: CANCELLED | OUTPATIENT
Start: 2022-11-28 | End: 2022-12-05

## 2022-11-28 RX ORDER — SODIUM CHLORIDE, SODIUM LACTATE, POTASSIUM CHLORIDE, CALCIUM CHLORIDE 600; 310; 30; 20 MG/100ML; MG/100ML; MG/100ML; MG/100ML
50 INJECTION, SOLUTION INTRAVENOUS CONTINUOUS
Status: CANCELLED | OUTPATIENT
Start: 2022-11-28

## 2022-11-28 NOTE — TELEPHONE ENCOUNTER
I offered for the pt to have sutab and she declined. She wanted to do the Golytely. I have sent in that prescription. I have r/s her surgery to 12-8-22. I did tell her you would be calling her sometime before that day.

## 2022-11-28 NOTE — TELEPHONE ENCOUNTER
Patient asked for [facility] to call her.  She might want to do the Sutab, but needs to discuss further.

## 2022-11-29 DIAGNOSIS — C18.9 MALIGNANT NEOPLASM OF COLON, UNSPECIFIED PART OF COLON: Primary | ICD-10-CM

## 2022-12-05 RX ORDER — ATORVASTATIN CALCIUM 10 MG/1
10 TABLET, FILM COATED ORAL DAILY
COMMUNITY
End: 2023-03-22

## 2022-12-08 ENCOUNTER — ANESTHESIA (OUTPATIENT)
Dept: PERIOP | Facility: HOSPITAL | Age: 77
DRG: 331 | End: 2022-12-08
Payer: MEDICARE

## 2022-12-08 ENCOUNTER — HOSPITAL ENCOUNTER (INPATIENT)
Facility: HOSPITAL | Age: 77
LOS: 6 days | Discharge: HOME | DRG: 331 | End: 2022-12-14
Attending: SURGERY | Admitting: SURGERY
Payer: MEDICARE

## 2022-12-08 ENCOUNTER — ANESTHESIA EVENT (OUTPATIENT)
Dept: PERIOP | Facility: HOSPITAL | Age: 77
DRG: 331 | End: 2022-12-08
Payer: MEDICARE

## 2022-12-08 DIAGNOSIS — C18.9 COLON CANCER: ICD-10-CM

## 2022-12-08 DIAGNOSIS — Z78.9 DECREASED ACTIVITIES OF DAILY LIVING (ADL): ICD-10-CM

## 2022-12-08 DIAGNOSIS — C18.9 MALIGNANT NEOPLASM OF COLON, UNSPECIFIED PART OF COLON: ICD-10-CM

## 2022-12-08 DIAGNOSIS — Z90.49 S/P PARTIAL COLECTOMY: ICD-10-CM

## 2022-12-08 DIAGNOSIS — R26.2 DIFFICULTY WALKING: Primary | ICD-10-CM

## 2022-12-08 LAB
ABO GROUP BLD: NORMAL
BLD GP AB SCN SERPL QL: NEGATIVE
RH BLD: NEGATIVE
T&S EXPIRATION DATE: NORMAL

## 2022-12-08 PROCEDURE — 86850 RBC ANTIBODY SCREEN: CPT | Performed by: SURGERY

## 2022-12-08 PROCEDURE — 0DTF4ZZ RESECTION OF RIGHT LARGE INTESTINE, PERCUTANEOUS ENDOSCOPIC APPROACH: ICD-10-PCS | Performed by: SURGERY

## 2022-12-08 PROCEDURE — 25010000002 PROPOFOL 10 MG/ML EMULSION: Performed by: NURSE ANESTHETIST, CERTIFIED REGISTERED

## 2022-12-08 PROCEDURE — 25010000002 MIDAZOLAM PER 1 MG: Performed by: ANESTHESIOLOGY

## 2022-12-08 PROCEDURE — 25010000002 ONDANSETRON PER 1 MG: Performed by: NURSE ANESTHETIST, CERTIFIED REGISTERED

## 2022-12-08 PROCEDURE — 25010000002 ENOXAPARIN PER 10 MG: Performed by: SURGERY

## 2022-12-08 PROCEDURE — 86901 BLOOD TYPING SEROLOGIC RH(D): CPT | Performed by: SURGERY

## 2022-12-08 PROCEDURE — 86900 BLOOD TYPING SEROLOGIC ABO: CPT | Performed by: SURGERY

## 2022-12-08 PROCEDURE — 25010000002 DEXAMETHASONE PER 1 MG: Performed by: NURSE ANESTHETIST, CERTIFIED REGISTERED

## 2022-12-08 PROCEDURE — 25010000002 FENTANYL CITRATE (PF) 50 MCG/ML SOLUTION: Performed by: NURSE ANESTHETIST, CERTIFIED REGISTERED

## 2022-12-08 PROCEDURE — 44204 LAPARO PARTIAL COLECTOMY: CPT | Performed by: SURGERY

## 2022-12-08 PROCEDURE — 8E0W4CZ ROBOTIC ASSISTED PROCEDURE OF TRUNK REGION, PERCUTANEOUS ENDOSCOPIC APPROACH: ICD-10-PCS | Performed by: SURGERY

## 2022-12-08 PROCEDURE — 88309 TISSUE EXAM BY PATHOLOGIST: CPT | Performed by: SURGERY

## 2022-12-08 PROCEDURE — 25010000002 CEFAZOLIN IN DEXTROSE 2-4 GM/100ML-% SOLUTION: Performed by: SURGERY

## 2022-12-08 DEVICE — ABSORBABLE WOUND CLOSURE DEVICE
Type: IMPLANTABLE DEVICE | Site: COLON | Status: FUNCTIONAL
Brand: V-LOC 180

## 2022-12-08 DEVICE — STAPLER 60 RELOAD BLUE
Type: IMPLANTABLE DEVICE | Site: COLON | Status: FUNCTIONAL
Brand: SUREFORM

## 2022-12-08 RX ORDER — HYDROCODONE BITARTRATE AND ACETAMINOPHEN 5; 325 MG/1; MG/1
1 TABLET ORAL EVERY 4 HOURS PRN
Status: DISCONTINUED | OUTPATIENT
Start: 2022-12-08 | End: 2022-12-14 | Stop reason: HOSPADM

## 2022-12-08 RX ORDER — PROCHLORPERAZINE EDISYLATE 5 MG/ML
5 INJECTION INTRAMUSCULAR; INTRAVENOUS EVERY 6 HOURS PRN
Status: DISCONTINUED | OUTPATIENT
Start: 2022-12-08 | End: 2022-12-14 | Stop reason: HOSPADM

## 2022-12-08 RX ORDER — MORPHINE SULFATE 2 MG/ML
2 INJECTION, SOLUTION INTRAMUSCULAR; INTRAVENOUS
Status: DISCONTINUED | OUTPATIENT
Start: 2022-12-08 | End: 2022-12-11

## 2022-12-08 RX ORDER — ACETAMINOPHEN 500 MG
500 TABLET ORAL EVERY 4 HOURS PRN
Status: DISCONTINUED | OUTPATIENT
Start: 2022-12-08 | End: 2022-12-14 | Stop reason: HOSPADM

## 2022-12-08 RX ORDER — ACETAMINOPHEN 500 MG
1000 TABLET ORAL ONCE
Status: COMPLETED | OUTPATIENT
Start: 2022-12-08 | End: 2022-12-08

## 2022-12-08 RX ORDER — METRONIDAZOLE 500 MG/100ML
500 INJECTION, SOLUTION INTRAVENOUS ONCE
Status: COMPLETED | OUTPATIENT
Start: 2022-12-08 | End: 2022-12-08

## 2022-12-08 RX ORDER — ONDANSETRON 2 MG/ML
INJECTION INTRAMUSCULAR; INTRAVENOUS AS NEEDED
Status: DISCONTINUED | OUTPATIENT
Start: 2022-12-08 | End: 2022-12-08 | Stop reason: SURG

## 2022-12-08 RX ORDER — CEFAZOLIN SODIUM 2 G/100ML
2 INJECTION, SOLUTION INTRAVENOUS ONCE
Status: COMPLETED | OUTPATIENT
Start: 2022-12-08 | End: 2022-12-08

## 2022-12-08 RX ORDER — SODIUM CHLORIDE, SODIUM LACTATE, POTASSIUM CHLORIDE, CALCIUM CHLORIDE 600; 310; 30; 20 MG/100ML; MG/100ML; MG/100ML; MG/100ML
50 INJECTION, SOLUTION INTRAVENOUS CONTINUOUS
Status: DISCONTINUED | OUTPATIENT
Start: 2022-12-08 | End: 2022-12-08

## 2022-12-08 RX ORDER — FENTANYL CITRATE 50 UG/ML
INJECTION, SOLUTION INTRAMUSCULAR; INTRAVENOUS AS NEEDED
Status: DISCONTINUED | OUTPATIENT
Start: 2022-12-08 | End: 2022-12-08 | Stop reason: SURG

## 2022-12-08 RX ORDER — PANTOPRAZOLE SODIUM 40 MG/1
40 TABLET, DELAYED RELEASE ORAL 2 TIMES DAILY PRN
Status: DISCONTINUED | OUTPATIENT
Start: 2022-12-08 | End: 2022-12-14 | Stop reason: HOSPADM

## 2022-12-08 RX ORDER — CELECOXIB 100 MG/1
200 CAPSULE ORAL ONCE
Status: COMPLETED | OUTPATIENT
Start: 2022-12-08 | End: 2022-12-08

## 2022-12-08 RX ORDER — MEPERIDINE HYDROCHLORIDE 25 MG/ML
12.5 INJECTION INTRAMUSCULAR; INTRAVENOUS; SUBCUTANEOUS
Status: DISCONTINUED | OUTPATIENT
Start: 2022-12-08 | End: 2022-12-08 | Stop reason: HOSPADM

## 2022-12-08 RX ORDER — SODIUM CHLORIDE, SODIUM LACTATE, POTASSIUM CHLORIDE, CALCIUM CHLORIDE 600; 310; 30; 20 MG/100ML; MG/100ML; MG/100ML; MG/100ML
9 INJECTION, SOLUTION INTRAVENOUS CONTINUOUS PRN
Status: DISCONTINUED | OUTPATIENT
Start: 2022-12-08 | End: 2022-12-08 | Stop reason: HOSPADM

## 2022-12-08 RX ORDER — ONDANSETRON 2 MG/ML
4 INJECTION INTRAMUSCULAR; INTRAVENOUS EVERY 6 HOURS PRN
Status: DISCONTINUED | OUTPATIENT
Start: 2022-12-08 | End: 2022-12-14 | Stop reason: HOSPADM

## 2022-12-08 RX ORDER — ENOXAPARIN SODIUM 100 MG/ML
40 INJECTION SUBCUTANEOUS EVERY 24 HOURS
Status: DISCONTINUED | OUTPATIENT
Start: 2022-12-09 | End: 2022-12-14 | Stop reason: HOSPADM

## 2022-12-08 RX ORDER — DIPHENHYDRAMINE HYDROCHLORIDE 50 MG/ML
12.5 INJECTION INTRAMUSCULAR; INTRAVENOUS EVERY 8 HOURS PRN
Status: DISCONTINUED | OUTPATIENT
Start: 2022-12-08 | End: 2022-12-14 | Stop reason: HOSPADM

## 2022-12-08 RX ORDER — ENOXAPARIN SODIUM 100 MG/ML
40 INJECTION SUBCUTANEOUS EVERY 24 HOURS
Status: DISCONTINUED | OUTPATIENT
Start: 2022-12-09 | End: 2022-12-08

## 2022-12-08 RX ORDER — ONDANSETRON 2 MG/ML
4 INJECTION INTRAMUSCULAR; INTRAVENOUS ONCE AS NEEDED
Status: DISCONTINUED | OUTPATIENT
Start: 2022-12-08 | End: 2022-12-08 | Stop reason: HOSPADM

## 2022-12-08 RX ORDER — ENOXAPARIN SODIUM 100 MG/ML
40 INJECTION SUBCUTANEOUS ONCE
Status: COMPLETED | OUTPATIENT
Start: 2022-12-08 | End: 2022-12-08

## 2022-12-08 RX ORDER — MIDAZOLAM HYDROCHLORIDE 1 MG/ML
2 INJECTION INTRAMUSCULAR; INTRAVENOUS ONCE
Status: COMPLETED | OUTPATIENT
Start: 2022-12-08 | End: 2022-12-08

## 2022-12-08 RX ORDER — KETOROLAC TROMETHAMINE 30 MG/ML
15 INJECTION, SOLUTION INTRAMUSCULAR; INTRAVENOUS EVERY 6 HOURS PRN
Status: DISCONTINUED | OUTPATIENT
Start: 2022-12-08 | End: 2022-12-11

## 2022-12-08 RX ORDER — BUPIVACAINE HYDROCHLORIDE 2.5 MG/ML
INJECTION, SOLUTION EPIDURAL; INFILTRATION; INTRACAUDAL AS NEEDED
Status: DISCONTINUED | OUTPATIENT
Start: 2022-12-08 | End: 2022-12-08 | Stop reason: HOSPADM

## 2022-12-08 RX ORDER — DIPHENHYDRAMINE HCL 25 MG
25 CAPSULE ORAL EVERY 8 HOURS PRN
Status: DISCONTINUED | OUTPATIENT
Start: 2022-12-08 | End: 2022-12-14 | Stop reason: HOSPADM

## 2022-12-08 RX ORDER — PROPOFOL 10 MG/ML
VIAL (ML) INTRAVENOUS AS NEEDED
Status: DISCONTINUED | OUTPATIENT
Start: 2022-12-08 | End: 2022-12-08 | Stop reason: SURG

## 2022-12-08 RX ORDER — MAGNESIUM HYDROXIDE 1200 MG/15ML
LIQUID ORAL AS NEEDED
Status: DISCONTINUED | OUTPATIENT
Start: 2022-12-08 | End: 2022-12-08 | Stop reason: HOSPADM

## 2022-12-08 RX ORDER — ROCURONIUM BROMIDE 10 MG/ML
INJECTION, SOLUTION INTRAVENOUS AS NEEDED
Status: DISCONTINUED | OUTPATIENT
Start: 2022-12-08 | End: 2022-12-08 | Stop reason: SURG

## 2022-12-08 RX ORDER — OXYCODONE HYDROCHLORIDE 5 MG/1
5 TABLET ORAL
Status: DISCONTINUED | OUTPATIENT
Start: 2022-12-08 | End: 2022-12-08 | Stop reason: HOSPADM

## 2022-12-08 RX ORDER — ALVIMOPAN 12 MG/1
12 CAPSULE ORAL
Status: COMPLETED | OUTPATIENT
Start: 2022-12-08 | End: 2022-12-08

## 2022-12-08 RX ORDER — DEXAMETHASONE SODIUM PHOSPHATE 4 MG/ML
INJECTION, SOLUTION INTRA-ARTICULAR; INTRALESIONAL; INTRAMUSCULAR; INTRAVENOUS; SOFT TISSUE AS NEEDED
Status: DISCONTINUED | OUTPATIENT
Start: 2022-12-08 | End: 2022-12-08 | Stop reason: SURG

## 2022-12-08 RX ORDER — SODIUM CHLORIDE, SODIUM LACTATE, POTASSIUM CHLORIDE, CALCIUM CHLORIDE 600; 310; 30; 20 MG/100ML; MG/100ML; MG/100ML; MG/100ML
100 INJECTION, SOLUTION INTRAVENOUS CONTINUOUS
Status: DISCONTINUED | OUTPATIENT
Start: 2022-12-09 | End: 2022-12-14 | Stop reason: HOSPADM

## 2022-12-08 RX ORDER — PROMETHAZINE HYDROCHLORIDE 25 MG/1
25 SUPPOSITORY RECTAL ONCE AS NEEDED
Status: DISCONTINUED | OUTPATIENT
Start: 2022-12-08 | End: 2022-12-08 | Stop reason: HOSPADM

## 2022-12-08 RX ORDER — LEVOTHYROXINE SODIUM 0.07 MG/1
75 TABLET ORAL
Status: DISCONTINUED | OUTPATIENT
Start: 2022-12-09 | End: 2022-12-14 | Stop reason: HOSPADM

## 2022-12-08 RX ORDER — PROMETHAZINE HYDROCHLORIDE 12.5 MG/1
25 TABLET ORAL ONCE AS NEEDED
Status: DISCONTINUED | OUTPATIENT
Start: 2022-12-08 | End: 2022-12-08 | Stop reason: HOSPADM

## 2022-12-08 RX ORDER — SODIUM CHLORIDE 9 MG/ML
INJECTION, SOLUTION INTRAVENOUS CONTINUOUS PRN
Status: DISCONTINUED | OUTPATIENT
Start: 2022-12-08 | End: 2022-12-08 | Stop reason: SURG

## 2022-12-08 RX ORDER — PHENYLEPHRINE HCL IN 0.9% NACL 1 MG/10 ML
SYRINGE (ML) INTRAVENOUS AS NEEDED
Status: DISCONTINUED | OUTPATIENT
Start: 2022-12-08 | End: 2022-12-08 | Stop reason: SURG

## 2022-12-08 RX ORDER — GABAPENTIN 300 MG/1
300 CAPSULE ORAL ONCE
Status: COMPLETED | OUTPATIENT
Start: 2022-12-08 | End: 2022-12-08

## 2022-12-08 RX ORDER — LIDOCAINE HYDROCHLORIDE 20 MG/ML
INJECTION, SOLUTION EPIDURAL; INFILTRATION; INTRACAUDAL; PERINEURAL AS NEEDED
Status: DISCONTINUED | OUTPATIENT
Start: 2022-12-08 | End: 2022-12-08 | Stop reason: SURG

## 2022-12-08 RX ADMIN — SODIUM CHLORIDE, POTASSIUM CHLORIDE, SODIUM LACTATE AND CALCIUM CHLORIDE 50 ML/HR: 600; 310; 30; 20 INJECTION, SOLUTION INTRAVENOUS at 15:24

## 2022-12-08 RX ADMIN — CEFAZOLIN SODIUM 2 G: 2 INJECTION, SOLUTION INTRAVENOUS at 22:54

## 2022-12-08 RX ADMIN — ALVIMOPAN 12 MG: 12 CAPSULE ORAL at 17:59

## 2022-12-08 RX ADMIN — GABAPENTIN 300 MG: 300 CAPSULE ORAL at 16:10

## 2022-12-08 RX ADMIN — SODIUM CHLORIDE, POTASSIUM CHLORIDE, SODIUM LACTATE AND CALCIUM CHLORIDE: 600; 310; 30; 20 INJECTION, SOLUTION INTRAVENOUS at 21:32

## 2022-12-08 RX ADMIN — PROPOFOL 100 MG: 10 INJECTION, EMULSION INTRAVENOUS at 18:38

## 2022-12-08 RX ADMIN — SUGAMMADEX 200 MG: 100 INJECTION, SOLUTION INTRAVENOUS at 22:40

## 2022-12-08 RX ADMIN — ROCURONIUM BROMIDE 20 MG: 10 INJECTION INTRAVENOUS at 20:59

## 2022-12-08 RX ADMIN — ROCURONIUM BROMIDE 10 MG: 10 INJECTION INTRAVENOUS at 19:19

## 2022-12-08 RX ADMIN — ROCURONIUM BROMIDE 20 MG: 10 INJECTION INTRAVENOUS at 19:47

## 2022-12-08 RX ADMIN — FENTANYL CITRATE 50 MCG: 50 INJECTION, SOLUTION INTRAMUSCULAR; INTRAVENOUS at 19:42

## 2022-12-08 RX ADMIN — METRONIDAZOLE 500 MG: 500 INJECTION, SOLUTION INTRAVENOUS at 18:33

## 2022-12-08 RX ADMIN — ONDANSETRON 4 MG: 2 INJECTION INTRAMUSCULAR; INTRAVENOUS at 22:32

## 2022-12-08 RX ADMIN — SODIUM CHLORIDE: 9 INJECTION, SOLUTION INTRAVENOUS at 18:35

## 2022-12-08 RX ADMIN — MIDAZOLAM HYDROCHLORIDE 2 MG: 1 INJECTION, SOLUTION INTRAMUSCULAR; INTRAVENOUS at 18:07

## 2022-12-08 RX ADMIN — CEFAZOLIN SODIUM 2 G: 2 INJECTION, SOLUTION INTRAVENOUS at 18:41

## 2022-12-08 RX ADMIN — FENTANYL CITRATE 50 MCG: 50 INJECTION, SOLUTION INTRAMUSCULAR; INTRAVENOUS at 21:47

## 2022-12-08 RX ADMIN — DEXAMETHASONE SODIUM PHOSPHATE 8 MG: 4 INJECTION, SOLUTION INTRA-ARTICULAR; INTRALESIONAL; INTRAMUSCULAR; INTRAVENOUS; SOFT TISSUE at 18:44

## 2022-12-08 RX ADMIN — CELECOXIB 200 MG: 100 CAPSULE ORAL at 16:10

## 2022-12-08 RX ADMIN — ENOXAPARIN SODIUM 40 MG: 100 INJECTION SUBCUTANEOUS at 17:59

## 2022-12-08 RX ADMIN — ROCURONIUM BROMIDE 20 MG: 10 INJECTION INTRAVENOUS at 21:47

## 2022-12-08 RX ADMIN — Medication 100 MCG: at 18:55

## 2022-12-08 RX ADMIN — FENTANYL CITRATE 50 MCG: 50 INJECTION, SOLUTION INTRAMUSCULAR; INTRAVENOUS at 19:07

## 2022-12-08 RX ADMIN — LIDOCAINE HYDROCHLORIDE 50 MG: 20 INJECTION, SOLUTION EPIDURAL; INFILTRATION; INTRACAUDAL; PERINEURAL at 18:38

## 2022-12-08 RX ADMIN — ROCURONIUM BROMIDE 50 MG: 10 INJECTION INTRAVENOUS at 18:39

## 2022-12-08 RX ADMIN — ACETAMINOPHEN 1000 MG: 500 TABLET ORAL at 16:10

## 2022-12-09 LAB
ANION GAP SERPL CALCULATED.3IONS-SCNC: 11.7 MMOL/L (ref 5–15)
BUN SERPL-MCNC: 9 MG/DL (ref 8–23)
BUN/CREAT SERPL: 8.6 (ref 7–25)
CALCIUM SPEC-SCNC: 8.7 MG/DL (ref 8.6–10.5)
CHLORIDE SERPL-SCNC: 106 MMOL/L (ref 98–107)
CO2 SERPL-SCNC: 21.3 MMOL/L (ref 22–29)
CREAT SERPL-MCNC: 1.05 MG/DL (ref 0.57–1)
DEPRECATED RDW RBC AUTO: 42.6 FL (ref 37–54)
EGFRCR SERPLBLD CKD-EPI 2021: 54.2 ML/MIN/1.73
ERYTHROCYTE [DISTWIDTH] IN BLOOD BY AUTOMATED COUNT: 13.3 % (ref 12.3–15.4)
GLUCOSE SERPL-MCNC: 161 MG/DL (ref 65–99)
HCT VFR BLD AUTO: 38.2 % (ref 34–46.6)
HGB BLD-MCNC: 12.5 G/DL (ref 12–15.9)
MAGNESIUM SERPL-MCNC: 1.8 MG/DL (ref 1.6–2.4)
MCH RBC QN AUTO: 28.6 PG (ref 26.6–33)
MCHC RBC AUTO-ENTMCNC: 32.7 G/DL (ref 31.5–35.7)
MCV RBC AUTO: 87.4 FL (ref 79–97)
PHOSPHATE SERPL-MCNC: 3.6 MG/DL (ref 2.5–4.5)
PLATELET # BLD AUTO: 264 10*3/MM3 (ref 140–450)
PMV BLD AUTO: 9.8 FL (ref 6–12)
POTASSIUM SERPL-SCNC: 4.1 MMOL/L (ref 3.5–5.2)
RBC # BLD AUTO: 4.37 10*6/MM3 (ref 3.77–5.28)
SODIUM SERPL-SCNC: 139 MMOL/L (ref 136–145)
WBC NRBC COR # BLD: 11.49 10*3/MM3 (ref 3.4–10.8)

## 2022-12-09 PROCEDURE — 25010000002 ENOXAPARIN PER 10 MG: Performed by: SURGERY

## 2022-12-09 PROCEDURE — 80048 BASIC METABOLIC PNL TOTAL CA: CPT | Performed by: SURGERY

## 2022-12-09 PROCEDURE — 25010000002 KETOROLAC TROMETHAMINE PER 15 MG: Performed by: SURGERY

## 2022-12-09 PROCEDURE — 97165 OT EVAL LOW COMPLEX 30 MIN: CPT

## 2022-12-09 PROCEDURE — 84100 ASSAY OF PHOSPHORUS: CPT | Performed by: SURGERY

## 2022-12-09 PROCEDURE — 83735 ASSAY OF MAGNESIUM: CPT | Performed by: SURGERY

## 2022-12-09 PROCEDURE — 25010000002 CEFOXITIN PER 1 G: Performed by: SURGERY

## 2022-12-09 PROCEDURE — 85027 COMPLETE CBC AUTOMATED: CPT | Performed by: SURGERY

## 2022-12-09 PROCEDURE — 97161 PT EVAL LOW COMPLEX 20 MIN: CPT

## 2022-12-09 RX ADMIN — CEFOXITIN 2 G: 2 INJECTION, POWDER, FOR SOLUTION INTRAVENOUS at 16:44

## 2022-12-09 RX ADMIN — SODIUM CHLORIDE, POTASSIUM CHLORIDE, SODIUM LACTATE AND CALCIUM CHLORIDE 100 ML/HR: 600; 310; 30; 20 INJECTION, SOLUTION INTRAVENOUS at 00:16

## 2022-12-09 RX ADMIN — MAGNESIUM HYDROXIDE 15 ML: 2400 SUSPENSION ORAL at 13:13

## 2022-12-09 RX ADMIN — KETOROLAC TROMETHAMINE 15 MG: 30 INJECTION, SOLUTION INTRAMUSCULAR; INTRAVENOUS at 00:21

## 2022-12-09 RX ADMIN — ENOXAPARIN SODIUM 40 MG: 40 INJECTION SUBCUTANEOUS at 20:15

## 2022-12-09 RX ADMIN — LEVOTHYROXINE SODIUM 75 MCG: 75 TABLET ORAL at 05:32

## 2022-12-09 RX ADMIN — KETOROLAC TROMETHAMINE 15 MG: 30 INJECTION, SOLUTION INTRAMUSCULAR; INTRAVENOUS at 15:36

## 2022-12-09 RX ADMIN — CEFOXITIN 2 G: 2 INJECTION, POWDER, FOR SOLUTION INTRAVENOUS at 00:17

## 2022-12-09 RX ADMIN — CEFOXITIN 2 G: 2 INJECTION, POWDER, FOR SOLUTION INTRAVENOUS at 08:21

## 2022-12-09 NOTE — THERAPY EVALUATION
Acute Care - Physical Therapy Initial Evaluation  JOSE Vargas     Patient Name: Berna Leong  : 1945  MRN: [id number]  Today's Date: 2022      Visit Dx:     ICD-10-CM ICD-9-CM   1. Difficulty walking  R26.2 719.7   2. Colon cancer (HCC)  C18.9 153.9   3. Malignant neoplasm of colon, unspecified part of colon (HCC)  C18.9 153.9     Patient Active Problem List   Diagnosis   • Adnexal cyst   • Arthritis   • Hypothyroidism   • Hx of knee surgery   • Macular degeneration   • Glaucoma   • Obesity (BMI 35.0-39.9 without comorbidity)   • History of MI (myocardial infarction)   • Stage 3a chronic kidney disease (HCC)   • History of Graves' disease   • History of coma   • Spondylosis of lumbar region without myelopathy or radiculopathy   • Cervicalgia   • Restless leg syndrome   • Osteopenia   • Bilateral carpal tunnel syndrome   • Chronic fatigue syndrome   • Hypertension   • Vitamin D deficiency   • Cellulitis of left lower extremity   • Pure hypercholesterolemia   • Trigger finger of left thumb   • Osteoarthritis of both knees   • Abnormal CT scan, colon   • Chronic RLQ pain   • Adenocarcinoma of cecum (HCC)   • Colon cancer (HCC)   • S/P partial colectomy     Past Medical History:   Diagnosis Date   • Arthritis    • Brown recluse spider bite     HX OF RIGHT FRONT LEG    • Coma due to low thyroid (HCC)    • Glaucoma    • Heart attack (HCC)     72 AND 81. DENIES CP BUT GETS SOA WITH EXERTION. STATES HAS BEEN ISSUE FOR LONG TIME. FOLLOWED BY PCP DR JOSHI. DECREASED ACTIVITY   • Hypothyroidism    • Kidney disease    • Leg pain    • Leg swelling    • Macular degeneration    • Malignant neoplasm of colon (HCC)    • Restless leg syndrome    • SOB (shortness of breath)    • Thyroid disorder    • Wound dehiscence 2022    Sustained laceration 2020 1 sutures removed by PCP with dehiscence seen at wound clinicby Dr Laina Bales 2022 and folowed there--given silver sulfadiazine creme  (Jani)     Past Surgical History:   Procedure Laterality Date   • COLON RESECTION N/A 12/8/2022    Procedure: COLON RESECTION LAPAROSCOPIC RIGHT WITH ROBOT;  Surgeon: Abebe Drew MD;  Location: [facility] MAIN OR;  Service: Robotics - DaVinci;  Laterality: N/A;   • COLONOSCOPY N/A 11/07/2022    Procedure: COLONOSCOPY, WITH bx;  Surgeon: Jimmie Zacarias MD;  Location: [facility] ENDOSCOPY;  Service: Gastroenterology;  Laterality: N/A;  CECUM MASS   • DILATATION AND CURETTAGE  2018   • KNEE SURGERY Right 2008    JOINT SURGERY     PT Assessment (last 12 hours)     PT Evaluation and Treatment     Row Name 12/09/22 1200          Physical Therapy Time and Intention    Subjective Information no complaints (P)   -AD     Document Type evaluation (P)   -AD     Mode of Treatment individual therapy;physical therapy (P)   -AD     Patient Effort good (P)   -AD     Symptoms Noted During/After Treatment none (P)   -AD     Row Name 12/09/22 1100          General Information    Patient Profile Reviewed yes (P)   -AD     Prior Level of Function independent:;all household mobility;community mobility;gait;transfer (P)   -AD     Row Name 12/09/22 1100          Living Environment    Current Living Arrangements home (P)   -AD     Home Accessibility stairs within home (P)   -AD     People in Home alone (P)   -AD     Primary Care Provided by self (P)   -AD     Row Name 12/09/22 1100          Stairs Within Home, Primary    Number of Stairs, Within Home, Primary twelve (P)   -AD     Stair Railings, Within Home, Primary railings safe and in good condition (P)   -AD     Row Name 12/09/22 1100          Home Use of Assistive/Adaptive Equipment    Equipment Currently Used at Home none (P)   -AD     Row Name 12/09/22 1100          Range of Motion (ROM)    Range of Motion ROM is WFL;bilateral lower extremities (P)   -AD     Row Name 12/09/22 1100          Strength (Manual Muscle Testing)    Strength (Manual Muscle Testing) strength is  WFL;bilateral lower extremities (P)   -AD     Row Name 12/09/22 1200          Bed Mobility    Bed Mobility sit-supine (P)   -AD     Row Name 12/09/22 1200          Transfers    Transfers sit-stand transfer;stand-sit transfer (P)   -AD     Row Name 12/09/22 1200          Sit-Stand Transfer    Sit-Stand [place] (Transfers) standby assist;verbal cues (P)   -AD     Row Name 12/09/22 1200          Stand-Sit Transfer    Stand-Sit [place] (Transfers) standby assist;verbal cues (P)   -AD     Row Name 12/09/22 1200          Gait/Stairs (Locomotion)    Gait/Stairs Locomotion gait/ambulation independence;gait/ambulation assistive device (P)   -AD     [place] Level (Gait) standby assist;verbal cues (P)   -AD     Assistive Device (Gait) walker, front-wheeled (P)   -AD     Distance in Feet (Gait) 150 (P)   -AD     Row Name 12/09/22 1200          Balance    Balance Assessment sitting dynamic balance;standing dynamic balance (P)   -AD     Dynamic Sitting Balance independent (P)   -AD     Position, Sitting Balance unsupported;sitting edge of bed (P)   -AD     Dynamic Standing Balance standby assist (P)   -AD     Position/Device Used, Standing Balance walker, front-wheeled (P)   -AD     Row Name             Wound 12/08/22 1914 medial abdomen Incision    Wound - Properties Group Placement Date: 12/08/22 -SM Placement Time: 1914 -SM Orientation: medial  -SM Location: abdomen  -SM Primary Wound Type: Incision  -SM Additional Comments: Torcar sites x5  -SM    Retired Wound - Properties Group Placement Date: 12/08/22 -SM Placement Time: 1914 -SM Orientation: medial  -SM Location: abdomen  -SM Primary Wound Type: Incision  -SM Additional Comments: Torcar sites x5  -SM    Retired Wound - Properties Group Date first assessed: 12/08/22  -SM Time first assessed: 1914 -SM Location: abdomen  -SM Primary Wound Type: Incision  -SM Additional Comments: Torcar sites x5  -SM    Row Name 12/09/22 1200          Plan of Care Review     Plan of Care Reviewed With patient (P)   -AD     Outcome Evaluation Pt presenst with ambulation and transfer deficits. Skilled physical therapy is necessary in order to address deficits and optimize pt function. Reccomend home with [facility] upon discharge. (P)   -AD     Row Name 12/09/22 1200          Therapy Assessment/Plan (PT)    Criteria for Skilled Interventions Met (PT) yes (P)   -AD     Therapy Frequency (PT) daily (P)   -AD     Problem List (PT) balance;coordination;mobility;range of motion (ROM);motor control;strength (P)   -AD     Row Name 12/09/22 1200          PT Evaluation Complexity    History, PT Evaluation Complexity 1-2 personal factors and/or comorbidities (P)   -AD     Examination of Body Systems (PT Eval Complexity) total of 4 or more elements (P)   -AD     Clinical Presentation (PT Evaluation Complexity) stable (P)   -AD     Clinical Decision Making (PT Evaluation Complexity) low complexity (P)   -AD     Overall Complexity (PT Evaluation Complexity) low complexity (P)   -AD     Row Name 12/09/22 1200          Therapy Plan Review/Discharge Plan (PT)    Therapy Plan Review (PT) evaluation/treatment results reviewed;patient (P)   -AD     Row Name 12/09/22 1200          Physical Therapy Goals    Transfer Goal Selection (PT) transfer, PT goal 1 (P)   -AD     Gait Training Goal Selection (PT) gait training, PT goal 1 (P)   -AD     Row Name 12/09/22 1200          Transfer Goal 1 (PT)    Activity/Assistive Device (Transfer Goal 1, PT) transfers, all (P)   -AD     [place] Level/Cues Needed (Transfer Goal 1, PT) independent (P)   -AD     Time Frame (Transfer Goal 1, PT) 10 days (P)   -AD     Row Name 12/09/22 1200          Gait Training Goal 1 (PT)    Activity/Assistive Device (Gait Training Goal 1, PT) gait (walking locomotion) (P)   -AD     [place] Level (Gait Training Goal 1, PT) independent (P)   -AD     Time Frame (Gait Training Goal 1, PT) 10 days (P)   -AD           User Key  (r) =  Recorded By, (t) = Taken By, (c) = Cosigned By    Initials Name Provider Type    SM Pj Jeter, RN Registered Nurse    AD Sonny Short, PT Student PT Student                Physical Therapy Education     Title: PT OT SLP Therapies (In Progress)     Topic: Physical Therapy (In Progress)     Point: Mobility training (Done)     Learning Progress Summary           Patient Acceptance, E,TB, VU by AD at 12/9/2022 1321                   Point: Home exercise program (Not Started)     Learner Progress:  Not documented in this visit.          Point: Body mechanics (Done)     Learning Progress Summary           Patient Acceptance, E,TB, VU by AD at 12/9/2022 1321                   Point: Precautions (Done)     Learning Progress Summary           Patient Acceptance, E,TB, VU by AD at 12/9/2022 1321                               User Key     Initials Effective Dates Name Provider Type Discipline    AD 10/18/22 -  Sonny Short, PT Student PT Student PT              PT Recommendation and Plan  Anticipated Discharge Disposition (PT): (P) home with home health  Planned Therapy Interventions (PT): (P) balance training, bed mobility training, gait training, manual therapy techniques, neuromuscular re-education, ROM (range of motion), strengthening, stair training, stretching, transfer training  Therapy Frequency (PT): (P) daily  Plan of Care Reviewed With: (P) patient  Outcome Evaluation: (P) Pt presenst with ambulation and transfer deficits. Skilled physical therapy is necessary in order to address deficits and optimize pt function. Reccomend home with [facility] upon discharge.   Outcome Measures     Row Name 12/09/22 1300             How much help from another person do you currently need...    Turning from your back to your side while in flat bed without using bedrails? 4 (P)   -AD      Moving from lying on back to sitting on the side of a flat bed without bedrails? 4 (P)   -AD      Moving to and from a bed to a chair  (including a wheelchair)? 4 (P)   -AD      Standing up from a chair using your arms (e.g., wheelchair, bedside chair)? 4 (P)   -AD      Climbing 3-5 steps with a railing? 3 (P)   -AD      To walk in hospital room? 3 (P)   -AD      AM-PAC 6 Clicks Score (PT) 22 (P)   -AD         Functional Assessment    Outcome Measure Options AM-PAC 6 Clicks Basic Mobility (PT) (P)   -AD            User Key  (r) = Recorded By, (t) = Taken By, (c) = Cosigned By    Initials Name Provider Type    Sonny Spears, PT Student PT Student                 Time Calculation:    PT Charges     Row Name 12/09/22 1322             Time Calculation    PT Received On 12/09/22 (P)   -AD      PT Goal Re-Cert Due Date 12/18/22 (P)   -AD         Untimed Charges    PT Eval/Re-eval Minutes 33 (P)   -AD         Total Minutes    Untimed Charges Total Minutes 33 (P)   -AD       Total Minutes 33 (P)   -AD            User Key  (r) = Recorded By, (t) = Taken By, (c) = Cosigned By    Initials Name Provider Type    Sonny Spears PT Student PT Student              Therapy Charges for Today     Code Description Service Date Service Provider Modifiers Qty    [id number] HC PT EVAL LOW COMPLEXITY 3 12/9/2022 Sonny Short PT Student GP 1          PT G-Codes  Outcome Measure Options: (P) AM-PAC 6 Clicks Basic Mobility (PT)  AM-PAC 6 Clicks Score (PT): (P) 22    Sonny Short PT Student  12/9/2022

## 2022-12-09 NOTE — PLAN OF CARE
Goal Outcome Evaluation:  Plan of Care Reviewed With: (P) patient           Outcome Evaluation: (P) Pt presenst with ambulation and transfer deficits. Skilled physical therapy is necessary in order to address deficits and optimize pt function. Reccomend home with [facility] upon discharge.

## 2022-12-09 NOTE — THERAPY EVALUATION
Patient Name: Berna Leong  : 1945    MRN: [id number]                              Today's Date: 2022       Admit Date: 2022    Visit Dx:     ICD-10-CM ICD-9-CM   1. Difficulty walking  R26.2 719.7   2. Colon cancer (HCC)  C18.9 153.9   3. Malignant neoplasm of colon, unspecified part of colon (HCC)  C18.9 153.9   4. Decreased activities of daily living (ADL)  Z78.9 V49.89     Patient Active Problem List   Diagnosis   • Adnexal cyst   • Arthritis   • Hypothyroidism   • Hx of knee surgery   • Macular degeneration   • Glaucoma   • Obesity (BMI 35.0-39.9 without comorbidity)   • History of MI (myocardial infarction)   • Stage 3a chronic kidney disease (HCC)   • History of Graves' disease   • History of coma   • Spondylosis of lumbar region without myelopathy or radiculopathy   • Cervicalgia   • Restless leg syndrome   • Osteopenia   • Bilateral carpal tunnel syndrome   • Chronic fatigue syndrome   • Hypertension   • Vitamin D deficiency   • Cellulitis of left lower extremity   • Pure hypercholesterolemia   • Trigger finger of left thumb   • Osteoarthritis of both knees   • Abnormal CT scan, colon   • Chronic RLQ pain   • Adenocarcinoma of cecum (HCC)   • Colon cancer (HCC)   • S/P partial colectomy     Past Medical History:   Diagnosis Date   • Arthritis    • Brown recluse spider bite     HX OF RIGHT FRONT LEG    • Coma due to low thyroid (HCC) 2015   • Glaucoma    • Heart attack (HCC)     72 AND 81. DENIES CP BUT GETS SOA WITH EXERTION. STATES HAS BEEN ISSUE FOR LONG TIME. FOLLOWED BY PCP DR JOSHI. DECREASED ACTIVITY   • Hypothyroidism    • Kidney disease    • Leg pain    • Leg swelling    • Macular degeneration    • Malignant neoplasm of colon (HCC)    • Restless leg syndrome    • SOB (shortness of breath)    • Thyroid disorder    • Wound dehiscence 2022    Sustained laceration 2020 1 sutures removed by PCP with dehiscence seen at wound clinicby Dr Laina Bales 2022 and  folowed there--given silver sulfadiazine creme (Silvadene)     Past Surgical History:   Procedure Laterality Date   • COLON RESECTION N/A 12/8/2022    Procedure: COLON RESECTION LAPAROSCOPIC RIGHT WITH ROBOT;  Surgeon: Abebe Drew MD;  Location: [facility] MAIN OR;  Service: Robotics - DaVinci;  Laterality: N/A;   • COLONOSCOPY N/A 11/07/2022    Procedure: COLONOSCOPY, WITH bx;  Surgeon: Jimmie Zacarias MD;  Location: [facility] ENDOSCOPY;  Service: Gastroenterology;  Laterality: N/A;  CECUM MASS   • DILATATION AND CURETTAGE  2018   • KNEE SURGERY Right 2008    JOINT SURGERY      General Information     [facility] Name 12/09/22 1434          OT Time and Intention    Document Type evaluation  -     Mode of Treatment individual therapy;occupational therapy  -[facility] Name 12/09/22 [facility]4          General Information    Patient Profile Reviewed yes  -     Prior Level of Function --  (I) with ADLs, ambulated w/o a device, has a step over tub, standard commode, stands to groom, drives, and no home O2.  -     Existing Precautions/Restrictions no known precautions/restrictions  -     Barriers to Rehab none identified  -[facility] Name 12/09/22 [facility]          Occupational Profile    Reason for Services/Referral (Occupational Profile) Patient is a 77 year old female who is currently status post right laparoscopic colon resection on December 8th, 2022. Occupational therapy consulted due to recent decline in ADLs/functional transfers. No previous occupational therapy services for current condition.  -[facility] Name 12/09/22 1434          Living Environment    People in Home alone  -[facility] Name 12/09/22 [facility]          Cognition    Orientation Status (Cognition) oriented x 4  -[facility] Name 12/09/22 [facility]          Safety Issues, Functional Mobility    Impairments Affecting Function (Mobility) balance;endurance/activity tolerance;pain  -           User Key  (r) = Recorded By, (t) = Taken By, (c) = Cosigned By    Initials  Name Provider Type     Priyanka Marie OT Occupational Therapist                 Mobility/ADL's     Row Name 12/09/22 1506          Bed Mobility    Comment, (Bed Mobility) Patient upright and seated in recliner upon therapist arrival.  -     Row Name 12/09/22 1506          Transfers    Transfers sit-stand transfer;stand-sit transfer  -[facility] Name 12/09/22 1506          Sit-Stand Transfer    Sit-Stand [place] (Transfers) standby assist;contact guard  -     Assistive Device (Sit-Stand Transfers) walker, front-wheeled  -     Row Name 12/09/22 1506          Stand-Sit Transfer    Stand-Sit [place] (Transfers) standby assist;contact guard  -     Assistive Device (Stand-Sit Transfers) walker, front-wheeled  -     Row Name 12/09/22 1506          Activities of Daily Living    BADL Assessment/Intervention bathing;upper body dressing;lower body dressing;grooming;feeding;toileting  -[facility] Name 12/09/22 1506          Bathing Assessment/Intervention    [place] Level (Bathing) bathing skills;upper body;set up;lower body;minimum assist (75% patient effort)  -[facility] Name 12/09/22 1506          Upper Body Dressing Assessment/Training    [place] Level (Upper Body Dressing) set up  -[facility] Name 12/09/22 1506          Lower Body Dressing Assessment/Training    [place] Level (Lower Body Dressing) lower body dressing skills;minimum assist (75% patient effort)  -[facility] Name 12/09/22 1506          Grooming Assessment/Training    [place] Level (Grooming) grooming skills;set up  -LF     Row Name 12/09/22 1506          Self-Feeding Assessment/Training    [place] Level (Feeding) feeding skills;set up  -LF     Row Name 12/09/22 1506          Toileting Assessment/Training    [place] Level (Toileting) toileting skills;contact guard assist;standby assist  -           User Key  (r) = Recorded By, (t) = Taken By, (c) = Cosigned By    Initials Name Provider Type      Priyanka Marie OT Occupational Therapist               Obj/Interventions     Row Name 12/09/22 1509          Sensory Assessment (Somatosensory)    Sensory Assessment (Somatosensory) UE sensation intact  -LF     Row Name 12/09/22 1509          Vision Assessment/Intervention    Visual Impairment/Limitations WFL;corrective lenses full-time  -LF     Row Name 12/09/22 1509          Range of Motion Comprehensive    General Range of Motion bilateral upper extremity ROM WFL  -LF     Row Name 12/09/22 1509          Strength Comprehensive (MMT)    Comment, General Manual Muscle Testing (MMT) Assessment 5/5 BUEs  -LF     Row Name 12/09/22 1509          Motor Skills    Motor Skills coordination;functional endurance  -LF     Coordination WFL  -LF     Functional Endurance Fair+  -LF     Row Name 12/09/22 1509          Balance    Balance Assessment sitting dynamic balance;standing dynamic balance  -LF     Dynamic Sitting Balance independent  -LF     Position, Sitting Balance unsupported;sitting in chair  -LF     Dynamic Standing Balance contact guard;standby assist  -LF     Position/Device Used, Standing Balance supported;walker, front-wheeled  -LF           User Key  (r) = Recorded By, (t) = Taken By, (c) = Cosigned By    Initials Name Provider Type    LF Priyanka Marie OT Occupational Therapist               Goals/Plan     Row Name 12/09/22 1511          Bed Mobility Goal 1 (OT)    Activity/Assistive Device (Bed Mobility Goal 1, OT) bed mobility activities, all  -LF     [place] Level/Cues Needed (Bed Mobility Goal 1, OT) modified independence  -LF     Time Frame (Bed Mobility Goal 1, OT) long term goal (LTG);10 days  -LF     Row Name 12/09/22 1511          Transfer Goal 1 (OT)    Activity/Assistive Device (Transfer Goal 1, OT) transfers, all;walker, rolling  -LF     [place] Level/Cues Needed (Transfer Goal 1, OT) modified independence  -LF     Time Frame (Transfer Goal 1, OT) long term goal (LTG);10 days  -LF      Row Name 12/09/22 1511          Bathing Goal 1 (OT)    Activity/Device (Bathing Goal 1, OT) bathing skills, all  -LF     [place] Level/Cues Needed (Bathing Goal 1, OT) modified independence  -LF     Time Frame (Bathing Goal 1, OT) long term goal (LTG);10 days  -LF     Row Name 12/09/22 1511          Dressing Goal 1 (OT)    Activity/Device (Dressing Goal 1, OT) dressing skills, all  -LF     [place]/Cues Needed (Dressing Goal 1, OT) modified independence  -LF     Time Frame (Dressing Goal 1, OT) long term goal (LTG);10 days  -LF     Row Name 12/09/22 1511          Toileting Goal 1 (OT)    Activity/Device (Toileting Goal 1, OT) toileting skills, all  -LF     [place] Level/Cues Needed (Toileting Goal 1, OT) modified independence  -LF     Time Frame (Toileting Goal 1, OT) long term goal (LTG);10 days  -LF     Row Name 12/09/22 1511          Problem Specific Goal 1 (OT)    Problem Specific Goal 1 (OT) Patient will demonstrate good endurance to support ADLs/functional transfers.  -LF     Time Frame (Problem Specific Goal 1, OT) long term goal (LTG);10 days  -LF     Row Name 12/09/22 1511          Therapy Assessment/Plan (OT)    Planned Therapy Interventions (OT) activity tolerance training;patient/caregiver education/training;BADL retraining;functional balance retraining;occupation/activity based interventions;transfer/mobility retraining  -LF           User Key  (r) = Recorded By, (t) = Taken By, (c) = Cosigned By    Initials Name Provider Type     Priyanka Marie OT Occupational Therapist               Clinical Impression     Row Name 12/09/22 1510          Pain Assessment    Additional Documentation Pain Scale: FACES Pre/Post-Treatment (Group)  -     Row Name 12/09/22 1510          Pain Scale: FACES Pre/Post-Treatment    Pain: FACES Scale, Pretreatment 2-->hurts little bit  -LF     Posttreatment Pain Rating 2-->hurts little bit  -LF     Pain Location - abdomen  -LF     Row Name 12/09/22 1510           Plan of Care Review    Plan of Care Reviewed With patient  -     Progress no change  -LF     Outcome Evaluation Patient presents with limitations in self-care, functional transfers, balance, and endurance. She would benefit from continued skilled occupational therapy services to maximize independence with ADLs/functional transfers. Return home with assist, RW, and 3 in 1 commode recommended upon discharge from hospital.  -     Row Name 12/09/22 1510          Therapy Assessment/Plan (OT)    Patient/Family Therapy Goal Statement (OT) To maximize independence.  -     Rehab Potential (OT) good, to achieve stated therapy goals  -     Criteria for Skilled Therapeutic Interventions Met (OT) yes;meets criteria;skilled treatment is necessary  -     Therapy Frequency (OT) 5 times/wk  -     Row Name 12/09/22 1510          Therapy Plan Review/Discharge Plan (OT)    Equipment Needs Upon Discharge (OT) walker, rolling;commode chair  -     Anticipated Discharge Disposition (OT) home with assist  Patient reports that she will be staying with a friend at d/c.  -     Row Name 12/09/22 1510          Vital Signs    O2 Delivery Pre Treatment room air  -LF     O2 Delivery Intra Treatment room air  -LF     O2 Delivery Post Treatment room air  -LF           User Key  (r) = Recorded By, (t) = Taken By, (c) = Cosigned By    Initials Name Provider Type     Priyanka Marie, OT Occupational Therapist               Outcome Measures     Row Name 12/09/22 1514          How much help from another is currently needed...    Putting on and taking off regular lower body clothing? 3  -LF     Bathing (including washing, rinsing, and drying) 3  -LF     Toileting (which includes using toilet bed pan or urinal) 3  -LF     Putting on and taking off regular upper body clothing 4  -LF     Taking care of personal grooming (such as brushing teeth) 4  -LF     Eating meals 4  -LF     AM-PAC 6 Clicks Score (OT) 21  -     Row Name 12/09/22  1300 12/09/22 0830       How much help from another person do you currently need...    Turning from your back to your side while in flat bed without using bedrails? 4 (P)   -AD 3  -JJ    Moving from lying on back to sitting on the side of a flat bed without bedrails? 4 (P)   -AD 3  -JJ    Moving to and from a bed to a chair (including a wheelchair)? 4 (P)   -AD 3  -JJ    Standing up from a chair using your arms (e.g., wheelchair, bedside chair)? 4 (P)   -AD 3  -JJ    Climbing 3-5 steps with a railing? 3 (P)   -AD 2  -JJ    To walk in hospital room? 3 (P)   -AD 3  -JJ    AM-PAC 6 Clicks Score (PT) 22 (P)   -AD 17  -JJ    Highest level of mobility 7 --> Walked 25 feet or more (P)   -AD 5 --> Static standing  -JJ    Row Name 12/09/22 1514 12/09/22 1300       Functional Assessment    Outcome Measure Options AM-PAC 6 Clicks Daily Activity (OT);Optimal Instrument  -LF AM-PAC 6 Clicks Basic Mobility (PT) (P)   -AD    Row Name 12/09/22 1514          Optimal Instrument    Optimal Instrument Optimal - 3  -LF     Bending/Stooping 2  -LF     Standing 2  -LF     Reaching 1  -LF     From the list, choose the 3 activities you would most like to be able to do without any difficulty Bending/stooping;Standing;Reaching  -LF     Total Score Optimal - 3 5  -LF           User Key  (r) = Recorded By, (t) = Taken By, (c) = Cosigned By    Initials Name Provider Type    Priyanka Hogan OT Occupational Therapist    Minna Cooney, RN Registered Nurse    Sonny Spears, PT Student PT Student                Occupational Therapy Education     Title: PT OT SLP Therapies (In Progress)     Topic: Occupational Therapy (Done)     Point: ADL training (Done)     Description:   Instruct learner(s) on proper safety adaptation and remediation techniques during self care or transfers.   Instruct in proper use of assistive devices.              Learning Progress Summary           Patient Acceptance, E,TB, VU by  at 12/9/2022 0910                    Point: Precautions (Done)     Description:   Instruct learner(s) on prescribed precautions during self-care and functional transfers.              Learning Progress Summary           Patient Acceptance, E,TB, VU by  at 12/9/2022 1515                   Point: Body mechanics (Done)     Description:   Instruct learner(s) on proper positioning and spine alignment during self-care, functional mobility activities and/or exercises.              Learning Progress Summary           Patient Acceptance, E,TB, VU by  at 12/9/2022 1515                               User Key     Initials Effective Dates Name Provider Type Discipline     06/16/21 -  Priyanka Marie OT Occupational Therapist OT              OT Recommendation and Plan  Planned Therapy Interventions (OT): activity tolerance training, patient/caregiver education/training, BADL retraining, functional balance retraining, occupation/activity based interventions, transfer/mobility retraining  Therapy Frequency (OT): 5 times/wk  Plan of Care Review  Plan of Care Reviewed With: patient  Progress: no change  Outcome Evaluation: Patient presents with limitations in self-care, functional transfers, balance, and endurance. She would benefit from continued skilled occupational therapy services to maximize independence with ADLs/functional transfers. Return home with assist, RW, and 3 in 1 commode recommended upon discharge from hospital.     Time Calculation:    Time Calculation- OT     Row Name 12/09/22 1516             Time Calculation- OT    OT Received On 12/09/22  -LF      OT Goal Re-Cert Due Date 12/18/22  -LF         Untimed Charges    OT Eval/Re-eval Minutes 34  -LF         Total Minutes    Untimed Charges Total Minutes 34  -LF       Total Minutes 34  -LF            User Key  (r) = Recorded By, (t) = Taken By, (c) = Cosigned By    Initials Name Provider Type     Priyanka Marie OT Occupational Therapist              Therapy Charges for Today     Code  Description Service Date Service Provider Modifiers Qty    [id number] HC OT EVAL LOW COMPLEXITY 3 12/9/2022 Priyanka Marie OT GO 1               Priyanka Marie OT  12/9/2022

## 2022-12-09 NOTE — PLAN OF CARE
Problem: Adult Inpatient Plan of Care  Goal: Plan of Care Review  Outcome: Ongoing, Progressing  Goal: Patient-Specific Goal (Individualized)  Outcome: Ongoing, Progressing  Goal: Absence of Hospital-Acquired Illness or Injury  Outcome: Ongoing, Progressing  Intervention: Identify and Manage Fall Risk  Recent Flowsheet Documentation  Taken 12/9/2022 0830 by Minna Oliveros RN  Safety Promotion/Fall Prevention: safety round/check completed  Intervention: Prevent Skin Injury  Recent Flowsheet Documentation  Taken 12/9/2022 0830 by Minna Oliveros RN  Body Position: position changed independently  Intervention: Prevent and Manage VTE (Venous Thromboembolism) Risk  Recent Flowsheet Documentation  Taken 12/9/2022 1300 by Minna Oliveros RN  Activity Management:   activity adjusted per tolerance   activity encouraged  Taken 12/9/2022 0830 by Minna Oliveros RN  Activity Management: up to bedside commode  Goal: Optimal Comfort and Wellbeing  Outcome: Ongoing, Progressing  Goal: Readiness for Transition of Care  Outcome: Ongoing, Progressing     Problem: Fall Injury Risk  Goal: Absence of Fall and Fall-Related Injury  Outcome: Ongoing, Progressing  Intervention: Promote Injury-Free Environment  Recent Flowsheet Documentation  Taken 12/9/2022 0830 by Minna Oliveros RN  Safety Promotion/Fall Prevention: safety round/check completed   Goal Outcome Evaluation:

## 2022-12-09 NOTE — PLAN OF CARE
Goal Outcome Evaluation:  Plan of Care Reviewed With: patient        Progress: improving  Outcome Evaluation: Admitted from PACU at 12a.m.  VSS, but temp low at 94ax., and  heating blanket placed.  toradol given x1 for pain.  Pt ambulated from stretcher to bed upon arrival and tolerated well.  Up to bsc during the night and voiding without difficulty.  I.S. at bedside and instructed on use.  tolerating clear liquids with no N/V.

## 2022-12-09 NOTE — OP NOTE
Operative Report    Patient Name:  Berna Leong  YOB: 1945    Date of Surgery:  12/8/2022     Pre-op Diagnosis:   Malignant neoplasm of colon, unspecified part of colon (HCC) [C18.9]    Pre-Op Diagnosis Codes:     * Malignant neoplasm of colon, unspecified part of colon (HCC) [C18.9]       Post-op Diagnosis:   Post-Op Diagnosis Codes:     * Malignant neoplasm of colon, unspecified part of colon (HCC) [C18.9]    Procedure(s):  COLON RESECTION LAPAROSCOPIC RIGHT WITH ROBOT    Staff:  Surgeon(s):  Abebe Drew MD    Assistant: Anselmo Ulloa CSA    Anesthesia: General    Estimated Blood Loss: 50 mL    Complications:  None    Drains:  None    Packing:  None    Implants:    Implant Name Type Inv. Item Serial No.  Lot No. LRB No. Used Action   RELOAD STPLR SUREFORM 60 DAVINCI/X/XI 6ROW 3.5 FRED 1P/U - [id number] Implant RELOAD STPLR SUREFORM 60 DAVINCI/X/XI 6ROW 3.5 FRED 1P/U  INTUITIVE SURGICAL [id number] N/A 2 Implanted   RELOAD STPLR SUREFORM 60 DAVINCI/X/XI 6ROW 3.5 FRED 1P/U - [id number] Implant RELOAD STPLR SUREFORM 60 DAVINCI/X/XI 6ROW 3.5 FRED 1P/U  INTUITIVE SURGICAL [id number] N/A 1 Implanted   DEV CLS WND VLOC/180 YOMAIRA ABS 1/2CIR SZ3/0 17MM 15CM GRN - [id number] Implant DEV CLS WND VLOC/180 YOMAIRA ABS 1/2CIR SZ3/0 17MM 15CM GRN  COVIDIEN [id number] N/A 2 Implanted       Specimen:          Specimens     ID Source Type Tests Collected By Collected At Frozen?    A Large Intestine, Right / Ascending Colon Tissue · TISSUE PATHOLOGY EXAM   Abebe Drew MD 12/8/22 2229     Description: right colon        Indications: 77-year-old female recently diagnosed with colon cancer after she had a colonoscopy.  Cancer is located at the cecum.     Findings:   Large cecal mass.  Appendix present and normal appearing.  Right colon excised with the distal margin at the proximal transverse colon.  No liver melanie or any findings to suggest liver metastasis.  Side-to-side isoperistaltic anastomosis  created.    Description of Procedure: Patient was taken to the operating room and placed supine on the operative table.  Timeout was performed.  General anesthesia was administered.  A maguire catheter was placed.  The patient was prepped and draped in the usual fashion.  Using my standard technique with a Veress needle to establish pneumoperitoneum and my standard three 8 mm robotic cannulas, one 12 mm robotic cannula, and one 8 mm accessory port, for robotic ports and the accessory port were placed in an oblique line beginning at the pubic symphysis and extending to the left upper quadrant.  Patient positioned headdown rotated toward the left side.  The small bowel was mobilized to the left side of the abdomen.  The robotic arms were docked.  The robotic instruments were inserted.    I then examined the abdomen.  There were no liver masses or any evidence of liver metastasis.  There was a large mass at the cecum.  I reflected the greater omentum and transverse colon superiorly.  I then created a retroperitoneal plane through the mesentery at the base of the ileocolic pedicle.  The retroperitoneal plane was developed inferiorly and then laterally and then superiorly.  The duodenum was identified.  The ileocolic pedicle and the right colic vessels were divided using LigaSure.  The right branch of the middle colic was divided.  Care was taken to keep the duodenum visualized and protected from injury.  Head of the pancreas was visualized as well and protected from injury.  I then divided the greater omentum and an area of the transverse colon I chose for division.  I then used LigaSure to divide the gastro colic attachments and then worked superiorly and laterally toward the hepatic flexure releasing the hepatic flexure of the colon.  I then released the lateral attachments of the ascending colon and cecum.  The right colon was now completely mobilized.  I used ICG green to confirm that the areas I chose for division of  the terminal ileum and the proximal transverse colon were well perfused.  A blue load of the robotic stapler was then used to divide the proximal transverse colon and the distal ileum.  The specimen was then placed over the liver.  The stapled end of the small bowel was then placed in a side-to-side orientation with the stapled end of the colon and a side-to-side stapled isoperistaltic anastomosis was created and the common enterotomy was closed in 2 layers with running absorbable V-Loc suture.  ICG green was used again to examine the anastomosis and it was noted to be well perfused.  Sponge needle and instrument counts were verified as correct.  There was less than 30 mL of blood loss during the procedure.  The specimen was then placed in an Endo Catch bag.  The robotic arms were undocked and the robotic instruments were removed.  Incision where the 12 mm robotic cannula was located was extended to an in length adequate enough to extract the specimen inside of the Endo Catch bag.  Fascial opening was closed with running #1 PDS suture.  Sponge needle and instrument counts were verified as correct again.  Skin incisions were closed appropriately with buried absorbable suture.  Dressings were placed.  Díaz catheter was removed.  The patient was awakened anesthesia and transported to the recovery area in stable condition.  There were no complications.d was transported to the recovery area in stable condition.     Assistant: Anselmo Ulloa CSA  was responsible for performing the following activities: closing, placing dressing and assisting with docking robot and exchanging robotic instruments and adjusting robotic arms as needed during the procedure, and his skilled assistance was necessary for the success of this case.      Abebe Drew MD     Date: 12/8/2022  Time: 23:10 EST    Electronically signed by Abebe Drew MD, 12/08/22, 11:10 PM EST.

## 2022-12-09 NOTE — PLAN OF CARE
Goal Outcome Evaluation:  Plan of Care Reviewed With: patient        Progress: no change  Outcome Evaluation: Patient presents with limitations in self-care, functional transfers, balance, and endurance. She would benefit from continued skilled occupational therapy services to maximize independence with ADLs/functional transfers. Return home with assist, RW, and 3 in 1 commode recommended upon discharge from hospital.

## 2022-12-09 NOTE — PROGRESS NOTES
[facility]     Surgery Progress Note    Patient Name: Berna Leong  :    1945  MRN:    [id number]  Date of admission:  2022  Length of Stay: 1 days    Subjective   No complaints.  No flatus yet.  Feels ok this morning.    Objective       Current Diet:    Dietary Orders (From admission, onward)     Start     Ordered    22 2333  Diet: Liquid Diets; Clear Liquid; Texture: Regular Texture (IDDSI 7); Fluid Consistency: Thin (IDDSI 0)  Diet Effective Now        References:    Diet Order Crosswalk   Question Answer Comment   Diets: Liquid Diets    Liquid Diet: Clear Liquid    Texture: Regular Texture (IDDSI 7)    Fluid Consistency: Thin (IDDSI 0)        22              Vitals:   Temp:  [94 °F (34.4 °C)-99.1 °F (37.3 °C)] 99.1 °F (37.3 °C)  Heart Rate:  [72-98] 98  Resp:  [12-20] 16  BP: (122-153)/(45-84) 122/45  Flow (L/min):  [2-3] 2  Physical Exam   • Constitutional: alert, no acute distress  • Respiratory:  breathing not labored, respiratory effort appears normal  • Cardiovascular:  heart regular rate and rhythm  • Abdomen:  soft, nondistended,  approp tenderness  • Musculoskeletal: moving all extremities symmetrically and purposefully  • Neurologic:  no obvious motor or sensory deficits, alert & oriented x 3, speech clear    LABS:  CBC    CBC 22   WBC 7.18 11.80 (A) 11.49 (A)   RBC 4.74 4.55 4.37   Hemoglobin 13.4 12.8 12.5   Hematocrit 42.3 39.1 38.2   MCV 89.2 85.9 87.4   MCH 28.3 28.1 28.6   MCHC 31.7 32.7 32.7   RDW 13.3 13.2 13.3   Platelets 334 295 264   (A) Abnormal value            CMP    CMP 22   Glucose 111 (A) 130 (A) 161 (A)   BUN 11 23 9   Creatinine 1.25 (A) 1.06 (A) 1.05 (A)   Sodium 140 130 (A) 139   Potassium 4.1 4.0 4.1   Chloride 102 90 (A) 106   Calcium 9.8 9.6 8.7   Albumin 4.40 4.10    Total Bilirubin 0.6 0.6    Alkaline Phosphatase 100 101    AST (SGOT) 23 26    ALT (SGPT) 19 15    (A) Abnormal value              Magnesium [[id number]]  (Normal) Collected: 12/09/22 0420    Specimen: Blood Updated: 12/09/22 0538     Magnesium 1.8 mg/dL     Phosphorus [[id number]]  (Normal) Collected: 12/09/22 0420    Specimen: Blood Updated: 12/09/22 0538     Phosphorus 3.6 mg/dL           IMAGING:  Imaging Results (Last 24 Hours)     ** No results found for the last 24 hours. **          Assessment / Plan   Assessment:   Colon cancer  s/p Robotic right colectomy 12/8/22  Satisfactory postop progress thus far    Plan:    Clear liq diet  Ambulate as much as able  Monitor progress     Electronically signed by Abebe Drew MD, 12/09/22, 12:10 PM EST.

## 2022-12-10 PROCEDURE — 25010000002 KETOROLAC TROMETHAMINE PER 15 MG: Performed by: SURGERY

## 2022-12-10 RX ADMIN — HYDROCODONE BITARTRATE AND ACETAMINOPHEN 1 TABLET: 5; 325 TABLET ORAL at 20:04

## 2022-12-10 RX ADMIN — HYDROCODONE BITARTRATE AND ACETAMINOPHEN 1 TABLET: 5; 325 TABLET ORAL at 10:45

## 2022-12-10 RX ADMIN — KETOROLAC TROMETHAMINE 15 MG: 30 INJECTION, SOLUTION INTRAMUSCULAR; INTRAVENOUS at 02:29

## 2022-12-10 RX ADMIN — LEVOTHYROXINE SODIUM 75 MCG: 75 TABLET ORAL at 06:06

## 2022-12-10 RX ADMIN — HYDROCODONE BITARTRATE AND ACETAMINOPHEN 1 TABLET: 5; 325 TABLET ORAL at 16:05

## 2022-12-10 RX ADMIN — KETOROLAC TROMETHAMINE 15 MG: 30 INJECTION, SOLUTION INTRAMUSCULAR; INTRAVENOUS at 23:13

## 2022-12-10 NOTE — PLAN OF CARE
Problem: Adult Inpatient Plan of Care  Goal: Plan of Care Review  Outcome: Ongoing, Progressing  Goal: Patient-Specific Goal (Individualized)  Outcome: Ongoing, Progressing  Goal: Absence of Hospital-Acquired Illness or Injury  Outcome: Ongoing, Progressing  Goal: Optimal Comfort and Wellbeing  Outcome: Ongoing, Progressing  Goal: Readiness for Transition of Care  Outcome: Ongoing, Progressing     Problem: Fall Injury Risk  Goal: Absence of Fall and Fall-Related Injury  Outcome: Ongoing, Progressing   Goal Outcome Evaluation:

## 2022-12-10 NOTE — PROGRESS NOTES
[facility]     Surgery Progress Note    Patient Name: Berna Leong  :    1945  MRN:    [id number]  Date of admission:  2022  Length of Stay: 2 days    Subjective   Have some incisional pain but not a lot.  No n/v.  Had several liquid BMs.  No N/V    Objective       Current Diet:    Dietary Orders (From admission, onward)     Start     Ordered    22  Diet: Liquid Diets; Clear Liquid; Texture: Regular Texture (IDDSI 7); Fluid Consistency: Thin (IDDSI 0)  Diet Effective Now        References:    Diet Order Crosswalk   Question Answer Comment   Diets: Liquid Diets    Liquid Diet: Clear Liquid    Texture: Regular Texture (IDDSI 7)    Fluid Consistency: Thin (IDDSI 0)        22                Vitals:   Temp:  [97.7 °F (36.5 °C)-99.1 °F (37.3 °C)] 98.2 °F (36.8 °C)  Heart Rate:  [75-98] 83  Resp:  [16-18] 16  BP: (120-137)/(41-86) 121/86  Physical Exam   • Constitutional: alert, no acute distress  • Respiratory:  breathing not labored, respiratory effort appears normal  • Cardiovascular:  heart regular rate and rhythm  • Abdomen:  soft, nondistended, approp tenderness, incisions look ok  • Musculoskeletal: moving all extremities symmetrically and purposefully  • Neurologic:  no obvious motor or sensory deficits, speech clear    LABS:  CBC    CBC 22   WBC 7.18 11.80 (A) 11.49 (A)   RBC 4.74 4.55 4.37   Hemoglobin 13.4 12.8 12.5   Hematocrit 42.3 39.1 38.2   MCV 89.2 85.9 87.4   MCH 28.3 28.1 28.6   MCHC 31.7 32.7 32.7   RDW 13.3 13.2 13.3   Platelets 334 295 264   (A) Abnormal value            CMP    CMP 22   Glucose 111 (A) 130 (A) 161 (A)   BUN 11 23 9   Creatinine 1.25 (A) 1.06 (A) 1.05 (A)   Sodium 140 130 (A) 139   Potassium 4.1 4.0 4.1   Chloride 102 90 (A) 106   Calcium 9.8 9.6 8.7   Albumin 4.40 4.10    Total Bilirubin 0.6 0.6    Alkaline Phosphatase 100 101    AST (SGOT) 23 26    ALT (SGPT) 19 15    (A) Abnormal value               Lab Results (last 24 hours)     ** No results found for the last 24 hours. **          IMAGING:  Imaging Results (Last 24 Hours)     ** No results found for the last 24 hours. **            Assessment / Plan   Assessment:   Colon cancer  s/p Robotic right colectomy 12/8/22  Satisfactory postop progress     Plan:    Full liq diet  Continue to ambulate as much as able  Monitor progress     Electronically signed by Abebe Drew MD, 12/10/22, 9:57 AM EST.

## 2022-12-11 LAB
ANION GAP SERPL CALCULATED.3IONS-SCNC: 5.4 MMOL/L (ref 5–15)
BUN SERPL-MCNC: 7 MG/DL (ref 8–23)
BUN/CREAT SERPL: 6.2 (ref 7–25)
CALCIUM SPEC-SCNC: 8.5 MG/DL (ref 8.6–10.5)
CHLORIDE SERPL-SCNC: 108 MMOL/L (ref 98–107)
CO2 SERPL-SCNC: 26.6 MMOL/L (ref 22–29)
CREAT SERPL-MCNC: 1.13 MG/DL (ref 0.57–1)
DEPRECATED RDW RBC AUTO: 46.4 FL (ref 37–54)
EGFRCR SERPLBLD CKD-EPI 2021: 49.6 ML/MIN/1.73
ERYTHROCYTE [DISTWIDTH] IN BLOOD BY AUTOMATED COUNT: 14.1 % (ref 12.3–15.4)
GLUCOSE SERPL-MCNC: 106 MG/DL (ref 65–99)
HCT VFR BLD AUTO: 30.8 % (ref 34–46.6)
HGB BLD-MCNC: 9.8 G/DL (ref 12–15.9)
MAGNESIUM SERPL-MCNC: 1.9 MG/DL (ref 1.6–2.4)
MCH RBC QN AUTO: 28.7 PG (ref 26.6–33)
MCHC RBC AUTO-ENTMCNC: 31.8 G/DL (ref 31.5–35.7)
MCV RBC AUTO: 90.1 FL (ref 79–97)
PHOSPHATE SERPL-MCNC: 2.1 MG/DL (ref 2.5–4.5)
PLATELET # BLD AUTO: 203 10*3/MM3 (ref 140–450)
PMV BLD AUTO: 10.1 FL (ref 6–12)
POTASSIUM SERPL-SCNC: 5 MMOL/L (ref 3.5–5.2)
RBC # BLD AUTO: 3.42 10*6/MM3 (ref 3.77–5.28)
SODIUM SERPL-SCNC: 140 MMOL/L (ref 136–145)
WBC NRBC COR # BLD: 12.59 10*3/MM3 (ref 3.4–10.8)

## 2022-12-11 PROCEDURE — 80048 BASIC METABOLIC PNL TOTAL CA: CPT | Performed by: SURGERY

## 2022-12-11 PROCEDURE — 25010000002 ENOXAPARIN PER 10 MG: Performed by: SURGERY

## 2022-12-11 PROCEDURE — 97116 GAIT TRAINING THERAPY: CPT

## 2022-12-11 PROCEDURE — 83735 ASSAY OF MAGNESIUM: CPT | Performed by: SURGERY

## 2022-12-11 PROCEDURE — 85027 COMPLETE CBC AUTOMATED: CPT | Performed by: SURGERY

## 2022-12-11 PROCEDURE — 84100 ASSAY OF PHOSPHORUS: CPT | Performed by: SURGERY

## 2022-12-11 RX ORDER — MORPHINE SULFATE 2 MG/ML
1 INJECTION, SOLUTION INTRAMUSCULAR; INTRAVENOUS
Status: DISCONTINUED | OUTPATIENT
Start: 2022-12-11 | End: 2022-12-14 | Stop reason: HOSPADM

## 2022-12-11 RX ORDER — MULTIPLE VITAMINS W/ MINERALS TAB 9MG-400MCG
1 TAB ORAL DAILY
Status: DISCONTINUED | OUTPATIENT
Start: 2022-12-11 | End: 2022-12-14 | Stop reason: HOSPADM

## 2022-12-11 RX ADMIN — HYDROCODONE BITARTRATE AND ACETAMINOPHEN 1 TABLET: 5; 325 TABLET ORAL at 08:16

## 2022-12-11 RX ADMIN — SODIUM CHLORIDE, POTASSIUM CHLORIDE, SODIUM LACTATE AND CALCIUM CHLORIDE 100 ML/HR: 600; 310; 30; 20 INJECTION, SOLUTION INTRAVENOUS at 15:21

## 2022-12-11 RX ADMIN — Medication 1 TABLET: at 15:57

## 2022-12-11 RX ADMIN — LEVOTHYROXINE SODIUM 75 MCG: 75 TABLET ORAL at 05:30

## 2022-12-11 RX ADMIN — HYDROCODONE BITARTRATE AND ACETAMINOPHEN 1 TABLET: 5; 325 TABLET ORAL at 15:57

## 2022-12-11 RX ADMIN — ENOXAPARIN SODIUM 40 MG: 40 INJECTION SUBCUTANEOUS at 19:00

## 2022-12-11 RX ADMIN — SODIUM CHLORIDE, POTASSIUM CHLORIDE, SODIUM LACTATE AND CALCIUM CHLORIDE 100 ML/HR: 600; 310; 30; 20 INJECTION, SOLUTION INTRAVENOUS at 03:31

## 2022-12-11 NOTE — THERAPY TREATMENT NOTE
Acute Care - Physical Therapy Treatment Note  JOSE Vargas     Patient Name: Berna Leong  : 1945  MRN: [id number]  Today's Date: 2022      Visit Dx:     ICD-10-CM ICD-9-CM   1. Difficulty walking  R26.2 719.7   2. Colon cancer (HCC)  C18.9 153.9   3. Malignant neoplasm of colon, unspecified part of colon (HCC)  C18.9 153.9   4. Decreased activities of daily living (ADL)  Z78.9 V49.89     Patient Active Problem List   Diagnosis   • Adnexal cyst   • Arthritis   • Hypothyroidism   • Hx of knee surgery   • Macular degeneration   • Glaucoma   • Obesity (BMI 35.0-39.9 without comorbidity)   • History of MI (myocardial infarction)   • Stage 3a chronic kidney disease (HCC)   • History of Graves' disease   • History of coma   • Spondylosis of lumbar region without myelopathy or radiculopathy   • Cervicalgia   • Restless leg syndrome   • Osteopenia   • Bilateral carpal tunnel syndrome   • Chronic fatigue syndrome   • Hypertension   • Vitamin D deficiency   • Cellulitis of left lower extremity   • Pure hypercholesterolemia   • Trigger finger of left thumb   • Osteoarthritis of both knees   • Abnormal CT scan, colon   • Chronic RLQ pain   • Adenocarcinoma of cecum (HCC)   • Colon cancer (HCC)   • S/P partial colectomy     Past Medical History:   Diagnosis Date   • Arthritis    • Brown recluse spider bite     HX OF RIGHT FRONT LEG    • Coma due to low thyroid (HCC) 2015   • Glaucoma    • Heart attack (HCC)     72 AND 81. DENIES CP BUT GETS SOA WITH EXERTION. STATES HAS BEEN ISSUE FOR LONG TIME. FOLLOWED BY PCP DR JOSHI. DECREASED ACTIVITY   • Hypothyroidism    • Kidney disease    • Leg pain    • Leg swelling    • Macular degeneration    • Malignant neoplasm of colon (HCC)    • Restless leg syndrome    • SOB (shortness of breath)    • Thyroid disorder    • Wound dehiscence 2022    Sustained laceration 2020 1 sutures removed by PCP with dehiscence seen at wound clinicby Dr Laina Bales 2022  and folowed there--given silver sulfadiazine creme (Silvadene)     Past Surgical History:   Procedure Laterality Date   • COLON RESECTION N/A 12/8/2022    Procedure: COLON RESECTION LAPAROSCOPIC RIGHT WITH ROBOT;  Surgeon: Abebe Drew MD;  Location: [facility] MAIN OR;  Service: Robotics - DaVinci;  Laterality: N/A;   • COLONOSCOPY N/A 11/07/2022    Procedure: COLONOSCOPY, WITH bx;  Surgeon: Jimmie Zacarias MD;  Location: [facility] ENDOSCOPY;  Service: Gastroenterology;  Laterality: N/A;  CECUM MASS   • DILATATION AND CURETTAGE  2018   • KNEE SURGERY Right 2008    JOINT SURGERY     PT Assessment (last 12 hours)     PT Evaluation and Treatment     Row Name 12/11/22 1351          Physical Therapy Time and Intention    Subjective Information complains of;weakness;fatigue;pain  -DK     Document Type therapy note (daily note)  -DK     Mode of Treatment individual therapy;physical therapy  -DK     Patient Effort good  -DK     Symptoms Noted During/After Treatment fatigue;increased pain  -DK     Row Name 12/11/22 1351          Pain    Pretreatment Pain Rating 5/10  -DK     Posttreatment Pain Rating 5/10  -DK     Pain Location generalized  -DK     Pain Location - back  -DK     Pain Intervention(s) Repositioned;Ambulation/increased activity;Distraction;Therapeutic presence  -DK     Row Name 12/11/22 1351          Cognition    Affect/Mental Status (Cognition) WNL  -DK     Orientation Status (Cognition) oriented x 4  -DK     Follows Commands (Cognition) WNL  -DK     Cognitive Function WNL  -DK     Personal Safety Interventions gait belt;nonskid shoes/slippers when out of bed;supervised activity  -DK     Row Name 12/11/22 1351          Bed Mobility    Bed Mobility supine-sit-supine  -DK     Sit-Supine [place] (Bed Mobility) supervision  -DK     Supine-Sit-Supine [place] (Bed Mobility) supervision  -DK     Assistive Device (Bed Mobility) bed rails  -DK     Row Name 12/11/22 1351          Transfers    Transfers  sit-stand transfer;stand-sit transfer  -DK     Row Name 12/11/22 1351          Sit-Stand Transfer    Sit-Stand [place] (Transfers) standby assist  -DK     Assistive Device (Sit-Stand Transfers) walker, front-wheeled  -DK     Row Name 12/11/22 1351          Stand-Sit Transfer    Stand-Sit [place] (Transfers) standby assist  -DK     Assistive Device (Stand-Sit Transfers) walker, front-wheeled  -DK     Row Name 12/11/22 1351          Gait/Stairs (Locomotion)    Gait/Stairs Locomotion gait/ambulation independence;gait/ambulation assistive device;distance ambulated;gait pattern  -DK     [place] Level (Gait) standby assist  -DK     Assistive Device (Gait) walker, front-wheeled  -DK     Distance in Feet (Gait) 300  -DK     Pattern (Gait) step-through  -DK     Comment, (Gait/Stairs) Pt ambulated on room air with a rolling walker. She returned to bed post treatment.  -DK     Row Name 12/11/22 1351          Safety Issues, Functional Mobility    Impairments Affecting Function (Mobility) balance;endurance/activity tolerance;strength;pain  -DK     Row Name 12/11/22 1351          Balance    Balance Assessment sitting static balance;sitting dynamic balance;standing static balance;standing dynamic balance  -DK     Static Sitting Balance independent  -DK     Dynamic Sitting Balance independent  -DK     Position, Sitting Balance unsupported;sitting edge of bed  -DK     Static Standing Balance standby assist  -DK     Dynamic Standing Balance standby assist  -DK     Position/Device Used, Standing Balance walker, front-wheeled  -DK     Balance Interventions standing;dynamic;tandem gait  -DK     Row Name             Wound 12/08/22 1914 medial abdomen Incision    Wound - Properties Group Placement Date: 12/08/22 -SM Placement Time: 1914 -SM Orientation: medial  -SM Location: abdomen  -SM Primary Wound Type: Incision  -SM Additional Comments: Torcar sites x5  -SM    Retired Wound - Properties Group Placement Date: 12/08/22   -SM Placement Time: 1914 -SM Orientation: medial  -SM Location: abdomen  -SM Primary Wound Type: Incision  -SM Additional Comments: Torcar sites x5  -SM    Retired Wound - Properties Group Date first assessed: 12/08/22  - Time first assessed: 1914 -SM Location: abdomen  -SM Primary Wound Type: Incision  -SM Additional Comments: Torcar sites x5  -SM    Row Name 12/11/22 [facility]          Plan of Care Review    Plan of Care Reviewed With patient  -DK     Progress improving  -DK     Row Name 12/11/22 1351          Positioning and Restraints    Pre-Treatment Position in bed  -DK     Post Treatment Position bed  -DK     In Bed supine;call light within reach;encouraged to call for assist;side rails up x2  -DK     Row Name 12/11/22 1351          Therapy Assessment/Plan (PT)    Rehab Potential (PT) good, to achieve stated therapy goals  -DK     Criteria for Skilled Interventions Met (PT) skilled treatment is necessary  -DK     Therapy Frequency (PT) daily  -DK     Problem List (PT) problems related to;balance;mobility;strength;pain  -DK     Activity Limitations Related to Problem List (PT) unable to ambulate safely  -DK     Row Name 12/11/22 [facility]          Progress Summary (PT)    Progress Toward Functional Goals (PT) progress toward functional goals is good  -DK           User Key  (r) = Recorded By, (t) = Taken By, (c) = Cosigned By    Initials Name Provider Type    Anahi Hancock PTA Physical Therapist Assistant    Pj Reynolds, RN Registered Nurse                Physical Therapy Education     Title: PT OT SLP Therapies (In Progress)     Topic: Physical Therapy (In Progress)     Point: Mobility training (Done)     Learning Progress Summary           Patient Acceptance, E,TB, VU by AS at 12/10/2022 0235    Acceptance, E,TB, VU by AD at 12/9/2022 1321                   Point: Home exercise program (Not Started)     Learner Progress:  Not documented in this visit.          Point: Body mechanics (Done)     Learning  Progress Summary           Patient Acceptance, E,TB, VU by AS at 12/10/2022 0235    Acceptance, E,TB, VU by AD at 12/9/2022 1321                   Point: Precautions (Done)     Learning Progress Summary           Patient Acceptance, E,TB, VU by AD at 12/9/2022 1321                               User Key     Initials Effective Dates Name Provider Type Discipline    AS 11/07/22 -  Yulissa Hooper, RN Registered Nurse Nurse    AD 10/18/22 -  Sonny Short, ANASTASIIA Student PT Student PT              PT Recommendation and Plan  Planned Therapy Interventions (PT): balance training, bed mobility training, gait training, strengthening, transfer training  Therapy Frequency (PT): daily  Progress Summary (PT)  Progress Toward Functional Goals (PT): progress toward functional goals is good  Plan of Care Reviewed With: patient  Progress: improving   Outcome Measures     Row Name 12/11/22 1351 12/09/22 1300          How much help from another person do you currently need...    Turning from your back to your side while in flat bed without using bedrails? 4  -DK 4  -DP (r) AD (t) DP (c)     Moving from lying on back to sitting on the side of a flat bed without bedrails? 4  -DK 4  -DP (r) AD (t) DP (c)     Moving to and from a bed to a chair (including a wheelchair)? 4  -DK 4  -DP (r) AD (t) DP (c)     Standing up from a chair using your arms (e.g., wheelchair, bedside chair)? 4  -DK 4  -DP (r) AD (t) DP (c)     Climbing 3-5 steps with a railing? 3  -DK 3  -DP (r) AD (t) DP (c)     To walk in hospital room? 3  -DK 3  -DP (r) AD (t) DP (c)     AM-PAC 6 Clicks Score (PT) 22  -DK 22  -DP (r) AD (t)        Functional Assessment    Outcome Measure Options AM-PAC 6 Clicks Basic Mobility (PT)  -DK AM-PAC 6 Clicks Basic Mobility (PT)  -DP (r) AD (t) DP (c)           User Key  (r) = Recorded By, (t) = Taken By, (c) = Cosigned By    Initials Name Provider Type    Anahi Hancock, PTA Physical Therapist Assistant    Krys Ferguson, PT Physical  Therapist    Sonny Spears, PT Student PT Student                 Time Calculation:    PT Charges     Row Name 12/11/22 1355             Time Calculation    PT Received On 12/11/22  -DK      PT Goal Re-Cert Due Date 12/18/22  -DK         Timed Charges    71816 - Gait Training Minutes  10  -DK      45273 - PT Therapeutic Activity Minutes 5  -DK         Total Minutes    Timed Charges Total Minutes 15  -DK       Total Minutes 15  -DK            User Key  (r) = Recorded By, (t) = Taken By, (c) = Cosigned By    Initials Name Provider Type    Anahi Hancock PTA Physical Therapist Assistant              Therapy Charges for Today     Code Description Service Date Service Provider Modifiers Qty    06336762703 HC GAIT TRAINING EA 15 MIN 12/11/2022 Anahi Blake PTA GP 1          PT G-Codes  Outcome Measure Options: AM-PAC 6 Clicks Basic Mobility (PT)  AM-PAC 6 Clicks Score (PT): 22  AM-PAC 6 Clicks Score (OT): 21    Anahi Blake PTA  12/11/2022

## 2022-12-12 LAB
ANION GAP SERPL CALCULATED.3IONS-SCNC: 3.1 MMOL/L (ref 5–15)
BILIRUB UR QL STRIP: NEGATIVE
BUN SERPL-MCNC: 10 MG/DL (ref 8–23)
BUN/CREAT SERPL: 8.9 (ref 7–25)
CALCIUM SPEC-SCNC: 8.7 MG/DL (ref 8.6–10.5)
CHLORIDE SERPL-SCNC: 108 MMOL/L (ref 98–107)
CLARITY UR: CLEAR
CO2 SERPL-SCNC: 27.9 MMOL/L (ref 22–29)
COLOR UR: YELLOW
CREAT SERPL-MCNC: 1.12 MG/DL (ref 0.57–1)
DEPRECATED RDW RBC AUTO: 45.2 FL (ref 37–54)
EGFRCR SERPLBLD CKD-EPI 2021: 50.1 ML/MIN/1.73
ERYTHROCYTE [DISTWIDTH] IN BLOOD BY AUTOMATED COUNT: 13.7 % (ref 12.3–15.4)
GLUCOSE SERPL-MCNC: 95 MG/DL (ref 65–99)
GLUCOSE UR STRIP-MCNC: NEGATIVE MG/DL
HCT VFR BLD AUTO: 31.1 % (ref 34–46.6)
HGB BLD-MCNC: 9.7 G/DL (ref 12–15.9)
HGB UR QL STRIP.AUTO: NEGATIVE
KETONES UR QL STRIP: NEGATIVE
LEUKOCYTE ESTERASE UR QL STRIP.AUTO: NEGATIVE
MAGNESIUM SERPL-MCNC: 2 MG/DL (ref 1.6–2.4)
MCH RBC QN AUTO: 28.1 PG (ref 26.6–33)
MCHC RBC AUTO-ENTMCNC: 31.2 G/DL (ref 31.5–35.7)
MCV RBC AUTO: 90.1 FL (ref 79–97)
NITRITE UR QL STRIP: NEGATIVE
PH UR STRIP.AUTO: 7 [PH] (ref 5–8)
PHOSPHATE SERPL-MCNC: 2.4 MG/DL (ref 2.5–4.5)
PLATELET # BLD AUTO: 198 10*3/MM3 (ref 140–450)
PMV BLD AUTO: 10.4 FL (ref 6–12)
POTASSIUM SERPL-SCNC: 5.7 MMOL/L (ref 3.5–5.2)
PROT UR QL STRIP: ABNORMAL
RBC # BLD AUTO: 3.45 10*6/MM3 (ref 3.77–5.28)
SODIUM SERPL-SCNC: 139 MMOL/L (ref 136–145)
SP GR UR STRIP: <=1.005 (ref 1–1.03)
UROBILINOGEN UR QL STRIP: ABNORMAL
WBC NRBC COR # BLD: 13.6 10*3/MM3 (ref 3.4–10.8)

## 2022-12-12 PROCEDURE — 25010000002 ENOXAPARIN PER 10 MG: Performed by: SURGERY

## 2022-12-12 PROCEDURE — 87086 URINE CULTURE/COLONY COUNT: CPT | Performed by: SURGERY

## 2022-12-12 PROCEDURE — 25010000002 CEFOXITIN PER 1 G: Performed by: SURGERY

## 2022-12-12 PROCEDURE — 83735 ASSAY OF MAGNESIUM: CPT | Performed by: SURGERY

## 2022-12-12 PROCEDURE — 80048 BASIC METABOLIC PNL TOTAL CA: CPT | Performed by: SURGERY

## 2022-12-12 PROCEDURE — 97110 THERAPEUTIC EXERCISES: CPT

## 2022-12-12 PROCEDURE — 85027 COMPLETE CBC AUTOMATED: CPT | Performed by: SURGERY

## 2022-12-12 PROCEDURE — 97116 GAIT TRAINING THERAPY: CPT

## 2022-12-12 PROCEDURE — 87077 CULTURE AEROBIC IDENTIFY: CPT | Performed by: SURGERY

## 2022-12-12 PROCEDURE — 81003 URINALYSIS AUTO W/O SCOPE: CPT | Performed by: SURGERY

## 2022-12-12 PROCEDURE — 87186 SC STD MICRODIL/AGAR DIL: CPT | Performed by: SURGERY

## 2022-12-12 PROCEDURE — 84100 ASSAY OF PHOSPHORUS: CPT | Performed by: SURGERY

## 2022-12-12 RX ADMIN — LEVOTHYROXINE SODIUM 75 MCG: 75 TABLET ORAL at 05:27

## 2022-12-12 RX ADMIN — HYDROCODONE BITARTRATE AND ACETAMINOPHEN 1 TABLET: 5; 325 TABLET ORAL at 05:27

## 2022-12-12 RX ADMIN — Medication 1 TABLET: at 09:03

## 2022-12-12 RX ADMIN — CEFOXITIN 2 G: 2 INJECTION, POWDER, FOR SOLUTION INTRAVENOUS at 23:18

## 2022-12-12 RX ADMIN — ENOXAPARIN SODIUM 40 MG: 40 INJECTION SUBCUTANEOUS at 18:26

## 2022-12-12 RX ADMIN — ACETAMINOPHEN 500 MG: 500 TABLET ORAL at 20:39

## 2022-12-12 RX ADMIN — SODIUM CHLORIDE, POTASSIUM CHLORIDE, SODIUM LACTATE AND CALCIUM CHLORIDE 100 ML/HR: 600; 310; 30; 20 INJECTION, SOLUTION INTRAVENOUS at 21:50

## 2022-12-12 RX ADMIN — HYDROCODONE BITARTRATE AND ACETAMINOPHEN 1 TABLET: 5; 325 TABLET ORAL at 18:27

## 2022-12-12 NOTE — PLAN OF CARE
Goal Outcome Evaluation:  Plan of Care Reviewed With: patient        Progress: improving     No acute changes with patient throughout the shift. Up to the chair for most of the shift with minimal complaints of pain. Awaiting for UA. Patient experiencing difficulty obtaining clean catch

## 2022-12-12 NOTE — PLAN OF CARE
Goal Outcome Evaluation:           Progress: improving  Outcome Evaluation: Generalized weakness and states needs therapy before returning home. VSS, ambulating to BSC with minimal assistance. Ana María Mcadams RN

## 2022-12-12 NOTE — THERAPY TREATMENT NOTE
Acute Care - Physical Therapy Treatment Note  JOSE Vargas     Patient Name: Berna Leong  : 1945  MRN: [id number]  Today's Date: 2022      Visit Dx:     ICD-10-CM ICD-9-CM   1. Difficulty walking  R26.2 719.7   2. Colon cancer (HCC)  C18.9 153.9   3. Malignant neoplasm of colon, unspecified part of colon (HCC)  C18.9 153.9   4. Decreased activities of daily living (ADL)  Z78.9 V49.89     Patient Active Problem List   Diagnosis   • Adnexal cyst   • Arthritis   • Hypothyroidism   • Hx of knee surgery   • Macular degeneration   • Glaucoma   • Obesity (BMI 35.0-39.9 without comorbidity)   • History of MI (myocardial infarction)   • Stage 3a chronic kidney disease (HCC)   • History of Graves' disease   • History of coma   • Spondylosis of lumbar region without myelopathy or radiculopathy   • Cervicalgia   • Restless leg syndrome   • Osteopenia   • Bilateral carpal tunnel syndrome   • Chronic fatigue syndrome   • Hypertension   • Vitamin D deficiency   • Cellulitis of left lower extremity   • Pure hypercholesterolemia   • Trigger finger of left thumb   • Osteoarthritis of both knees   • Abnormal CT scan, colon   • Chronic RLQ pain   • Adenocarcinoma of cecum (HCC)   • Colon cancer (HCC)   • S/P partial colectomy     Past Medical History:   Diagnosis Date   • Arthritis    • Brown recluse spider bite     HX OF RIGHT FRONT LEG    • Coma due to low thyroid (HCC) 2015   • Glaucoma    • Heart attack (HCC)     72 AND 81. DENIES CP BUT GETS SOA WITH EXERTION. STATES HAS BEEN ISSUE FOR LONG TIME. FOLLOWED BY PCP DR JOSHI. DECREASED ACTIVITY   • Hypothyroidism    • Kidney disease    • Leg pain    • Leg swelling    • Macular degeneration    • Malignant neoplasm of colon (HCC)    • Restless leg syndrome    • SOB (shortness of breath)    • Thyroid disorder    • Wound dehiscence 2022    Sustained laceration 2020 1 sutures removed by PCP with dehiscence seen at wound clinicby Dr Laina Bales 2022  and folowed there--given silver sulfadiazine creme (Silvadene)     Past Surgical History:   Procedure Laterality Date   • COLON RESECTION N/A 12/8/2022    Procedure: COLON RESECTION LAPAROSCOPIC RIGHT WITH ROBOT;  Surgeon: Abebe Drew MD;  Location: [facility] MAIN OR;  Service: Robotics - DaVinci;  Laterality: N/A;   • COLONOSCOPY N/A 11/07/2022    Procedure: COLONOSCOPY, WITH bx;  Surgeon: Jimmie Zacarias MD;  Location: [facility] ENDOSCOPY;  Service: Gastroenterology;  Laterality: N/A;  CECUM MASS   • DILATATION AND CURETTAGE  2018   • KNEE SURGERY Right 2008    JOINT SURGERY     PT Assessment (last 12 hours)     PT Evaluation and Treatment     Row Name 12/12/22 1311          Physical Therapy Time and Intention    Subjective Information complains of;weakness;fatigue;pain  -DK     Document Type therapy note (daily note)  -DK     Mode of Treatment individual therapy;physical therapy  -DK     Patient Effort good  -DK     Symptoms Noted During/After Treatment fatigue;increased pain  -DK     Row Name 12/12/22 1311          Pain    Pretreatment Pain Rating 5/10  -DK     Posttreatment Pain Rating 5/10  -DK     Pain Location generalized  -DK     Pain Location - back;hip;knee  -DK     Pain Intervention(s) Repositioned;Ambulation/increased activity;Distraction;Therapeutic presence  -DK     Row Name 12/12/22 1311          Cognition    Affect/Mental Status (Cognition) WNL  -DK     Orientation Status (Cognition) oriented x 4  -DK     Follows Commands (Cognition) WNL  -DK     Cognitive Function WNL  -DK     Personal Safety Interventions gait belt;nonskid shoes/slippers when out of bed;supervised activity  -DK     Row Name 12/12/22 1311          Bed Mobility    Bed Mobility supine-sit-supine  -DK     Sit-Supine [place] (Bed Mobility) standby assist  -DK     Supine-Sit-Supine [place] (Bed Mobility) standby assist  -DK     Assistive Device (Bed Mobility) bed rails  -DK     Row Name 12/12/22 1311          Transfers     Transfers sit-stand transfer;stand-sit transfer  -DK     Row Name 12/12/22 1311          Sit-Stand Transfer    Sit-Stand [place] (Transfers) standby assist  -DK     Assistive Device (Sit-Stand Transfers) walker, front-wheeled  -DK     Row Name 12/12/22 1311          Stand-Sit Transfer    Stand-Sit [place] (Transfers) standby assist  -DK     Assistive Device (Stand-Sit Transfers) walker, front-wheeled  -DK     Row Name 12/12/22 1311          Gait/Stairs (Locomotion)    Gait/Stairs Locomotion gait/ambulation independence;gait/ambulation assistive device;distance ambulated;gait pattern  -DK     [place] Level (Gait) standby assist  -DK     Assistive Device (Gait) walker, front-wheeled  -DK     Distance in Feet (Gait) 300  -DK     Pattern (Gait) step-through  -DK     Comment, (Gait/Stairs) Pt ambulated on room air with a rolling walker.  She was left in the recliner post treatment.  -DK     Row Name 12/12/22 1311          Safety Issues, Functional Mobility    Safety Issues Affecting Function (Mobility) safety precaution awareness;awareness of need for assistance  -DK     Impairments Affecting Function (Mobility) balance;endurance/activity tolerance;strength;pain  -DK     Row Name 12/12/22 1311          Balance    Balance Assessment sitting static balance;sitting dynamic balance;standing static balance;standing dynamic balance  -DK     Static Sitting Balance standby assist  -DK     Dynamic Sitting Balance standby assist  -DK     Position, Sitting Balance unsupported;sitting in chair;sitting edge of bed  -DK     Static Standing Balance standby assist;contact guard;1-person assist  -DK     Dynamic Standing Balance standby assist;contact guard;1-person assist  -DK     Position/Device Used, Standing Balance walker, front-wheeled  -DK     Balance Interventions standing;dynamic;tandem gait  -DK     Row Name 12/12/22 1311          Motor Skills    Motor Skills --  therapeutic exercises  -DK     Coordination WFL   -DK     Therapeutic Exercise hip;knee;ankle  -DK     Row Name 12/12/22 1311          Hip (Therapeutic Exercise)    Hip (Therapeutic Exercise) AROM (active range of motion)  -DK     Hip AROM (Therapeutic Exercise) bilateral;flexion;extension;aBduction;aDduction;sitting;15 repititions  -DK     Row Name 12/12/22 1311          Knee (Therapeutic Exercise)    Knee (Therapeutic Exercise) AROM (active range of motion)  -DK     Knee AROM (Therapeutic Exercise) bilateral;flexion;extension;sitting;15 repititions  -DK     Row Name 12/12/22 1311          Ankle (Therapeutic Exercise)    Ankle (Therapeutic Exercise) AROM (active range of motion)  -DK     Ankle AROM (Therapeutic Exercise) bilateral;dorsiflexion;plantarflexion;sitting;20 repititions  -DK     Row Name             Wound 12/08/22 1914 medial abdomen Incision    Wound - Properties Group Placement Date: 12/08/22 -SM Placement Time: 1914 -SM Orientation: medial  -SM Location: abdomen  -SM Primary Wound Type: Incision  -SM Additional Comments: Torcar sites x5  -SM    Retired Wound - Properties Group Placement Date: 12/08/22 -SM Placement Time: 1914 -SM Orientation: medial  -SM Location: abdomen  -SM Primary Wound Type: Incision  -SM Additional Comments: Torcar sites x5  -SM    Retired Wound - Properties Group Date first assessed: 12/08/22 -SM Time first assessed: 1914 -SM Location: abdomen  -SM Primary Wound Type: Incision  -SM Additional Comments: Torcar sites x5  -SM    Row Name 12/12/22 1311          Plan of Care Review    Plan of Care Reviewed With patient  -DK     Progress improving  -DK     Row Name 12/12/22 1311          Positioning and Restraints    Pre-Treatment Position in bed  -DK     Post Treatment Position chair  -DK     In Chair reclined;call light within reach;encouraged to call for assist;legs elevated  -DK     Row Name 12/12/22 1311          Therapy Assessment/Plan (PT)    Rehab Potential (PT) good, to achieve stated therapy goals  -DK     Criteria  for Skilled Interventions Met (PT) skilled treatment is necessary  -DK     Therapy Frequency (PT) daily  -DK     Problem List (PT) problems related to;balance;mobility;strength;pain  -DK     Activity Limitations Related to Problem List (PT) unable to ambulate safely  -DK     Row Name 12/12/22 1311          Progress Summary (PT)    Progress Toward Functional Goals (PT) progress toward functional goals is good  -DK           User Key  (r) = Recorded By, (t) = Taken By, (c) = Cosigned By    Initials Name Provider Type    DK Anahi Blake PTA Physical Therapist Assistant    Pj Reynolds RN Registered Nurse                Physical Therapy Education     Title: PT OT SLP Therapies (In Progress)     Topic: Physical Therapy (In Progress)     Point: Mobility training (Done)     Learning Progress Summary           Patient Acceptance, E,TB, VU by AS at 12/10/2022 0235    Acceptance, E,TB, VU by AD at 12/9/2022 1321                   Point: Home exercise program (Not Started)     Learner Progress:  Not documented in this visit.          Point: Body mechanics (Done)     Learning Progress Summary           Patient Acceptance, E,TB, VU by AS at 12/10/2022 0235    Acceptance, E,TB, VU by AD at 12/9/2022 1321                   Point: Precautions (Done)     Learning Progress Summary           Patient Acceptance, E,TB, VU by AD at 12/9/2022 1321                               User Key     Initials Effective Dates Name Provider Type Discipline    AS 11/07/22 -  Yulissa Hooper, RN Registered Nurse Nurse    AD 10/18/22 -  Sonny Short, ANASTASIIA Student PT Student PT              PT Recommendation and Plan  Planned Therapy Interventions (PT): balance training, gait training, bed mobility training, home exercise program, strengthening, transfer training  Therapy Frequency (PT): daily  Progress Summary (PT)  Progress Toward Functional Goals (PT): progress toward functional goals is good  Plan of Care Reviewed With: patient  Progress:  improving   Outcome Measures     Row Name 12/12/22 1311 12/11/22 1351          How much help from another person do you currently need...    Turning from your back to your side while in flat bed without using bedrails? 4  -DK 4  -DK     Moving from lying on back to sitting on the side of a flat bed without bedrails? 4  -DK 4  -DK     Moving to and from a bed to a chair (including a wheelchair)? 4  -DK 4  -DK     Standing up from a chair using your arms (e.g., wheelchair, bedside chair)? 4  -DK 4  -DK     Climbing 3-5 steps with a railing? 3  -DK 3  -DK     To walk in hospital room? 3  -DK 3  -DK     AM-PAC 6 Clicks Score (PT) 22  -DK 22  -DK        Functional Assessment    Outcome Measure Options AM-PAC 6 Clicks Basic Mobility (PT)  -DK AM-PAC 6 Clicks Basic Mobility (PT)  -DK           User Key  (r) = Recorded By, (t) = Taken By, (c) = Cosigned By    Initials Name Provider Type    Anahi Hancock PTA Physical Therapist Assistant                 Time Calculation:    PT Charges     Row Name 12/12/22 1314             Time Calculation    PT Received On 12/12/22  -DK      PT Goal Re-Cert Due Date 12/18/22  -DK         Timed Charges    56384 - PT Therapeutic Exercise Minutes 12  -DK      43377 - Gait Training Minutes  10  -DK      60958 - PT Therapeutic Activity Minutes 5  -DK         Total Minutes    Timed Charges Total Minutes 27  -DK       Total Minutes 27  -DK            User Key  (r) = Recorded By, (t) = Taken By, (c) = Cosigned By    Initials Name Provider Type    Anahi Hancock PTA Physical Therapist Assistant              Therapy Charges for Today     Code Description Service Date Service Provider Modifiers Qty    31039596691 HC GAIT TRAINING EA 15 MIN 12/11/2022 Anahi Blake PTA GP 1    91689063716 HC PT THER PROC EA 15 MIN 12/12/2022 Anahi Blake PTA GP 1    56642089059 HC GAIT TRAINING EA 15 MIN 12/12/2022 Anahi Blake PTA GP 1          PT G-Codes  Outcome Measure Options: AM-PAC 6 Clicks Basic  Mobility (PT)  AM-PAC 6 Clicks Score (PT): 22  AM-PAC 6 Clicks Score (OT): 21    Anahi Blake, PTA  12/12/2022

## 2022-12-12 NOTE — PROGRESS NOTES
[facility]     Surgery Progress Note    Patient Name: Berna Leong  :    1945  MRN:    [id number]  Date of admission:  2022  Length of Stay: 4 days    Subjective   Having some intermittent right lower quadrant pain but otherwise doing well.  No nausea or vomiting.  Has not required any IV pain medicines.  No difficulty urinating but nurse reported urine is cloudy colored.  No fevers.  Patient is passing gas and having liquid bowel movements.  Regular diet started yesterday.    Objective       Current Diet:    Dietary Orders (From admission, onward)     Start     Ordered    22 0942  Diet: Regular/House Diet; Texture: Regular Texture (IDDSI 7); Fluid Consistency: Thin (IDDSI 0)  Diet Effective Now        References:    Diet Order [place]   Question Answer Comment   Diets: Regular/House Diet    Texture: Regular Texture (IDDSI 7)    Fluid Consistency: Thin (IDDSI 0)        22 0941                Vitals:   Temp:  [97.9 °F (36.6 °C)-99.5 °F (37.5 °C)] 98.8 °F (37.1 °C)  Heart Rate:  [] 95  Resp:  [16-18] 18  BP: (100-132)/(41-55) 125/42  Physical Exam   • Constitutional: alert, no acute distress  • Respiratory:  breathing not labored, respiratory effort appears normal  • Cardiovascular:  heart regular rate and rhythm  • Abdomen:  soft, nondistended, approp tenderness, incisions look ok  • Skin and subcutaneous tissue:  Warm and dry  • Musculoskeletal: moving all extremities symmetrically and purposefully  • Neurologic:  no obvious motor or sensory deficits, alert & oriented x 3, speech clear    LABS:  CBC    CBC 22   WBC 11.49 (A) 12.59 (A) 13.60 (A)   RBC 4.37 3.42 (A) 3.45 (A)   Hemoglobin 12.5 9.8 (A) 9.7 (A)   Hematocrit 38.2 30.8 (A) 31.1 (A)   MCV 87.4 90.1 90.1   MCH 28.6 28.7 28.1   MCHC 32.7 31.8 31.2 (A)   RDW 13.3 14.1 13.7   Platelets 264 203 198   (A) Abnormal value            CMP    CMP 22   Glucose 161 (A) 106 (A) 95   BUN 9  7 (A) 10   Creatinine 1.05 (A) 1.13 (A) 1.12 (A)   Sodium 139 140 139   Potassium 4.1 5.0 5.7 (A)   Chloride 106 108 (A) 108 (A)   Calcium 8.7 8.5 (A) 8.7   (A) Abnormal value              Assessment / Plan   Assessment:   Colon cancer  s/p Robotic right colectomy 12/8/22  Mild leukocytosis  Satisfactory postop progress thus far     Plan:    Continue regular diet  Check U/A  Await solid BM    Electronically signed by Abebe Drew MD, 12/12/22, 12:38 PM EST.

## 2022-12-13 LAB
ANION GAP SERPL CALCULATED.3IONS-SCNC: 5.8 MMOL/L (ref 5–15)
BUN SERPL-MCNC: 10 MG/DL (ref 8–23)
BUN/CREAT SERPL: 9.7 (ref 7–25)
CALCIUM SPEC-SCNC: 8.3 MG/DL (ref 8.6–10.5)
CHLORIDE SERPL-SCNC: 106 MMOL/L (ref 98–107)
CO2 SERPL-SCNC: 26.2 MMOL/L (ref 22–29)
CREAT SERPL-MCNC: 1.03 MG/DL (ref 0.57–1)
DEPRECATED RDW RBC AUTO: 44.6 FL (ref 37–54)
EGFRCR SERPLBLD CKD-EPI 2021: 55.4 ML/MIN/1.73
ERYTHROCYTE [DISTWIDTH] IN BLOOD BY AUTOMATED COUNT: 13.6 % (ref 12.3–15.4)
GLUCOSE SERPL-MCNC: 85 MG/DL (ref 65–99)
HCT VFR BLD AUTO: 30.6 % (ref 34–46.6)
HGB BLD-MCNC: 9.7 G/DL (ref 12–15.9)
MAGNESIUM SERPL-MCNC: 2 MG/DL (ref 1.6–2.4)
MCH RBC QN AUTO: 28.4 PG (ref 26.6–33)
MCHC RBC AUTO-ENTMCNC: 31.7 G/DL (ref 31.5–35.7)
MCV RBC AUTO: 89.5 FL (ref 79–97)
PHOSPHATE SERPL-MCNC: 2.7 MG/DL (ref 2.5–4.5)
PLATELET # BLD AUTO: 209 10*3/MM3 (ref 140–450)
PMV BLD AUTO: 11 FL (ref 6–12)
POTASSIUM SERPL-SCNC: 4.4 MMOL/L (ref 3.5–5.2)
RBC # BLD AUTO: 3.42 10*6/MM3 (ref 3.77–5.28)
SODIUM SERPL-SCNC: 138 MMOL/L (ref 136–145)
WBC NRBC COR # BLD: 10.7 10*3/MM3 (ref 3.4–10.8)

## 2022-12-13 PROCEDURE — 85027 COMPLETE CBC AUTOMATED: CPT | Performed by: SURGERY

## 2022-12-13 PROCEDURE — 80048 BASIC METABOLIC PNL TOTAL CA: CPT | Performed by: SURGERY

## 2022-12-13 PROCEDURE — 97110 THERAPEUTIC EXERCISES: CPT

## 2022-12-13 PROCEDURE — 25010000002 CEFOXITIN PER 1 G: Performed by: SURGERY

## 2022-12-13 PROCEDURE — 97530 THERAPEUTIC ACTIVITIES: CPT

## 2022-12-13 PROCEDURE — 25010000002 ENOXAPARIN PER 10 MG: Performed by: SURGERY

## 2022-12-13 PROCEDURE — 83735 ASSAY OF MAGNESIUM: CPT | Performed by: SURGERY

## 2022-12-13 PROCEDURE — 84100 ASSAY OF PHOSPHORUS: CPT | Performed by: SURGERY

## 2022-12-13 PROCEDURE — 97116 GAIT TRAINING THERAPY: CPT

## 2022-12-13 RX ADMIN — LEVOTHYROXINE SODIUM 75 MCG: 75 TABLET ORAL at 05:10

## 2022-12-13 RX ADMIN — CEFOXITIN 2 G: 2 INJECTION, POWDER, FOR SOLUTION INTRAVENOUS at 14:30

## 2022-12-13 RX ADMIN — Medication 1 TABLET: at 09:11

## 2022-12-13 RX ADMIN — SODIUM CHLORIDE, POTASSIUM CHLORIDE, SODIUM LACTATE AND CALCIUM CHLORIDE 100 ML/HR: 600; 310; 30; 20 INJECTION, SOLUTION INTRAVENOUS at 05:03

## 2022-12-13 RX ADMIN — MAGNESIUM HYDROXIDE 30 ML: 2400 SUSPENSION ORAL at 14:30

## 2022-12-13 RX ADMIN — HYDROCODONE BITARTRATE AND ACETAMINOPHEN 1 TABLET: 5; 325 TABLET ORAL at 22:34

## 2022-12-13 RX ADMIN — CEFOXITIN 2 G: 2 INJECTION, POWDER, FOR SOLUTION INTRAVENOUS at 05:03

## 2022-12-13 RX ADMIN — ENOXAPARIN SODIUM 40 MG: 40 INJECTION SUBCUTANEOUS at 17:41

## 2022-12-13 RX ADMIN — ACETAMINOPHEN 500 MG: 500 TABLET ORAL at 20:52

## 2022-12-13 NOTE — THERAPY TREATMENT NOTE
Acute Care - Physical Therapy Treatment Note  JOSE Vargas     Patient Name: Berna Leong  : 1945  MRN: [id number]  Today's Date: 2022      Visit Dx:     ICD-10-CM ICD-9-CM   1. Difficulty walking  R26.2 719.7   2. Colon cancer (HCC)  C18.9 153.9   3. Malignant neoplasm of colon, unspecified part of colon (HCC)  C18.9 153.9   4. Decreased activities of daily living (ADL)  Z78.9 V49.89     Patient Active Problem List   Diagnosis   • Adnexal cyst   • Arthritis   • Hypothyroidism   • Hx of knee surgery   • Macular degeneration   • Glaucoma   • Obesity (BMI 35.0-39.9 without comorbidity)   • History of MI (myocardial infarction)   • Stage 3a chronic kidney disease (HCC)   • History of Graves' disease   • History of coma   • Spondylosis of lumbar region without myelopathy or radiculopathy   • Cervicalgia   • Restless leg syndrome   • Osteopenia   • Bilateral carpal tunnel syndrome   • Chronic fatigue syndrome   • Hypertension   • Vitamin D deficiency   • Cellulitis of left lower extremity   • Pure hypercholesterolemia   • Trigger finger of left thumb   • Osteoarthritis of both knees   • Abnormal CT scan, colon   • Chronic RLQ pain   • Adenocarcinoma of cecum (HCC)   • Colon cancer (HCC)   • S/P partial colectomy     Past Medical History:   Diagnosis Date   • Arthritis    • Brown recluse spider bite     HX OF RIGHT FRONT LEG    • Coma due to low thyroid (HCC) 2015   • Glaucoma    • Heart attack (HCC)     72 AND 81. DENIES CP BUT GETS SOA WITH EXERTION. STATES HAS BEEN ISSUE FOR LONG TIME. FOLLOWED BY PCP DR JOSHI. DECREASED ACTIVITY   • Hypothyroidism    • Kidney disease    • Leg pain    • Leg swelling    • Macular degeneration    • Malignant neoplasm of colon (HCC)    • Restless leg syndrome    • SOB (shortness of breath)    • Thyroid disorder    • Wound dehiscence 2022    Sustained laceration 2020 1 sutures removed by PCP with dehiscence seen at wound clinicby Dr Laina Bales 2022  and folowed there--given silver sulfadiazine creme (Silvadene)     Past Surgical History:   Procedure Laterality Date   • COLON RESECTION N/A 12/8/2022    Procedure: COLON RESECTION LAPAROSCOPIC RIGHT WITH ROBOT;  Surgeon: Abebe Drew MD;  Location: [facility] MAIN OR;  Service: Robotics - DaVinci;  Laterality: N/A;   • COLONOSCOPY N/A 11/07/2022    Procedure: COLONOSCOPY, WITH bx;  Surgeon: Jimmie Zacarias MD;  Location: [facility] ENDOSCOPY;  Service: Gastroenterology;  Laterality: N/A;  CECUM MASS   • DILATATION AND CURETTAGE  2018   • KNEE SURGERY Right 2008    JOINT SURGERY     PT Assessment (last 12 hours)     PT Evaluation and Treatment     Row Name 12/13/22 1227          Physical Therapy Time and Intention    Subjective Information complains of;weakness;fatigue;pain  -DK     Document Type therapy note (daily note)  -DK     Mode of Treatment individual therapy;physical therapy  -DK     Patient Effort good  -DK     Symptoms Noted During/After Treatment fatigue;increased pain  -DK     Comment Pt required the BSC post treatment.  -DK     Row Name 12/13/22 1227          Pain    Pretreatment Pain Rating 9/10  -DK     Posttreatment Pain Rating 9/10  -DK     Pain Location generalized  -DK     Pain Location - abdomen;back  -DK     Pain Intervention(s) Repositioned;Ambulation/increased activity;Distraction;Therapeutic presence  -     Row Name 12/13/22 1227          Cognition    Affect/Mental Status (Cognition) WNL  -DK     Orientation Status (Cognition) oriented x 4  -DK     Follows Commands (Cognition) WNL  -DK     Cognitive Function WNL  -DK     Personal Safety Interventions gait belt;nonskid shoes/slippers when out of bed;supervised activity  -     Row Name 12/13/22 1227          Bed Mobility    Bed Mobility supine-sit-supine  -DK     Sit-Supine [place] (Bed Mobility) standby assist  -DK     Supine-Sit-Supine [place] (Bed Mobility) standby assist  -DK     Assistive Device (Bed Mobility) bed rails   -DK     Row Name 12/13/22 1227          Transfers    Transfers sit-stand transfer;stand-sit transfer;toilet transfer  -DK     Row Name 12/13/22 1227          Sit-Stand Transfer    Sit-Stand [place] (Transfers) standby assist  -DK     Assistive Device (Sit-Stand Transfers) walker, front-wheeled  -DK     Row Name 12/13/22 1227          Stand-Sit Transfer    Stand-Sit [place] (Transfers) standby assist  -DK     Assistive Device (Stand-Sit Transfers) walker, front-wheeled  -DK     Row Name 12/13/22 1227          Toilet Transfer    Type (Toilet Transfer) sit-stand;stand-sit  -DK     [place] Level (Toilet Transfer) standby assist  -DK     Assistive Device (Toilet Transfer) commode, bedside without drop arms  -DK     Row Name 12/13/22 1227          Gait/Stairs (Locomotion)    Gait/Stairs Locomotion gait/ambulation independence;gait/ambulation assistive device;distance ambulated;gait pattern  -DK     [place] Level (Gait) standby assist  -DK     Assistive Device (Gait) walker, front-wheeled  -DK     Distance in Feet (Gait) 600  -DK     Pattern (Gait) step-through  -DK     Comment, (Gait/Stairs) Pt ambulated on room air with no assistive device.  She was left on the C post treatment.  -     Row Name 12/13/22 1227          Safety Issues, Functional Mobility    Impairments Affecting Function (Mobility) balance;endurance/activity tolerance;strength;pain  -DK     Row Name 12/13/22 1227          Balance    Balance Assessment sitting static balance;sitting dynamic balance;standing static balance;standing dynamic balance  -DK     Static Sitting Balance standby assist  -DK     Dynamic Sitting Balance standby assist  -DK     Position, Sitting Balance unsupported;sitting edge of bed  -DK     Static Standing Balance standby assist  -DK     Dynamic Standing Balance standby assist  -DK     Position/Device Used, Standing Balance walker, front-wheeled  -DK     Balance Interventions standing;dynamic;tandem gait  -DK      Row Name             Wound 12/08/22 1914 medial abdomen Incision    Wound - Properties Group Placement Date: 12/08/22 -SM Placement Time: 1914 -SM Orientation: medial  -SM Location: abdomen  -SM Primary Wound Type: Incision  -SM Additional Comments: Torcar sites x5  -SM    Retired Wound - Properties Group Placement Date: 12/08/22 -SM Placement Time: 1914 -SM Orientation: medial  -SM Location: abdomen  -SM Primary Wound Type: Incision  -SM Additional Comments: Torcar sites x5  -SM    Retired Wound - Properties Group Date first assessed: 12/08/22 -SM Time first assessed: 1914 -SM Location: abdomen  -SM Primary Wound Type: Incision  -SM Additional Comments: Torcar sites x5  -SM    Row Name 12/13/22 1227          Plan of Care Review    Plan of Care Reviewed With patient  -DK     Progress improving  -DK     Row Name 12/13/22 1227          Positioning and Restraints    Pre-Treatment Position in bed  -DK     Post Treatment Position bsc  -DK     On BS commode sitting;call light within reach;encouraged to call for assist  -DK     Row Name 12/13/22 1227          Therapy Assessment/Plan (PT)    Rehab Potential (PT) good, to achieve stated therapy goals  -DK     Criteria for Skilled Interventions Met (PT) skilled treatment is necessary  -DK     Therapy Frequency (PT) daily  -DK     Problem List (PT) problems related to;balance;mobility;strength;pain  -DK     Activity Limitations Related to Problem List (PT) unable to ambulate safely  -DK     Row Name 12/13/22 1227          Progress Summary (PT)    Progress Toward Functional Goals (PT) progress toward functional goals is good  -DK           User Key  (r) = Recorded By, (t) = Taken By, (c) = Cosigned By    Initials Name Provider Type    Anahi Hancock PTA Physical Therapist Assistant    Pj Reynolds, RN Registered Nurse                Physical Therapy Education     Title: PT OT SLP Therapies (In Progress)     Topic: Physical Therapy (In Progress)     Point:  Mobility training (Done)     Learning Progress Summary           Patient Acceptance, E,TB, VU by AS at 12/10/2022 0235    Acceptance, E,TB, VU by AD at 12/9/2022 1321                   Point: Home exercise program (Not Started)     Learner Progress:  Not documented in this visit.          Point: Body mechanics (Done)     Learning Progress Summary           Patient Acceptance, E,TB, VU by AS at 12/10/2022 0235    Acceptance, E,TB, VU by AD at 12/9/2022 1321                   Point: Precautions (Done)     Learning Progress Summary           Patient Acceptance, E,TB, VU by AD at 12/9/2022 1321                               User Key     Initials Effective Dates Name Provider Type Discipline    AS 11/07/22 -  Yulissa Hooper, RN Registered Nurse Nurse    AD 10/18/22 -  Sonny Short, ANASTASIIA Student PT Student PT              PT Recommendation and Plan  Planned Therapy Interventions (PT): balance training, bed mobility training, gait training, home exercise program, strengthening, transfer training  Therapy Frequency (PT): daily  Progress Summary (PT)  Progress Toward Functional Goals (PT): progress toward functional goals is good  Plan of Care Reviewed With: patient  Progress: improving   Outcome Measures     Row Name 12/13/22 1227 12/12/22 1311 12/11/22 1351       How much help from another person do you currently need...    Turning from your back to your side while in flat bed without using bedrails? 4  -DK 4  -DK 4  -DK    Moving from lying on back to sitting on the side of a flat bed without bedrails? 4  -DK 4  -DK 4  -DK    Moving to and from a bed to a chair (including a wheelchair)? 4  -DK 4  -DK 4  -DK    Standing up from a chair using your arms (e.g., wheelchair, bedside chair)? 4  -DK 4  -DK 4  -DK    Climbing 3-5 steps with a railing? 3  -DK 3  -DK 3  -DK    To walk in hospital room? 4  -DK 3  -DK 3  -DK    AM-PAC 6 Clicks Score (PT) 23  -DK 22  -DK 22  -DK       Functional Assessment    Outcome Measure Options  AM-PAC 6 Clicks Basic Mobility (PT)  -DK AM-PAC 6 Clicks Basic Mobility (PT)  -DK AM-PAC 6 Clicks Basic Mobility (PT)  -DK          User Key  (r) = Recorded By, (t) = Taken By, (c) = Cosigned By    Initials Name Provider Type    Anahi Hancock PTA Physical Therapist Assistant                 Time Calculation:    PT Charges     Row Name 12/13/22 1230             Time Calculation    PT Received On 12/13/22  -DK      PT Goal Re-Cert Due Date 12/18/22  -DK         Timed Charges    82897 - Gait Training Minutes  18  -DK      38765 - PT Therapeutic Activity Minutes 6  -DK         Total Minutes    Timed Charges Total Minutes 24  -DK       Total Minutes 24  -DK            User Key  (r) = Recorded By, (t) = Taken By, (c) = Cosigned By    Initials Name Provider Type    Anahi Hancock PTA Physical Therapist Assistant              Therapy Charges for Today     Code Description Service Date Service Provider Modifiers Qty    10352883981 HC PT THER PROC EA 15 MIN 12/12/2022 Anahi Blake, PTA GP 1    21438137478 HC GAIT TRAINING EA 15 MIN 12/12/2022 Anahi Blake, PTA GP 1    94115797151 HC GAIT TRAINING EA 15 MIN 12/13/2022 Anahi Blake, PTA GP 1    70886769210 HC PT THERAPEUTIC ACT EA 15 MIN 12/13/2022 Anahi Blake, JERZY GP 1          PT G-Codes  Outcome Measure Options: AM-PAC 6 Clicks Basic Mobility (PT)  AM-PAC 6 Clicks Score (PT): 23  AM-PAC 6 Clicks Score (OT): 21    Anahi Blake PTA  12/13/2022

## 2022-12-13 NOTE — PLAN OF CARE
Goal Outcome Evaluation:           Progress: improving  Outcome Evaluation: Patient a/ox4, No iv access at the beginning of shift, IV access started in L wrist for ivf and iv abx. patient up to bsc by herself, but encouraged to use call light if she needs help. patient had temp of 100.2 F at beginning of shift, given prn tylenol and educated on Incentive spirometer use, tolerated well. no other reports of fever. patient UA negative for UTI. patient vitals stable. patient has c/o constipation, but is passing gas. patient states that she hasnt had a bm since 12/09, but there are two bms documented on 12/11, will pass on that we may need to start bowel regimen. otherwise, no other concerns or needs voiced at this time.

## 2022-12-13 NOTE — THERAPY TREATMENT NOTE
Patient Name: Berna Leong  : 1945    MRN: [id number]                              Today's Date: 2022       Admit Date: 2022    Visit Dx:     ICD-10-CM ICD-9-CM   1. Difficulty walking  R26.2 719.7   2. Colon cancer (HCC)  C18.9 153.9   3. Malignant neoplasm of colon, unspecified part of colon (HCC)  C18.9 153.9   4. Decreased activities of daily living (ADL)  Z78.9 V49.89     Patient Active Problem List   Diagnosis   • Adnexal cyst   • Arthritis   • Hypothyroidism   • Hx of knee surgery   • Macular degeneration   • Glaucoma   • Obesity (BMI 35.0-39.9 without comorbidity)   • History of MI (myocardial infarction)   • Stage 3a chronic kidney disease (HCC)   • History of Graves' disease   • History of coma   • Spondylosis of lumbar region without myelopathy or radiculopathy   • Cervicalgia   • Restless leg syndrome   • Osteopenia   • Bilateral carpal tunnel syndrome   • Chronic fatigue syndrome   • Hypertension   • Vitamin D deficiency   • Cellulitis of left lower extremity   • Pure hypercholesterolemia   • Trigger finger of left thumb   • Osteoarthritis of both knees   • Abnormal CT scan, colon   • Chronic RLQ pain   • Adenocarcinoma of cecum (HCC)   • Colon cancer (HCC)   • S/P partial colectomy     Past Medical History:   Diagnosis Date   • Arthritis    • Brown recluse spider bite     HX OF RIGHT FRONT LEG    • Coma due to low thyroid (HCC) 2015   • Glaucoma    • Heart attack (HCC)     72 AND 81. DENIES CP BUT GETS SOA WITH EXERTION. STATES HAS BEEN ISSUE FOR LONG TIME. FOLLOWED BY PCP DR JOSHI. DECREASED ACTIVITY   • Hypothyroidism    • Kidney disease    • Leg pain    • Leg swelling    • Macular degeneration    • Malignant neoplasm of colon (HCC)    • Restless leg syndrome    • SOB (shortness of breath)    • Thyroid disorder    • Wound dehiscence 2022    Sustained laceration 2020 1 sutures removed by PCP with dehiscence seen at wound clinicby Dr Laina Bales 2022 and  folowed there--given silver sulfadiazine creme (Silvadene)     Past Surgical History:   Procedure Laterality Date   • COLON RESECTION N/A 12/8/2022    Procedure: COLON RESECTION LAPAROSCOPIC RIGHT WITH ROBOT;  Surgeon: Abebe Drew MD;  Location: [facility] MAIN OR;  Service: Robotics - DaVinci;  Laterality: N/A;   • COLONOSCOPY N/A 11/07/2022    Procedure: COLONOSCOPY, WITH bx;  Surgeon: Jimmie Zacarias MD;  Location: [facility] ENDOSCOPY;  Service: Gastroenterology;  Laterality: N/A;  CECUM MASS   • DILATATION AND CURETTAGE  2018   • KNEE SURGERY Right 2008    JOINT SURGERY      General Information     Row Name 12/13/22 1523          OT Time and Intention    Document Type therapy note (daily note)  -     Mode of Treatment individual therapy;occupational therapy  -           User Key  (r) = Recorded By, (t) = Taken By, (c) = Cosigned By    Initials Name Provider Type     Priyanka Marie OT Occupational Therapist                 Mobility/ADL's     Row Name 12/13/22 1523          Bed Mobility    Bed Mobility supine-sit  -     Supine-Sit [place] (Bed Mobility) standby assist  -     Bed Mobility, Safety Issues decreased use of arms for pushing/pulling;decreased use of legs for bridging/pushing  -     Assistive Device (Bed Mobility) bed rails;head of bed elevated  -           User Key  (r) = Recorded By, (t) = Taken By, (c) = Cosigned By    Initials Name Provider Type     Priyanka Marie, OT Occupational Therapist               Obj/Interventions     Row Name 12/13/22 1523          Shoulder (Therapeutic Exercise)    Shoulder (Therapeutic Exercise) strengthening exercise  -     Shoulder Strengthening (Therapeutic Exercise) bilateral;1 lb free weight  Shoulder flexion/extension, forward reach, rotation, and horizontal abduction/adduction 10 reps x 3 sets.  -     Row Name 12/13/22 1523          Elbow/Forearm (Therapeutic Exercise)    Elbow/Forearm (Therapeutic Exercise) strengthening exercise   -     Elbow/Forearm Strengthening (Therapeutic Exercise) bilateral;flexion;extension;1 lb free weight;10 repetitions;3 sets  -     Row Name 12/13/22 1523          Motor Skills    Motor Skills functional endurance  -     Functional Endurance Fair+  -LF     Therapeutic Exercise shoulder;elbow/forearm  -[facility] Name 12/13/22 1523          Balance    Balance Assessment sitting dynamic balance  -     Dynamic Sitting Balance supervision  -     Position, Sitting Balance unsupported;sitting edge of bed  -     Balance Interventions sitting;dynamic;occupation based/functional task;UE activity with balance activity  -           User Key  (r) = Recorded By, (t) = Taken By, (c) = Cosigned By    Initials Name Provider Type     Priyanka Marie, OT Occupational Therapist               Goals/Plan    No documentation.                Clinical Impression     Row Name 12/13/22 1526          Pain Assessment    Additional Documentation Pain Scale: FACES Pre/Post-Treatment (Group)  -[facility] Name 12/13/22 1526          Pain Scale: FACES Pre/Post-Treatment    Pain: FACES Scale, Pretreatment 2-->hurts little bit  -     Posttreatment Pain Rating 2-->hurts little bit  -     Pain Location - abdomen  -[facility] Name 12/13/22 1526          Plan of Care Review    Plan of Care Reviewed With patient  -     Progress improving  -     Outcome Evaluation Patient completed supine to sit in preparation for BUE exercises on EOB with SBA using bed rails/HOB elevated. She then completed 3 sets of BUE exercises while demonstrating fair+ endurance. She would benefit from continued OT services to maximize independence.  -     Row Name 12/13/22 1526          Vital Signs    O2 Delivery Pre Treatment room air  -     O2 Delivery Intra Treatment room air  -     O2 Delivery Post Treatment room air  -           User Key  (r) = Recorded By, (t) = Taken By, (c) = Cosigned By    Initials Name Provider Type    LF Priyanka Marie, OT  Occupational Therapist               Outcome Measures     Row Name 12/13/22 1530          How much help from another is currently needed...    Putting on and taking off regular lower body clothing? 3  -LF     Bathing (including washing, rinsing, and drying) 3  -LF     Toileting (which includes using toilet bed pan or urinal) 3  -LF     Putting on and taking off regular upper body clothing 4  -LF     Taking care of personal grooming (such as brushing teeth) 4  -LF     Eating meals 4  -LF     AM-PAC 6 Clicks Score (OT) 21  -LF     Row Name 12/13/22 1227          How much help from another person do you currently need...    Turning from your back to your side while in flat bed without using bedrails? 4  -DK     Moving from lying on back to sitting on the side of a flat bed without bedrails? 4  -DK     Moving to and from a bed to a chair (including a wheelchair)? 4  -DK     Standing up from a chair using your arms (e.g., wheelchair, bedside chair)? 4  -DK     Climbing 3-5 steps with a railing? 3  -DK     To walk in hospital room? 4  -DK     AM-PAC 6 Clicks Score (PT) 23  -DK     Highest level of mobility 7 --> Walked 25 feet or more  -     Row Name 12/13/22 1530 12/13/22 1227       Functional Assessment    Outcome Measure Options AM-PAC 6 Clicks Daily Activity (OT);Optimal Instrument  -LF AM-PAC 6 Clicks Basic Mobility (PT)  -    Row Name 12/13/22 1530          Optimal Instrument    Optimal Instrument Optimal - 3  -LF     Bending/Stooping 2  -LF     Standing 2  -LF     Reaching 1  -LF     From the list, choose the 3 activities you would most like to be able to do without any difficulty Bending/stooping;Standing;Reaching  -LF     Total Score Optimal - 3 5  -LF           User Key  (r) = Recorded By, (t) = Taken By, (c) = Cosigned By    Initials Name Provider Type    Anahi Hancock PTA Physical Therapist Assistant    Priyanka Hogan, OT Occupational Therapist                Occupational Therapy Education      Title: PT OT SLP Therapies (In Progress)     Topic: Occupational Therapy (Done)     Point: ADL training (Done)     Description:   Instruct learner(s) on proper safety adaptation and remediation techniques during self care or transfers.   Instruct in proper use of assistive devices.              Learning Progress Summary           Patient Acceptance, E,TB, VU by  at 12/9/2022 1515                   Point: Precautions (Done)     Description:   Instruct learner(s) on prescribed precautions during self-care and functional transfers.              Learning Progress Summary           Patient Acceptance, E,TB, VU by  at 12/9/2022 1515                   Point: Body mechanics (Done)     Description:   Instruct learner(s) on proper positioning and spine alignment during self-care, functional mobility activities and/or exercises.              Learning Progress Summary           Patient Acceptance, E,TB, VU by  at 12/9/2022 1515                               User Key     Initials Effective Dates Name Provider Type Discipline     06/16/21 -  Priyanka Marie OT Occupational Therapist OT              OT Recommendation and Plan  Planned Therapy Interventions (OT): activity tolerance training, patient/caregiver education/training, BADL retraining, functional balance retraining, occupation/activity based interventions, transfer/mobility retraining  Therapy Frequency (OT): 5 times/wk  Plan of Care Review  Plan of Care Reviewed With: patient  Progress: improving  Outcome Evaluation: Patient completed supine to sit in preparation for BUE exercises on EOB with SBA using bed rails/HOB elevated. She then completed 3 sets of BUE exercises while demonstrating fair+ endurance. She would benefit from continued OT services to maximize independence.     Time Calculation:    Time Calculation- OT     Row Name 12/13/22 1530 12/13/22 1230          Time Calculation- OT    OT Received On 12/13/22  - --     OT Goal Re-Cert Due Date 12/18/22   -LF --        Timed Charges    41100 - OT Therapeutic Exercise Minutes 10  -LF --     96902 - Gait Training Minutes  -- 18  -DK     13624 - OT Therapeutic Activity Minutes 5  -LF --        Total Minutes    Timed Charges Total Minutes 15  -LF 18  -DK      Total Minutes 15  -LF 18  -DK           User Key  (r) = Recorded By, (t) = Taken By, (c) = Cosigned By    Initials Name Provider Type    Anahi Hancock, PTA Physical Therapist Assistant     Priyanka Marie OT Occupational Therapist              Therapy Charges for Today     Code Description Service Date Service Provider Modifiers Qty    44058432727  OT THER PROC EA 15 MIN 12/13/2022 Priyanka Marie OT GO 1               Priyanka Marie OT  12/13/2022

## 2022-12-13 NOTE — PLAN OF CARE
Goal Outcome Evaluation:   Pt walked in hallway. Currently sitting up in chair. [employer] ordered this shift after patient complained of not having BM x3 days. Pt reports passing gas without issue. Bowel sounds hypoactive. No other complaints this shift.

## 2022-12-13 NOTE — PROGRESS NOTES
[facility]     Surgery Progress Note    Patient Name: Berna Leong  :    1945  MRN:    [id number]  Date of admission:  2022  Length of Stay: 5 days    Subjective   No solid BM yet.  Eating regular diet.  Still with some right sided abd pain still but improving.  Still walking quite a bit.  No dysuria    Objective       Current Diet:    Dietary Orders (From admission, onward)     Start     Ordered    22 0942  Diet: Regular/House Diet; Texture: Regular Texture (IDDSI 7); Fluid Consistency: Thin (IDDSI 0)  Diet Effective Now        References:    Diet Order Crosswalk   Question Answer Comment   Diets: Regular/House Diet    Texture: Regular Texture (IDDSI 7)    Fluid Consistency: Thin (IDDSI 0)        22 0941                Vitals:   Temp:  [97.3 °F (36.3 °C)-100.3 °F (37.9 °C)] 98.2 °F (36.8 °C)  Heart Rate:  [65-95] 92  Resp:  [16] 16  BP: (104-126)/(43-52) 123/44  Physical Exam   • Constitutional: alert, no acute distress, sitting up in a chair  • Respiratory:  breathing not labored, respiratory effort appears normal  • Cardiovascular:  heart regular rate  • Abdomen:  soft, nondistended, approp tenderness  • Musculoskeletal: moving all extremities symmetrically and purposefully  • Neurologic:  no obvious motor or sensory deficits, cerebellar function without any obvious abnormalities, alert & oriented x 3, speech clear  • Psychiatric:  judgment and insight intact, mood normal, affect appropriate, cooperative    LABS:  CBC    CBC 22   WBC 12.59 (A) 13.60 (A) 10.70   RBC 3.42 (A) 3.45 (A) 3.42 (A)   Hemoglobin 9.8 (A) 9.7 (A) 9.7 (A)   Hematocrit 30.8 (A) 31.1 (A) 30.6 (A)   MCV 90.1 90.1 89.5   MCH 28.7 28.1 28.4   MCHC 31.8 31.2 (A) 31.7   RDW 14.1 13.7 13.6   Platelets 203 198 209   (A) Abnormal value            CMP    CMP 22   Glucose 106 (A) 95 85   BUN 7 (A) 10 10   Creatinine 1.13 (A) 1.12 (A) 1.03 (A)   Sodium 140 139 138   Potassium  5.0 5.7 (A) 4.4   Chloride 108 (A) 108 (A) 106   Calcium 8.5 (A) 8.7 8.3 (A)   (A) Abnormal value              Lab Results (last 24 hours)     Procedure Component Value Units Date/Time    Urine Culture - Urine, Urine, Clean Catch [[id number]]  (Normal) Collected: 12/12/22 2114    Specimen: Urine, Clean Catch Updated: 12/13/22 1322     Urine Culture Culture in progress    Urinalysis With Culture If Indicated - Urine, Clean Catch [[id number]]  (Abnormal) Collected: 12/12/22 2114    Specimen: Urine, Clean Catch Updated: 12/12/22 2126     Color, UA Yellow     Appearance, UA Clear     pH, UA 7.0     Specific Gravity, UA <=1.005     Glucose, UA Negative     Ketones, UA Negative     Bilirubin, UA Negative     Blood, UA Negative     Protein, UA Trace     Leuk Esterase, UA Negative     Nitrite, UA Negative     Urobilinogen, UA 0.2 E.U./dL              Assessment / Plan   Assessment:   Colon cancer  s/p Robotic right colectomy 12/8/22  Mild leukocytosis - resolved  U/A without any evidence of UTI  Satisfactory postop progress     Plan:    Continue regular diet  D/C Ab  Await solid BM      Electronically signed by Abebe Drew MD, 12/13/22, 4:17 PM EST.

## 2022-12-14 ENCOUNTER — READMISSION MANAGEMENT (OUTPATIENT)
Dept: CALL CENTER | Facility: HOSPITAL | Age: 77
End: 2022-12-14

## 2022-12-14 VITALS
OXYGEN SATURATION: 100 % | HEIGHT: 62 IN | DIASTOLIC BLOOD PRESSURE: 47 MMHG | SYSTOLIC BLOOD PRESSURE: 107 MMHG | TEMPERATURE: 98.2 F | HEART RATE: 89 BPM | WEIGHT: 173.28 LBS | RESPIRATION RATE: 19 BRPM | BODY MASS INDEX: 31.89 KG/M2

## 2022-12-14 PROBLEM — Z90.49 S/P PARTIAL COLECTOMY: Status: RESOLVED | Noted: 2022-12-08 | Resolved: 2022-12-14

## 2022-12-14 PROCEDURE — 25010000002 ENOXAPARIN PER 10 MG: Performed by: SURGERY

## 2022-12-14 PROCEDURE — 97116 GAIT TRAINING THERAPY: CPT

## 2022-12-14 PROCEDURE — 97530 THERAPEUTIC ACTIVITIES: CPT

## 2022-12-14 RX ORDER — HYDROCODONE BITARTRATE AND ACETAMINOPHEN 5; 325 MG/1; MG/1
1 TABLET ORAL EVERY 6 HOURS PRN
Qty: 15 TABLET | Refills: 0 | Status: SHIPPED | OUTPATIENT
Start: 2022-12-14 | End: 2023-03-22

## 2022-12-14 RX ADMIN — HYDROCODONE BITARTRATE AND ACETAMINOPHEN 1 TABLET: 5; 325 TABLET ORAL at 14:12

## 2022-12-14 RX ADMIN — ENOXAPARIN SODIUM 40 MG: 40 INJECTION SUBCUTANEOUS at 17:32

## 2022-12-14 RX ADMIN — Medication 1 TABLET: at 10:27

## 2022-12-14 RX ADMIN — LEVOTHYROXINE SODIUM 75 MCG: 75 TABLET ORAL at 06:06

## 2022-12-14 NOTE — PLAN OF CARE
Goal Outcome Evaluation:           Progress: improving  Outcome Evaluation: patient had 2 BMs overnight, still loose and not formed. patient had some c/o RLQ soreness and tenderness with movement, prn pain med given. patient encouraged to use incentive spirometer and TCDB

## 2022-12-14 NOTE — THERAPY TREATMENT NOTE
Acute Care - Physical Therapy Treatment Note  [facility]     Patient Name: Berna Leong  : 1945  MRN: [id number]  Today's Date: 2022      Visit Dx:     ICD-10-CM ICD-9-CM   1. Difficulty walking  R26.2 719.7   2. Colon cancer (HCC)  C18.9 153.9   3. Malignant neoplasm of colon, unspecified part of colon (HCC)  C18.9 153.9   4. Decreased activities of daily living (ADL)  Z78.9 V49.89     Patient Active Problem List   Diagnosis   • Adnexal cyst   • Arthritis   • Hypothyroidism   • Hx of knee surgery   • Macular degeneration   • Glaucoma   • Obesity (BMI 35.0-39.9 without comorbidity)   • History of MI (myocardial infarction)   • Stage 3a chronic kidney disease (HCC)   • History of Graves' disease   • History of coma   • Spondylosis of lumbar region without myelopathy or radiculopathy   • Cervicalgia   • Restless leg syndrome   • Osteopenia   • Bilateral carpal tunnel syndrome   • Chronic fatigue syndrome   • Hypertension   • Vitamin D deficiency   • Cellulitis of left lower extremity   • Pure hypercholesterolemia   • Trigger finger of left thumb   • Osteoarthritis of both knees   • Abnormal CT scan, colon   • Chronic RLQ pain   • Adenocarcinoma of cecum (HCC)   • Colon cancer (HCC)   • S/P partial colectomy     Past Medical History:   Diagnosis Date   • Arthritis    • Brown recluse spider bite     HX OF RIGHT FRONT LEG    • Coma due to low thyroid (HCC) 2015   • Glaucoma    • Heart attack (HCC)     72 AND 81. DENIES CP BUT GETS SOA WITH EXERTION. STATES HAS BEEN ISSUE FOR LONG TIME. FOLLOWED BY PCP DR JOSHI. DECREASED ACTIVITY   • Hypothyroidism    • Kidney disease    • Leg pain    • Leg swelling    • Macular degeneration    • Malignant neoplasm of colon (HCC)    • Restless leg syndrome    • SOB (shortness of breath)    • Thyroid disorder    • Wound dehiscence 2022    Sustained laceration 2020 1 sutures removed by PCP with dehiscence seen at wound clinicby Dr Laina Bales 2022  and folowed there--given silver sulfadiazine creme (Silvadene)     Past Surgical History:   Procedure Laterality Date   • COLON RESECTION N/A 12/8/2022    Procedure: COLON RESECTION LAPAROSCOPIC RIGHT WITH ROBOT;  Surgeon: Abebe Drew MD;  Location: formerly [facility] MAIN OR;  Service: Robotics - DaVinci;  Laterality: N/A;   • COLONOSCOPY N/A 11/07/2022    Procedure: COLONOSCOPY, WITH bx;  Surgeon: Jimmie Zacarias MD;  Location: formerly [facility] ENDOSCOPY;  Service: Gastroenterology;  Laterality: N/A;  CECUM MASS   • DILATATION AND CURETTAGE  2018   • KNEE SURGERY Right 2008    JOINT SURGERY     PT Assessment (last 12 hours)     PT Evaluation and Treatment     Row Name 12/14/22 1210          Physical Therapy Time and Intention    Subjective Information complains of;weakness;fatigue;pain  -DK     Document Type therapy note (daily note)  -DK     Mode of Treatment individual therapy;physical therapy  -DK     Patient Effort good  -DK     Symptoms Noted During/After Treatment fatigue;increased pain  -DK     Comment Pt reports increased abdominal pain at the end of ambulation this session.  RN was advised.  -     Row Name 12/14/22 1210          Pain    Pretreatment Pain Rating 6/10  -DK     Posttreatment Pain Rating 8/10  -DK     Pain Location generalized  -DK     Pain Location - abdomen  -DK     Pain Intervention(s) Repositioned;Ambulation/increased activity;Distraction;Therapeutic presence  -     Row Name 12/14/22 1210          Cognition    Affect/Mental Status (Cognition) WNL  -DK     Orientation Status (Cognition) oriented x 4  -DK     Follows Commands (Cognition) WNL  -DK     Cognitive Function WNL  -DK     Personal Safety Interventions gait belt;nonskid shoes/slippers when out of bed;supervised activity  -     Row Name 12/14/22 1210          Bed Mobility    Bed Mobility supine-sit-supine  -DK     Sit-Supine [place] (Bed Mobility) standby assist  -DK     Supine-Sit-Supine [place] (Bed Mobility) standby assist   -     Assistive Device (Bed Mobility) bed rails  -     Row Name 12/14/22 1210          Transfers    Transfers sit-stand transfer;stand-sit transfer;toilet transfer  -DK     Row Name 12/14/22 1210          Sit-Stand Transfer    Sit-Stand [place] (Transfers) standby assist  -DK     Assistive Device (Sit-Stand Transfers) walker, front-wheeled  -DK     Row Name 12/14/22 1210          Stand-Sit Transfer    Stand-Sit [place] (Transfers) standby assist  -DK     Assistive Device (Stand-Sit Transfers) walker, front-wheeled  -DK     Row Name 12/14/22 1210          Toilet Transfer    Type (Toilet Transfer) sit-stand;stand-sit  -DK     [place] Level (Toilet Transfer) standby assist  -DK     Assistive Device (Toilet Transfer) commode, bedside without drop arms  -     Row Name 12/14/22 1210          Gait/Stairs (Locomotion)    Gait/Stairs Locomotion gait/ambulation independence;gait/ambulation assistive device;distance ambulated;gait pattern  -DK     [place] Level (Gait) standby assist  -DK     Assistive Device (Gait) walker, front-wheeled  -DK     Distance in Feet (Gait) 900  -     Pattern (Gait) step-through  -DK     Comment, (Gait/Stairs) Pt ambulated on room air with a rolling walker.  She did c/o increased abdominal pain by the end of gait.  Pt was left in the recliner post treatment.  -     Row Name 12/14/22 1210          Safety Issues, Functional Mobility    Impairments Affecting Function (Mobility) balance;endurance/activity tolerance;strength;pain  -     Row Name 12/14/22 1210          Balance    Balance Assessment sitting static balance;sitting dynamic balance;standing static balance;standing dynamic balance  -DK     Static Sitting Balance standby assist  -DK     Dynamic Sitting Balance standby assist  -DK     Position, Sitting Balance unsupported;sitting in chair  -DK     Static Standing Balance standby assist  -DK     Dynamic Standing Balance standby assist  -DK     Position/Device  Used, Standing Balance walker, front-wheeled  -DK     Balance Interventions standing;dynamic;tandem gait  -DK     Row Name             Wound 12/08/22 1914 medial abdomen Incision    Wound - Properties Group Placement Date: 12/08/22 -SM Placement Time: 1914 -SM Orientation: medial  -SM Location: abdomen  -SM Primary Wound Type: Incision  -SM Additional Comments: Torcar sites x5  -SM    Retired Wound - Properties Group Placement Date: 12/08/22 -SM Placement Time: 1914 -SM Orientation: medial  -SM Location: abdomen  -SM Primary Wound Type: Incision  -SM Additional Comments: Torcar sites x5  -SM    Retired Wound - Properties Group Date first assessed: 12/08/22 -SM Time first assessed: 1914 -SM Location: abdomen  -SM Primary Wound Type: Incision  -SM Additional Comments: Torcar sites x5  -SM    Row Name 12/14/22 1210          Plan of Care Review    Plan of Care Reviewed With patient  -DK     Progress improving  -DK     Row Name 12/14/22 1210          Positioning and Restraints    Pre-Treatment Position sitting in chair/recliner  -DK     Post Treatment Position chair  -DK     In Chair reclined;call light within reach;encouraged to call for assist;legs elevated  -DK     Row Name 12/14/22 1210          Therapy Assessment/Plan (PT)    Rehab Potential (PT) good, to achieve stated therapy goals  -DK     Criteria for Skilled Interventions Met (PT) skilled treatment is necessary  -DK     Therapy Frequency (PT) daily  -DK     Problem List (PT) problems related to;balance;mobility;strength;pain  -DK     Activity Limitations Related to Problem List (PT) unable to ambulate safely  -DK     Row Name 12/14/22 1210          Progress Summary (PT)    Progress Toward Functional Goals (PT) progress toward functional goals is good  -DK           User Key  (r) = Recorded By, (t) = Taken By, (c) = Cosigned By    Initials Name Provider Type    Anahi Hancock PTA Physical Therapist Assistant    Pj Reynolds, RN Registered Nurse                 Physical Therapy Education     Title: PT OT SLP Therapies (In Progress)     Topic: Physical Therapy (In Progress)     Point: Mobility training (Done)     Learning Progress Summary           Patient Acceptance, E,TB, VU by AS at 12/10/2022 0235    Acceptance, E,TB, VU by AD at 12/9/2022 1321                   Point: Home exercise program (Not Started)     Learner Progress:  Not documented in this visit.          Point: Body mechanics (Done)     Learning Progress Summary           Patient Acceptance, E,TB, VU by AS at 12/10/2022 0235    Acceptance, E,TB, VU by AD at 12/9/2022 1321                   Point: Precautions (Done)     Learning Progress Summary           Patient Acceptance, E,TB, VU by AD at 12/9/2022 1321                               User Key     Initials Effective Dates Name Provider Type Discipline    AS 11/07/22 -  Yulissa Hooper, RN Registered Nurse Nurse    AD 10/18/22 -  Sonny Short, ANASTASIIA Student PT Student PT              PT Recommendation and Plan  Planned Therapy Interventions (PT): balance training, bed mobility training, gait training, strengthening, transfer training, home exercise program  Therapy Frequency (PT): daily  Progress Summary (PT)  Progress Toward Functional Goals (PT): progress toward functional goals is good  Plan of Care Reviewed With: patient  Progress: improving   Outcome Measures     Row Name 12/14/22 1210 12/13/22 1227 12/12/22 1311       How much help from another person do you currently need...    Turning from your back to your side while in flat bed without using bedrails? 4  -DK 4  -DK 4  -DK    Moving from lying on back to sitting on the side of a flat bed without bedrails? 4  -DK 4  -DK 4  -DK    Moving to and from a bed to a chair (including a wheelchair)? 4  -DK 4  -DK 4  -DK    Standing up from a chair using your arms (e.g., wheelchair, bedside chair)? 4  -DK 4  -DK 4  -DK    Climbing 3-5 steps with a railing? 3  -DK 3  -DK 3  -DK    To walk in hospital  room? 4  -DK 4  -DK 3  -DK    AM-PAC 6 Clicks Score (PT) 23  -DK 23  -DK 22  -DK       Functional Assessment    Outcome Measure Options AM-PAC 6 Clicks Basic Mobility (PT)  -DK AM-PAC 6 Clicks Basic Mobility (PT)  -DK AM-PAC 6 Clicks Basic Mobility (PT)  -DK    Row Name 12/11/22 1351             How much help from another person do you currently need...    Turning from your back to your side while in flat bed without using bedrails? 4  -DK      Moving from lying on back to sitting on the side of a flat bed without bedrails? 4  -DK      Moving to and from a bed to a chair (including a wheelchair)? 4  -DK      Standing up from a chair using your arms (e.g., wheelchair, bedside chair)? 4  -DK      Climbing 3-5 steps with a railing? 3  -DK      To walk in hospital room? 3  -DK      AM-PAC 6 Clicks Score (PT) 22  -DK         Functional Assessment    Outcome Measure Options AM-PAC 6 Clicks Basic Mobility (PT)  -DK            User Key  (r) = Recorded By, (t) = Taken By, (c) = Cosigned By    Initials Name Provider Type    Anahi Hancock PTA Physical Therapist Assistant                 Time Calculation:    PT Charges     Row Name 12/14/22 1213             Time Calculation    PT Received On 12/14/22  -DK      PT Goal Re-Cert Due Date 12/18/22  -DK         Timed Charges    22361 - Gait Training Minutes  20  -DK      66704 - PT Therapeutic Activity Minutes 6  -DK         Total Minutes    Timed Charges Total Minutes 26  -DK       Total Minutes 26  -DK            User Key  (r) = Recorded By, (t) = Taken By, (c) = Cosigned By    Initials Name Provider Type    Anahi Hancock PTA Physical Therapist Assistant              Therapy Charges for Today     Code Description Service Date Service Provider Modifiers Qty    50804704438 HC GAIT TRAINING EA 15 MIN 12/13/2022 Anahi Blake PTA GP 1    02613612519 HC PT THERAPEUTIC ACT EA 15 MIN 12/13/2022 Anahi Blake PTA GP 1    90603713862 HC GAIT TRAINING EA 15 MIN 12/14/2022  Anahi Blake, PTA  1    37998494771 HC PT THERAPEUTIC ACT EA 15 MIN 12/14/2022 Anahi Blake, JERZY  1          PT G-Codes  Outcome Measure Options: AM-PAC 6 Clicks Basic Mobility (PT)  AM-PAC 6 Clicks Score (PT): 23  AM-PAC 6 Clicks Score (OT): 21    Anahi Blake PTA  12/14/2022

## 2022-12-14 NOTE — DISCHARGE SUMMARY
[facility]         DISCHARGE SUMMARY    Patient Name:  Berna Leong  : 1945  MRN: [id number]    Date of Admission: 2022    Date of Discharge: 22    Primary Care Physician:  Reyna Chacon MD    Admission Diagnosis:  Malignant neoplasm of colon, unspecified part of colon (HCC) [C18.9]  Colon cancer (HCC) [C18.9]    Discharge Diagnosis:    Active Hospital Problems    Diagnosis POA   • **Colon cancer (HCC) [C18.9] Yes      Resolved Hospital Problems    Diagnosis POA   • S/P partial colectomy [Z90.49] Not Applicable       Consultations:  PT and OT    Procedures Performed:    COLON RESECTION LAPAROSCOPIC RIGHT WITH ROBOT  Surgeon:  Abebe Drew MD    Condition at Discharge:  Eating regular diet, Bowel functioning, Benign abdominal exam, Normal vitals      Hospital Course     Hospital Course:  Berna Leong is a 77 y.o. female recently diagnosed with colon cancer when she had a colonoscopy.  Cancer was located at the cecum.  Robotic right colectomy was performed by Dr. Drew on the patient's day of admission.  The surgery went very well and postoperatively the patient had a routine and uneventful recovery.  Her diet was slowly advanced.  By the day of discharge the patient had been on a regular diet for several days.  Her bowels were functioning.  Any pain was well controlled with oral medications.  Patient was ambulating independently.  She was seen by Physical Therapy and Occupational Therapy during her recovery.  By postoperative day #6 the patient was in satisfactory condition for discharge home.    Day of Discharge     Vital Signs:  Temp:  [97.9 °F (36.6 °C)-99 °F (37.2 °C)] 98.2 °F (36.8 °C)  Heart Rate:  [76-89] 89  Resp:  [16-20] 19  BP: (107-131)/(47-58) 107/47  Output by Drain (mL) 22 0701 - 22 1900 22 1901 - 22 0700 22 0701 - 22 1655 Range Total   Patient has no LDAs of requested type attached.     Physical  Exam:  Appears comfortable  Breathing not labored in heart regular rate and rhythm  Abdomen soft, nondistended, appropriate tenderness.  Incisions without any evidence of infection  Moving all extremities normally      Discharge Instructions     Discharge Disposition:  Home or Self Care    Discharge Medications:       Discharge Medications      New Medications      Instructions Start Date   HYDROcodone-acetaminophen 5-325 MG per tablet  Commonly known as: Norco   1 tablet, Oral, Every 6 Hours PRN         Continue These Medications      Instructions Start Date   ascorbic acid 1000 MG tablet  Commonly known as: VITAMIN C   1,000 mg, Oral, Daily      atorvastatin 10 MG tablet  Commonly known as: LIPITOR   10 mg, Oral, Daily      Cholecalciferol 125 MCG (5000 UT) tablet   Oral, Daily      ICAPS PO   1 tablet, Oral, 2 Times Daily      levothyroxine 75 MCG tablet  Commonly known as: SYNTHROID, LEVOTHROID   75 mcg, Oral, Daily, SYNTHROID NAME BRAND ONLY      PEG 3350-KCl-NaBcb-NaCl-NaSulf 227.1 g pack   227.1 g, Oral, Daily      Sodium Sulfate-Mag Sulfate-KCl 1631-181-691 MG tablet   12 tablets, Oral, Daily, Follow instructions given in office. This is a bowel prep for upcoming surgery.      vitamin E 400 UNIT capsule   400 Units, Oral             Diet:  See Dr. Drew's instructions available in the after visit summary.    Discharge Activity:  See Dr. Drew's instructions available in the after visit summary.    Follow-up Appointment(s):   Dr. Drew in 3 weeks.    Other Instructions:  See Dr. Drew's instructions available in the after visit summary.      Abebe Drew MD  12/14/22  16:55 EST    Electronically signed by Abebe Drew MD, 12/14/22, 4:55 PM EST.

## 2022-12-14 NOTE — PLAN OF CARE
Goal Outcome Evaluation:   Patient has been up walking and sitting in chair for most of this shift. Pain medication given for R sided abdominal pain. Pt has had loose bowel movements following MOM yesterday evening. No issues noted at this time.

## 2022-12-14 NOTE — CONSULTS
"Nutrition Services    Patient Name: Berna Leong  YOB: 1945  MRN: [id number]  Admission date: 12/8/2022      CLINICAL NUTRITION ASSESSMENT      Reason for Assessment  LOS     H&P:    Past Medical History:   Diagnosis Date   • Arthritis    • Brown recluse spider bite     HX OF RIGHT FRONT LEG 2004   • Coma due to low thyroid (HCC) 2015   • Glaucoma    • Heart attack (HCC)     72 AND 81. DENIES CP BUT GETS SOA WITH EXERTION. STATES HAS BEEN ISSUE FOR LONG TIME. FOLLOWED BY PCP DR JOSHI. DECREASED ACTIVITY   • Hypothyroidism    • Kidney disease    • Leg pain    • Leg swelling    • Macular degeneration    • Malignant neoplasm of colon (HCC)    • Restless leg syndrome    • SOB (shortness of breath)    • Thyroid disorder    • Wound dehiscence 04/20/2022    Sustained laceration December 2020 1 sutures removed by PCP with dehiscence seen at wound clinicby Dr Laina Bales February 2022 and folowed there--given silver sulfadiazine creme (Silvadene)        Current Problems:   Active Hospital Problems    Diagnosis    • **Colon cancer (HCC)    • S/P partial colectomy         Nutrition/Diet History         Narrative     Pt followed by RD for LOS. Pt s/p colon resection 12/8. PO intake 25-75% x 3 days w/clear liquids and full liquid diet; now 100% w/regular diet since 12/11. Pt  is at low risk per nutrition risk screening. No acute nutrition concerns or interventions at this time. RD will continue to follow and monitor per protocol.       Anthropometrics        Current Height, Weight Height: 157.5 cm (62\")  Weight: 78.6 kg (173 lb 4.5 oz)   Current BMI Body mass index is 31.69 kg/m².       Weight Hx  Wt Readings from Last 30 Encounters:   12/09/22 0100 78.6 kg (173 lb 4.5 oz)   12/08/22 1452 76 kg (167 lb 8.8 oz)   12/05/22 1159 81.6 kg (179 lb 14.3 oz)   11/23/22 0044 81.6 kg (179 lb 14.3 oz)   11/17/22 1036 77.6 kg (171 lb)   11/16/22 0948 78.3 kg (172 lb 9.9 oz)   11/11/22 1451 79.4 kg (175 lb)   11/07/22 1008 " 79.2 kg (174 lb 9.7 oz)   11/02/22 1313 81.6 kg (180 lb)   11/01/22 0815 78 kg (171 lb 15.3 oz)   08/09/22 0909 82.1 kg (181 lb)   07/11/22 0931 84.2 kg (185 lb 9.6 oz)   05/09/22 0916 87.4 kg (192 lb 9.6 oz)   04/21/22 0926 87.3 kg (192 lb 6.4 oz)   03/21/22 1013 86.9 kg (191 lb 9.6 oz)   02/01/22 1301 84.8 kg (187 lb)   01/27/22 1447 85.1 kg (187 lb 11.2 oz)   01/11/22 1132 82.6 kg (182 lb)   01/05/22 1406 82.9 kg (182 lb 11.2 oz)   12/29/21 1144 82.9 kg (182 lb 11.2 oz)   12/20/21 1902 82.5 kg (181 lb 14.1 oz)   12/20/21 1035 80.9 kg (178 lb 5.6 oz)   11/04/21 0808 81 kg (178 lb 9.6 oz)   09/14/21 0736 78.3 kg (172 lb 9.6 oz)   09/07/21 1611 78.6 kg (173 lb 4.8 oz)   09/03/21 1902 78 kg (172 lb)   09/01/21 1359 78.2 kg (172 lb 6.4 oz)   03/30/21 0000 81.2 kg (179 lb)   11/18/20 0000 78.9 kg (174 lb)   09/21/20 0000 79.1 kg (174 lb 7 oz)   07/20/20 0000 80.3 kg (177 lb)   03/02/20 0000 84 kg (185 lb 4 oz)            Wt Change Observation 18.3# wt loss x 9 mos (9.6%) - not significant      Estimated/Assessed Needs       Energy Requirements 25 - 30 kcal/kg adj IBW (57.2 kg)   EST Needs (kcal/day) 1430 - 1716       Protein Requirements 1 - 1.25 g/kg adj IBW   EST Daily Needs (g/day) 57 - 72       Fluid Requirements 30 - 35 mL/kg adj IBW    Estimated Needs (mL/day) 1716 - 2002     Labs/Medications         Pertinent Labs Reviewed.   Results from last 7 days   Lab Units 12/13/22  0431 12/12/22  0509 12/11/22  0322   SODIUM mmol/L 138 139 140   POTASSIUM mmol/L 4.4 5.7* 5.0   CHLORIDE mmol/L 106 108* 108*   CO2 mmol/L 26.2 27.9 26.6   BUN mg/dL 10 10 7*   CREATININE mg/dL 1.03* 1.12* 1.13*   CALCIUM mg/dL 8.3* 8.7 8.5*   GLUCOSE mg/dL 85 95 106*     Results from last 7 days   Lab Units 12/13/22  0431 12/12/22  0509 12/11/22  0322   MAGNESIUM mg/dL 2.0 2.0 1.9   PHOSPHORUS mg/dL 2.7 2.4* 2.1*   HEMOGLOBIN g/dL 9.7* 9.7* 9.8*   HEMATOCRIT % 30.6* 31.1* 30.8*     No results found for: COVID19  Lab Results   Component Value  Date    HGBA1C 5.30 11/14/2022         Pertinent Medications Reviewed.     Current Nutrition Orders & Evaluation of Intake       Oral Nutrition     Current PO Diet Diet: Regular/House Diet; Texture: Regular Texture (IDDSI 7); Fluid Consistency: Thin (IDDSI 0)   Supplement No active supplement orders       Malnutrition Severity Assessment                Nutrition Diagnosis         Nutrition Dx Problem 1 No nutrition diagnosis at this time.       Nutrition Intervention         No intervention indicated.      Medical Nutrition Therapy/Nutrition Education          Learner     Readiness N/A  N/A     Method     Response N/A  N/A     Monitor/Evaluation        Monitor Per protocol.       Nutrition Discharge Plan         No nutrition needs identified at this time.       Electronically signed by:  Morena Dalton RD  12/14/22 11:24 EST

## 2022-12-15 ENCOUNTER — TELEPHONE (OUTPATIENT)
Dept: GASTROENTEROLOGY | Facility: CLINIC | Age: 77
End: 2022-12-15

## 2022-12-15 ENCOUNTER — TRANSITIONAL CARE MANAGEMENT TELEPHONE ENCOUNTER (OUTPATIENT)
Dept: CALL CENTER | Facility: HOSPITAL | Age: 77
End: 2022-12-15

## 2022-12-15 LAB — BACTERIA SPEC AEROBE CULT: ABNORMAL

## 2022-12-15 RX ORDER — LEVOTHYROXINE SODIUM 0.07 MG/1
75 TABLET ORAL DAILY
Qty: 90 TABLET | Refills: 1 | Status: SHIPPED | OUTPATIENT
Start: 2022-12-15

## 2022-12-15 NOTE — OUTREACH NOTE
Call Center TCM Note    Flowsheet Row Responses   [facility] facility patient discharged from? [facility]   Does the patient have one of the following disease processes/diagnoses(primary or secondary)? General Surgery   TCM attempt successful? No   Unsuccessful attempts Attempt 1          Alina Ngo LPN    12/15/2022, 08:18 EST

## 2022-12-15 NOTE — TELEPHONE ENCOUNTER
Caller: LORENZO CORREA    Relationship: Emergency Contact    Best call back number: [phone]    Requested Prescriptions:   Requested Prescriptions     Pending Prescriptions Disp Refills   • levothyroxine (SYNTHROID, LEVOTHROID) 75 MCG tablet 90 tablet 1     Sig: Take 1 tablet by mouth Daily. SYNTHROID NAME BRAND ONLY        Pharmacy where request should be sent: [facility] [place] [address] [phone] [facility] [phone] FX     Additional details provided by patient:     Does the patient have less than a 3 day supply:  [] Yes  [x] No    Would you like a call back once the refill request has been completed: [] Yes [x] No    If the office needs to give you a call back, can they leave a voicemail: [] Yes [x] No    Jacinda Lloyd Rep   12/15/22 13:27 EST

## 2022-12-15 NOTE — TELEPHONE ENCOUNTER
Pt was scheduled for a f/u this morning with MATHEW but did not make it in. It appears patient was in the hospital yesterday. I called to check on patient to see if I could get her r/s'd. No answer. Left message for her to call back.

## 2022-12-15 NOTE — OUTREACH NOTE
Prep Survey    Flowsheet Row Responses   [facility] patient discharged from? [facility]   Is LACE score < 7 ? No   Eligibility [facility]   Date of Admission 12/08/22   Date of Discharge 12/14/22   Discharge Disposition Home or Self Care   Discharge diagnosis COLON RESECTION    Does the patient have one of the following disease processes/diagnoses(primary or secondary)? General Surgery   Does the patient have Home health ordered? No   Is there a DME ordered? No   Prep survey completed? Yes          HUBER SCHNEIDER - Registered Nurse

## 2022-12-16 ENCOUNTER — TRANSITIONAL CARE MANAGEMENT TELEPHONE ENCOUNTER (OUTPATIENT)
Dept: CALL CENTER | Facility: HOSPITAL | Age: 77
End: 2022-12-16

## 2022-12-16 NOTE — OUTREACH NOTE
Call Center TCM Note    Flowsheet Row Responses   [facility] facility patient discharged from? [facility]   Does the patient have one of the following disease processes/diagnoses(primary or secondary)? General Surgery   TCM attempt successful? No  [No verbal release]   Unsuccessful attempts Attempt 3          Marian Colorado RN    12/16/2022, 09:21 EST

## 2022-12-20 ENCOUNTER — TELEPHONE (OUTPATIENT)
Dept: SURGERY | Facility: CLINIC | Age: 77
End: 2022-12-20

## 2022-12-20 DIAGNOSIS — C18.9 MALIGNANT NEOPLASM OF COLON, UNSPECIFIED PART OF COLON: Primary | ICD-10-CM

## 2022-12-20 NOTE — TELEPHONE ENCOUNTER
----- Message from Abebe Drew MD sent at 12/20/2022  8:16 AM EST -----  Please set up a referral for patient to be seen by medical oncology.  Let me know the name of the doctor she will see.  She should be seen sometime in January.  Patient knows we are going to arrange the referral.

## 2022-12-20 NOTE — TELEPHONE ENCOUNTER
Order has been placed. I will follow to make sure appt has been scheduled and verify with patient. [facility] should schedule appt.

## 2022-12-27 ENCOUNTER — TELEPHONE (OUTPATIENT)
Dept: INTERNAL MEDICINE | Facility: CLINIC | Age: 77
End: 2022-12-27

## 2022-12-27 NOTE — TELEPHONE ENCOUNTER
Caller: Berna Leong    Relationship: Self    Best call back number: [phone]    Who are you requesting to speak with (clinical staff, provider,  specific staff member): MEDICAL STAFF    What was the call regarding: PATIENT WOULD LIKE TO ADD AN ORDER TO HER LABS TO CHECK HER BLOOD SUGAR. SHE RECEIVED A CALL FROM DR ERAZO AND WAS TOLD THAT HER BLOOD SUGAR IS HIGH. SHE IS GETTING LABS DONE ON 1/25/23 AND WOULD LIKE TO GET HER BLOOD SUGAR CHECK AS WELL. SHE WAS TOLD TO FOLLOW UP WITH DR JOSHI.

## 2022-12-28 LAB
CYTO UR: NORMAL
LAB AP CASE REPORT: NORMAL
LAB AP CLINICAL INFORMATION: NORMAL
LAB AP DIAGNOSIS COMMENT: NORMAL
LAB AP SYNOPTIC CHECKLIST: NORMAL
PATH REPORT.ADDENDUM SPEC: NORMAL
PATH REPORT.FINAL DX SPEC: NORMAL
PATH REPORT.GROSS SPEC: NORMAL

## 2022-12-29 PROBLEM — C18.9 COLON CANCER: Status: RESOLVED | Noted: 2022-11-09 | Resolved: 2022-12-29

## 2022-12-29 PROBLEM — M19.90 IDIOPATHIC OSTEOARTHRITIS: Status: ACTIVE | Noted: 2022-10-03

## 2022-12-30 ENCOUNTER — CONSULT (OUTPATIENT)
Dept: ONCOLOGY | Facility: HOSPITAL | Age: 77
End: 2022-12-30

## 2022-12-30 VITALS
OXYGEN SATURATION: 99 % | RESPIRATION RATE: 16 BRPM | TEMPERATURE: 98.2 F | BODY MASS INDEX: 30.14 KG/M2 | SYSTOLIC BLOOD PRESSURE: 155 MMHG | DIASTOLIC BLOOD PRESSURE: 75 MMHG | HEIGHT: 62 IN | HEART RATE: 98 BPM | WEIGHT: 163.8 LBS

## 2022-12-30 DIAGNOSIS — C18.0 ADENOCARCINOMA OF CECUM: Primary | ICD-10-CM

## 2022-12-30 PROCEDURE — 99204 OFFICE O/P NEW MOD 45 MIN: CPT | Performed by: INTERNAL MEDICINE

## 2022-12-30 PROCEDURE — G0463 HOSPITAL OUTPT CLINIC VISIT: HCPCS

## 2022-12-30 RX ORDER — MULTIVIT WITH MINERALS/LUTEIN
TABLET ORAL EVERY 24 HOURS
COMMUNITY
End: 2023-03-22

## 2022-12-30 RX ORDER — MULTIVIT-MIN/IRON/FOLIC ACID/K 18-600-40
5000 CAPSULE ORAL EVERY 24 HOURS
COMMUNITY

## 2022-12-30 RX ORDER — ALUMINUM ZIRCONIUM OCTACHLOROHYDREX GLY 16 G/100G
GEL TOPICAL 3 TIMES DAILY
Qty: 1 EACH | Refills: 12 | Status: SHIPPED | OUTPATIENT
Start: 2022-12-30

## 2022-12-30 RX ORDER — AMOXICILLIN 250 MG
1 CAPSULE ORAL DAILY
Qty: 30 TABLET | Refills: 5 | Status: SHIPPED | OUTPATIENT
Start: 2022-12-30 | End: 2023-03-22

## 2022-12-30 NOTE — PROGRESS NOTES
Chief Complaint  Malignant Neoplasm of Colon     Abebe Drew MD Bella-Oropilla, Reyna Crocker MD    Subjective          Berna Leong presents to [facility] for colon cancer    Ms. Leong is a 77-year-old female who presents for new oncology evaluation regarding diagnosis of stage II colon adenocarcinoma. She has as past medical history significant for CAD (MI x 2), hyperthyroid s/p iodine, stage 3 CKD, HTN, vitamin D deficiency. Patient reports having worsening right-sided lower abdominal pain that was so severe she needed to present to ED.  She presented in November 1.  She has known ovarian cyst and thought the pain was related to this.  She had CT abdomen performed that showed a right-sided colon mass.  She underwent colonoscopy in November 7 fungating partially obstructing large mass in the cecum.  She underwent right right-sided colectomy on December 8.  This showed moderately differentiated adenocarcinoma with no LVI.  On 26 lymph nodes were negative for tumor.  Staging PT3P.  Patient feels well overall today.  She does have some issues initiating a bowel movement and has had to use enemas.  She states that she can have regular bowel movements once she passes a hard stool.  She denies any abdominal pain.  She denies any chest pain or trouble breathing.  She denies any fevers, chills, infections.    Oncology/Hematology History Overview Note   11/01/22: CT abdomen/pelvis with contrast obtained due to right flank pain: 6 cm cecal mass concerning for colonic adenocarcinoma.  There is some stranding in the adjacent fat worrisome for tumor infiltration and there are several adjacent mildly enlarged morphologically abnormal lymph nodes concerning for metastasis. 10 mm hypoattenuating lesion in liver segment 4 is unchanged from 2018, likely cyst or hemangioma.      11/07/22: Colonoscopy revealed a fungating partially obstructing large mass in the cecum. Biopsy with  moderately differentiated adenocarcinoma    12/8/22: Right hemicolectomy: adenocarcinoma, mod differentiated, arisign in tubulovillous adenoma with high grade dysplasia, 6 cm. No LVI. All 26 lymph nodes negative for tumor. Staged at pT3pN0. MSI-S     Adenocarcinoma of cecum (HCC)   11/8/2022 Initial Diagnosis    Adenocarcinoma of cecum (HCC)     12/30/2022 Cancer Staged    Staging form: Colon And Rectum, AJCC 8th Edition  - Pathologic: pT3, pN0 - Signed by Artur Aldridge MD on 12/30/2022           Review of Systems   All other systems reviewed and are negative.    Current Outpatient Medications on File Prior to Visit   Medication Sig Dispense Refill   • levothyroxine (SYNTHROID, LEVOTHROID) 75 MCG tablet Take 1 tablet by mouth Daily. SYNTHROID NAME BRAND ONLY 90 tablet 1   • ascorbic acid (VITAMIN C) 1000 MG tablet Take 1,000 mg by mouth Daily.     • ascorbic acid (VITAMIN C) 1000 MG tablet Daily.     • atorvastatin (LIPITOR) 10 MG tablet Take 10 mg by mouth Daily.     • Cholecalciferol (Vitamin D) 50 MCG (2000 UT) capsule Daily.     • Cholecalciferol 125 MCG (5000 UT) tablet Take  by mouth Daily.     • HYDROcodone-acetaminophen (Norco) 5-325 MG per tablet Take 1 tablet by mouth Every 6 (Six) Hours As Needed for Moderate Pain (NOTE: You can take two tablets instead of one tablet every four hours instead of every six hours if needed for pain). 15 tablet 0   • Multiple Vitamins-Minerals (ICAPS PO) Take 1 tablet by mouth 2 (Two) Times a Day.     • PEG 3350-KCl-NaBcb-NaCl-NaSulf 227.1 g pack Take 227.1 g by mouth Daily. 227.1 each 0   • Sodium Sulfate-Mag Sulfate-KCl 9288-916-038 MG tablet Take 12 tablets by mouth Daily. Follow instructions given in office. This is a bowel prep for upcoming surgery. 24 tablet 0   • vitamin E 400 UNIT capsule Take 400 Units by mouth.       No current facility-administered medications on file prior to visit.       Allergies   Allergen Reactions   • Penicillins Rash   • Sulfa  Antibiotics Rash     Past Medical History:   Diagnosis Date   • Arthritis    • Brown recluse spider bite     HX OF RIGHT FRONT LEG 2004   • Coma due to low thyroid (HCC) 2015   • Glaucoma    • Heart attack (HCC)     72 AND 81. DENIES CP BUT GETS SOA WITH EXERTION. STATES HAS BEEN ISSUE FOR LONG TIME. FOLLOWED BY PCP DR JOSHI. DECREASED ACTIVITY   • Hypothyroidism    • Kidney disease    • Leg pain    • Leg swelling    • Macular degeneration    • Malignant neoplasm of colon (HCC)    • Restless leg syndrome    • SOB (shortness of breath)    • Thyroid disorder    • Wound dehiscence 04/20/2022    Sustained laceration December 2020 1 sutures removed by PCP with dehiscence seen at wound clinicby Dr Laina Bales February 2022 and folowed there--given silver sulfadiazine creme (Silvadene)     Past Surgical History:   Procedure Laterality Date   • COLON RESECTION N/A 12/8/2022    Procedure: COLON RESECTION LAPAROSCOPIC RIGHT WITH ROBOT;  Surgeon: Abebe Drew MD;  Location: [facility] MAIN OR;  Service: Robotics - DaVinci;  Laterality: N/A;   • COLONOSCOPY N/A 11/07/2022    Procedure: COLONOSCOPY, WITH bx;  Surgeon: Jimmie Zacarias MD;  Location: [facility] ENDOSCOPY;  Service: Gastroenterology;  Laterality: N/A;  CECUM MASS   • DILATATION AND CURETTAGE  2018   • KNEE SURGERY Right 2008    JOINT SURGERY     Social History     Socioeconomic History   • Marital status: Single   Tobacco Use   • Smoking status: Never   • Smokeless tobacco: Never   Vaping Use   • Vaping Use: Never used   Substance and Sexual Activity   • Alcohol use: Never   • Drug use: Never   • Sexual activity: Defer     Family History   Problem Relation Age of Onset   • Heart disease Mother    • Arthritis Mother    • Heart disease Father    • Cancer Brother    • Stomach cancer Maternal Aunt    • Arthritis Other    • Cancer Other    • Heart disease Other    • Colon cancer Neg Hx    • Malig Hyperthermia Neg Hx        Objective   Physical Exam  Well appearing  "patient in no acute distress on RA  Anicteric sclera, no rash on exposed skin  Respirations non-labored  Awake, alert, and oriented x 4. Speech intact. No gross neurologic deficit  Appropriate mood and affect    Vitals:    12/30/22 1432   Resp: 16   Weight: 74.3 kg (163 lb 12.8 oz)   Height: 157.5 cm (62.01\")   PainSc: 0-No pain     ECOG score: 0         PHQ-9 Total Score:                      Result Review :   The following data was reviewed by: Artur Aldridge MD on 12/30/2022:  Lab Results   Component Value Date    HGB 9.7 (L) 12/13/2022    HCT 30.6 (L) 12/13/2022    MCV 89.5 12/13/2022     12/13/2022    WBC 10.70 12/13/2022    NEUTROABS 9.45 (H) 11/23/2022    LYMPHSABS 1.72 11/23/2022    MONOSABS 0.48 11/23/2022    EOSABS 0.02 11/23/2022    BASOSABS 0.07 11/23/2022     Lab Results   Component Value Date    GLUCOSE 85 12/13/2022    BUN 10 12/13/2022    CREATININE 1.03 (H) 12/13/2022     12/13/2022    K 4.4 12/13/2022     12/13/2022    CO2 26.2 12/13/2022    CALCIUM 8.3 (L) 12/13/2022    PROTEINTOT 7.4 11/23/2022    ALBUMIN 4.10 11/23/2022    BILITOT 0.6 11/23/2022    ALKPHOS 101 11/23/2022    AST 26 11/23/2022    ALT 15 11/23/2022     Lab Results   Component Value Date    MG 2.0 12/13/2022    PHOS 2.7 12/13/2022    FREET4 1.63 11/14/2022    TSH 4.670 (H) 11/14/2022     Labs personally reviewed       11/1/22 ED note personally reviewed    12/14/22 Discharge summary personally reviewed    11/1/22 CT abdomen/pelvis personally reviewed and showed 6 cm cecal mass.     Pathology reviewed    Final Diagnosis   Right colon, right hemicolectomy:               - Adenocarcinoma, moderately differentiated, arising in a tubulovillous adenoma with high grade dysplasia               - Unremarkable appendix               - See synoptic checklist   Electronically signed by Donal Munoz MD on 12/19/2022 at 0957   Synoptic Checklist   COLON AND RECTUM: Resection, Including Transanal Disk Excision of Rectal " Neoplasms  8th Edition - Protocol posted: 6/22/2022  COLON AND RECTUM: RESECTION - All Specimens  SPECIMEN   Procedure  Right hemicolectomy    TUMOR   Tumor Site  Ileocecal valve    Histologic Type  Adenocarcinoma    Histologic Grade  G2, moderately differentiated    Tumor Size  Greatest dimension (Centimeters): 6 cm   Tumor Extent  Invades through muscularis propria into the pericolonic or perirectal tissue    Macroscopic Tumor Perforation  Not identified    Lymphovascular Invasion  Not identified    Perineural Invasion  Not identified    Treatment Effect  No known presurgical therapy    MARGINS   Margin Status for Invasive Carcinoma  All margins negative for invasive carcinoma    Margin Status for Non-Invasive Tumor  All margins negative for high-grade dysplasia / intramucosal carcinoma and low-grade dysplasia    REGIONAL LYMPH NODES   Regional Lymph Node Status  All regional lymph nodes negative for tumor    Number of Lymph Nodes Examined  26    Tumor Deposits  Not identified    PATHOLOGIC STAGE CLASSIFICATION (pTNM, AJCC 8th Edition)   Reporting of pT, pN, and (when applicable) pM categories is based on information available to the pathologist at the time the report is issued. As per the AJCC (Chapter 1, 8th Ed.) it is the managing physician's responsibility to establish the final pathologic stage based upon all pertinent information, including but potentially not limited to this pathology report.   pT Category  pT3    pN Category  pN0    .        MSI-S       Assessment and Plan    Diagnoses and all orders for this visit:    1. Adenocarcinoma of cecum (HCC) (Primary)  -     CEA; Future  -     CBC & Differential; Future  -     Comprehensive Metabolic Panel; Future  -     CT chest w contrast; Future  -     CT abdomen pelvis w contrast; Future    Other orders  -     sennosides-docusate (senna-docusate sodium) 8.6-50 MG per tablet; Take 1 tablet by mouth Daily.  Dispense: 30 tablet; Refill: 5  -     psyllium  (Metamucil Smooth Texture) 58.6 % powder; Take  by mouth 3 (Three) Times a Day.  Dispense: 1 each; Refill: 12    Ms. Dang with low risk stage II colon cancer, MSI-S s/p right hemicolectomy on 12/8/22 with 0/26 lymph nodes involved and negative margins; pT3pN0 disease. As patient does NOT have LVI, PNI, T4 disease, positive margins, poorly differentiated disease, bowel obstruction/perforation, and <12 lymph nodes examined, patient with low risk stage II disease and therefore adjuvant chemotherapy is not recommended. Discussed this with patient. Will therefore proceed with surveillance. Plan to repeat CT imaging with contrast in 6 months (every 6-12 months for 5 years). Plan to get CEA in 6 months (every 6 months for 5 years). Colonoscopy due 1 year after surgery.     This is an acute or chronic illness that poses a threat to life or bodily function.    The patient was seen and examined. Work by the provider also included review and/or ordering of lab tests, review and/or ordering of radiology tests, review and/or ordering of medicine tests, discussion with other physicians or providers, independent review of data, obtaining old records, review/summation of old records, and/or other review.     I have reviewed the family history, social history, and past medical history for this patient. Previous information and data has been reviewed and updated as needed. I have reviewed and verified the chief complaint, history, and other documentation. The patient was interviewed and examined at the bedside and the chart reviewed. The previous observations, recommendations, and conclusions were reviewed including those of other providers.     The plan was discussed with the patient and/or family. The patient was given time to ask questions and these questions were answered. At the conclusion of their visit they had no additional questions or concerns and all questions were answered to their satisfaction.    Constipation  Daily  metamucil and doc/senna ordered.           Patient Follow Up: 6 months with scans  Patient was given instructions and counseling regarding her condition or for health maintenance advice. Please see specific information pulled into the AVS if appropriate.

## 2023-01-06 ENCOUNTER — OFFICE VISIT (OUTPATIENT)
Dept: SURGERY | Facility: CLINIC | Age: 78
End: 2023-01-06
Payer: MEDICARE

## 2023-01-06 VITALS — BODY MASS INDEX: 30 KG/M2 | RESPIRATION RATE: 16 BRPM | HEIGHT: 62 IN | WEIGHT: 163 LBS

## 2023-01-06 DIAGNOSIS — Z09 ENCOUNTER FOR FOLLOW-UP: Primary | ICD-10-CM

## 2023-01-06 PROCEDURE — 99024 POSTOP FOLLOW-UP VISIT: CPT | Performed by: SURGERY

## 2023-01-06 NOTE — PROGRESS NOTES
Patient is here for another follow-up appointment after robotic right colectomy for cancer at her cecum.  She is already been seen by medical oncology and it was determined that chemotherapy is not needed.  Patient continues to do well in her recovery.  She did not have any concerning issues.  She seems pleased with her progress thus far.  Abdominal exam is benign.  My assessment is the patient has recovered satisfactorily from her colon cancer surgery.  She seems pleased with her progress thus far.  She will follow-up with medical oncology per direction given to her during her clinic visit at medical oncology.  She will get a colonoscopy in 1 year by Dr. Zacarias.  Patient will see me PRN.

## 2023-01-11 ENCOUNTER — OFFICE VISIT (OUTPATIENT)
Dept: PODIATRY | Facility: CLINIC | Age: 78
End: 2023-01-11
Payer: MEDICARE

## 2023-01-11 VITALS
HEIGHT: 62 IN | HEART RATE: 97 BPM | WEIGHT: 167 LBS | OXYGEN SATURATION: 99 % | SYSTOLIC BLOOD PRESSURE: 123 MMHG | TEMPERATURE: 97.1 F | DIASTOLIC BLOOD PRESSURE: 85 MMHG | BODY MASS INDEX: 30.73 KG/M2

## 2023-01-11 DIAGNOSIS — M20.41 HAMMER TOES OF BOTH FEET: ICD-10-CM

## 2023-01-11 DIAGNOSIS — M20.42 HAMMER TOES OF BOTH FEET: ICD-10-CM

## 2023-01-11 DIAGNOSIS — M79.671 PAIN IN BOTH FEET: ICD-10-CM

## 2023-01-11 DIAGNOSIS — M79.672 PAIN IN BOTH FEET: ICD-10-CM

## 2023-01-11 DIAGNOSIS — B35.1 ONYCHOMYCOSIS: Primary | ICD-10-CM

## 2023-01-11 PROCEDURE — 11721 DEBRIDE NAIL 6 OR MORE: CPT | Performed by: PODIATRIST

## 2023-01-11 PROCEDURE — 99203 OFFICE O/P NEW LOW 30 MIN: CPT | Performed by: PODIATRIST

## 2023-01-11 NOTE — PROGRESS NOTES
[facility] - PODIATRY    Today's Date: 01/11/23    Patient Name: Berna Leong  MRN: [id number]  CSN: [id number]  PCP: Reyna Chacon MD,   Referring Provider: Referring, Self    SUBJECTIVE     Chief Complaint   Patient presents with   • Left Foot - Establish Care, Nail Problem   • Right Foot - Establish Care, Nail Problem     HPI: Berna Leong, a 77 y.o.female, presents to clinic.    Patient is a 77-year-old female presenting with bilateral foot pain patient states her toes hurt bilaterally.  Patient has hammertoes and painful toenails.  She is been treated in the past by a podiatrist and would like to switch to this practice.    Past Medical History:   Diagnosis Date   • Arthritis    • Brown recluse spider bite     HX OF RIGHT FRONT LEG 2004   • Coma due to low thyroid (HCC) 2015   • Glaucoma    • Heart attack (HCC)     72 AND 81. DENIES CP BUT GETS SOA WITH EXERTION. STATES HAS BEEN ISSUE FOR LONG TIME. FOLLOWED BY PCP DR JOSHI. DECREASED ACTIVITY   • Hypothyroidism    • Kidney disease    • Leg pain    • Leg swelling    • Macular degeneration    • Malignant neoplasm of colon (HCC)    • Restless leg syndrome    • SOB (shortness of breath)    • Thyroid disorder    • Wound dehiscence 04/20/2022    Sustained laceration December 2020 1 sutures removed by PCP with dehiscence seen at wound clinicby Dr Laina Bales February 2022 and folowed there--given silver sulfadiazine creme (Silvadene)     Past Surgical History:   Procedure Laterality Date   • COLON RESECTION N/A 12/8/2022    Procedure: COLON RESECTION LAPAROSCOPIC RIGHT WITH ROBOT;  Surgeon: Abebe Drew MD;  Location: [facility] MAIN OR;  Service: Robotics - DaVinci;  Laterality: N/A;   • COLONOSCOPY N/A 11/07/2022    Procedure: COLONOSCOPY, WITH bx;  Surgeon: Jimmie Zacarias MD;  Location: [facility] ENDOSCOPY;  Service: Gastroenterology;  Laterality: N/A;  CECUM MASS   • DILATATION AND CURETTAGE  2018   • KNEE SURGERY Right  2008    JOINT SURGERY     Family History   Problem Relation Age of Onset   • Heart disease Mother    • Arthritis Mother    • Heart disease Father    • Cancer Brother    • Stomach cancer Maternal Aunt    • Arthritis Other    • Cancer Other    • Heart disease Other    • Colon cancer Neg Hx    • Malig Hyperthermia Neg Hx      Social History     Socioeconomic History   • Marital status: Single   Tobacco Use   • Smoking status: Never   • Smokeless tobacco: Never   Vaping Use   • Vaping Use: Never used   Substance and Sexual Activity   • Alcohol use: Never   • Drug use: Never   • Sexual activity: Defer     Allergies   Allergen Reactions   • Penicillins Rash   • Sulfa Antibiotics Rash     Current Outpatient Medications   Medication Sig Dispense Refill   • ascorbic acid (VITAMIN C) 1000 MG tablet Take 1,000 mg by mouth Daily.     • ascorbic acid (VITAMIN C) 1000 MG tablet Daily.     • atorvastatin (LIPITOR) 10 MG tablet Take 10 mg by mouth Daily.     • Cholecalciferol (Vitamin D) 50 MCG (2000 UT) capsule Daily.     • Cholecalciferol 125 MCG (5000 UT) tablet Take  by mouth Daily.     • levothyroxine (SYNTHROID, LEVOTHROID) 75 MCG tablet Take 1 tablet by mouth Daily. SYNTHROID NAME BRAND ONLY 90 tablet 1   • Multiple Vitamins-Minerals (ICAPS PO) Take 1 tablet by mouth 2 (Two) Times a Day.     • PEG 3350-KCl-NaBcb-NaCl-NaSulf 227.1 g pack Take 227.1 g by mouth Daily. 227.1 each 0   • psyllium (Metamucil Smooth Texture) 58.6 % powder Take  by mouth 3 (Three) Times a Day. 1 each 12   • sennosides-docusate (senna-docusate sodium) 8.6-50 MG per tablet Take 1 tablet by mouth Daily. 30 tablet 5   • Sodium Sulfate-Mag Sulfate-KCl 3010-864-818 MG tablet Take 12 tablets by mouth Daily. Follow instructions given in office. This is a bowel prep for upcoming surgery. 24 tablet 0   • vitamin E 400 UNIT capsule Take 400 Units by mouth.     • HYDROcodone-acetaminophen (Norco) 5-325 MG per tablet Take 1 tablet by mouth Every 6 (Six) Hours As  Needed for Moderate Pain (NOTE: You can take two tablets instead of one tablet every four hours instead of every six hours if needed for pain). 15 tablet 0     No current facility-administered medications for this visit.     Review of Systems   Skin:        Painful toenails       OBJECTIVE     Vitals:    01/11/23 0856   BP: 123/85   Pulse: 97   Temp: 97.1 °F (36.2 °C)   SpO2: 99%       WBC   Date Value Ref Range Status   12/13/2022 10.70 3.40 - 10.80 10*3/mm3 Final     RBC   Date Value Ref Range Status   12/13/2022 3.42 (L) 3.77 - 5.28 10*6/mm3 Final     Hemoglobin   Date Value Ref Range Status   12/13/2022 9.7 (L) 12.0 - 15.9 g/dL Final     Hematocrit   Date Value Ref Range Status   12/13/2022 30.6 (L) 34.0 - 46.6 % Final     MCV   Date Value Ref Range Status   12/13/2022 89.5 79.0 - 97.0 fL Final     MCH   Date Value Ref Range Status   12/13/2022 28.4 26.6 - 33.0 pg Final     MCHC   Date Value Ref Range Status   12/13/2022 31.7 31.5 - 35.7 g/dL Final     RDW   Date Value Ref Range Status   12/13/2022 13.6 12.3 - 15.4 % Final     RDW-SD   Date Value Ref Range Status   12/13/2022 44.6 37.0 - 54.0 fl Final     MPV   Date Value Ref Range Status   12/13/2022 11.0 6.0 - 12.0 fL Final     Platelets   Date Value Ref Range Status   12/13/2022 209 140 - 450 10*3/mm3 Final     Neutrophil %   Date Value Ref Range Status   11/23/2022 80.0 (H) 42.7 - 76.0 % Final     Lymphocyte %   Date Value Ref Range Status   11/23/2022 14.6 (L) 19.6 - 45.3 % Final     Monocyte %   Date Value Ref Range Status   11/23/2022 4.1 (L) 5.0 - 12.0 % Final     Eosinophil %   Date Value Ref Range Status   11/23/2022 0.2 (L) 0.3 - 6.2 % Final     Basophil %   Date Value Ref Range Status   11/23/2022 0.6 0.0 - 1.5 % Final     Immature Grans %   Date Value Ref Range Status   11/23/2022 0.5 0.0 - 0.5 % Final     Neutrophils, Absolute   Date Value Ref Range Status   11/23/2022 9.45 (H) 1.70 - 7.00 10*3/mm3 Final     Lymphocytes, Absolute   Date Value Ref  Range Status   11/23/2022 1.72 0.70 - 3.10 10*3/mm3 Final     Monocytes, Absolute   Date Value Ref Range Status   11/23/2022 0.48 0.10 - 0.90 10*3/mm3 Final     Eosinophils, Absolute   Date Value Ref Range Status   11/23/2022 0.02 0.00 - 0.40 10*3/mm3 Final     Basophils, Absolute   Date Value Ref Range Status   11/23/2022 0.07 0.00 - 0.20 10*3/mm3 Final     Immature Grans, Absolute   Date Value Ref Range Status   11/23/2022 0.06 (H) 0.00 - 0.05 10*3/mm3 Final     nRBC   Date Value Ref Range Status   11/23/2022 0.0 0.0 - 0.2 /100 WBC Final         Lab Results   Component Value Date    GLUCOSE 85 12/13/2022    BUN 10 12/13/2022    CREATININE 1.03 (H) 12/13/2022    EGFRIFNONA 53 (L) 01/28/2022    BCR 9.7 12/13/2022    K 4.4 12/13/2022    CO2 26.2 12/13/2022    CALCIUM 8.3 (L) 12/13/2022    ALBUMIN 4.10 11/23/2022    LABIL2 1.1 (L) 03/24/2021    AST 26 11/23/2022    ALT 15 11/23/2022       Patient seen in no apparent distress.      PHYSICAL EXAM:     Foot/Ankle Exam:       General:   Appearance: appears stated age and healthy    Orientation: AAOx3    Affect: appropriate    Gait: unimpaired    Shoe Gear:  Casual shoes    VASCULAR      Right Foot Vascularity   Normal vascular exam    Dorsalis pedis:  2+  Posterior tibial:  2+  Skin Temperature: warm    Edema Grading:  None  CFT:  < 3 seconds  Pedal Hair Growth:  Present  Varicosities: none       Left Foot Vascularity   Normal vascular exam    Dorsalis pedis:  2+  Posterior tibial:  2+  Skin Temperature: warm    Edema Grading:  None  CFT:  < 3 seconds  Pedal Hair Growth:  Present  Varicosities: none        NEUROLOGIC     Right Foot Neurologic   Normal sensation    Light touch sensation:  Normal  Vibratory sensation:  Normal  Hot/Cold sensation: normal    Protective Sensation using [place] Monofilament:  10     Left Foot Neurologic   Normal sensation    Light touch sensation:  Normal  Vibratory sensation:  Normal  Hot/cold sensation: normal    Protective Sensation  using [place]-Ignacia Monofilament:  10     MUSCULOSKELETAL      Right Foot Musculoskeletal   Hammertoe:  Second toe, third toe, fourth toe and fifth toe     MUSCLE STRENGTH     Right Foot Muscle Strength   Foot dorsiflexion:  4  Foot plantar flexion:  4  Foot inversion:  4  Foot eversion:  4     Left Foot Muscle Strength   Foot dorsiflexion:  4  Foot plantar flexion:  4  Foot inversion:  4  Foot eversion:  4     RANGE OF MOTION      Right Foot Range of Motion   Foot and ankle ROM within normal limits       Left Foot Range of Motion   Foot and ankle ROM within normal limits       DERMATOLOGIC     Right Foot Dermatologic   Skin: skin intact    Nails: onychomycosis, abnormally thick, subungual debris, dystrophic nails and ingrown toenail    Nails comment:  Toenails 1, 2, 3, 4, and 5     Left Foot Dermatologic   Skin: skin intact    Nails: onychomycosis, abnormally thick, subungual debris, dystrophic nails and ingrown toenail    Nails comment:  Toenails 1, 2, 3, 4, and 5      RADIOLOGY:          No results found.    ASSESSMENT/PLAN     Diagnoses and all orders for this visit:    1. Onychomycosis (Primary)    2. Hammer toes of both feet    3. Pain in both feet        Toenails 1, 2, 3, 4, 5 on Right and 1, 2, 3, 4, 5 on Left were debrided with nail nippers then filed with a Johannmel nail rashawn.  Patient tolerated procedure well without complications.    Discussed treatment of hammertoes including offloading, shoe gear modification, and hammertoe pads.     Comprehensive lower extremity examination and evaluation was performed.    Discussed findings and treatment plan including risks, benefits, and treatment options with patient in detail. Patient agreed with treatment plan.    Medications and allergies reviewed.  Reviewed available lab values along with other pertinent labs.  These were discussed with the patient.    An After Visit Summary was printed and given to the patient at discharge, including (if requested) any  available informative/educational handouts regarding diagnosis, treatment, or medications. All questions were answered to patient/family satisfaction. Should symptoms fail to improve or worsen they agree to call or return to clinic or to go to the Emergency Department. Discussed the importance of following up with any needed screening tests/labs/specialist appointments and any requested follow-up recommended by me today. Importance of maintaining follow-up discussed and patient accepts that missed appointments can delay diagnosis and potentially lead to worsening of conditions.    No follow-ups on file., or sooner if acute issues arise.    This document has been electronically signed by Roni Catalan DPM on January 11, 2023 09:26 EST

## 2023-01-19 ENCOUNTER — PATIENT OUTREACH (OUTPATIENT)
Dept: ONCOLOGY | Facility: HOSPITAL | Age: 78
End: 2023-01-19
Payer: MEDICARE

## 2023-02-03 ENCOUNTER — TELEPHONE (OUTPATIENT)
Dept: GASTROENTEROLOGY | Facility: CLINIC | Age: 78
End: 2023-02-03
Payer: MEDICARE

## 2023-02-03 NOTE — TELEPHONE ENCOUNTER
Patient called Dr Lewis's office and left a message about wanting to schedule an appointment. Upon looking at patient chart this patient is established with Dr Zacarias and has a scheduled follow up with Isadora Holliday on 02/21/2023.      Returned patient's voicemail and informed her that since she is established with Dr Zacarias's office I would not be able to make an appointment with out an approved transfer of care. Also that at this time Dr Lewis is not taking any new patients and our office next available was into Aug.

## 2023-02-08 ENCOUNTER — PATIENT OUTREACH (OUTPATIENT)
Dept: ONCOLOGY | Facility: HOSPITAL | Age: 78
End: 2023-02-08
Payer: MEDICARE

## 2023-02-08 NOTE — SIGNIFICANT NOTE
Called patient to discuss Survivorship.  Scheduled patient for clinic.  Appointment time and date given to patient over the phone.

## 2023-02-10 ENCOUNTER — OFFICE VISIT (OUTPATIENT)
Dept: SURGERY | Facility: CLINIC | Age: 78
End: 2023-02-10
Payer: MEDICARE

## 2023-02-10 VITALS — HEIGHT: 62 IN | WEIGHT: 159 LBS | RESPIRATION RATE: 16 BRPM | BODY MASS INDEX: 29.26 KG/M2

## 2023-02-10 DIAGNOSIS — Z09 ENCOUNTER FOR FOLLOW-UP: Primary | ICD-10-CM

## 2023-02-10 PROCEDURE — 99024 POSTOP FOLLOW-UP VISIT: CPT | Performed by: SURGERY

## 2023-02-10 NOTE — PROGRESS NOTES
Patient is known to me as I performed robotic right colectomy on 12/8/2022 for colon cancer.  It was determined after visiting with medical oncology the chemotherapy was not needed.  I last saw the patient for an office visit on 1/6/2023.    The patient wanted to see me today because she is having some right lower quadrant abdominal pain.  She can eat food without difficulty.  The pain comes and goes and is not associated with anything in particular.    Patient looks good on exam today.  Abdominal exam is unremarkable.  She has some tenderness at her right lower quadrant area but there is no detectable mass, hernia, or any abnormality.    There is no detectable abnormality today.  Patient will be getting a CT scan 6 months after surgery for routine cancer surveillance per medical oncology.  I told her unless the pain gets significantly worse to just wait until that CT scan is done as it will be a good way to evaluate her symptoms.

## 2023-02-21 ENCOUNTER — OFFICE VISIT (OUTPATIENT)
Dept: GASTROENTEROLOGY | Facility: CLINIC | Age: 78
End: 2023-02-21
Payer: MEDICARE

## 2023-02-21 VITALS
BODY MASS INDEX: 29.85 KG/M2 | SYSTOLIC BLOOD PRESSURE: 137 MMHG | DIASTOLIC BLOOD PRESSURE: 69 MMHG | WEIGHT: 162.2 LBS | HEIGHT: 62 IN | HEART RATE: 94 BPM

## 2023-02-21 DIAGNOSIS — C18.0 ADENOCARCINOMA OF CECUM: Primary | ICD-10-CM

## 2023-02-21 DIAGNOSIS — K59.04 CHRONIC IDIOPATHIC CONSTIPATION: ICD-10-CM

## 2023-02-21 PROCEDURE — 99213 OFFICE O/P EST LOW 20 MIN: CPT | Performed by: NURSE PRACTITIONER

## 2023-02-21 NOTE — PROGRESS NOTES
"Patient Name: Berna Leong   Visit Date: 02/21/2023   Patient ID: [id number]  Provider: DONNY Weston    Sex: female  Location:  Location Address:  Location Phone: [phone] [place] [address] [phone]    YOB: 1945  Age: 78 y.o.   Primary Care Provider Reyna Chacon MD      Referring Provider: No ref. provider found        Chief Complaint  Colonoscopy (Follow up ) and Abdominal Pain (LRQ ABD pain )    History of Present Illness    Patient initially came to us in November from ER referral for acute abdominal pain and CT scan showed cecal mass.  Patient had no history of screening colonoscopy.    Colonoscopy 11/7/2022:  partially obstructing mass in the cecum consistent with malignancy-biopsy with adenocarcinoma arising in a tubulovillous adenoma with high-grade dysplasia.  Patient underwent laparoscopic right colon resection with Dr. Drew 12/8/2022, classified as stage II Colon cancer; per oncology she did not require adjuvant chemotherapy. Patient was seen by Dr. Peters, oncologist.  CT scheduled in June 2023    Pt states she's had some RLQ discomfort since surgery.  She has had f/u w Dr Drew and he felt sx were expected and normal from surgery. Pt states \"it's not like it was.\"  Pt states sometimes she feels constipated, [place] #4-5, pt states she can have some difficulty getting BM started. Has a script for Metamucil she plans to start today. She is eating regularly without difficulty. She is trying to increase fiber in diet.     Past Medical History:   Diagnosis Date   • Arthritis    • Brown recluse spider bite     HX OF RIGHT FRONT LEG 2004   • Coma due to low thyroid (HCC) 2015   • Glaucoma    • Heart attack (HCC)     72 AND 81. DENIES CP BUT GETS SOA WITH EXERTION. STATES HAS BEEN ISSUE FOR LONG TIME. FOLLOWED BY PCP DR JOSHI. DECREASED ACTIVITY   • Hypothyroidism    • Kidney disease    • Leg pain    • Leg swelling    • Macular degeneration    • " "Malignant neoplasm of colon (HCC)    • Restless leg syndrome    • SOB (shortness of breath)    • Thyroid disorder    • Wound dehiscence 04/20/2022    Sustained laceration December 2020 1 sutures removed by PCP with dehiscence seen at wound clinicby Dr Laina Bales February 2022 and folowed there--given silver sulfadiazine creme (Silvadene)       Past Surgical History:   Procedure Laterality Date   • COLON RESECTION N/A 12/8/2022    Procedure: COLON RESECTION LAPAROSCOPIC RIGHT WITH ROBOT;  Surgeon: Abebe Drew MD;  Location: [facility] MAIN OR;  Service: Robotics - DaVinci;  Laterality: N/A;   • COLONOSCOPY N/A 11/07/2022    Procedure: COLONOSCOPY, WITH bx;  Surgeon: Jimmie Zacarias MD;  Location: [facility] ENDOSCOPY;  Service: Gastroenterology;  Laterality: N/A;  CECUM MASS   • DILATATION AND CURETTAGE  2018   • KNEE SURGERY Right 2008    JOINT SURGERY       Allergies   Allergen Reactions   • Penicillins Rash   • Sulfa Antibiotics Rash       Family History   Problem Relation Age of Onset   • Heart disease Mother    • Arthritis Mother    • Heart disease Father    • Cancer Brother    • Stomach cancer Maternal Aunt    • Arthritis Other    • Cancer Other    • Heart disease Other    • Colon cancer Neg Hx    • Malig Hyperthermia Neg Hx         Social History     Tobacco Use   • Smoking status: Never   • Smokeless tobacco: Never   Vaping Use   • Vaping Use: Never used   Substance Use Topics   • Alcohol use: Never   • Drug use: Never       Objective     Vital Signs:   /69 (BP Location: Left arm, Patient Position: Sitting, Cuff Size: Adult)   Pulse 94   Ht 157.5 cm (62\")   Wt 73.6 kg (162 lb 3.2 oz)   BMI 29.67 kg/m²       Physical Exam  Constitutional:       General: The patient is not in acute distress.     Appearance: Normal appearance.   HENT:      Head: Normocephalic and atraumatic.      Nose: Nose normal.   Pulmonary:      Effort: Pulmonary effort is normal. No respiratory distress.   Abdominal:      " General: Abdomen is flat.      Palpations: Abdomen is soft. There is no mass.      Tenderness: There is no abdominal tenderness  X mild tenderness in RLQ. There is no guarding.   Musculoskeletal:      Cervical back: Neck supple.      Right lower leg: No edema.      Left lower leg: No edema.   Skin:     General: Skin is warm and dry.   Neurological:      General: No focal deficit present.      Mental Status: The patient is alert and oriented to person, place, and time.      Gait: Gait normal.   Psychiatric:         Mood and Affect: Mood normal.         Speech: Speech normal.         Behavior: Behavior normal.         Thought Content: Thought content normal.     Result Review :   The following data was reviewed by: DONNY Weston on 02/21/2023:    CBC w/diff    CBC w/Diff 12/11/22 12/12/22 12/13/22   WBC 12.59 (A) 13.60 (A) 10.70   RBC 3.42 (A) 3.45 (A) 3.42 (A)   Hemoglobin 9.8 (A) 9.7 (A) 9.7 (A)   Hematocrit 30.8 (A) 31.1 (A) 30.6 (A)   MCV 90.1 90.1 89.5   MCH 28.7 28.1 28.4   MCHC 31.8 31.2 (A) 31.7   RDW 14.1 13.7 13.6   Platelets 203 198 209   (A) Abnormal value            CMP    CMP 12/11/22 12/12/22 12/13/22   Glucose 106 (A) 95 85   BUN 7 (A) 10 10   Creatinine 1.13 (A) 1.12 (A) 1.03 (A)   eGFR 49.6 (A) 50.1 (A) 55.4 (A)   Sodium 140 139 138   Potassium 5.0 5.7 (A) 4.4   Chloride 108 (A) 108 (A) 106   Calcium 8.5 (A) 8.7 8.3 (A)   BUN/Creatinine Ratio 6.2 (A) 8.9 9.7   Anion Gap 5.4 3.1 (A) 5.8   (A) Abnormal value       Comments are available for some flowsheets but are not being displayed.                         Assessment and Plan    Diagnoses and all orders for this visit:    1. Adenocarcinoma of cecum (HCC) (Primary)  Comments:  s/p colon resection w Dr Drew, has had RLQ pain post op, but gradually improving    2. Chronic idiopathic constipation  Comments:  mild              Follow Up   Return if symptoms worsen or fail to improve.   I advised pt to continue to monitor symptoms, she  reports improving but has been gradual.   If symptoms worsen requested she call us back.  Recommended Benefiber or Metamucil w Colace (stool softener) OTC  Repeat colon Dec 2023.     Patient was given instructions and counseling regarding her condition or for health maintenance advice. Please see specific information pulled into the AVS if appropriate.

## 2023-03-10 ENCOUNTER — TELEPHONE (OUTPATIENT)
Dept: SURGERY | Facility: CLINIC | Age: 78
End: 2023-03-10
Payer: MEDICARE

## 2023-03-10 NOTE — TELEPHONE ENCOUNTER
Patient called and said that she had surgery with Dr. Drew on 12/08/23, and that she had her surgery f/u on 01/06/23.    She said she was in extreme pain and called herself and scheduled an appointment to see Dr. Drew on 02/10/23.  She said the pain was related to surgery.    She said that she got a letter that Medicare didn't pay the appointment on 02/10/23, because it was for a f/u.  She said it wasn't a f/u, since she called herself and schedule the appointment.  She said it should have been a new problem appointment.    She said it should have been coded as a new problem appointment.  She said that it needs to be re-coded, and submitted again.      She said Dr. Drew didn't get paid for this appointment, and it is cheating him out of money.    She called billing, and said she was told to call the office.

## 2023-03-22 ENCOUNTER — OFFICE VISIT (OUTPATIENT)
Dept: ONCOLOGY | Facility: HOSPITAL | Age: 78
End: 2023-03-22
Payer: MEDICARE

## 2023-03-22 ENCOUNTER — LAB (OUTPATIENT)
Dept: LAB | Facility: HOSPITAL | Age: 78
End: 2023-03-22
Payer: MEDICARE

## 2023-03-22 ENCOUNTER — OFFICE VISIT (OUTPATIENT)
Dept: OBSTETRICS AND GYNECOLOGY | Facility: CLINIC | Age: 78
End: 2023-03-22
Payer: MEDICARE

## 2023-03-22 VITALS
BODY MASS INDEX: 29.64 KG/M2 | RESPIRATION RATE: 18 BRPM | SYSTOLIC BLOOD PRESSURE: 161 MMHG | HEART RATE: 66 BPM | OXYGEN SATURATION: 100 % | TEMPERATURE: 97 F | WEIGHT: 162.04 LBS | DIASTOLIC BLOOD PRESSURE: 75 MMHG

## 2023-03-22 VITALS
WEIGHT: 162 LBS | HEART RATE: 93 BPM | BODY MASS INDEX: 29.81 KG/M2 | DIASTOLIC BLOOD PRESSURE: 75 MMHG | HEIGHT: 62 IN | SYSTOLIC BLOOD PRESSURE: 161 MMHG

## 2023-03-22 DIAGNOSIS — I10 HYPERTENSION, UNSPECIFIED TYPE: ICD-10-CM

## 2023-03-22 DIAGNOSIS — D27.1 DERMOID CYST OF LEFT OVARY: ICD-10-CM

## 2023-03-22 DIAGNOSIS — D39.8 NEOPLASM OF UNCERTAIN BEHAVIOR OF OTHER SPECIFIED FEMALE GENITAL ORGANS: ICD-10-CM

## 2023-03-22 DIAGNOSIS — N83.201 BILATERAL OVARIAN CYSTS: ICD-10-CM

## 2023-03-22 DIAGNOSIS — H35.30 MACULAR DEGENERATION OF BOTH EYES, UNSPECIFIED TYPE: ICD-10-CM

## 2023-03-22 DIAGNOSIS — C18.0 ADENOCARCINOMA OF CECUM: ICD-10-CM

## 2023-03-22 DIAGNOSIS — N83.202 BILATERAL OVARIAN CYSTS: ICD-10-CM

## 2023-03-22 DIAGNOSIS — E11.69 TYPE 2 DIABETES MELLITUS WITH OTHER SPECIFIED COMPLICATION, WITHOUT LONG-TERM CURRENT USE OF INSULIN: ICD-10-CM

## 2023-03-22 DIAGNOSIS — C18.0 ADENOCARCINOMA OF CECUM: Primary | ICD-10-CM

## 2023-03-22 DIAGNOSIS — N83.201 BILATERAL OVARIAN CYSTS: Primary | ICD-10-CM

## 2023-03-22 DIAGNOSIS — N83.202 BILATERAL OVARIAN CYSTS: Primary | ICD-10-CM

## 2023-03-22 LAB
CANCER AG125 SERPL QL: 12.9 U/ML (ref 0–38.1)
CEA SERPL-MCNC: 1.19 NG/ML

## 2023-03-22 PROCEDURE — 99215 OFFICE O/P EST HI 40 MIN: CPT | Performed by: NURSE PRACTITIONER

## 2023-03-22 PROCEDURE — 86304 IMMUNOASSAY TUMOR CA 125: CPT

## 2023-03-22 PROCEDURE — 82378 CARCINOEMBRYONIC ANTIGEN: CPT

## 2023-03-22 PROCEDURE — 1126F AMNT PAIN NOTED NONE PRSNT: CPT | Performed by: NURSE PRACTITIONER

## 2023-03-22 PROCEDURE — 3077F SYST BP >= 140 MM HG: CPT | Performed by: NURSE PRACTITIONER

## 2023-03-22 PROCEDURE — 3078F DIAST BP <80 MM HG: CPT | Performed by: NURSE PRACTITIONER

## 2023-03-22 PROCEDURE — G0463 HOSPITAL OUTPT CLINIC VISIT: HCPCS | Performed by: NURSE PRACTITIONER

## 2023-03-22 PROCEDURE — 36415 COLL VENOUS BLD VENIPUNCTURE: CPT

## 2023-03-22 NOTE — PROGRESS NOTES
"GYN new patient    CC: Ovarian cyst    Tobacco/Nicotine use:  No    HPI:   78 y.o. Contraception or HRT: Post menopausal, using no HRT    Pt has concerns she would like to discuss.      History: PMHx, Meds, Allergies, PSHx, Social Hx, and POBHx all reviewed and updated.  PCP:Reyna Chacon MD      Review of Systems     /75   Pulse 93   Ht 157.5 cm (62\")   Wt 73.5 kg (162 lb)   Breastfeeding No   BMI 29.63 kg/m²     Physical Exam  US Pelvic NON-OB w Doppler (2022 11:36)     ASSESSMENT AND PLAN:  Problem Visit    Diagnoses and all orders for this visit:    1. Bilateral ovarian cysts (Primary)  Assessment & Plan:  Had a fall in 2018 and had imaging in the ER and was found to have bilateral ovarian cysts  Followed up with gyn and was found to have an endometrial polyp and had a hysteroscopy/D&C.  Polyp was removed but nothing was done with the ovarian cysts  Has had right lower quadrant pain and thought it was an ovarian cyst and went to the ER and had a colon mass.  Mass was resected and found to have colon cancer  Pt is concerned because during the last set of imaging she was told one of the cysts had increased in size approximately 1 cm.    Orders:  -     ; Future  -     US Pelvis Transvaginal Non OB; Future    2. Dermoid cyst of left ovary  Assessment & Plan:  Has been visualized multiple times over the past 5 years  Not increasing in size  Will check a Ca-125  At this point think it is unlikely pt will need a surgical intervention for this mass    Orders:  -     ; Future  -     US Pelvis Transvaginal Non OB; Future    3. 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        Kacey Cox MD  2023    "

## 2023-03-22 NOTE — PROGRESS NOTES
MEDICAL ONCOLOGY CANCER SURVIVORSHIP VISIT    Berna Leong  [id number]  1945    Chief Complaint: Survivorship         History of present illness:  Berna Leong is a 78 y.o. year old female who is here today for the Cancer Survivorship visit, see oncology history below.     Ms. Berna Leong presents for survivorship visit today. History of MI x 2. Reports she was in a coma for 66 days after the second MI. Other history includes:  hypothyroidism, glaucoma, macular degeneration, obesity, stage IIIA CKD, hypertension, osteoarthritis. Diagnosed with stage II colon cancer in November 2022 after presenting to ER for abdominal pain. She completed right hemicolectomy. 26 LN's were negative. She did not require any chemotherapy. She is following with Dr. Aldridge for her oncology needs.     Follows with Dr. Batista for PCP needs.     She is a non-smoker. Does not drink any alcohol. She lives by herself.     She has distress score of 1 today. She was tearful today speaking about her extended family dynamics she has been through for the last several years since her mother passed away.     She is the process of selling her house.     We discussed advanced care planning today. She plans to get these services completed after the house has been sold. I offered her services today to speak to  to get application set up, but reports she has other plans moving forward.       Cancer History:   Oncology/Hematology History Overview Note   11/01/22: CT abdomen/pelvis with contrast obtained due to right flank pain: 6 cm cecal mass concerning for colonic adenocarcinoma.  There is some stranding in the adjacent fat worrisome for tumor infiltration and there are several adjacent mildly enlarged morphologically abnormal lymph nodes concerning for metastasis. 10 mm hypoattenuating lesion in liver segment 4 is unchanged from 2018, likely cyst or hemangioma.      11/07/22: Colonoscopy revealed a fungating partially obstructing  large mass in the cecum. Biopsy with moderately differentiated adenocarcinoma    12/8/22: Right hemicolectomy: adenocarcinoma, mod differentiated, arisign in tubulovillous adenoma with high grade dysplasia, 6 cm. No LVI. All 26 lymph nodes negative for tumor. Staged at pT3pN0. MSI-S     Adenocarcinoma of cecum (HCC)   11/8/2022 Initial Diagnosis    Adenocarcinoma of cecum (HCC)     12/30/2022 Cancer Staged    Staging form: Colon And Rectum, AJCC 8th Edition  - Pathologic: pT3, pN0 - Signed by Artur Aldridge MD on 12/30/2022         Past Medical History:   Diagnosis Date   • Arthritis    • Brown recluse spider bite     HX OF RIGHT FRONT LEG 2004   • Coma due to low thyroid (HCC) 2015   • Glaucoma    • Heart attack (HCC)     72 AND 81. DENIES CP BUT GETS SOA WITH EXERTION. STATES HAS BEEN ISSUE FOR LONG TIME. FOLLOWED BY PCP DR JOSHI. DECREASED ACTIVITY   • Hypothyroidism    • Kidney disease    • Leg pain    • Leg swelling    • Macular degeneration    • Malignant neoplasm of colon (HCC)    • Restless leg syndrome    • SOB (shortness of breath)    • Thyroid disorder    • Uterine polyp    • Wound dehiscence 04/20/2022    Sustained laceration December 2020 1 sutures removed by PCP with dehiscence seen at wound clinicby Dr Laina Bales February 2022 and folowed there--given silver sulfadiazine creme (Silvadene)       PHQ-9 Depression Screening  Little interest or pleasure in doing things?     Feeling down, depressed, or hopeless?     Trouble falling or staying asleep, or sleeping too much?     Feeling tired or having little energy?     Poor appetite or overeating?     Feeling bad about yourself - or that you are a failure or have let yourself or your family down?     Trouble concentrating on things, such as reading the newspaper or watching television?     Moving or speaking so slowly that other people could have noticed? Or the opposite - being so fidgety or restless that you have been moving around a lot more  than usual?     Thoughts that you would be better off dead, or of hurting yourself in some way?     PHQ-9 Total Score     If you checked off any problems, how difficult have these problems made it for you to do your work, take care of things at home, or get along with other people?           ZENAIDA-7        Past Surgical History:   Procedure Laterality Date   • COLON RESECTION N/A 12/8/2022    Procedure: COLON RESECTION LAPAROSCOPIC RIGHT WITH ROBOT;  Surgeon: Abebe Drew MD;  Location: [facility] MAIN OR;  Service: Robotics - DaVinci;  Laterality: N/A;   • COLONOSCOPY N/A 11/07/2022    Procedure: COLONOSCOPY, WITH bx;  Surgeon: Jimmie Zacarias MD;  Location: [facility] ENDOSCOPY;  Service: Gastroenterology;  Laterality: N/A;  CECUM MASS   • DILATATION AND CURETTAGE  2018   • KNEE SURGERY Right 2008    JOINT SURGERY       MEDICATIONS: The current medication list was reviewed and reconciled.     Allergies:  is allergic to penicillins and sulfa antibiotics.    Family History   Problem Relation Age of Onset   • Heart disease Mother    • Arthritis Mother    • Heart disease Father    • Cancer Brother    • Stomach cancer Maternal Aunt    • Arthritis Other    • Cancer Other    • Heart disease Other    • Colon cancer Neg Hx    • Malig Hyperthermia Neg Hx          Review of Systems   Constitutional: Positive for fatigue.   HENT: Negative.    Eyes: Positive for visual disturbance.   Respiratory: Negative.    Cardiovascular: Negative.    Gastrointestinal: Negative.    Endocrine: Negative.    Genitourinary: Negative.    Musculoskeletal: Negative.    Skin: Negative.    Allergic/Immunologic: Negative.    Neurological: Negative.    Hematological: Negative.    Psychiatric/Behavioral: Negative.        Physical Exam  Vital Signs: /75   Pulse 66   Temp 97 °F (36.1 °C) (Temporal)   Resp 18   Wt 73.5 kg (162 lb 0.6 oz)   SpO2 100%   BMI 29.64 kg/m²    General Appearance:  alert, cooperative, no apparent distress, appears  stated age and obese   Neurologic/Psychiatric: A&O x 3, gait steady, appropriate affect   HEENT:  Normocephalic, without obvious abnormality, mucous membranes moist   Neck: Supple, symmetrical, trachea midline, no adenopathy;  No thyromegaly, masses, or tenderness   Back:   Symmetric, no curvature, ROM normal, no CVA tenderness   Lungs:   Clear to auscultation bilaterally; respirations regular, even, and unlabored bilaterally   Heart:  Regular rate and rhythm, no murmurs appreciated   Abdomen:   Soft, non-tender, non-distended and no organomegaly   Lymph nodes: No cervical or supraclavicular adenopathy noted   Extremities: Normal, atraumatic; no clubbing, cyanosis, or edema        3/22/2023: Reviewed: DONNY Watson.     ECOG Performance Status: (0) Fully Active - Able to Carry On All Pre-disease Performance Without Restriction        Assessment and Plan:  Diagnoses and all orders for this visit:    1. Adenocarcinoma of cecum (HCC) (Primary)    2. Hypertension, unspecified type    3. Type 2 diabetes mellitus with other specified complication, without long-term current use of insulin (HCC)    4. Macular degeneration of both eyes, unspecified type      We discussed surveillance for stage II colon cancer. She has scans scheduled for June and will follow up with Dr. Aldridge after scans to discuss. We discussed surveillance parameters to include lab work and scans periodically for the next 5 years. She will have a colonoscopy sometime after November for 1 year follow up with Dr. Drew for repeat colonoscopy.     Follow up with PCP for all of her PCP needs. We discussed signs / symptoms to MI to report and go to ER for.     She was given survivorship care plan to take home to review.     The patient and I have reviewed their personal Survivorship Care Plan in detail. We discussed diagnosis, pathology, histology, all treatments, and ongoing surveillance recommendations. All questions were answered to her satisfaction.  The patient is in agreement with our plan for ongoing surveillance as outlined in the plan. A copy of this document was provided at the completion of our visit.  A copy has also been sent to the patient's primary care provider.    This was a 40 minute visit with 40 minutes spent in direct face to face review of the Survivorship Care Plan.    Return to clinic in 3 months with Dr. Aldridge  for ongoing cancer surveillance.    Call with any questions or concerns.       DONNY Thurman

## 2023-03-22 NOTE — ASSESSMENT & PLAN NOTE
Had a fall in 2018 and had imaging in the ER and was found to have bilateral ovarian cysts  Followed up with gyn and was found to have an endometrial polyp and had a hysteroscopy/D&C.  Polyp was removed but nothing was done with the ovarian cysts  Has had right lower quadrant pain and thought it was an ovarian cyst and went to the ER and had a colon mass.  Mass was resected and found to have colon cancer  Pt is concerned because during the last set of imaging she was told one of the cysts had increased in size approximately 1 cm.

## 2023-03-23 PROBLEM — D27.1 DERMOID CYST OF LEFT OVARY: Status: ACTIVE | Noted: 2023-03-23

## 2023-03-23 NOTE — ASSESSMENT & PLAN NOTE
Has been visualized multiple times over the past 5 years  Not increasing in size  Will check a Ca-125  At this point think it is unlikely pt will need a surgical intervention for this mass

## 2023-03-24 ENCOUNTER — PATIENT OUTREACH (OUTPATIENT)
Dept: ONCOLOGY | Facility: HOSPITAL | Age: 78
End: 2023-03-24
Payer: MEDICARE

## 2023-03-24 NOTE — SIGNIFICANT NOTE
Nurse Navigator Evaluation     03/24/2023  Berna Leong    Care Plan Delivery Method: Patient Visit    Care Plan Delivered to PCP Via: Fax    Referrals:  None    Education Material Provided on: Care Plan, Diagnosis, Exercise, Nutrition, Support Groups, Survivorship, and Count Me In Study    Diagnosis:   Adenocarcinoma of cecum    Stage:  Cancer Staging   pT3, pN0    New Cancer Screening:   See APRN documentation from 3/22/23.  Survivorship Nurse Navigator (SNN) not present in clinic for visit.     Nurse Navigator Intervention:   See APRN documentation from 3/22/23.  SNN not present in clinic for visit.      Distress Score: 1  Taken from provider note on 3/22/23.    Explanation of time for this encounter: Documentation and review of chart.    Bailey Lemon RN  3/24/2023

## 2023-03-31 NOTE — ANESTHESIA POSTPROCEDURE EVALUATION
Patient: Berna Leong    Procedure Summary     Date: 12/08/22 Room / Location: [facility] OR 08 / [facility] MAIN OR    Anesthesia Start: 1831 Anesthesia Stop: 2304    Procedure: COLON RESECTION LAPAROSCOPIC RIGHT WITH ROBOT (Abdomen) Diagnosis:       Malignant neoplasm of colon, unspecified part of colon (HCC)      (Malignant neoplasm of colon, unspecified part of colon (HCC) [C18.9])    Surgeons: Abebe Drew MD Provider: Xi Goldberg CRNA    Anesthesia Type: general ASA Status: 3          Anesthesia Type: general    Vitals  Vitals Value Taken Time   /63 12/08/22 2350   Temp 36.2 °C (97.2 °F) 12/08/22 2345   Pulse 83 12/08/22 2350   Resp 16 12/08/22 2350   SpO2 100 % 12/08/22 2350           Post Anesthesia Care and Evaluation    Patient location during evaluation: bedside  Patient participation: complete - patient participated  Level of consciousness: awake  Pain management: adequate    Airway patency: patent  PONV Status: none  Cardiovascular status: acceptable and stable  Respiratory status: acceptable  Hydration status: acceptable    Comments: An Anesthesiologist personally participated in the most demanding procedures (including induction and emergence if applicable) in the anesthesia plan, monitored the course of anesthesia administration at frequent intervals and remained physically present and available for immediate diagnosis and treatment of emergencies.

## 2023-04-12 ENCOUNTER — HOSPITAL ENCOUNTER (OUTPATIENT)
Dept: ULTRASOUND IMAGING | Facility: HOSPITAL | Age: 78
Discharge: HOME | End: 2023-04-12
Admitting: OBSTETRICS & GYNECOLOGY
Payer: MEDICARE

## 2023-04-12 DIAGNOSIS — N83.201 BILATERAL OVARIAN CYSTS: ICD-10-CM

## 2023-04-12 DIAGNOSIS — N83.202 BILATERAL OVARIAN CYSTS: ICD-10-CM

## 2023-04-12 DIAGNOSIS — D27.1 DERMOID CYST OF LEFT OVARY: ICD-10-CM

## 2023-04-12 PROCEDURE — 76856 US EXAM PELVIC COMPLETE: CPT

## 2023-04-12 PROCEDURE — 76830 TRANSVAGINAL US NON-OB: CPT

## 2023-04-13 ENCOUNTER — TELEPHONE (OUTPATIENT)
Dept: OBSTETRICS AND GYNECOLOGY | Facility: CLINIC | Age: 78
End: 2023-04-13

## 2023-04-13 NOTE — TELEPHONE ENCOUNTER
Spoke to patient and informed her that Dr Cox is out of the office until next week and has not had a chance to review report. We will call patient if there are any recommendations.

## 2023-04-13 NOTE — TELEPHONE ENCOUNTER
PROVIDER: DR. MCDANIELS    Caller: Berna Leong    Relationship: Self    Best call back number: [phone]    Caller requesting test results: U/S WAS DONE YESTERDAY. WANTS TO MAKE SURE SHE GETS A PHONE CALL ABOUT RESULTS AS HER F/U IS NOT UNTIL 5-10   CAN LVM IF NA.    What test was performed: YESTERDAY 4-12    When was the test performed: 11AM    Where was the test performed: [facility]    Additional notes:

## 2023-05-05 ENCOUNTER — TELEPHONE (OUTPATIENT)
Dept: INTERNAL MEDICINE | Facility: CLINIC | Age: 78
End: 2023-05-05
Payer: MEDICARE

## 2023-05-05 DIAGNOSIS — I10 PRIMARY HYPERTENSION: ICD-10-CM

## 2023-05-05 DIAGNOSIS — E03.8 OTHER SPECIFIED HYPOTHYROIDISM: ICD-10-CM

## 2023-05-05 DIAGNOSIS — E55.9 VITAMIN D DEFICIENCY: ICD-10-CM

## 2023-05-05 DIAGNOSIS — E78.00 PURE HYPERCHOLESTEROLEMIA: ICD-10-CM

## 2023-05-05 DIAGNOSIS — D50.8 OTHER IRON DEFICIENCY ANEMIA: ICD-10-CM

## 2023-05-05 DIAGNOSIS — R73.03 PREDIABETES: Primary | ICD-10-CM

## 2023-05-05 NOTE — TELEPHONE ENCOUNTER
Caller: Berna Leong    Relationship: Self    Best call back number: [phone] (Home)    What orders are you requesting (i.e. lab or imaging): BLOOD WORK ORDERS      In what timeframe would the patient need to come in: BEFORE APPOINTMENT ON 05.16.2023    Where will you receive your lab/imaging services: [facility]

## 2023-05-12 ENCOUNTER — LAB (OUTPATIENT)
Dept: LAB | Facility: HOSPITAL | Age: 78
End: 2023-05-12
Payer: MEDICARE

## 2023-05-12 DIAGNOSIS — I10 PRIMARY HYPERTENSION: ICD-10-CM

## 2023-05-12 DIAGNOSIS — D50.8 OTHER IRON DEFICIENCY ANEMIA: ICD-10-CM

## 2023-05-12 DIAGNOSIS — R73.03 PREDIABETES: ICD-10-CM

## 2023-05-12 DIAGNOSIS — E03.8 OTHER SPECIFIED HYPOTHYROIDISM: ICD-10-CM

## 2023-05-12 DIAGNOSIS — E78.00 PURE HYPERCHOLESTEROLEMIA: ICD-10-CM

## 2023-05-12 LAB
ALBUMIN SERPL-MCNC: 4.2 G/DL (ref 3.5–5.2)
ALBUMIN/GLOB SERPL: 1.3 G/DL
ALP SERPL-CCNC: 106 U/L (ref 39–117)
ALT SERPL W P-5'-P-CCNC: 33 U/L (ref 1–33)
ANION GAP SERPL CALCULATED.3IONS-SCNC: 10.4 MMOL/L (ref 5–15)
AST SERPL-CCNC: 23 U/L (ref 1–32)
BASOPHILS # BLD AUTO: 0.07 10*3/MM3 (ref 0–0.2)
BASOPHILS NFR BLD AUTO: 1.1 % (ref 0–1.5)
BILIRUB SERPL-MCNC: 0.5 MG/DL (ref 0–1.2)
BUN SERPL-MCNC: 31 MG/DL (ref 8–23)
BUN/CREAT SERPL: 31 (ref 7–25)
CALCIUM SPEC-SCNC: 10.2 MG/DL (ref 8.6–10.5)
CHLORIDE SERPL-SCNC: 106 MMOL/L (ref 98–107)
CHOLEST SERPL-MCNC: 190 MG/DL (ref 0–200)
CO2 SERPL-SCNC: 29.6 MMOL/L (ref 22–29)
CREAT SERPL-MCNC: 1 MG/DL (ref 0.57–1)
DEPRECATED RDW RBC AUTO: 39.9 FL (ref 37–54)
EGFRCR SERPLBLD CKD-EPI 2021: 57.1 ML/MIN/1.73
EOSINOPHIL # BLD AUTO: 0.16 10*3/MM3 (ref 0–0.4)
EOSINOPHIL NFR BLD AUTO: 2.5 % (ref 0.3–6.2)
ERYTHROCYTE [DISTWIDTH] IN BLOOD BY AUTOMATED COUNT: 13 % (ref 12.3–15.4)
FERRITIN SERPL-MCNC: 46 NG/ML (ref 13–150)
FOLATE SERPL-MCNC: 9 NG/ML (ref 4.78–24.2)
GLOBULIN UR ELPH-MCNC: 3.2 GM/DL
GLUCOSE SERPL-MCNC: 97 MG/DL (ref 65–99)
HBA1C MFR BLD: 5.2 % (ref 4.8–5.6)
HCT VFR BLD AUTO: 38.6 % (ref 34–46.6)
HDLC SERPL-MCNC: 70 MG/DL (ref 40–60)
HGB BLD-MCNC: 12.5 G/DL (ref 12–15.9)
IMM GRANULOCYTES # BLD AUTO: 0.01 10*3/MM3 (ref 0–0.05)
IMM GRANULOCYTES NFR BLD AUTO: 0.2 % (ref 0–0.5)
IRON 24H UR-MRATE: 75 MCG/DL (ref 37–145)
IRON SATN MFR SERPL: 18 % (ref 20–50)
LDLC SERPL CALC-MCNC: 98 MG/DL (ref 0–100)
LDLC/HDLC SERPL: 1.36 {RATIO}
LYMPHOCYTES # BLD AUTO: 2.57 10*3/MM3 (ref 0.7–3.1)
LYMPHOCYTES NFR BLD AUTO: 39.8 % (ref 19.6–45.3)
MAGNESIUM SERPL-MCNC: 2.2 MG/DL (ref 1.6–2.4)
MCH RBC QN AUTO: 27.2 PG (ref 26.6–33)
MCHC RBC AUTO-ENTMCNC: 32.4 G/DL (ref 31.5–35.7)
MCV RBC AUTO: 84.1 FL (ref 79–97)
MONOCYTES # BLD AUTO: 0.39 10*3/MM3 (ref 0.1–0.9)
MONOCYTES NFR BLD AUTO: 6 % (ref 5–12)
NEUTROPHILS NFR BLD AUTO: 3.26 10*3/MM3 (ref 1.7–7)
NEUTROPHILS NFR BLD AUTO: 50.4 % (ref 42.7–76)
NRBC BLD AUTO-RTO: 0.2 /100 WBC (ref 0–0.2)
PLATELET # BLD AUTO: 280 10*3/MM3 (ref 140–450)
PMV BLD AUTO: 10.8 FL (ref 6–12)
POTASSIUM SERPL-SCNC: 4.8 MMOL/L (ref 3.5–5.2)
PROT SERPL-MCNC: 7.4 G/DL (ref 6–8.5)
RBC # BLD AUTO: 4.59 10*6/MM3 (ref 3.77–5.28)
SODIUM SERPL-SCNC: 146 MMOL/L (ref 136–145)
T4 FREE SERPL-MCNC: 1.25 NG/DL (ref 0.93–1.7)
TIBC SERPL-MCNC: 425 MCG/DL (ref 298–536)
TRANSFERRIN SERPL-MCNC: 285 MG/DL (ref 200–360)
TRIGL SERPL-MCNC: 125 MG/DL (ref 0–150)
TSH SERPL DL<=0.05 MIU/L-ACNC: 0.53 UIU/ML (ref 0.27–4.2)
VIT B12 BLD-MCNC: 514 PG/ML (ref 211–946)
VLDLC SERPL-MCNC: 22 MG/DL (ref 5–40)
WBC NRBC COR # BLD: 6.46 10*3/MM3 (ref 3.4–10.8)

## 2023-05-12 PROCEDURE — 80061 LIPID PANEL: CPT

## 2023-05-12 PROCEDURE — 85025 COMPLETE CBC W/AUTO DIFF WBC: CPT

## 2023-05-12 PROCEDURE — 80053 COMPREHEN METABOLIC PANEL: CPT

## 2023-05-12 PROCEDURE — 82746 ASSAY OF FOLIC ACID SERUM: CPT

## 2023-05-12 PROCEDURE — 84443 ASSAY THYROID STIM HORMONE: CPT

## 2023-05-12 PROCEDURE — 83735 ASSAY OF MAGNESIUM: CPT

## 2023-05-12 PROCEDURE — 82607 VITAMIN B-12: CPT

## 2023-05-12 PROCEDURE — 82728 ASSAY OF FERRITIN: CPT

## 2023-05-12 PROCEDURE — 84466 ASSAY OF TRANSFERRIN: CPT

## 2023-05-12 PROCEDURE — 83036 HEMOGLOBIN GLYCOSYLATED A1C: CPT

## 2023-05-12 PROCEDURE — 84439 ASSAY OF FREE THYROXINE: CPT

## 2023-05-12 PROCEDURE — 36415 COLL VENOUS BLD VENIPUNCTURE: CPT

## 2023-05-12 PROCEDURE — 83540 ASSAY OF IRON: CPT

## 2023-05-16 ENCOUNTER — OFFICE VISIT (OUTPATIENT)
Dept: INTERNAL MEDICINE | Facility: CLINIC | Age: 78
End: 2023-05-16
Payer: MEDICARE

## 2023-05-16 VITALS
WEIGHT: 158.4 LBS | SYSTOLIC BLOOD PRESSURE: 126 MMHG | HEIGHT: 62 IN | OXYGEN SATURATION: 97 % | DIASTOLIC BLOOD PRESSURE: 74 MMHG | TEMPERATURE: 97.4 F | BODY MASS INDEX: 29.15 KG/M2 | HEART RATE: 62 BPM | RESPIRATION RATE: 18 BRPM

## 2023-05-16 DIAGNOSIS — H35.30 MACULAR DEGENERATION OF BOTH EYES, UNSPECIFIED TYPE: ICD-10-CM

## 2023-05-16 DIAGNOSIS — I10 PRIMARY HYPERTENSION: ICD-10-CM

## 2023-05-16 DIAGNOSIS — E55.9 VITAMIN D DEFICIENCY: ICD-10-CM

## 2023-05-16 DIAGNOSIS — E03.8 OTHER SPECIFIED HYPOTHYROIDISM: ICD-10-CM

## 2023-05-16 DIAGNOSIS — Z86.39 HISTORY OF GRAVES' DISEASE: ICD-10-CM

## 2023-05-16 DIAGNOSIS — R73.03 PREDIABETES: ICD-10-CM

## 2023-05-16 DIAGNOSIS — E66.9 OBESITY (BMI 35.0-39.9 WITHOUT COMORBIDITY): ICD-10-CM

## 2023-05-16 DIAGNOSIS — I25.2 HISTORY OF MI (MYOCARDIAL INFARCTION): ICD-10-CM

## 2023-05-16 DIAGNOSIS — Z00.00 ENCOUNTER FOR SUBSEQUENT ANNUAL WELLNESS VISIT (AWV) IN MEDICARE PATIENT: Primary | ICD-10-CM

## 2023-05-16 DIAGNOSIS — E78.00 PURE HYPERCHOLESTEROLEMIA: ICD-10-CM

## 2023-05-16 DIAGNOSIS — C18.0 ADENOCARCINOMA OF CECUM: ICD-10-CM

## 2023-05-16 RX ORDER — ATORVASTATIN CALCIUM 20 MG/1
20 TABLET, FILM COATED ORAL DAILY
Qty: 90 TABLET | Refills: 1 | Status: SHIPPED | OUTPATIENT
Start: 2023-05-16

## 2023-05-16 NOTE — ASSESSMENT & PLAN NOTE
Stable with blood pressure 126/74today.  Checks blood pressure at home and systolic blood pressure is in the 120s to 130s range.  Not on any medications.  Asked patient to check blood pressure twice a day 4-5 times a week to record and bring in at next visit goal is less than 140/90

## 2023-05-16 NOTE — Clinical Note
Please inform patient that I refilled her atorvastatin 10 mg once a day for her cholesterol her bad cholesterol is not at goal it should be less than 70 specially with a history of heart attack in the past.

## 2023-05-16 NOTE — ASSESSMENT & PLAN NOTE
Radiation in past--now with hypothyroidism-Currently taking  Synthroid 75 mcg every day .  TFTs okay has chronic constipation no weight gain    Plan-continue Synthroid 75 mcg daily recheck thyroid function test in 3 months

## 2023-05-16 NOTE — PROGRESS NOTES
The ABCs of the Annual Wellness Visit  Subsequent Medicare Wellness Visit    Subjective    Berna Leong is a 78 y.o. female who presents for a Subsequent Medicare Wellness Visit.    The following portions of the patient's history were reviewed and   updated as appropriate: allergies, current medications, past family history, past medical history, past social history, past surgical history and problem list.    Compared to one year ago, the patient feels her physical   health is the same.    Compared to one year ago, the patient feels her mental   health is the same.    Recent Hospitalizations:  This patient has had a [facility] admission record on file within the last 365 days.    Current Medical Providers:  Patient Care Team:  Reyna Chacon MD as PCP - General (Internal Medicine)  Bailey Lemon RN as Registered Nurse (Oncology)  Artur Aldridge MD as Consulting Physician (Hematology and Oncology)  Isadora Holliday APRN as Nurse Practitioner (Nurse Practitioner)    Outpatient Medications Prior to Visit   Medication Sig Dispense Refill   • ascorbic acid (VITAMIN C) 1000 MG tablet Take 1 tablet by mouth Daily.     • Cholecalciferol (Vitamin D) 50 MCG (2000 UT) capsule 5,000 Units Daily. VIT D3     • Cholecalciferol 125 MCG (5000 UT) tablet Take  by mouth Daily.     • levothyroxine (SYNTHROID, LEVOTHROID) 75 MCG tablet Take 1 tablet by mouth Daily. SYNTHROID NAME BRAND ONLY 90 tablet 1   • Multiple Vitamins-Minerals (ICAPS PO) Take 1 tablet by mouth 2 (Two) Times a Day.     • PEG 3350-KCl-NaBcb-NaCl-NaSulf 227.1 g pack Take 227.1 g by mouth Daily. 227.1 each 0   • psyllium (Metamucil Smooth Texture) 58.6 % powder Take  by mouth 3 (Three) Times a Day. 1 each 12   • Vitamin A 3 MG (08657 UT) capsule Take 500 Units by mouth Daily.     • vitamin E 400 UNIT capsule Take 180 Units by mouth.       No facility-administered medications prior to visit.       No opioid medication  "identified on active medication list. I have reviewed chart for other potential  high risk medication/s and harmful drug interactions in the elderly.          Aspirin is not on active medication list.  Aspirin use is indicated based on review of current medical condition/s. Pros and cons of this therapy have been discussed with this patient. Benefits of this medication outweigh potential harm.  Patient has been instructed to start taking this medication.. Pt declines and never wanted to start!!    Patient Active Problem List   Diagnosis   • Adnexal cyst   • Arthritis   • Hypothyroidism   • Hx of knee surgery   • Macular degeneration   • Glaucoma   • Obesity (BMI 35.0-39.9 without comorbidity)   • History of MI (myocardial infarction)   • Stage 3a chronic kidney disease   • History of Graves' disease   • History of coma   • Spondylosis of lumbar region without myelopathy or radiculopathy   • Cervicalgia   • Restless leg syndrome   • Osteopenia   • Bilateral carpal tunnel syndrome   • Chronic fatigue syndrome   • Hypertension   • Vitamin D deficiency   • Cellulitis of left lower extremity   • Pure hypercholesterolemia   • Trigger finger of left thumb   • Osteoarthritis of both knees   • Abnormal CT scan, colon   • Chronic RLQ pain   • Adenocarcinoma of cecum   • Idiopathic osteoarthritis   • Bilateral ovarian cysts   • Dermoid cyst of left ovary   • Prediabetes   • Encounter for subsequent annual wellness visit (AWV) in Medicare patient     Advance Care Planning   Advance Care Planning     Advance Directive is not on file.  ACP discussion was held with the patient during this visit. Patient does not have an advance directive, information provided.     Objective    Vitals:    05/16/23 1122   BP: 126/74   BP Location: Left arm   Patient Position: Sitting   Pulse: 62   Resp: 18   Temp: 97.4 °F (36.3 °C)   TempSrc: Temporal   SpO2: 97%   Weight: 71.8 kg (158 lb 6.4 oz)   Height: 157.5 cm (62\")     Estimated body mass " "index is 28.97 kg/m² as calculated from the following:    Height as of this encounter: 157.5 cm (62\").    Weight as of this encounter: 71.8 kg (158 lb 6.4 oz).    BMI is >= 25 and <30. (Overweight) The following options were offered after discussion;: weight loss educational material (shared in after visit summary), exercise counseling/recommendations and nutrition counseling/recommendations      Does the patient have evidence of cognitive impairment? No    Lab Results   Component Value Date    TRIG 125 2023    HDL 70 (H) 2023    LDL 98 2023    VLDL 22 2023    HGBA1C 5.20 2023        HEALTH RISK ASSESSMENT    Smoking Status:  Social History     Tobacco Use   Smoking Status Never   Smokeless Tobacco Never     Alcohol Consumption:  Social History     Substance and Sexual Activity   Alcohol Use Never     Fall Risk Screen:    ELVIS Fall Risk Assessment was completed, and patient is at LOW risk for falls.Assessment completed on:2023    Depression Screenin/16/2023    11:20 AM   PHQ-2/PHQ-9 Depression Screening   Little Interest or Pleasure in Doing Things 0-->not at all   Feeling Down, Depressed or Hopeless 0-->not at all   PHQ-9: Brief Depression Severity Measure Score 0       Health Habits and Functional and Cognitive Screenin/16/2023    11:20 AM   Functional & Cognitive Status   Do you have difficulty preparing food and eating? No   Do you have difficulty bathing yourself, getting dressed or grooming yourself? No   Do you have difficulty using the toilet? No   Do you have difficulty moving around from place to place? No   Do you have trouble with steps or getting out of a bed or a chair? No   Current Diet Well Balanced Diet   Dental Exam Up to date   Eye Exam Up to date   Current Exercises Include Home Exercise Program (TV, Computer, Etc.);Home Fitness Gym;Walking   Do you need help using the phone?  No   Are you deaf or do you have serious difficulty hearing?  No "   Do you need help with transportation? No   Do you need help shopping? No   Do you need help preparing meals?  No   Do you need help with housework?  No   Do you need help with laundry? No   Do you need help taking your medications? No   Do you need help managing money? No   Do you ever drive or ride in a car without wearing a seat belt? No       Age-appropriate Screening Schedule:  Refer to the list below for future screening recommendations based on patient's age, sex and/or medical conditions. Orders for these recommended tests are listed in the plan section. The patient has been provided with a written plan.    Health Maintenance   Topic Date Due   • ZOSTER VACCINE (1 of 2) 07/03/2023 (Originally 2/8/1995)   • DIABETIC EYE EXAM  07/04/2023 (Originally 7/24/2021)   • Pneumococcal Vaccine 65+ (2 - PCV) 05/16/2024 (Originally 12/30/2016)   • INFLUENZA VACCINE  08/01/2023   • URINE MICROALBUMIN  09/30/2023   • COLONOSCOPY  11/07/2023   • HEMOGLOBIN A1C  11/12/2023   • LIPID PANEL  05/12/2024   • ANNUAL WELLNESS VISIT  05/16/2024   • DXA SCAN  06/08/2024   • TDAP/TD VACCINES (2 - Td or Tdap) 12/20/2031   • COVID-19 Vaccine  Completed                  CMS Preventative Services Quick Reference  Risk Factors Identified During Encounter  Immunizations Discussed/Encouraged: Shingrix  The above risks/problems have been discussed with the patient.  Pertinent information has been shared with the patient in the After Visit Summary.  An After Visit Summary and PPPS were made available to the patient.    Follow Up:   Next Medicare Wellness visit to be scheduled in 1 year.       Additional E&M Note during same encounter follows:  Patient has multiple medical problems which are significant and separately identifiable that require additional work above and beyond the Medicare Wellness Visit.      Chief Complaint  Medicare Wellness-subsequent (78 year old female here today for her Medicare Wellness Exam /She has labs to review  "today. States her Colon Cancer is now stage 2. States she is doing okay. )    Subjective        HPI  Berna Leong is also being seen today for routine check up    11/17/22 visit   Since last visit dx-with adenocarcinoma of cecum-for surgery -seen in ER 11/1/22-for right side pain     Pt getting a lift chair - will pay for LIFT for [employer]     Past medical history significant for arthritis of her neck and back/DDD of L-spine January 2020, DEXA scan June 2022 with osteopenia of L-spine, fracture of right wrist and left foot, hypothyroidism on Synthroid 88 mcg by Dr. Arreguin in the past, also with vitamin C D and B12 deficiency..  B12 injections caused side effects per patient she can tolerate the pill.  Chronic kidney disease established with Dr.Chami HERNÁNDEZ-single mom passed away 3 yrs ago-and waiting to see her house which michaela is buying then will move to mom's house on [address]    Objective   Vital Signs:  /74 (BP Location: Left arm, Patient Position: Sitting)   Pulse 62   Temp 97.4 °F (36.3 °C) (Temporal)   Resp 18   Ht 157.5 cm (62\")   Wt 71.8 kg (158 lb 6.4 oz)   SpO2 97%   BMI 28.97 kg/m²     Physical Exam  Vitals and nursing note reviewed.   Constitutional:       Appearance: Normal appearance.   HENT:      Head: Normocephalic and atraumatic.   Cardiovascular:      Rate and Rhythm: Normal rate and regular rhythm.   Pulmonary:      Effort: Pulmonary effort is normal.      Breath sounds: Normal breath sounds.   Abdominal:      Palpations: Abdomen is soft.   Musculoskeletal:      Cervical back: Normal range of motion and neck supple.   Neurological:      General: No focal deficit present.      Mental Status: She is alert and oriented to person, place, and time.          The following data was reviewed by: Reyna Chacon MD on 05/16/2023:  Common labs        12/12/2022    05:09 12/13/2022    04:31 5/12/2023    06:19   Common Labs   Glucose 95   85   97     BUN 10   10   31 "     Creatinine 1.12   1.03   1.00     Sodium 139   138   146     Potassium 5.7   4.4   4.8     Chloride 108   106   106     Calcium 8.7   8.3   10.2     Albumin   4.2     Total Bilirubin   0.5     Alkaline Phosphatase   106     AST (SGOT)   23     ALT (SGPT)   33     WBC 13.60   10.70   6.46     Hemoglobin 9.7   9.7   12.5     Hematocrit 31.1   30.6   38.6     Platelets 198   209   280     Total Cholesterol   190     Triglycerides   125     HDL Cholesterol   70     LDL Cholesterol    98     Hemoglobin A1C   5.20                  Assessment and Plan   Diagnoses and all orders for this visit:    1. Encounter for subsequent annual wellness visit (AWV) in Medicare patient (Primary)  -     Comprehensive Metabolic Panel; Future  -     TSH+Free T4; Future  -     T3, Free; Future  -     Vitamin B12; Future  -     Folate; Future  -     Magnesium; Future  -     Vitamin D,25-Hydroxy; Future  -     CBC & Differential; Future    2. History of MI (myocardial infarction)  -     Comprehensive Metabolic Panel; Future  -     TSH+Free T4; Future  -     T3, Free; Future  -     Vitamin B12; Future  -     Folate; Future  -     Magnesium; Future  -     Vitamin D,25-Hydroxy; Future  -     CBC & Differential; Future  -     atorvastatin (LIPITOR) 20 MG tablet; Take 1 tablet by mouth Daily.  Dispense: 90 tablet; Refill: 1    3. Primary hypertension  Assessment & Plan:  Stable with blood pressure 126/74today.  Checks blood pressure at home and systolic blood pressure is in the 120s to 130s range.  Not on any medications.  Asked patient to check blood pressure twice a day 4-5 times a week to record and bring in at next visit goal is less than 140/90    Orders:  -     Comprehensive Metabolic Panel; Future  -     TSH+Free T4; Future  -     T3, Free; Future  -     Vitamin B12; Future  -     Folate; Future  -     Magnesium; Future  -     Vitamin D,25-Hydroxy; Future  -     CBC & Differential; Future  -     atorvastatin (LIPITOR) 20 MG tablet; Take  1 tablet by mouth Daily.  Dispense: 90 tablet; Refill: 1    4. Pure hypercholesterolemia  Assessment & Plan:  LDL not at goal equals 120s.  Stop simvastatin and switch to atorvastatin 10 mg once a day    Orders:  -     Comprehensive Metabolic Panel; Future  -     TSH+Free T4; Future  -     T3, Free; Future  -     Vitamin B12; Future  -     Folate; Future  -     Magnesium; Future  -     Vitamin D,25-Hydroxy; Future  -     CBC & Differential; Future  -     atorvastatin (LIPITOR) 20 MG tablet; Take 1 tablet by mouth Daily.  Dispense: 90 tablet; Refill: 1    5. Other specified hypothyroidism  Assessment & Plan:  Radiation in past--now with hypothyroidism-Currently taking  Synthroid 75 mcg every day .  TFTs willam has chronic constipation no weight gain    Plan-continue Synthroid 75 mcg daily recheck thyroid function test in 3 months    Orders:  -     Comprehensive Metabolic Panel; Future  -     TSH+Free T4; Future  -     T3, Free; Future  -     Vitamin B12; Future  -     Folate; Future  -     Magnesium; Future  -     Vitamin D,25-Hydroxy; Future  -     CBC & Differential; Future    6. History of Graves' disease  -     Comprehensive Metabolic Panel; Future  -     TSH+Free T4; Future  -     T3, Free; Future  -     Vitamin B12; Future  -     Folate; Future  -     Magnesium; Future  -     Vitamin D,25-Hydroxy; Future  -     CBC & Differential; Future    7. Prediabetes  -     Comprehensive Metabolic Panel; Future  -     TSH+Free T4; Future  -     T3, Free; Future  -     Vitamin B12; Future  -     Folate; Future  -     Magnesium; Future  -     Vitamin D,25-Hydroxy; Future  -     CBC & Differential; Future    8. Obesity (BMI 35.0-39.9 without comorbidity)  -     Comprehensive Metabolic Panel; Future  -     TSH+Free T4; Future  -     T3, Free; Future  -     Vitamin B12; Future  -     Folate; Future  -     Magnesium; Future  -     Vitamin D,25-Hydroxy; Future  -     CBC & Differential; Future    9. Macular degeneration of both  eyes, unspecified type  -     Comprehensive Metabolic Panel; Future  -     TSH+Free T4; Future  -     T3, Free; Future  -     Vitamin B12; Future  -     Folate; Future  -     Magnesium; Future  -     Vitamin D,25-Hydroxy; Future  -     CBC & Differential; Future    10. Adenocarcinoma of cecum  Comments:  s/p surg 12/2022-sees Dr Aldridge   Orders:  -     Comprehensive Metabolic Panel; Future  -     TSH+Free T4; Future  -     T3, Free; Future  -     Vitamin B12; Future  -     Folate; Future  -     Magnesium; Future  -     Vitamin D,25-Hydroxy; Future  -     CBC & Differential; Future    11. Vitamin D deficiency  -     Comprehensive Metabolic Panel; Future  -     TSH+Free T4; Future  -     T3, Free; Future  -     Vitamin B12; Future  -     Folate; Future  -     Magnesium; Future  -     Vitamin D,25-Hydroxy; Future  -     CBC & Differential; Future    Patient Instructions   recommend Shingrix vaccines at pharmacy  Declines pneumonia vaccine  Declines any mammograms in the future  DEXA scan due in 2024  Follow-up in 3 months with labs prior  Had Tdap 2 years ago      Follow Up   Return in about 3 months (around 8/16/2023) for Recheck.  Patient was given instructions and counseling regarding her condition or for health maintenance advice. Please see specific information pulled into the AVS if appropriate.

## 2023-05-16 NOTE — PATIENT INSTRUCTIONS
recommend Shingrix vaccines at pharmacy  Declines pneumonia vaccine  Declines any mammograms in the future  DEXA scan due in 2024  Follow-up in 3 months with labs prior

## 2023-05-31 NOTE — SIGNIFICANT NOTE
Attempted to call patient to discuss Survivorship.  No answer, no option for voicemail.   
Spine appears normal, movement of extremities grossly intact.

## 2023-06-22 ENCOUNTER — TELEPHONE (OUTPATIENT)
Dept: OBSTETRICS AND GYNECOLOGY | Facility: CLINIC | Age: 78
End: 2023-06-22

## 2023-06-22 NOTE — TELEPHONE ENCOUNTER
Caller: Berna Leong    Relationship to patient: Self    Best call back number: [phone]    Patient is needing:     PT HAS F/U ON 7/18/2023 TO REVIEW RESULT FROM 4/12/2023 U/S    PT WANTS TO KNOW IF SHE SHOULD HAVE A MORE CURRENT U/S    PLEASE ADVISE    OKAY TO CALL ANY TIME  OKAY TO M

## 2023-07-24 ENCOUNTER — TELEPHONE (OUTPATIENT)
Dept: INTERNAL MEDICINE | Facility: CLINIC | Age: 78
End: 2023-07-24
Payer: MEDICARE

## 2023-07-24 DIAGNOSIS — E03.8 OTHER SPECIFIED HYPOTHYROIDISM: Primary | ICD-10-CM

## 2023-07-24 RX ORDER — LEVOTHYROXINE SODIUM 0.07 MG/1
TABLET ORAL
Qty: 30 TABLET | Refills: 5 | Status: SHIPPED | OUTPATIENT
Start: 2023-07-24

## 2023-07-24 NOTE — TELEPHONE ENCOUNTER
Patient states Synthroid brand name is too expensive more than double what she usually pays a for about $35 will try Euthyrox or generic if unable to get this.  Patient states she cannot tolerate the generic however nothing is noted in the chart she has had radiation.

## 2023-08-29 ENCOUNTER — TELEPHONE (OUTPATIENT)
Dept: OBSTETRICS AND GYNECOLOGY | Facility: CLINIC | Age: 78
End: 2023-08-29
Payer: MEDICARE

## 2023-08-29 NOTE — TELEPHONE ENCOUNTER
08.29.23 this patient had an appt with you on [id number], she has to mirian due to a family emergency, does she need to get another appt for an us before there next appt which is 10.24.23 please advise.sb

## 2023-08-29 NOTE — TELEPHONE ENCOUNTER
[id number]: pt phone is not working, no my chart. Sent a letter to patient with answer to her questions.abby

## 2023-10-11 ENCOUNTER — TELEPHONE (OUTPATIENT)
Dept: OBSTETRICS AND GYNECOLOGY | Facility: CLINIC | Age: 78
End: 2023-10-11
Payer: MEDICARE

## 2023-10-11 DIAGNOSIS — R10.2 PELVIC PAIN: Primary | ICD-10-CM

## 2023-10-11 NOTE — TELEPHONE ENCOUNTER
Patient came in this morning. She had seen you to F/U on an US back in March and states that she was not having any pain at that time, but now she is having mild-moderate bilateral pelvic pain. She states that she is concerned that the cyst(s) may be getting bigger. She has a f/u appt with you on 10/24. Should she repeat the US before that appt? Should the appt be sooner? Any recommendations?    Erythromycin Pregnancy And Lactation Text: This medication is Pregnancy Category B and is considered safe during pregnancy. It is also excreted in breast milk.

## 2023-10-16 NOTE — TELEPHONE ENCOUNTER
Patient is scheduled for in house ultrasound same day as her OV.  Attempted to reach patient and the number in her chart is not in service.

## 2023-10-19 NOTE — PROGRESS NOTES
GYN Visit    CC: Pelvic pain    HPI:   78 y.o. Contraception or HRT: Post menopausal, using no HRT    Patient is complaining of intermittent pelvic pain over the past 2 months.  She indicates the pain is on both sides of the abdomen just below her umbilicus.          History: PMHx, Meds, Allergies, PSHx, Social Hx, and POBHx all reviewed and updated.  Physical Exam   PHYSICAL EXAM:  /76   Pulse 65   Wt 69.9 kg (154 lb)   Breastfeeding No   BMI 28.17 kg/m²   General- NAD, alert and oriented, appropriate  Psych- Normal mood, good memory  US Pelvis Transvaginal Non OB (10/24/2023 09:47)    US Pelvis Transvaginal Non OB (2023 11:17)    US Pelvic NON-OB w Doppler (2022 11:36)       ASSESSMENT AND PLAN:  Diagnoses and all orders for this visit:    1. Bilateral ovarian cysts (Primary)  Assessment & Plan:  I reviewed the patient's transvaginal ultrasound results and compared them to her previous ultrasound imaging.  Her ovarian cysts are stable and have clinically been stable since 2018.  At this time I do not think there the etiology of recurrent pelvic pain      2. Dermoid cyst of left ovary  Assessment & Plan:  Patient's dermoid cyst is unchanged from the last 2 times it was visualized.      3. Pelvic pain  Assessment & Plan:  C/o random and sporadic pelvic pain  Pain is worse but not more frequent and she never knows when it is coming  Sometimes the pain feels like needles sticking into the pelvis  Pain is bilateral sides of low abdomen and will sometimes awaken her from sleep   S/p hysteroscopy/D&C in 2018 and tissue was benign.  Has never had any PMB  Uncertain the etiology of pelvic pain.  Patient does have a history of colon cancer.  At this time I recommend she follow-up with her surgeon for further evaluation of pain given her history of cancer              Patient's surgeon and/or oncologist    Follow Up:  Return in 6 months (on 2024).          Kacey Cox,  MD  10/24/2023

## 2023-10-24 ENCOUNTER — OFFICE VISIT (OUTPATIENT)
Dept: OBSTETRICS AND GYNECOLOGY | Facility: CLINIC | Age: 78
End: 2023-10-24
Payer: MEDICARE

## 2023-10-24 VITALS
DIASTOLIC BLOOD PRESSURE: 76 MMHG | SYSTOLIC BLOOD PRESSURE: 157 MMHG | WEIGHT: 154 LBS | BODY MASS INDEX: 28.17 KG/M2 | HEART RATE: 65 BPM

## 2023-10-24 DIAGNOSIS — N83.202 BILATERAL OVARIAN CYSTS: Primary | ICD-10-CM

## 2023-10-24 DIAGNOSIS — N83.201 BILATERAL OVARIAN CYSTS: Primary | ICD-10-CM

## 2023-10-24 DIAGNOSIS — R10.2 PELVIC PAIN: ICD-10-CM

## 2023-10-24 DIAGNOSIS — D27.1 DERMOID CYST OF LEFT OVARY: ICD-10-CM

## 2023-10-24 NOTE — LETTER
2023     Abebe Drew MD  [address]  [place] [address]    Patient: Berna Leong   YOB: 1945   Date of Visit: 10/24/2023     Dear Abebe Drew MD:       Mrs. Leong is a mutual patient of ours that has been followed for bilateral ovarian cysts.  She recently saw me stating she was having some intermittent pelvic pain for the past 2 months.  On repeat pelvic ultrasound the bilateral ovarian cyst are unchanged and appear to be stable since 2018.  At this time I do not think it is a GYN etiology.  But I have asked her to follow-up with her surgeon and oncologist given her history of colon cancer in the past 12 months.  If you have questions, please do not hesitate to call me. I look forward to following Berna along with you.         Sincerely,        Kacey Cox MD        CC: MD Reyna Vines MD Holt, Kacey POWERS MD  10/24/23 1047  Sign when Signing Visit  GYN Visit    CC: Pelvic pain    HPI:   78 y.o. Contraception or HRT: Post menopausal, using no HRT    Patient is complaining of intermittent pelvic pain over the past 2 months.  She indicates the pain is on both sides of the abdomen just below her umbilicus.          History: PMHx, Meds, Allergies, PSHx, Social Hx, and POBHx all reviewed and updated.  Physical Exam   PHYSICAL EXAM:  /76   Pulse 65   Wt 69.9 kg (154 lb)   Breastfeeding No   BMI 28.17 kg/m²   General- NAD, alert and oriented, appropriate  Psych- Normal mood, good memory  US Pelvis Transvaginal Non OB (10/24/2023 09:47)    US Pelvis Transvaginal Non OB (2023 11:17)    US Pelvic NON-OB w Doppler (2022 11:36)       ASSESSMENT AND PLAN:  Diagnoses and all orders for this visit:    1. Bilateral ovarian cysts (Primary)  Assessment & Plan:  I reviewed the patient's transvaginal ultrasound results and compared them to her previous ultrasound imaging.  Her ovarian cysts are stable and have  clinically been stable since 2018.  At this time I do not think there the etiology of recurrent pelvic pain      2. Dermoid cyst of left ovary  Assessment & Plan:  Patient's dermoid cyst is unchanged from the last 2 times it was visualized.      3. Pelvic pain  Assessment & Plan:  C/o random and sporadic pelvic pain  Pain is worse but not more frequent and she never knows when it is coming  Sometimes the pain feels like needles sticking into the pelvis  Pain is bilateral sides of low abdomen and will sometimes awaken her from sleep   S/p hysteroscopy/D&C in 2018 and tissue was benign.  Has never had any PMB  Uncertain the etiology of pelvic pain.  Patient does have a history of colon cancer.  At this time I recommend she follow-up with her surgeon for further evaluation of pain given her history of cancer              Patient's surgeon and/or oncologist    Follow Up:  Return in 6 months (on 4/24/2024).          Kacey Cox MD  10/24/2023

## 2023-10-24 NOTE — ASSESSMENT & PLAN NOTE
I reviewed the patient's transvaginal ultrasound results and compared them to her previous ultrasound imaging.  Her ovarian cysts are stable and have clinically been stable since 2018.  At this time I do not think there the etiology of recurrent pelvic pain

## 2023-10-24 NOTE — ASSESSMENT & PLAN NOTE
C/o random and sporadic pelvic pain  Pain is worse but not more frequent and she never knows when it is coming  Sometimes the pain feels like needles sticking into the pelvis  Pain is bilateral sides of low abdomen and will sometimes awaken her from sleep   S/p hysteroscopy/D&C in 2018 and tissue was benign.  Has never had any PMB  Uncertain the etiology of pelvic pain.  Patient does have a history of colon cancer.  At this time I recommend she follow-up with her surgeon for further evaluation of pain given her history of cancer

## 2023-11-06 ENCOUNTER — OFFICE VISIT (OUTPATIENT)
Dept: INTERNAL MEDICINE | Facility: CLINIC | Age: 78
End: 2023-11-06
Payer: MEDICARE

## 2023-11-06 VITALS
TEMPERATURE: 98.2 F | SYSTOLIC BLOOD PRESSURE: 147 MMHG | BODY MASS INDEX: 27.86 KG/M2 | OXYGEN SATURATION: 99 % | DIASTOLIC BLOOD PRESSURE: 83 MMHG | HEIGHT: 62 IN | WEIGHT: 151.38 LBS | HEART RATE: 95 BPM

## 2023-11-06 DIAGNOSIS — E66.3 OVERWEIGHT (BMI 25.0-29.9): ICD-10-CM

## 2023-11-06 DIAGNOSIS — I25.2 HISTORY OF MI (MYOCARDIAL INFARCTION): ICD-10-CM

## 2023-11-06 DIAGNOSIS — I10 PRIMARY HYPERTENSION: ICD-10-CM

## 2023-11-06 DIAGNOSIS — E53.8 B12 DEFICIENCY: ICD-10-CM

## 2023-11-06 DIAGNOSIS — Z23 NEED FOR COVID-19 VACCINE: ICD-10-CM

## 2023-11-06 DIAGNOSIS — H35.30 MACULAR DEGENERATION OF BOTH EYES, UNSPECIFIED TYPE: ICD-10-CM

## 2023-11-06 DIAGNOSIS — E03.8 OTHER SPECIFIED HYPOTHYROIDISM: ICD-10-CM

## 2023-11-06 DIAGNOSIS — Z86.39: ICD-10-CM

## 2023-11-06 DIAGNOSIS — L65.0 TELOGEN EFFLUVIUM: ICD-10-CM

## 2023-11-06 DIAGNOSIS — N18.31 STAGE 3A CHRONIC KIDNEY DISEASE: ICD-10-CM

## 2023-11-06 DIAGNOSIS — C18.9 MALIGNANT NEOPLASM OF COLON, UNSPECIFIED PART OF COLON: ICD-10-CM

## 2023-11-06 DIAGNOSIS — Z76.89 ESTABLISHING CARE WITH NEW DOCTOR, ENCOUNTER FOR: Primary | ICD-10-CM

## 2023-11-06 DIAGNOSIS — H40.9 GLAUCOMA, UNSPECIFIED GLAUCOMA TYPE, UNSPECIFIED LATERALITY: ICD-10-CM

## 2023-11-06 DIAGNOSIS — E78.00 PURE HYPERCHOLESTEROLEMIA: ICD-10-CM

## 2023-11-06 DIAGNOSIS — R73.03 PREDIABETES: ICD-10-CM

## 2023-11-06 RX ORDER — VITAMIN B COMPLEX
1 CAPSULE ORAL DAILY
COMMUNITY
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
## 2023-11-08 ENCOUNTER — TELEPHONE (OUTPATIENT)
Dept: INTERNAL MEDICINE | Facility: CLINIC | Age: 78
End: 2023-11-08
Payer: MEDICARE

## 2023-11-08 NOTE — TELEPHONE ENCOUNTER
Patient came in asking for a referral to Gurwinder Cohn MD, for hair loss from Dr. Telles, but she is no longer the patient's PCP. Please advise.

## 2023-11-08 NOTE — TELEPHONE ENCOUNTER
Reviewed.  Thank you.  I'm not sure that Dr. Cohn of ENT would be the most appropriate referral to discuss hair loss.

## 2023-11-13 ENCOUNTER — PATIENT ROUNDING (BHMG ONLY) (OUTPATIENT)
Dept: INTERNAL MEDICINE | Facility: CLINIC | Age: 78
End: 2023-11-13
Payer: MEDICARE

## 2023-11-13 NOTE — PROGRESS NOTES
".\"A [email or web address] message has been sent to the patient for PATIENT ROUNDING with [facility]\"  "

## 2023-11-20 ENCOUNTER — TELEPHONE (OUTPATIENT)
Dept: INTERNAL MEDICINE | Facility: CLINIC | Age: 78
End: 2023-11-20
Payer: MEDICARE

## 2023-11-20 DIAGNOSIS — E03.9 HYPOTHYROIDISM, UNSPECIFIED TYPE: Primary | ICD-10-CM

## 2023-11-20 NOTE — TELEPHONE ENCOUNTER
Pt came in the office requesting a referral to Dr Cohn for Hypothyroid.  Stated she's been seeing a specialist in [place] but doesn't like making that drive.  Told pt wasn't sure if she'd need to make an appt with you or not but I'd let her know either.      Also, wanted to let you know she went on & on about the care she received.  Said you were very through & she was so happy she switched.

## 2023-11-22 NOTE — TELEPHONE ENCOUNTER
Isn't Dr. Cohn [facility]?  I've placed an endocrine referral, and we'll work on finding someone local.

## 2023-12-07 ENCOUNTER — TELEPHONE (OUTPATIENT)
Dept: INTERNAL MEDICINE | Facility: CLINIC | Age: 78
End: 2023-12-07

## 2023-12-26 ENCOUNTER — HOSPITAL ENCOUNTER (OUTPATIENT)
Dept: CT IMAGING | Facility: HOSPITAL | Age: 78
Discharge: HOME | End: 2023-12-26
Admitting: INTERNAL MEDICINE
Payer: MEDICARE

## 2023-12-26 DIAGNOSIS — C18.0 ADENOCARCINOMA OF CECUM: ICD-10-CM

## 2023-12-26 LAB
CREAT BLDA-MCNC: 1.1 MG/DL (ref 0.6–1.3)
EGFRCR SERPLBLD CKD-EPI 2021: 50.9 ML/MIN/1.73

## 2023-12-26 PROCEDURE — 74177 CT ABD & PELVIS W/CONTRAST: CPT

## 2023-12-26 PROCEDURE — 25510000001 IOPAMIDOL PER 1 ML: Performed by: INTERNAL MEDICINE

## 2023-12-26 PROCEDURE — 82565 ASSAY OF CREATININE: CPT

## 2023-12-26 PROCEDURE — 71260 CT THORAX DX C+: CPT

## 2023-12-26 RX ADMIN — IOPAMIDOL 85 ML: 755 INJECTION, SOLUTION INTRAVENOUS at 10:46

## 2023-12-28 DIAGNOSIS — E03.9 HYPOTHYROIDISM, UNSPECIFIED TYPE: Primary | ICD-10-CM

## 2023-12-29 ENCOUNTER — OFFICE VISIT (OUTPATIENT)
Dept: ONCOLOGY | Facility: HOSPITAL | Age: 78
End: 2023-12-29
Payer: MEDICARE

## 2023-12-29 VITALS
RESPIRATION RATE: 18 BRPM | SYSTOLIC BLOOD PRESSURE: 134 MMHG | OXYGEN SATURATION: 100 % | DIASTOLIC BLOOD PRESSURE: 79 MMHG | HEIGHT: 62 IN | HEART RATE: 90 BPM | WEIGHT: 151.01 LBS | BODY MASS INDEX: 27.79 KG/M2 | TEMPERATURE: 97.8 F

## 2023-12-29 DIAGNOSIS — C18.0 ADENOCARCINOMA OF CECUM: Primary | ICD-10-CM

## 2023-12-29 DIAGNOSIS — K59.00 CONSTIPATION, UNSPECIFIED CONSTIPATION TYPE: ICD-10-CM

## 2023-12-29 NOTE — PROGRESS NOTES
Chief Complaint: Adenocarcinoma of cecum        History of present illness:  Berna Leong is a 78 y.o. year old female who is here today for colon cancer follow up. She is doing well overall. She has low appetite and does not each much at each meal. This has been present much of her life. Weight is stable since last visit. She has chronic constipation. She has been trying to drink more. No bleeding. No fevers, chills. Recent CT scans reviewed and this showed no evidence of disease.         Oncology/Hematology History Overview Note   11/01/22: CT abdomen/pelvis with contrast obtained due to right flank pain: 6 cm cecal mass concerning for colonic adenocarcinoma.  There is some stranding in the adjacent fat worrisome for tumor infiltration and there are several adjacent mildly enlarged morphologically abnormal lymph nodes concerning for metastasis. 10 mm hypoattenuating lesion in liver segment 4 is unchanged from 2018, likely cyst or hemangioma.      11/07/22: Colonoscopy revealed a fungating partially obstructing large mass in the cecum. Biopsy with moderately differentiated adenocarcinoma    12/8/22: Right hemicolectomy: adenocarcinoma, mod differentiated, arisign in tubulovillous adenoma with high grade dysplasia, 6 cm. No LVI. All 26 lymph nodes negative for tumor. Staged at pT3pN0. MSI-S     Adenocarcinoma of cecum   11/8/2022 Initial Diagnosis    Adenocarcinoma of cecum (HCC)     12/30/2022 Cancer Staged    Staging form: Colon And Rectum, AJCC 8th Edition  - Pathologic: pT3, pN0 - Signed by Artur Aldridge MD on 12/30/2022       ROS    Review of Systems   Constitutional:  Positive for fatigue.   HENT: Negative.     Eyes:  Positive for visual disturbance.   Respiratory: Negative.     Cardiovascular: Negative.    Gastrointestinal: Negative.    Endocrine: Negative.    Genitourinary: Negative.    Musculoskeletal: Negative.    Skin: Negative.    Allergic/Immunologic: Negative.    Neurological: Negative.     Hematological: Negative.    Psychiatric/Behavioral: Negative.     All other systems reviewed and are negative.                                                      Past Medical History:   Diagnosis Date    Arthritis     Brown recluse spider bite     HX OF RIGHT FRONT LEG 2004    Coma due to low thyroid 2015    Glaucoma     Heart attack     72 AND 81. DENIES CP BUT GETS SOA WITH EXERTION. STATES HAS BEEN ISSUE FOR LONG TIME. FOLLOWED BY PCP DR JOSHI. DECREASED ACTIVITY    Hypothyroidism     Kidney disease     Leg pain     Leg swelling     Macular degeneration     Malignant neoplasm of colon     Restless leg syndrome     SOB (shortness of breath)     Thyroid disorder     Uterine polyp     Wound dehiscence 04/20/2022    Sustained laceration December 2020 1 sutures removed by PCP with dehiscence seen at wound clinicby Dr Laina Bales February 2022 and folowed there--given silver sulfadiazine creme (Silvadene)               Past Surgical History:   Procedure Laterality Date    COLON RESECTION N/A 12/8/2022    Procedure: COLON RESECTION LAPAROSCOPIC RIGHT WITH ROBOT;  Surgeon: Abebe Drew MD;  Location: [facility] MAIN OR;  Service: Robotics - DaVinci;  Laterality: N/A;    COLONOSCOPY N/A 11/07/2022    Procedure: COLONOSCOPY, WITH bx;  Surgeon: Jimmie Zacarias MD;  Location: [facility] ENDOSCOPY;  Service: Gastroenterology;  Laterality: N/A;  CECUM MASS    DILATATION AND CURETTAGE  2018    KNEE SURGERY Right 2008    JOINT SURGERY       MEDICATIONS: The current medication list was reviewed and reconciled.     Allergies:  is allergic to penicillins and sulfa antibiotics.    Family History   Problem Relation Age of Onset    Heart disease Mother     Arthritis Mother     Heart disease Father     Cancer Brother     Stomach cancer Maternal Aunt     Arthritis Other     Cancer Other     Heart disease Other     Colon cancer Neg Hx     Malig Hyperthermia Neg Hx            Physical Exam  Well appearing patient in no acute  distress on RA  Anicteric sclerae, no rash on exposed skin  Respirations non-labored  Awake, alert, and oriented x 4. Speech intact.   Appropriate mood and affect      Vitals:    12/29/23 1329   BP: 134/79   Pulse: 90   Resp: 18   Temp: 97.8 °F (36.6 °C)   SpO2: 100%       ECOG Performance Status: (0) Fully Active - Able to Carry On All Pre-disease Performance Without Restriction      Glucose   Date Value Ref Range Status   06/30/2023 113 (H) 65 - 99 mg/dL Final     BUN   Date Value Ref Range Status   06/30/2023 24 (H) 8 - 23 mg/dL Final     Creatinine   Date Value Ref Range Status   12/26/2023 1.10 0.60 - 1.30 mg/dL Final     Comment:     Serial Number: [id number]:  [id number]     Sodium   Date Value Ref Range Status   06/30/2023 144 136 - 145 mmol/L Final     Potassium   Date Value Ref Range Status   06/30/2023 4.2 3.5 - 5.2 mmol/L Final     Chloride   Date Value Ref Range Status   06/30/2023 107 98 - 107 mmol/L Final     CO2   Date Value Ref Range Status   06/30/2023 26.1 22.0 - 29.0 mmol/L Final     Calcium   Date Value Ref Range Status   06/30/2023 9.8 8.6 - 10.5 mg/dL Final     Total Protein   Date Value Ref Range Status   06/30/2023 7.8 6.0 - 8.5 g/dL Final     Albumin   Date Value Ref Range Status   06/30/2023 4.3 3.5 - 5.2 g/dL Final     ALT (SGPT)   Date Value Ref Range Status   06/30/2023 18 1 - 33 U/L Final     AST (SGOT)   Date Value Ref Range Status   06/30/2023 20 1 - 32 U/L Final     Alkaline Phosphatase   Date Value Ref Range Status   06/30/2023 100 39 - 117 U/L Final     Total Bilirubin   Date Value Ref Range Status   06/30/2023 0.9 0.0 - 1.2 mg/dL Final     eGFR Non  Amer   Date Value Ref Range Status   01/28/2022 53 (L) >60 mL/min/1.73 Final     A/G Ratio   Date Value Ref Range Status   03/24/2021 1.1 (L) 1.4 - 2.6 [ratio] Final     BUN/Creatinine Ratio   Date Value Ref Range Status   06/30/2023 22.9 7.0 - 25.0 Final     Anion Gap   Date Value Ref Range Status   06/30/2023 10.9 5.0 - 15.0  mmol/L Final      WBC   Date Value Ref Range Status   06/30/2023 7.28 3.40 - 10.80 10*3/mm3 Final     RBC   Date Value Ref Range Status   06/30/2023 4.70 3.77 - 5.28 10*6/mm3 Final     Hemoglobin   Date Value Ref Range Status   06/30/2023 13.8 12.0 - 15.9 g/dL Final     Hematocrit   Date Value Ref Range Status   06/30/2023 42.3 34.0 - 46.6 % Final     MCV   Date Value Ref Range Status   06/30/2023 90.0 79.0 - 97.0 fL Final     MCH   Date Value Ref Range Status   06/30/2023 29.4 26.6 - 33.0 pg Final     MCHC   Date Value Ref Range Status   06/30/2023 32.6 31.5 - 35.7 g/dL Final     RDW   Date Value Ref Range Status   06/30/2023 13.5 12.3 - 15.4 % Final     RDW-SD   Date Value Ref Range Status   06/30/2023 44.5 37.0 - 54.0 fl Final     MPV   Date Value Ref Range Status   06/30/2023 9.2 6.0 - 12.0 fL Final     Platelets   Date Value Ref Range Status   06/30/2023 301 140 - 450 10*3/mm3 Final     Neutrophil %   Date Value Ref Range Status   06/30/2023 45.1 42.7 - 76.0 % Final     Lymphocyte %   Date Value Ref Range Status   06/30/2023 44.0 19.6 - 45.3 % Final     Monocyte %   Date Value Ref Range Status   06/30/2023 5.4 5.0 - 12.0 % Final     Eosinophil %   Date Value Ref Range Status   06/30/2023 4.1 0.3 - 6.2 % Final     Basophil %   Date Value Ref Range Status   06/30/2023 1.1 0.0 - 1.5 % Final     Immature Grans %   Date Value Ref Range Status   06/30/2023 0.3 0.0 - 0.5 % Final     Neutrophils, Absolute   Date Value Ref Range Status   06/30/2023 3.29 1.70 - 7.00 10*3/mm3 Final     Lymphocytes, Absolute   Date Value Ref Range Status   06/30/2023 3.20 (H) 0.70 - 3.10 10*3/mm3 Final     Monocytes, Absolute   Date Value Ref Range Status   06/30/2023 0.39 0.10 - 0.90 10*3/mm3 Final     Eosinophils, Absolute   Date Value Ref Range Status   06/30/2023 0.30 0.00 - 0.40 10*3/mm3 Final     Basophils, Absolute   Date Value Ref Range Status   06/30/2023 0.08 0.00 - 0.20 10*3/mm3 Final     Immature Grans, Absolute   Date Value  Ref Range Status   06/30/2023 0.02 0.00 - 0.05 10*3/mm3 Final     nRBC   Date Value Ref Range Status   06/30/2023 0.0 0.0 - 0.2 /100 WBC Final       Labs personally reviewed and stable. No anemia.     Imaging    CT CAP from 12/2023 personally reviewed and per my read with no evidence of disease, no liver lesions.    CT Abdomen Pelvis With Contrast    Result Date: 12/26/2023   Please see the chest CT report for details regarding the abdomen and pelvis     Lei Trejo MD       Electronically Signed and Approved By: Lei Trejo MD on 12/26/2023 at 12:37             CT Chest With Contrast Diagnostic    Result Date: 12/26/2023    1. Postop changes of prior right hemicolectomy. 2. Stable low-density liver lesion likely a benign cyst. 3. Bilateral ovarian cysts. 4. Fat density lesion in the left ovary likely a small dermoid.  This is not changed significantly from the last exam.     Lei Trejo MD       Electronically Signed and Approved By: Lei Trejo MD on 12/26/2023 at 12:36                    Assessment and Plan:  Diagnoses and all orders for this visit:    1. Adenocarcinoma of cecum (Primary)  -     CBC & Differential; Future  -     Comprehensive Metabolic Panel; Future  -     CEA; Future  -     CBC & Differential; Future  -     Comprehensive Metabolic Panel; Future  -     CEA; Future  -     CT chest w contrast; Future  -     CT abdomen pelvis w contrast; Future  -     Ambulatory Referral to General Surgery    2. Constipation, unspecified constipation type      Ms. Leong with low risk stage II colon cancer, MSI-S s/p right hemicolectomy on 12/8/22 with 0/26 lymph nodes involved and negative margins; pT3pN0 disease. Patient with low risk stage II disease and therefore adjuvant chemotherapy was not recommended. Proceed with surveillance. 12/2023 CT imaging with KATINA. Plan to repeat CT imaging with contrast in 6 months (every 6-12 months for 5 years). 3/2023 CEA normal. Plan to get CEA today and again in 6  months (every 6 months for 5 years). Colonoscopy due 1 year after surgery, which will be 12/2023, have referred back to surgery (Dr. Drew) for this.      Constipation  Discussed increased fluids, PRN stool softener, and metamucil.     Follow up: 6 months

## 2024-01-10 ENCOUNTER — TELEPHONE (OUTPATIENT)
Dept: INTERNAL MEDICINE | Facility: CLINIC | Age: 79
End: 2024-01-10
Payer: MEDICARE

## 2024-02-02 ENCOUNTER — TELEPHONE (OUTPATIENT)
Dept: INTERNAL MEDICINE | Facility: CLINIC | Age: 79
End: 2024-02-02
Payer: MEDICARE

## 2024-02-02 DIAGNOSIS — E03.8 OTHER SPECIFIED HYPOTHYROIDISM: ICD-10-CM

## 2024-02-02 RX ORDER — LEVOTHYROXINE SODIUM 0.07 MG/1
TABLET ORAL
Qty: 90 TABLET | Refills: 1 | Status: SHIPPED | OUTPATIENT
Start: 2024-02-02

## 2024-03-28 NOTE — PROGRESS NOTES
"GYN Visit    CC: Ovarian cyst    HPI:   79 y.o. Contraception or HRT: Post menopausal, using no HRT        Patient has no complaints today      History: PMHx, Meds, Allergies, PSHx, Social Hx, and POBHx all reviewed and updated.  Physical Exam   PHYSICAL EXAM:  /73   Pulse 112   Ht 157.5 cm (62.01\")   Wt 73.5 kg (162 lb)   BMI 29.62 kg/m²   General- NAD, alert and oriented, appropriate  Psych- Normal mood, good memory    CT Chest With Contrast Diagnostic (2023 10:56)    CT Abdomen Pelvis With Contrast (2023 10:56)    US Pelvis Transvaginal Non OB (10/24/2023 09:47)    US Pelvis Transvaginal Non OB (2023 11:17)    US Pelvic NON-OB w Doppler (2022 11:36)     I reviewed all the previous imaging with pelvic ultrasounds and CT scans  ASSESSMENT AND PLAN:  Diagnoses and all orders for this visit:    1. Dermoid cyst of left ovary (Primary)  Assessment & Plan:  Patient just had a CT scan of the abdomen and pelvis and the dermoid cyst is again visualized and unchanged.  The patient states this has been present since 2018 and has not changed.  At this time the patient gets regular CT scans secondary to her history of colon cancer.  Given there is no sign of change in size I think we can watch the ovarian cyst on her regular scheduled CT scan of the abdomen and pelvis      2. Bilateral ovarian cysts  Assessment & Plan:  Patient states she is not having any pelvic pain at this time.  There is no change in sizes on ovarian cysts.  I would not do any further gynecologic intervention at this time.  She the patient is continuing to get CT scans of the abdomen and pelvis is follow-up for her colon cancer if there are any changes we can reevaluate at that time                  Follow Up:  No follow-ups on file.          Kacey Cox MD  2024      "

## 2024-04-08 ENCOUNTER — OFFICE VISIT (OUTPATIENT)
Dept: OBSTETRICS AND GYNECOLOGY | Facility: CLINIC | Age: 79
End: 2024-04-08
Payer: MEDICARE

## 2024-04-08 ENCOUNTER — HOSPITAL ENCOUNTER (OUTPATIENT)
Dept: ULTRASOUND IMAGING | Facility: HOSPITAL | Age: 79
Discharge: HOME | End: 2024-04-08
Admitting: STUDENT IN AN ORGANIZED HEALTH CARE EDUCATION/TRAINING PROGRAM
Payer: MEDICARE

## 2024-04-08 VITALS
BODY MASS INDEX: 29.81 KG/M2 | WEIGHT: 162 LBS | SYSTOLIC BLOOD PRESSURE: 126 MMHG | HEART RATE: 112 BPM | HEIGHT: 62 IN | DIASTOLIC BLOOD PRESSURE: 73 MMHG

## 2024-04-08 DIAGNOSIS — E03.9 HYPOTHYROIDISM, UNSPECIFIED TYPE: ICD-10-CM

## 2024-04-08 DIAGNOSIS — N83.202 BILATERAL OVARIAN CYSTS: ICD-10-CM

## 2024-04-08 DIAGNOSIS — D27.1 DERMOID CYST OF LEFT OVARY: Primary | ICD-10-CM

## 2024-04-08 DIAGNOSIS — N83.201 BILATERAL OVARIAN CYSTS: ICD-10-CM

## 2024-04-08 PROCEDURE — 1160F RVW MEDS BY RX/DR IN RCRD: CPT | Performed by: OBSTETRICS & GYNECOLOGY

## 2024-04-08 PROCEDURE — 76536 US EXAM OF HEAD AND NECK: CPT

## 2024-04-08 PROCEDURE — 3078F DIAST BP <80 MM HG: CPT | Performed by: OBSTETRICS & GYNECOLOGY

## 2024-04-08 PROCEDURE — 1159F MED LIST DOCD IN RCRD: CPT | Performed by: OBSTETRICS & GYNECOLOGY

## 2024-04-08 PROCEDURE — 99213 OFFICE O/P EST LOW 20 MIN: CPT | Performed by: OBSTETRICS & GYNECOLOGY

## 2024-04-08 PROCEDURE — 3074F SYST BP LT 130 MM HG: CPT | Performed by: OBSTETRICS & GYNECOLOGY

## 2024-04-08 NOTE — ASSESSMENT & PLAN NOTE
Patient just had a CT scan of the abdomen and pelvis and the dermoid cyst is again visualized and unchanged.  The patient states this has been present since 2018 and has not changed.  At this time the patient gets regular CT scans secondary to her history of colon cancer.  Given there is no sign of change in size I think we can watch the ovarian cyst on her regular scheduled CT scan of the abdomen and pelvis

## 2024-04-08 NOTE — ASSESSMENT & PLAN NOTE
Patient states she is not having any pelvic pain at this time.  There is no change in sizes on ovarian cysts.  I would not do any further gynecologic intervention at this time.  She the patient is continuing to get CT scans of the abdomen and pelvis is follow-up for her colon cancer if there are any changes we can reevaluate at that time

## 2024-04-09 ENCOUNTER — TELEPHONE (OUTPATIENT)
Dept: INTERNAL MEDICINE | Facility: CLINIC | Age: 79
End: 2024-04-09
Payer: MEDICARE

## 2024-04-09 ENCOUNTER — PREP FOR SURGERY (OUTPATIENT)
Dept: OTHER | Facility: HOSPITAL | Age: 79
End: 2024-04-09
Payer: MEDICARE

## 2024-04-09 DIAGNOSIS — Z85.038 PERSONAL HISTORY OF COLON CANCER: Primary | ICD-10-CM

## 2024-04-09 NOTE — TELEPHONE ENCOUNTER
Attempted to contact patient. Number was not in working service at the time of the call.     HUB OK TO READ/ADVISE:  Thyroid ultrasound is reassuring, overall. There is atrophy noted. No nodules noted. No findings concerning for malignancy.

## 2024-04-09 NOTE — TELEPHONE ENCOUNTER
----- Message from Daniel Hale MD sent at 4/8/2024  1:51 PM EDT -----  Thyroid ultrasound is reassuring, overall.  There is atrophy noted.  No nodules noted.  No findings concerning for malignancy.

## 2024-04-11 NOTE — TELEPHONE ENCOUNTER
Attempted to contact patient. Number was not in working service at the time of the call.      HUB OK TO READ/ADVISE:  Thyroid ultrasound is reassuring, overall. There is atrophy noted. No nodules noted. No findings concerning for malignancy

## 2024-04-15 ENCOUNTER — OFFICE VISIT (OUTPATIENT)
Dept: OTOLARYNGOLOGY | Facility: CLINIC | Age: 79
End: 2024-04-15
Payer: MEDICARE

## 2024-04-15 VITALS
BODY MASS INDEX: 32.72 KG/M2 | TEMPERATURE: 97.5 F | HEIGHT: 59 IN | SYSTOLIC BLOOD PRESSURE: 125 MMHG | DIASTOLIC BLOOD PRESSURE: 67 MMHG | HEART RATE: 91 BPM

## 2024-04-15 DIAGNOSIS — J34.2 DEVIATED NASAL SEPTUM: ICD-10-CM

## 2024-04-15 DIAGNOSIS — E03.9 HYPOTHYROIDISM (ACQUIRED): Primary | ICD-10-CM

## 2024-04-15 DIAGNOSIS — E03.4 ACQUIRED ATROPHY OF THYROID: ICD-10-CM

## 2024-04-15 DIAGNOSIS — Z92.3 HISTORY OF RADIOACTIVE IODINE THYROID ABLATION: ICD-10-CM

## 2024-04-15 DIAGNOSIS — Z87.81 HISTORY OF FRACTURE OF NASAL BONE: ICD-10-CM

## 2024-04-15 DIAGNOSIS — Z86.39 HISTORY OF HYPERTHYROIDISM: ICD-10-CM

## 2024-04-15 PROCEDURE — 99204 OFFICE O/P NEW MOD 45 MIN: CPT | Performed by: OTOLARYNGOLOGY

## 2024-04-15 PROCEDURE — 3078F DIAST BP <80 MM HG: CPT | Performed by: OTOLARYNGOLOGY

## 2024-04-15 PROCEDURE — 1160F RVW MEDS BY RX/DR IN RCRD: CPT | Performed by: OTOLARYNGOLOGY

## 2024-04-15 PROCEDURE — 3074F SYST BP LT 130 MM HG: CPT | Performed by: OTOLARYNGOLOGY

## 2024-04-15 PROCEDURE — 1159F MED LIST DOCD IN RCRD: CPT | Performed by: OTOLARYNGOLOGY

## 2024-04-15 RX ORDER — LEVOTHYROXINE SODIUM 0.07 MG/1
75 TABLET ORAL DAILY
Qty: 90 TABLET | Refills: 1 | Status: SHIPPED | OUTPATIENT
Start: 2024-04-15

## 2024-04-15 NOTE — PATIENT INSTRUCTIONS
1.  Patient has a long history of thyroid disease with hyperthyroidism which have been treated with medical oral medication for a long time until she had the I-131 ablation 95.  Since then patient has been on  replacement with Synthroid.  However recently price has increased and she went to generic but is causing her nausea.  I have given her some options which 1 of it is to try to change the generic company by going to a different pharmacy and see if they carry a different generic manufacturing levothyroxine.  The other option is to go back on Synthroid.  Patient has elected to try [facility] and give the go with another generic.  I will see her back in 2 months with labs and see where she is on it.

## 2024-04-15 NOTE — PROGRESS NOTES
Patient Name: Berna Leong   Visit Date: 04/15/2024   Patient ID: [id number]  Provider: Gurwinder Cohn MD    Sex: female  Location: [facility]   YOB: 1945  Location Address: [address] [place] [address], [address] [place] [address],?[place]?[id number]    Primary Care Provider Daniel Hale MD  Location Phone: [phone]    Referring Provider: Daniel Hale MD        Chief Complaint  Hypothyroidism    History of Present Illness  Berna Leong is a 79 y.o. female who presents to [facility] for Hypothyroidism. Patient patient was seen at [facility] ENT clinic by Ms. Sujey Weldon on 11/12/2015 for fall with nasal fracture and deviated nasal septum.  Otherwise patient had not been seen by Dr. Cohn.  Patient is here with a long history of thyroid disease where she was actually having hyperthyroidism with palpitations since age 8.  Patient had been managed with methimazole orally for a long time until 1995 where she had seen Dr. Xiong, an endocrinologist in [place] and she proceeded with I-131 ablation.  Following the ablation, patient became hypothyroid and was supplemented with oral Synthroid.  Until recently patient had been on Synthroid but due to price increase she was changed to Euthyrox which is a type of generic levothyroxine.  Patient complains of daily nausea and lightheadedness with tachycardia taking the medication.  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  Patient had been in remission.  Patient has been referred for hypothyroidism.  Otherwise patient had an ultrasound of thyroid on 4/8/2024 showing marked thyroid atrophy either due to chronic thyroiditis or posttreatment related changes which is probably the case due to I-131 ablation.  Currently patient is on Euthyrox 75 mcg p.o. daily    Past Medical History:   Diagnosis Date    Arthritis     Brown recluse spider bite     HX OF RIGHT FRONT LEG 2004     Coma due to low thyroid 2015    Glaucoma     Heart attack     72 AND 81. DENIES CP BUT GETS SOA WITH EXERTION. STATES HAS BEEN ISSUE FOR LONG TIME. FOLLOWED BY PCP DR JOSHI. DECREASED ACTIVITY    Hypothyroidism     Kidney disease     Leg pain     Leg swelling     Macular degeneration     Malignant neoplasm of colon     Restless leg syndrome     SOB (shortness of breath)     Thyroid disorder     Uterine polyp     Wound dehiscence 04/20/2022    Sustained laceration December 2020 1 sutures removed by PCP with dehiscence seen at wound clinicby Dr Laina Bales February 2022 and folowed there--given silver sulfadiazine creme (Silvadene)       Past Surgical History:   Procedure Laterality Date    COLON RESECTION N/A 12/8/2022    Procedure: COLON RESECTION LAPAROSCOPIC RIGHT WITH ROBOT;  Surgeon: Abebe Drew MD;  Location: [facility] MAIN OR;  Service: Robotics - DaVinci;  Laterality: N/A;    COLONOSCOPY N/A 11/07/2022    Procedure: COLONOSCOPY, WITH bx;  Surgeon: Jimmie Zacarias MD;  Location: [facility] ENDOSCOPY;  Service: Gastroenterology;  Laterality: N/A;  CECUM MASS    DILATATION AND CURETTAGE  2018    KNEE SURGERY Right 2008    JOINT SURGERY         Current Outpatient Medications:     ascorbic acid (VITAMIN C) 1000 MG tablet, Take 1 tablet by mouth Daily., Disp: , Rfl:     B Complex Vitamins (vitamin b complex) capsule capsule, Take 1 capsule by mouth Daily., Disp: , Rfl:     Cholecalciferol (Vitamin D) 50 MCG (2000 UT) capsule, 5,000 Units Daily. VIT D3, Disp: , Rfl:     Multiple Vitamins-Minerals (EYE HEALTH PO), Take 2 tablets by mouth Daily., Disp: , Rfl:     Vitamin A 3 MG (12979 UT) capsule, Take 2,400 Units by mouth Daily., Disp: , Rfl:     vitamin E 400 UNIT capsule, Take 1 capsule by mouth Daily., Disp: , Rfl:     levothyroxine (Synthroid) 75 MCG tablet, Take 1 tablet by mouth Daily., Disp: 90 tablet, Rfl: 1    polyethylene glycol (MiraLax) 17 GM/SCOOP powder, Take 17 g by mouth Daily. (Patient not  "taking: Reported on 12/29/2023), Disp: 238 g, Rfl: 1    psyllium (Metamucil Smooth Texture) 58.6 % powder, Take  by mouth 3 (Three) Times a Day. (Patient not taking: Reported on 12/29/2023), Disp: 1 each, Rfl: 12     Allergies   Allergen Reactions    Penicillins Rash    Sulfa Antibiotics Rash       Social History     Tobacco Use    Smoking status: Never    Smokeless tobacco: Never   Vaping Use    Vaping status: Never Used   Substance Use Topics    Alcohol use: Never    Drug use: Never        Objective     Vital Signs:   Vitals:    04/15/24 0936   BP: 125/67   BP Location: Left arm   Patient Position: Sitting   Cuff Size: Adult   Pulse: 91   Temp: 97.5 °F (36.4 °C)   TempSrc: Temporal   Height: 149.9 cm (59\")       Tobacco Use: Low Risk  (4/15/2024)    Patient History     Smoking Tobacco Use: Never     Smokeless Tobacco Use: Never     Passive Exposure: Not on file         Physical Exam    Constitutional   Appearance  well developed, well-nourished, alert and in no acute distress, voice clear and strong    Head   Inspection  no deformities or lesions      Face   Inspection  no facial lesions; House-Brackmann I/VI bilaterally   Palpation  no TMJ crepitus nor  muscle tenderness bilaterally     Eyes/Vision   Visual Fields  extraocular movements are intact, no spontaneous or gaze-induced nystagmus  Conjunctivae  clear   Sclerae  clear   Pupils and Irises  pupils equal, round, and reactive to light.   Nystagmus  not present     Ears, Nose, Mouth and Throat  Ears  External Ears  appearance within normal limits, no lesions present   Otoscopic Examination  tympanic membrane appearance within normal limits bilaterally without perforations, well-aerated middle ears   Hearing  intact to conversational voice both ears   Tunning fork testing    Rinne:  Sauceda:    Nose  External Nose  appearance normal   Intranasal Exam  mucosa within normal limits, vestibules normal, no intranasal lesions present, septum midline, sinuses " non tender to percussion   Modified [place] Test:    Oral Cavity  Oral Mucosa  oral mucosa normal without pallor or cyanosis   Lips  lip appearance normal   Teeth  normal dentition for age   Gums  gums pink, non-swollen, no bleeding present   Tongue  tongue appearance normal; normal mobility   Palate  hard palate normal, soft palate appearance normal with symmetric mobility     Throat  Oropharynx  no inflammation or lesions present, tonsils within normal limits   Hypopharynx  appearance within normal limits   Larynx  voice normal     Neck  Inspection/Palpation  normal appearance, no masses or tenderness, trachea midline; thyroid size normal, nontender, no nodules or masses present on palpation     Lymphatic  Neck  no lymphadenopathy present   Supraclavicular Nodes  no lymphadenopathy present   Preauricular Nodes  no lymphadenopathy present     Respiratory  Respiratory Effort  breathing unlabored   Inspection of Chest  normal appearance, no retractions     Musculoskeletal   Cervical back: Normal range of motion and neck supple.      Skin and Subcutaneous Tissue  General Inspection  Regarding face and neck - there are no rashes present, no lesions present, and no areas of discoloration     Neurologic  Cranial Nerves  cranial nerves II-XII are grossly intact bilaterally   Gait and Station  normal gait, able to stand without diffculty    Psychiatric  Judgement and Insight  judgment and insight intact   Mood and Affect  mood normal, affect appropriate       RESULTS REVIEWED    I have reviewed the following information:   [x]  Previous Internal Note  []  Previous External Note:   [x]  Ordered Tests & Results:      Pathology:   Lab Results   Component Value Date    CASEREPORT  12/08/2022     Surgical Pathology Report                         Case: [id number]                                  Authorizing Provider:  Abebe Drew MD        Collected:           12/08/2022 10:29 PM          Ordering Location:     [facility]  "[facility] Received:            12/12/2022 09:12 AM                                 OR                                                                           Pathologist:           Donal Munoz MD                                                            Specimen:    Large Intestine, Right / Ascending Colon, right colon                                      CLININFO  12/08/2022     Malignant neoplasm of colon, unspecified part of colon (HCC)      FINALDX  12/08/2022     Right colon, right hemicolectomy:   - Adenocarcinoma, moderately differentiated, arising in a tubulovillous adenoma with high grade dysplasia   - Unremarkable appendix   - See synoptic checklist      GROSSDES  12/08/2022     1. Large Intestine, Right / Ascending Colon.  The specimen is received in 1 formalin filled container labeled \"right colon\" and consists of a 3.0 cm in length extended right hemicolectomy specimen including 13.0 x 2.0 cm of proximal terminal ileum, a 6.0 cm in length by 0.5 cm average diameter intact, vermiform appendix, 13.0 x 5.0 cm of cecum/ascending colon, 10.0 x 5.0 cm transverse colon, 7.0 cm of attached omentum, 9.0 cm of attached pericolic adipose tissue, and an opposing stapled margins.  The tan-pink serosa is indurated, dusky, and strictured down to 4.5 cm along the ileocecal valve and displays an additional 2.5 cm strictured area along the ascending colon.  Opening reveals a 6.0 x 5.0 x 4.0 cm tan, exophytic, fungating mass along the ileocecal valve, located 12.0 cm from the proximal terminal ileum margin, 30.0 cm of the distal transverse colon margin, and 2.0 cm of the nearest mesenteric adipose tissue margin.  Sectioning reveals the mass extends beyond the muscularis propria into the pericolic adipose tissue.  The mass abuts but does not grossly involve the appendiceal orifice.  The luminal circumference ranges from 12.0 cm to 4.5 cm at the strictured area of the ascending colon.  The average wall " thickness is 0.3 cm.  The luminal circumference of the terminal ileum averages 3.5 cm, and the average wall thickness is 0.6 cm.  The appendix displays tan, smooth, intact serosa and a 0.3 cm diameter lumen with an average wall thickness of 0.2 cm.  Sectioning the attached omentum reveals lobulated adipose tissue with no distinct nodules.  Multiple tan-pink lymph nodes are identified in the attached adipose tissue, ranging in size from 0.3-3.0 cm.  Representative sections are submitted as follows: 1A-proximal terminal ileum margin, en face, and 1 bisected lymph node; 1B-1 trisected lymph node, representative section of distal transverse colon margin, and mesenteric adipose tissue margin nearest to mass, en face; 7X-1V-jcrkrkza of mass, to include deepest extent in 1C, uninvolved appendiceal orifice in 1F, and adjacent proximal/distal uninvolved in 1G; 1H-strictured area of the ascending colon; 1I-appendix, entirely; 1J-1K-1 lymph node, serially sectioned; 1L-1 trisected lymph node; 1M-12 whole possible lymph nodes; 1N-8 whole possible lymph node; 1O-omentum and 2 bisected lymph nodes (1 inked blue). HESHAM      MICRO  12/08/2022      Comment:      Microscopic examination performed.         TSH   Date Value Ref Range Status   05/12/2023 0.527 0.270 - 4.200 uIU/mL Final     T3, Free   Date Value Ref Range Status   04/22/2022 1.90 (L) 2.00 - 4.40 pg/mL Final     Free T4   Date Value Ref Range Status   05/12/2023 1.25 0.93 - 1.70 ng/dL Final     Comment:     T4 results may be falsely increased if patient taking Biotin.     PTH, Intact   Date Value Ref Range Status   05/28/2021 97.9 (H) 11.1 - 79.5 pg/mL Final     Comment:     Interpretation of intact PTH values should always take into  account serum calcium results and the interrelationship  between these two elements in various disorders involving PTH  and calcium. It is recommended that the intact PTH results  should always be interpreted with caution and  with  consideration of the overall clinical manifestations even  when used in conjunction with calcium values.       Heterophilic antibodies in human serum can react with  reagent immunoglobulins, interfering with in vitro immunoassays.  Patients routinely exposed to animals or to animal serum  products can be prone to this interference and anomalous values  may be observed. Samples from patients routinely receiving high  dose biotin therapy may show falsely depressed values.       Calcium   Date Value Ref Range Status   06/30/2023 9.8 8.6 - 10.5 mg/dL Final     25 Hydroxy, Vitamin D   Date Value Ref Range Status   11/14/2022 42.1 30.0 - 100.0 ng/ml Final       US Thyroid    Result Date: 4/8/2024  Impression: Marked thyroid atrophy, either related to chronic thyroiditis or posttreatment related change. Correlate for history of thyroid ablation.  TI-RADS: TR1 - Benign. No follow up needed.  Electronically Signed By-Jaswant Mcleod MD On:4/8/2024 1:37 PM      CT Abdomen Pelvis With Contrast    Result Date: 12/26/2023   Please see the chest CT report for details regarding the abdomen and pelvis     Lei Trejo MD       Electronically Signed and Approved By: Lei Trejo MD on 12/26/2023 at 12:37             CT Chest With Contrast Diagnostic    Result Date: 12/26/2023    1. Postop changes of prior right hemicolectomy. 2. Stable low-density liver lesion likely a benign cyst. 3. Bilateral ovarian cysts. 4. Fat density lesion in the left ovary likely a small dermoid.  This is not changed significantly from the last exam.     Lei Trejo MD       Electronically Signed and Approved By: Lei Trejo MD on 12/26/2023 at 12:36               I have discussed the interpretation of the above results with the patient.    Procedures          Assessment and Plan   Diagnoses and all orders for this visit:    1. Hypothyroidism (acquired) (Primary)  -     TSH; Future  -     T4, free; Future  -     T3, Free; Future  -      levothyroxine (Synthroid) 75 MCG tablet; Take 1 tablet by mouth Daily.  Dispense: 90 tablet; Refill: 1    2. Acquired atrophy of thyroid    3. Deviated nasal septum    4. History of fracture of nasal bone    5. History of radioactive iodine thyroid ablation    6. History of hyperthyroidism        Berna Leong  reports that she has never smoked. She has never used smokeless tobacco.       Plan:  Patient Instructions   1.  Patient has a long history of thyroid disease with hyperthyroidism which have been treated with medical oral medication for a long time until she had the I-131 ablation 95.  Since then patient has been on  replacement with Synthroid.  However recently price has increased and she went to generic but is causing her nausea.  I have given her some options which 1 of it is to try to change the generic company by going to a different pharmacy and see if they carry a different generic manufacturing levothyroxine.  The other option is to go back on Synthroid.  Patient has elected to try [facility] and give the go with another generic.  I will see her back in 2 months with labs and see where she is on it.     47 minutes were spent caring for Berna on this date of service. This time spent by me includes preparing for the visit, reviewing tests, obtaining/reviewing separately obtained history, performing medically appropriate exam/evaluation, counseling/educating the patient/family/caregiver, ordering medications/tests/procedures, referring/communicating with other health care professionals, documenting information in the medical record, independently interpreting results and communicating that with the patient/family/caregiver and/or care coordination.       Follow Up   Return in about 2 months (around 6/15/2024), or Follow-up 2 months after this new generic medication with lab.  Patient was given instructions and counseling regarding her condition or for health maintenance advice. Please see specific  information pulled into the AVS if appropriate.

## 2024-05-23 ENCOUNTER — LAB (OUTPATIENT)
Dept: LAB | Facility: HOSPITAL | Age: 79
End: 2024-05-23
Payer: MEDICARE

## 2024-05-23 ENCOUNTER — TRANSCRIBE ORDERS (OUTPATIENT)
Dept: ADMINISTRATIVE | Facility: HOSPITAL | Age: 79
End: 2024-05-23
Payer: MEDICARE

## 2024-05-23 DIAGNOSIS — E03.9 HYPOTHYROIDISM (ACQUIRED): ICD-10-CM

## 2024-05-23 DIAGNOSIS — N18.31 CHRONIC KIDNEY DISEASE (CKD) STAGE G3A/A1, MODERATELY DECREASED GLOMERULAR FILTRATION RATE (GFR) BETWEEN 45-59 ML/MIN/1.73 SQUARE METER AND ALBUMINURIA CREATININE RATIO LESS THAN 30 MG/G (CMS/H*: ICD-10-CM

## 2024-05-23 DIAGNOSIS — N18.31 CHRONIC KIDNEY DISEASE (CKD) STAGE G3A/A1, MODERATELY DECREASED GLOMERULAR FILTRATION RATE (GFR) BETWEEN 45-59 ML/MIN/1.73 SQUARE METER AND ALBUMINURIA CREATININE RATIO LESS THAN 30 MG/G (CMS/H*: Primary | ICD-10-CM

## 2024-05-23 DIAGNOSIS — E55.9 VITAMIN D DEFICIENCY: ICD-10-CM

## 2024-05-23 DIAGNOSIS — N18.31 CHRONIC KIDNEY DISEASE, STAGE 3A: ICD-10-CM

## 2024-05-23 DIAGNOSIS — C18.0 ADENOCARCINOMA OF CECUM: ICD-10-CM

## 2024-05-23 LAB
25(OH)D3 SERPL-MCNC: 45.3 NG/ML (ref 30–100)
ALBUMIN SERPL-MCNC: 4.1 G/DL (ref 3.5–5.2)
ALBUMIN/GLOB SERPL: 1.4 G/DL
ALP SERPL-CCNC: 112 U/L (ref 39–117)
ALT SERPL W P-5'-P-CCNC: 14 U/L (ref 1–33)
ANION GAP SERPL CALCULATED.3IONS-SCNC: 10.2 MMOL/L (ref 5–15)
AST SERPL-CCNC: 14 U/L (ref 1–32)
BACTERIA UR QL AUTO: ABNORMAL /HPF
BASOPHILS # BLD AUTO: 0.09 10*3/MM3 (ref 0–0.2)
BASOPHILS NFR BLD AUTO: 1.1 % (ref 0–1.5)
BILIRUB SERPL-MCNC: 0.7 MG/DL (ref 0–1.2)
BILIRUB UR QL STRIP: NEGATIVE
BUN SERPL-MCNC: 26 MG/DL (ref 8–23)
BUN/CREAT SERPL: 23 (ref 7–25)
CALCIUM SPEC-SCNC: 9.4 MG/DL (ref 8.6–10.5)
CEA SERPL-MCNC: 1.46 NG/ML
CHLORIDE SERPL-SCNC: 99 MMOL/L (ref 98–107)
CLARITY UR: ABNORMAL
CO2 SERPL-SCNC: 23.8 MMOL/L (ref 22–29)
COD CRY URNS QL: PRESENT /HPF
COLOR UR: ABNORMAL
CREAT SERPL-MCNC: 1.13 MG/DL (ref 0.57–1)
CREAT UR-MCNC: 313 MG/DL
DEPRECATED RDW RBC AUTO: 42.3 FL (ref 37–54)
EGFRCR SERPLBLD CKD-EPI 2021: 49 ML/MIN/1.73
EOSINOPHIL # BLD AUTO: 0.15 10*3/MM3 (ref 0–0.4)
EOSINOPHIL NFR BLD AUTO: 1.9 % (ref 0.3–6.2)
ERYTHROCYTE [DISTWIDTH] IN BLOOD BY AUTOMATED COUNT: 12.9 % (ref 12.3–15.4)
GLOBULIN UR ELPH-MCNC: 2.9 GM/DL
GLUCOSE SERPL-MCNC: 100 MG/DL (ref 65–99)
GLUCOSE UR STRIP-MCNC: NEGATIVE MG/DL
HCT VFR BLD AUTO: 40.5 % (ref 34–46.6)
HGB BLD-MCNC: 12.9 G/DL (ref 12–15.9)
HGB UR QL STRIP.AUTO: NEGATIVE
HYALINE CASTS UR QL AUTO: ABNORMAL /LPF
IMM GRANULOCYTES # BLD AUTO: 0.03 10*3/MM3 (ref 0–0.05)
IMM GRANULOCYTES NFR BLD AUTO: 0.4 % (ref 0–0.5)
KETONES UR QL STRIP: ABNORMAL
LEUKOCYTE ESTERASE UR QL STRIP.AUTO: ABNORMAL
LYMPHOCYTES # BLD AUTO: 2.31 10*3/MM3 (ref 0.7–3.1)
LYMPHOCYTES NFR BLD AUTO: 29.2 % (ref 19.6–45.3)
MCH RBC QN AUTO: 28.6 PG (ref 26.6–33)
MCHC RBC AUTO-ENTMCNC: 31.9 G/DL (ref 31.5–35.7)
MCV RBC AUTO: 89.8 FL (ref 79–97)
MONOCYTES # BLD AUTO: 0.48 10*3/MM3 (ref 0.1–0.9)
MONOCYTES NFR BLD AUTO: 6.1 % (ref 5–12)
NEUTROPHILS NFR BLD AUTO: 4.85 10*3/MM3 (ref 1.7–7)
NEUTROPHILS NFR BLD AUTO: 61.3 % (ref 42.7–76)
NITRITE UR QL STRIP: NEGATIVE
NRBC BLD AUTO-RTO: 0 /100 WBC (ref 0–0.2)
PH UR STRIP.AUTO: 5.5 [PH] (ref 5–8)
PHOSPHATE SERPL-MCNC: 3.1 MG/DL (ref 2.5–4.5)
PLATELET # BLD AUTO: 261 10*3/MM3 (ref 140–450)
PMV BLD AUTO: 9.3 FL (ref 6–12)
POTASSIUM SERPL-SCNC: 3.6 MMOL/L (ref 3.5–5.2)
PROT ?TM UR-MCNC: 33.2 MG/DL
PROT SERPL-MCNC: 7 G/DL (ref 6–8.5)
PROT UR QL STRIP: ABNORMAL
PROT/CREAT UR: 0.11 MG/G{CREAT}
PTH-INTACT SERPL-MCNC: 81.8 PG/ML (ref 15–65)
RBC # BLD AUTO: 4.51 10*6/MM3 (ref 3.77–5.28)
RBC # UR STRIP: ABNORMAL /HPF
REF LAB TEST METHOD: ABNORMAL
SODIUM SERPL-SCNC: 133 MMOL/L (ref 136–145)
SP GR UR STRIP: 1.02 (ref 1–1.03)
SQUAMOUS #/AREA URNS HPF: ABNORMAL /HPF
T3FREE SERPL-MCNC: 2.42 PG/ML (ref 2–4.4)
T4 FREE SERPL-MCNC: 1.68 NG/DL (ref 0.93–1.7)
TSH SERPL DL<=0.05 MIU/L-ACNC: 0.41 UIU/ML (ref 0.27–4.2)
UROBILINOGEN UR QL STRIP: ABNORMAL
WBC # UR STRIP: ABNORMAL /HPF
WBC NRBC COR # BLD AUTO: 7.91 10*3/MM3 (ref 3.4–10.8)

## 2024-05-23 PROCEDURE — 84443 ASSAY THYROID STIM HORMONE: CPT

## 2024-05-23 PROCEDURE — 83970 ASSAY OF PARATHORMONE: CPT

## 2024-05-23 PROCEDURE — 85025 COMPLETE CBC W/AUTO DIFF WBC: CPT

## 2024-05-23 PROCEDURE — 84439 ASSAY OF FREE THYROXINE: CPT

## 2024-05-23 PROCEDURE — 84156 ASSAY OF PROTEIN URINE: CPT

## 2024-05-23 PROCEDURE — 84100 ASSAY OF PHOSPHORUS: CPT

## 2024-05-23 PROCEDURE — 81001 URINALYSIS AUTO W/SCOPE: CPT

## 2024-05-23 PROCEDURE — 82306 VITAMIN D 25 HYDROXY: CPT

## 2024-05-23 PROCEDURE — 80053 COMPREHEN METABOLIC PANEL: CPT

## 2024-05-23 PROCEDURE — 36415 COLL VENOUS BLD VENIPUNCTURE: CPT

## 2024-05-23 PROCEDURE — 84481 FREE ASSAY (FT-3): CPT

## 2024-05-23 PROCEDURE — 82378 CARCINOEMBRYONIC ANTIGEN: CPT

## 2024-05-23 PROCEDURE — 82570 ASSAY OF URINE CREATININE: CPT

## 2024-06-09 NOTE — PROGRESS NOTES
[facility]     Surgery Progress Note    Patient Name: Berna Leong  :    1945  MRN:    [id number]  Date of admission:  2022  Length of Stay: 3 days    Subjective   Had some RLQ pain last night - improved with toradol.  Having liquid BMs - not bloody.  No n/v.  No complaints this morning.  Walked in the halls yesterday.      Objective       Current Diet:    Dietary Orders (From admission, onward)     Start     Ordered    12/10/22 1003  Diet: Liquid Diets; Full Liquid; Texture: Regular Texture (IDDSI 7); Fluid Consistency: Thin (IDDSI 0)  Diet Effective Now        References:    Diet Order Crosswalk   Question Answer Comment   Diets: Liquid Diets    Liquid Diet: Full Liquid    Texture: Regular Texture (IDDSI 7)    Fluid Consistency: Thin (IDDSI 0)        12/10/22 1003                Vitals:   Temp:  [98 °F (36.7 °C)-98.8 °F (37.1 °C)] 98.4 °F (36.9 °C)  Heart Rate:  [81-89] 81  Resp:  [16] 16  BP: (118-141)/(43-48) 129/48  Physical Exam   • Constitutional: alert, no acute distress  • Respiratory:  breathing not labored, respiratory effort appears normal  • Cardiovascular:  heart regular rate and rhythm  • Abdomen:  soft, nondistended, approp tenderness, incisions look ok  • Musculoskeletal: moving all extremities symmetrically and purposefully  • Neurologic:  no obvious motor or sensory deficits, alert & oriented x 3, speech clear    LABS:  CBC    CBC 22   WBC 11.80 (A) 11.49 (A) 12.59 (A)   RBC 4.55 4.37 3.42 (A)   Hemoglobin 12.8 12.5 9.8 (A)   Hematocrit 39.1 38.2 30.8 (A)   MCV 85.9 87.4 90.1   MCH 28.1 28.6 28.7   MCHC 32.7 32.7 31.8   RDW 13.2 13.3 14.1   Platelets 295 264 203   (A) Abnormal value            CMP    CMP 22   Glucose 130 (A) 161 (A) 106 (A)   BUN 23 9 7 (A)   Creatinine 1.06 (A) 1.05 (A) 1.13 (A)   Sodium 130 (A) 139 140   Potassium 4.0 4.1 5.0   Chloride 90 (A) 106 108 (A)   Calcium 9.6 8.7 8.5 (A)   Albumin 4.10     Total Bilirubin  0.6     Alkaline Phosphatase 101     AST (SGOT) 26     ALT (SGPT) 15     (A) Abnormal value            Assessment / Plan   Assessment:   Colon cancer  s/p Robotic right colectomy 12/8/22  Satisfactory postop progress thus far     Plan:    Start regular diet  Monitor progress     Electronically signed by Abebe Drew MD, 12/11/22, 9:34 AM EST.   Normal rate, regular rhythm.  Heart sounds S1, S2.  No murmurs, rubs or gallops.

## 2024-06-22 ENCOUNTER — APPOINTMENT (OUTPATIENT)
Dept: GENERAL RADIOLOGY | Facility: HOSPITAL | Age: 79
End: 2024-06-22
Payer: MEDICARE

## 2024-06-22 ENCOUNTER — HOSPITAL ENCOUNTER (EMERGENCY)
Facility: HOSPITAL | Age: 79
Discharge: HOME | End: 2024-06-22
Attending: EMERGENCY MEDICINE
Payer: MEDICARE

## 2024-06-22 VITALS
SYSTOLIC BLOOD PRESSURE: 144 MMHG | HEIGHT: 59 IN | OXYGEN SATURATION: 96 % | HEART RATE: 84 BPM | RESPIRATION RATE: 17 BRPM | BODY MASS INDEX: 35.51 KG/M2 | TEMPERATURE: 97.8 F | DIASTOLIC BLOOD PRESSURE: 59 MMHG | WEIGHT: 176.15 LBS

## 2024-06-22 DIAGNOSIS — S70.01XA CONTUSION OF RIGHT HIP, INITIAL ENCOUNTER: Primary | ICD-10-CM

## 2024-06-22 DIAGNOSIS — S50.811A ABRASION OF RIGHT FOREARM, INITIAL ENCOUNTER: ICD-10-CM

## 2024-06-22 PROCEDURE — 73090 X-RAY EXAM OF FOREARM: CPT

## 2024-06-22 PROCEDURE — 96375 TX/PRO/DX INJ NEW DRUG ADDON: CPT

## 2024-06-22 PROCEDURE — 73502 X-RAY EXAM HIP UNI 2-3 VIEWS: CPT

## 2024-06-22 PROCEDURE — 99283 EMERGENCY DEPT VISIT LOW MDM: CPT

## 2024-06-22 PROCEDURE — 96374 THER/PROPH/DIAG INJ IV PUSH: CPT

## 2024-06-22 PROCEDURE — 25010000002 ONDANSETRON PER 1 MG: Performed by: EMERGENCY MEDICINE

## 2024-06-22 PROCEDURE — 25010000002 MORPHINE PER 10 MG: Performed by: EMERGENCY MEDICINE

## 2024-06-22 RX ORDER — TRAMADOL HYDROCHLORIDE 50 MG/1
50 TABLET ORAL EVERY 8 HOURS PRN
Qty: 8 TABLET | Refills: 0 | Status: SHIPPED | OUTPATIENT
Start: 2024-06-22

## 2024-06-22 RX ORDER — MORPHINE SULFATE 2 MG/ML
2 INJECTION, SOLUTION INTRAMUSCULAR; INTRAVENOUS ONCE
Status: COMPLETED | OUTPATIENT
Start: 2024-06-22 | End: 2024-06-22

## 2024-06-22 RX ORDER — ONDANSETRON 2 MG/ML
4 INJECTION INTRAMUSCULAR; INTRAVENOUS ONCE
Status: COMPLETED | OUTPATIENT
Start: 2024-06-22 | End: 2024-06-22

## 2024-06-22 RX ADMIN — MORPHINE SULFATE 2 MG: 2 INJECTION, SOLUTION INTRAMUSCULAR; INTRAVENOUS at 11:01

## 2024-06-22 RX ADMIN — ONDANSETRON 4 MG: 2 INJECTION INTRAMUSCULAR; INTRAVENOUS at 11:00

## 2024-06-22 NOTE — ED PROVIDER NOTES
Time: 10:21 AM EDT  Date of encounter:  6/22/2024  Independent Historian/Clinical History and Information was obtained by:   Patient    History is limited by: N/A    Chief Complaint: Fall      History of Present Illness:  Patient is a 79 y.o. year old female who presents to the emergency department for evaluation of injury status post fall.  Patient reports she tripped while trimming bushes landing on concrete injuring her right forearm and right hip.  She does report she was able to stand up and bear weight.    HPI    Patient Care Team  Primary Care Provider: Daniel Hale MD    Past Medical History:     Allergies   Allergen Reactions    Penicillins Rash    Sulfa Antibiotics Rash     Past Medical History:   Diagnosis Date    Arthritis     Brown recluse spider bite     HX OF RIGHT FRONT LEG 2004    Coma due to low thyroid 2015    Glaucoma     Heart attack     72 AND 81. DENIES CP BUT GETS SOA WITH EXERTION. STATES HAS BEEN ISSUE FOR LONG TIME. FOLLOWED BY PCP DR JOSHI. DECREASED ACTIVITY    Hypothyroidism     Kidney disease     Leg pain     Leg swelling     Macular degeneration     Malignant neoplasm of colon     Restless leg syndrome     SOB (shortness of breath)     Thyroid disorder     Uterine polyp     Wound dehiscence 04/20/2022    Sustained laceration December 2020 1 sutures removed by PCP with dehiscence seen at wound clinicby Dr Laina Bales February 2022 and folowed there--given silver sulfadiazine creme (Silvadene)     Past Surgical History:   Procedure Laterality Date    COLON RESECTION N/A 12/8/2022    Procedure: COLON RESECTION LAPAROSCOPIC RIGHT WITH ROBOT;  Surgeon: Abebe Drew MD;  Location: [facility] MAIN OR;  Service: Robotics - DaVinci;  Laterality: N/A;    COLONOSCOPY N/A 11/07/2022    Procedure: COLONOSCOPY, WITH bx;  Surgeon: Jimmie Zacarias MD;  Location: [facility] ENDOSCOPY;  Service: Gastroenterology;  Laterality: N/A;  CECUM MASS    DILATATION AND CURETTAGE  2018    KNEE SURGERY  Right 2008    JOINT SURGERY     Family History   Problem Relation Age of Onset    Heart disease Father     Heart disease Mother     Arthritis Mother     Cancer Brother     Stomach cancer Maternal Aunt     Arthritis Other     Cancer Other     Heart disease Other     Colon cancer Neg Hx     Malig Hyperthermia Neg Hx     Breast cancer Neg Hx     Uterine cancer Neg Hx     Ovarian cancer Neg Hx        Home Medications:  Prior to Admission medications    Medication Sig Start Date End Date Taking? Authorizing Provider   ascorbic acid (VITAMIN C) 1000 MG tablet Take 1 tablet by mouth Daily.    Emergency, Nurse Epic, RN   B Complex Vitamins (vitamin b complex) capsule capsule Take 1 capsule by mouth Daily.    Paulette Estrella MD   Cholecalciferol (Vitamin D) 50 MCG (2000 UT) capsule 5,000 Units Daily. VIT D3    Paulette Estrella MD   levothyroxine (Synthroid) 75 MCG tablet Take 1 tablet by mouth Daily. 4/15/24   Gurwinder Cohn MD   Multiple Vitamins-Minerals (EYE HEALTH PO) Take 2 tablets by mouth Daily.    Paulette Estrella MD   polyethylene glycol (MiraLax) 17 GM/SCOOP powder Take 17 g by mouth Daily.  Patient not taking: Reported on 12/29/2023 6/30/23   Artur Aldridge MD   psyllium (Metamucil Smooth Texture) 58.6 % powder Take  by mouth 3 (Three) Times a Day.  Patient not taking: Reported on 12/29/2023 12/30/22   Artur Aldridge MD   Vitamin A 3 MG (33103 UT) capsule Take 2,400 Units by mouth Daily.    Paulette Estrella MD   vitamin E 400 UNIT capsule Take 1 capsule by mouth Daily.    Paulette Estrella MD        Social History:   Social History     Tobacco Use    Smoking status: Never    Smokeless tobacco: Never   Vaping Use    Vaping status: Never Used   Substance Use Topics    Alcohol use: Never    Drug use: Never         Review of Systems:  Review of Systems   Constitutional:  Negative for chills and fever.   HENT:  Negative for congestion, rhinorrhea and sore throat.    Eyes:   "Negative for pain and visual disturbance.   Respiratory:  Negative for apnea, cough, chest tightness and shortness of breath.    Cardiovascular:  Negative for chest pain and palpitations.   Gastrointestinal:  Negative for abdominal pain, diarrhea, nausea and vomiting.   Genitourinary:  Negative for difficulty urinating and dysuria.   Musculoskeletal:  Negative for joint swelling and myalgias.   Skin:  Negative for color change.   Neurological:  Negative for seizures and headaches.   Psychiatric/Behavioral: Negative.     All other systems reviewed and are negative.       Physical Exam:  /59 (BP Location: Left arm, Patient Position: Lying)   Pulse 88   Temp 97.8 °F (36.6 °C) (Oral)   Resp 17   Ht 149.9 cm (59.02\")   Wt 79.9 kg (176 lb 2.4 oz)   SpO2 98%   BMI 35.56 kg/m²     Physical Exam  Vitals and nursing note reviewed.   Constitutional:       General: She is not in acute distress.     Appearance: Normal appearance. She is not toxic-appearing.   HENT:      Head: Normocephalic and atraumatic.      Jaw: There is normal jaw occlusion.   Eyes:      General: Lids are normal.      Extraocular Movements: Extraocular movements intact.      Conjunctiva/sclera: Conjunctivae normal.      Pupils: Pupils are equal, round, and reactive to light.   Cardiovascular:      Rate and Rhythm: Normal rate and regular rhythm.      Pulses: Normal pulses.      Heart sounds: Normal heart sounds.   Pulmonary:      Effort: Pulmonary effort is normal. No respiratory distress.      Breath sounds: Normal breath sounds. No wheezing or rhonchi.   Abdominal:      General: Abdomen is flat.      Palpations: Abdomen is soft.      Tenderness: There is no abdominal tenderness. There is no guarding or rebound.   Musculoskeletal:      Cervical back: Normal range of motion and neck supple.      Right lower leg: No edema.      Left lower leg: No edema.      Comments: Tenderness to palpation right hip, no obvious deformity, neurovascular intact " distally   Skin:     General: Skin is warm.      Comments: Abrasions right forearm   Neurological:      Mental Status: She is alert and oriented to person, place, and time. Mental status is at baseline.   Psychiatric:         Mood and Affect: Mood normal.                  Procedures:  Procedures      Medical Decision Making:      Comorbidities that affect care:    CKD, hypertension, thyroid disease    External Notes reviewed:    Previous Clinic Note: Nephrology office visit for CKD management      The following orders were placed and all results were independently analyzed by me:  Orders Placed This Encounter   Procedures    XR Forearm 2 View Right    XR Hip With or Without Pelvis 2 - 3 View Right       Medications Given in the Emergency Department:  Medications   morphine injection 2 mg (2 mg Intravenous Given 6/22/24 1101)   ondansetron (ZOFRAN) injection 4 mg (4 mg Intravenous Given 6/22/24 1100)        ED Course:         Labs:    Lab Results (last 24 hours)       ** No results found for the last 24 hours. **             Imaging:    XR Hip With or Without Pelvis 2 - 3 View Right    Result Date: 6/22/2024  XR HIP W OR WO PELVIS 2-3 VIEW RIGHT Date of Exam: 6/22/2024 10:35 AM EDT Indication: Fall, Hip Pain Comparison: 2/21/2020 Findings: Bony detail is obscured by body habitus. No definite acute fractures, dislocations or acute osseous abnormalities are identified in the pelvis or right hip. Degenerative changes are present in the hips     Impression: No fractures, dislocations or acute osseous abnormalities are identified. Electronically Signed: Papo Montoya MD  6/22/2024 11:21 AM EDT  Workstation ID: [id number]    XR Forearm 2 View Right    Result Date: 6/22/2024  XR FOREARM 2 VW RIGHT Date of Exam: 6/22/2024 10:35 AM EDT Indication: Fall, Arm Pain Comparison: None available. Findings: Degenerative changes are present in the right wrist. No fractures, dislocations or acute osseous abnormalities are identified in  the right forearm. No evidence of radiopaque foreign body.     Impression: No acute osseous abnormalities are identified in the right forearm. Electronically Signed: Papo Montoya MD  6/22/2024 11:18 AM EDT  Workstation ID: [id number]       Differential Diagnosis and Discussion:    Extremity Pain: Differential diagnosis includes but is not limited to soft tissue sprain, tendonitis, tendon injury, dislocation, fracture, deep vein thrombosis, arterial insufficiency, osteoarthritis, bursitis, and ligamentous damage.    All X-rays impressions were independently interpreted by me.    MDM  Number of Diagnoses or Management Options  Abrasion of right forearm, initial encounter  Contusion of right hip, initial encounter  Diagnosis management comments: In summary this is an 79-year-old female who presents to the emergency department for evaluation of injury status post fall.  X-ray of the right forearm is unremarkable and negative for acute traumatic injuries.  X-ray of the right hip is unremarkable and negative for acute traumatic injuries.  Patient is otherwise well-appearing in no acute distress.  Wounds of the right forearm have been cleaned and dressed.  No indication for sutures.  Very strict return to ER and follow-up instructions have been provided to the patient.  Prescription pain medication given.                 Patient Care Considerations:    LABS: I considered ordering labs, however no signs of metabolic derangement      Consultants/Shared Management Plan:    None    Social Determinants of Health:    Patient is independent, reliable, and has access to care.       Disposition and Care Coordination:    Discharged: The patient is suitable and stable for discharge with no need for consideration of admission.    I have explained the patient´s condition, diagnoses and treatment plan based on the information available to me at this time. I have answered questions and addressed any concerns. The patient has a good   understanding of the patient´s diagnosis, condition, and treatment plan as can be expected at this point. The vital signs have been stable. The patient´s condition is stable and appropriate for discharge from the emergency department.      The patient will pursue further outpatient evaluation with the primary care physician or other designated or consulting physician as outlined in the discharge instructions. They are agreeable to this plan of care and follow-up instructions have been explained in detail. The patient has received these instructions in written format and has expressed an understanding of the discharge instructions. The patient is aware that any significant change in condition or worsening of symptoms should prompt an immediate return to this or the closest emergency department or call to 911.  I have explained discharge medications and the need for follow up with the patient/caretakers. This was also printed in the discharge instructions. Patient was discharged with the following medications and follow up:      Medication List        New Prescriptions      traMADol 50 MG tablet  Commonly known as: ULTRAM  Take 1 tablet by mouth Every 8 (Eight) Hours As Needed for Severe Pain.               Where to Get Your Medications        These medications were sent to [facility] - [facility] - [address] - [phone]  - [phone]   [address] [place] [address]      Phone: [phone]   traMADol 50 MG tablet      Daniel Hale MD  [address]  [address]  [place] [address]  [phone]    In 1 week         Final diagnoses:   Contusion of right hip, initial encounter   Abrasion of right forearm, initial encounter        ED Disposition       ED Disposition   Discharge    Condition   Stable    Comment   --               This medical record created using voice recognition software.             Manjeet Sheriff MD  06/22/24 [id number]

## 2024-06-24 ENCOUNTER — TELEPHONE (OUTPATIENT)
Dept: ONCOLOGY | Facility: HOSPITAL | Age: 79
End: 2024-06-24

## 2024-06-24 NOTE — TELEPHONE ENCOUNTER
Caller: Berna Leong    Relationship to patient: Self    Best call back number: [phone]    Chief complaint: FELL     Type of visit: CT SCAN &  F/U 2     Requested date: MID JULY, CALL TO R/S     If rescheduling, when is the original appointment:6/26/2024 & 6/28/2024    Additional notes:

## 2024-06-24 NOTE — TELEPHONE ENCOUNTER
Caller: Berna Leong    Relationship to patient: Self    Best call back number: [phone]    Chief complaint: R/S    Type of visit: FOLLOW UP    Requested date: 8-7 MORNING    If rescheduling, when is the original appointment: 6-    Additional notes:HUB UNABLE TO R/S    PLEASE CONTACT WITH APPT

## 2024-07-03 ENCOUNTER — OFFICE VISIT (OUTPATIENT)
Dept: OTOLARYNGOLOGY | Facility: CLINIC | Age: 79
End: 2024-07-03
Payer: MEDICARE

## 2024-07-03 VITALS — HEIGHT: 59 IN | BODY MASS INDEX: 32.05 KG/M2 | WEIGHT: 159 LBS | TEMPERATURE: 97.3 F

## 2024-07-03 DIAGNOSIS — Z86.39 HISTORY OF HYPERTHYROIDISM: ICD-10-CM

## 2024-07-03 DIAGNOSIS — Z92.3 HISTORY OF RADIOACTIVE IODINE THYROID ABLATION: ICD-10-CM

## 2024-07-03 DIAGNOSIS — E03.9 HYPOTHYROIDISM (ACQUIRED): Primary | ICD-10-CM

## 2024-07-03 DIAGNOSIS — E03.4 ACQUIRED ATROPHY OF THYROID: ICD-10-CM

## 2024-07-03 RX ORDER — LEVOTHYROXINE SODIUM 0.07 MG/1
75 TABLET ORAL DAILY
Qty: 90 TABLET | Refills: 1 | Status: SHIPPED | OUTPATIENT
Start: 2024-07-03

## 2024-07-03 NOTE — PROGRESS NOTES
Patient Name: Benra Leong   Visit Date: 07/03/2024   Patient ID: [id number]  Provider: Gurwinder Cohn MD    Sex: female  Location: [facility]   YOB: 1945  Location Address: [address] [place] [address], [address],?[place]?[id number]    Primary Care Provider Daniel Hale MD  Location Phone: [phone]    Referring Provider: No ref. provider found        Chief Complaint  LAB FOLLOW UP    History of Present Illness  Berna Leong is a 79 y.o. female who presents to [facility] for LAB FOLLOW UP. Patient patient was seen at [facility] ENT clinic by Ms. Sujey Weldon on 11/12/2015 for fall with nasal fracture and deviated nasal septum.  Otherwise patient had not been seen by Dr. Cohn.  Patient is here with a long history of thyroid disease where she was actually having hyperthyroidism with palpitations since age 8.  Patient had been managed with methimazole orally for a long time until 1995 where she had seen Dr. Xiong, an endocrinologist in [place] and she proceeded with I-131 ablation.  Following the ablation, patient became hypothyroid and was supplemented with oral Synthroid.  Until recently patient had been on Synthroid but due to price increase she was changed to Euthyrox which is a type of generic levothyroxine.  Patient complains of daily nausea and lightheadedness with tachycardia taking the medication.  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  Patient had been in remission.  Patient has been referred for hypothyroidism.  Otherwise patient had an ultrasound of thyroid on 4/8/2024 showing marked thyroid atrophy either due to chronic thyroiditis or posttreatment related changes which is probably the case due to I-131 ablation.  Currently patient is on Euthyrox 75 mcg p.o. daily    Patient has a long history of thyroid disease with hyperthyroidism which have been treated with medical oral  medication for a long time until she had the I-131 ablation 1995. Since then patient has been on replacement with Synthroid. However recently price has increased and she went to generic but is causing her nausea. I have given her some options which 1 of it is to try to change the generic company by going to a different pharmacy and see if they carry a different generic manufacturing levothyroxine. The other option is to go back on Synthroid. Patient has elected to try [facility] and give it a go with another generic. I will see her back in 2 months with labs and see where she is on it.     Patient is doing much better since she has been on a different generic levothyroxine.              Past Medical History:   Diagnosis Date    Arthritis     Brown recluse spider bite     HX OF RIGHT FRONT LEG 2004    Coma due to low thyroid 2015    Glaucoma     Heart attack     72 AND 81. DENIES CP BUT GETS SOA WITH EXERTION. STATES HAS BEEN ISSUE FOR LONG TIME. FOLLOWED BY PCP DR JOSHI. DECREASED ACTIVITY    Hypothyroidism     Kidney disease     Leg pain     Leg swelling     Macular degeneration     Malignant neoplasm of colon     Restless leg syndrome     SOB (shortness of breath)     Thyroid disorder     Uterine polyp     Wound dehiscence 04/20/2022    Sustained laceration December 2020 1 sutures removed by PCP with dehiscence seen at wound clinicby Dr Laina Bales February 2022 and folowed there--given silver sulfadiazine creme (Silvadene)       Past Surgical History:   Procedure Laterality Date    COLON RESECTION N/A 12/8/2022    Procedure: COLON RESECTION LAPAROSCOPIC RIGHT WITH ROBOT;  Surgeon: Abebe Drew MD;  Location: [facility] MAIN OR;  Service: Robotics - DaVinci;  Laterality: N/A;    COLONOSCOPY N/A 11/07/2022    Procedure: COLONOSCOPY, WITH bx;  Surgeon: Jimmie Zacarias MD;  Location: [facility] ENDOSCOPY;  Service: Gastroenterology;  Laterality: N/A;  CECUM MASS    DILATATION AND CURETTAGE  2018    KNEE  "SURGERY Right 2008    JOINT SURGERY         Current Outpatient Medications:     ascorbic acid (VITAMIN C) 1000 MG tablet, Take 1 tablet by mouth Daily., Disp: , Rfl:     B Complex Vitamins (vitamin b complex) capsule capsule, Take 1 capsule by mouth Daily., Disp: , Rfl:     Cholecalciferol (Vitamin D) 50 MCG (2000 UT) capsule, 5,000 Units Daily. VIT D3, Disp: , Rfl:     levothyroxine (Synthroid) 75 MCG tablet, Take 1 tablet by mouth Daily., Disp: 90 tablet, Rfl: 1    Multiple Vitamins-Minerals (EYE HEALTH PO), Take 2 tablets by mouth Daily., Disp: , Rfl:     traMADol (ULTRAM) 50 MG tablet, Take 1 tablet by mouth Every 8 (Eight) Hours As Needed for Severe Pain., Disp: 8 tablet, Rfl: 0    Vitamin A 3 MG (02226 UT) capsule, Take 2,400 Units by mouth Daily., Disp: , Rfl:     vitamin E 400 UNIT capsule, Take 1 capsule by mouth Daily., Disp: , Rfl:     polyethylene glycol (MiraLax) 17 GM/SCOOP powder, Take 17 g by mouth Daily. (Patient not taking: Reported on 12/29/2023), Disp: 238 g, Rfl: 1    psyllium (Metamucil Smooth Texture) 58.6 % powder, Take  by mouth 3 (Three) Times a Day. (Patient not taking: Reported on 12/29/2023), Disp: 1 each, Rfl: 12     Allergies   Allergen Reactions    Penicillins Rash    Sulfa Antibiotics Rash       Social History     Tobacco Use    Smoking status: Never    Smokeless tobacco: Never   Vaping Use    Vaping status: Never Used   Substance Use Topics    Alcohol use: Never    Drug use: Never        Objective     Vital Signs:   Vitals:    07/03/24 0855   Temp: 97.3 °F (36.3 °C)   TempSrc: Temporal   Weight: 72.1 kg (159 lb)   Height: 149.9 cm (59.02\")       Tobacco Use: Low Risk  (7/3/2024)    Patient History     Smoking Tobacco Use: Never     Smokeless Tobacco Use: Never     Passive Exposure: Not on file         Physical Exam    Constitutional   Appearance  well developed, well-nourished, alert and in no acute distress, voice clear and strong    Head   Inspection  no deformities or " lesions      Face   Inspection  no facial lesions; House-Brackmann I/VI bilaterally   Palpation  no TMJ crepitus nor  muscle tenderness bilaterally     Eyes/Vision   Visual Fields  extraocular movements are intact, no spontaneous or gaze-induced nystagmus  Conjunctivae  clear   Sclerae  clear   Pupils and Irises  pupils equal, round, and reactive to light.   Nystagmus  not present     Ears, Nose, Mouth and Throat  Ears  External Ears  appearance within normal limits, no lesions present   Otoscopic Examination  tympanic membrane appearance within normal limits bilaterally without perforations, well-aerated middle ears   Hearing  intact to conversational voice both ears   Tunning fork testing    Rinne:  Sauceda:    Nose  External Nose  appearance normal   Intranasal Exam  mucosa within normal limits, vestibules normal, no intranasal lesions present, septum midline, sinuses non tender to percussion   Modified Maral Test:    Oral Cavity  Oral Mucosa  oral mucosa normal without pallor or cyanosis   Lips  lip appearance normal   Teeth  normal dentition for age   Gums  gums pink, non-swollen, no bleeding present   Tongue  tongue appearance normal; normal mobility   Palate  hard palate normal, soft palate appearance normal with symmetric mobility     Throat  Oropharynx  no inflammation or lesions present, tonsils within normal limits   Hypopharynx  appearance within normal limits   Larynx  voice normal     Neck  Inspection/Palpation  normal appearance, no masses or tenderness, trachea midline; thyroid size normal, nontender, no nodules or masses present on palpation     Lymphatic  Neck  no lymphadenopathy present   Supraclavicular Nodes  no lymphadenopathy present   Preauricular Nodes  no lymphadenopathy present     Respiratory  Respiratory Effort  breathing unlabored   Inspection of Chest  normal appearance, no retractions     Musculoskeletal   Cervical back: Normal range of motion and neck supple.      Skin and  "Subcutaneous Tissue  General Inspection  Regarding face and neck - there are no rashes present, no lesions present, and no areas of discoloration     Neurologic  Cranial Nerves  cranial nerves II-XII are grossly intact bilaterally   Gait and Station  normal gait, able to stand without diffculty    Psychiatric  Judgement and Insight  judgment and insight intact   Mood and Affect  mood normal, affect appropriate       RESULTS REVIEWED    I have reviewed the following information:   [x]  Previous Internal Note  []  Previous External Note:   [x]  Ordered Tests & Results:      Pathology:   Lab Results   Component Value Date    CASEREPORT  12/08/2022     Surgical Pathology Report                         Case: [id number]                                  Authorizing Provider:  Abebe Drew MD        Collected:           12/08/2022 10:29 PM          Ordering Location:     [facility] MAIN Received:            12/12/2022 09:12 AM                                 OR                                                                           Pathologist:           Donal Munoz MD                                                            Specimen:    Large Intestine, Right / Ascending Colon, right colon                                      CLININFO  12/08/2022     Malignant neoplasm of colon, unspecified part of colon (HCC)      FINALDX  12/08/2022     Right colon, right hemicolectomy:   - Adenocarcinoma, moderately differentiated, arising in a tubulovillous adenoma with high grade dysplasia   - Unremarkable appendix   - See synoptic checklist      GROSSDES  12/08/2022     1. Large Intestine, Right / Ascending Colon.  The specimen is received in 1 formalin filled container labeled \"right colon\" and consists of a 3.0 cm in length extended right hemicolectomy specimen including 13.0 x 2.0 cm of proximal terminal ileum, a 6.0 cm in length by 0.5 cm average diameter intact, vermiform appendix, 13.0 x 5.0 cm of " cecum/ascending colon, 10.0 x 5.0 cm transverse colon, 7.0 cm of attached omentum, 9.0 cm of attached pericolic adipose tissue, and an opposing stapled margins.  The tan-pink serosa is indurated, dusky, and strictured down to 4.5 cm along the ileocecal valve and displays an additional 2.5 cm strictured area along the ascending colon.  Opening reveals a 6.0 x 5.0 x 4.0 cm tan, exophytic, fungating mass along the ileocecal valve, located 12.0 cm from the proximal terminal ileum margin, 30.0 cm of the distal transverse colon margin, and 2.0 cm of the nearest mesenteric adipose tissue margin.  Sectioning reveals the mass extends beyond the muscularis propria into the pericolic adipose tissue.  The mass abuts but does not grossly involve the appendiceal orifice.  The luminal circumference ranges from 12.0 cm to 4.5 cm at the strictured area of the ascending colon.  The average wall thickness is 0.3 cm.  The luminal circumference of the terminal ileum averages 3.5 cm, and the average wall thickness is 0.6 cm.  The appendix displays tan, smooth, intact serosa and a 0.3 cm diameter lumen with an average wall thickness of 0.2 cm.  Sectioning the attached omentum reveals lobulated adipose tissue with no distinct nodules.  Multiple tan-pink lymph nodes are identified in the attached adipose tissue, ranging in size from 0.3-3.0 cm.  Representative sections are submitted as follows: 1A-proximal terminal ileum margin, en face, and 1 bisected lymph node; 1B-1 trisected lymph node, representative section of distal transverse colon margin, and mesenteric adipose tissue margin nearest to mass, en face; 6B-5G-ixjhorcd of mass, to include deepest extent in 1C, uninvolved appendiceal orifice in 1F, and adjacent proximal/distal uninvolved in 1G; 1H-strictured area of the ascending colon; 1I-appendix, entirely; 1J-1K-1 lymph node, serially sectioned; 1L-1 trisected lymph node; 1M-12 whole possible lymph nodes; 1N-8 whole possible lymph  node; 1O-omentum and 2 bisected lymph nodes (1 inked blue). HESHAM      MICRO  12/08/2022      Comment:      Microscopic examination performed.         TSH   Date Value Ref Range Status   05/23/2024 0.415 0.270 - 4.200 uIU/mL Final     T3, Free   Date Value Ref Range Status   05/23/2024 2.42 2.00 - 4.40 pg/mL Final     Free T4   Date Value Ref Range Status   05/23/2024 1.68 0.93 - 1.70 ng/dL Final     PTH, Intact   Date Value Ref Range Status   05/23/2024 81.8 (H) 15.0 - 65.0 pg/mL Final     Calcium   Date Value Ref Range Status   05/23/2024 9.4 8.6 - 10.5 mg/dL Final     25 Hydroxy, Vitamin D   Date Value Ref Range Status   05/23/2024 45.3 30.0 - 100.0 ng/ml Final       XR Hip With or Without Pelvis 2 - 3 View Right    Result Date: 6/22/2024  Impression: No fractures, dislocations or acute osseous abnormalities are identified. Electronically Signed: Papo Montoya MD  6/22/2024 11:21 AM EDT  Workstation ID: [id number]    XR Forearm 2 View Right    Result Date: 6/22/2024  Impression: No acute osseous abnormalities are identified in the right forearm. Electronically Signed: Papo Montoya MD  6/22/2024 11:18 AM EDT  Workstation ID: [id number]    US Thyroid    Result Date: 4/8/2024  Impression: Marked thyroid atrophy, either related to chronic thyroiditis or posttreatment related change. Correlate for history of thyroid ablation.  TI-RADS: TR1 - Benign. No follow up needed.  Electronically Signed By-Jaswant Mcleod MD On:4/8/2024 1:37 PM        I have discussed the interpretation of the above results with the patient.    Procedures          Assessment and Plan   Diagnoses and all orders for this visit:    1. Hypothyroidism (acquired) (Primary)  -     levothyroxine (Synthroid) 75 MCG tablet; Take 1 tablet by mouth Daily.  Dispense: 90 tablet; Refill: 1    2. Acquired atrophy of thyroid    3. History of hyperthyroidism    4. History of radioactive iodine thyroid ablation        Berna Leong  reports that she has never smoked.  She has never used smokeless tobacco.       Plan:  Patient Instructions   Patient is doing much better since she has been on a different generic levothyroxine 75 mcg p.o. daily.  And follow-up lab today shows that the lab is excellent and levels.  Since patient's thyroid levels quite stable with current dosing, I am going to send her to her family physician Dr. Hale to further manage her thyroid medication.  I will see her back as needed.        Follow Up   Return if symptoms worsen or fail to improve.  Patient was given instructions and counseling regarding her condition or for health maintenance advice. Please see specific information pulled into the AVS if appropriate.

## 2024-07-03 NOTE — PATIENT INSTRUCTIONS
Patient is doing much better since she has been on a different generic levothyroxine 75 mcg p.o. daily.  And follow-up lab today shows that the lab is excellent and levels.  Since patient's thyroid levels quite stable with current dosing, I am going to send her to her family physician Dr. Hale to further manage her thyroid medication.  I will see her back as needed.

## 2024-07-09 NOTE — PROGRESS NOTES
"Chief Complaint  Fall (Right hip pain from fall ), Hypothyroidism, Hypertension, and Alopecia    Subjective          Berna Leong presents to [facility]  History of Present Illness    Seen by ENT in interim, who has until recently been managing her hypothyroid medicine.    Since changing to different genetic, doing well.  No tachycardia, no balance problems.    Reports on 22nd of June, while taking trimings from yardwork to curb had a fall and hurt her right lower back and arm.  Went to ER with radiographs showing no fracture.  Given tramadol, which helps.  Though a little better, still feels like \"knives\" in her right lower back.  Denies saddle anesthesia and denies urinary changes.    She of note did go to [facility] pharmacy after ER visit for tetanus booster.  In addition, has used topical neosporin for right arm.    Seen by Dr. Dobbins May 31st, and reports no issues.    Reports hair loss has improved since started collagen.    PHQ-9 Depression Screening  Little interest or pleasure in doing things? 0-->not at all   Feeling down, depressed, or hopeless? 0-->not at all   Trouble falling or staying asleep, or sleeping too much?     Feeling tired or having little energy?     Poor appetite or overeating?     Feeling bad about yourself - or that you are a failure or have let yourself or your family down?     Trouble concentrating on things, such as reading the newspaper or watching television?     Moving or speaking so slowly that other people could have noticed? Or the opposite - being so fidgety or restless that you have been moving around a lot more than usual?     Thoughts that you would be better off dead, or of hurting yourself in some way?     PHQ-9 Total Score 0   If you checked off any problems, how difficult have these problems made it for you to do your work, take care of things at home, or get along with other people?           Current Outpatient Medications " "  Medication Instructions    ascorbic acid (VITAMIN C) 1,000 mg, Oral, Daily    B Complex Vitamins (vitamin b complex) capsule capsule 1 capsule, Oral, Daily    levothyroxine (SYNTHROID) 75 mcg, Oral, Daily    lidocaine (LIDODERM) 5 % 1 patch, Transdermal, Every 24 Hours, Remove & Discard patch within 12 hours or as directed by MD    Multiple Vitamins-Minerals (EYE HEALTH PO) 2 tablets, Oral, Daily    polyethylene glycol (MIRALAX) 17 g, Oral, Daily    psyllium (Metamucil Smooth Texture) 58.6 % powder Oral, 3 Times Daily    traMADol (ULTRAM) 50 mg, Oral, Every 8 Hours PRN    Vitamin A 2,400 Units, Oral, Daily    Vitamin D 5,000 Units, Every 24 Hours, VIT D3    vitamin E 400 Units, Oral, Daily       The following portions of the patient's history were reviewed and updated as appropriate: allergies, current medications, past family history, past medical history, past social history, past surgical history, and problem list.    Objective   Vital Signs:   /76 (BP Location: Right arm, Patient Position: Sitting, Cuff Size: Adult)   Pulse 71   Temp 98.9 °F (37.2 °C) (Temporal)   Ht 149.9 cm (59\")   Wt 73.6 kg (162 lb 3.2 oz)   SpO2 98%   BMI 32.76 kg/m²     BP Readings from Last 3 Encounters:   07/10/24 134/76   06/22/24 144/59   04/15/24 125/67     Wt Readings from Last 3 Encounters:   07/10/24 73.6 kg (162 lb 3.2 oz)   07/03/24 72.1 kg (159 lb)   06/22/24 79.9 kg (176 lb 2.4 oz)     BMI is >= 30 and <35. (Class 1 Obesity). The following options were offered after discussion;: information on healthy weight added to patient's after visit summary      Physical Exam  Vitals reviewed.   Constitutional:       General: She is not in acute distress.     Appearance: Normal appearance. She is not ill-appearing, toxic-appearing or diaphoretic.   HENT:      Head: Normocephalic and atraumatic.      Right Ear: External ear normal.      Left Ear: External ear normal.   Eyes:      Conjunctiva/sclera: Conjunctivae normal. "   Cardiovascular:      Rate and Rhythm: Normal rate and regular rhythm.      Pulses: Normal pulses.      Heart sounds: Normal heart sounds. No murmur heard.     No friction rub. No gallop.   Pulmonary:      Effort: Pulmonary effort is normal. No respiratory distress.      Breath sounds: Normal breath sounds. No stridor. No wheezing, rhonchi or rales.   Chest:      Chest wall: No tenderness.   Abdominal:      General: Abdomen is flat.      Palpations: Abdomen is soft. There is no mass.      Tenderness: There is no abdominal tenderness.   Musculoskeletal:      Right lower leg: No edema.      Left lower leg: No edema.      Comments: Abrasion noted on right forearm.  Right posterior hip is TTP   Skin:     General: Skin is warm and dry.   Neurological:      General: No focal deficit present.      Mental Status: She is alert. Mental status is at baseline.   Psychiatric:         Behavior: Behavior normal.         Thought Content: Thought content normal.         Judgment: Judgment normal.        Result Review :   The following data was reviewed by: Daniel Hale MD on 07/10/2024:  Common labs          12/26/2023    10:38 5/23/2024    07:42   Common Labs   Glucose  100    BUN  26    Creatinine 1.10  1.13    Sodium  133    Potassium  3.6    Chloride  99    Calcium  9.4    Albumin  4.1    Total Bilirubin  0.7    Alkaline Phosphatase  112    AST (SGOT)  14    ALT (SGPT)  14    WBC  7.91    Hemoglobin  12.9    Hematocrit  40.5    Platelets  261             Lab Results   Component Value Date    BILIRUBINUR Negative 05/23/2024       Procedures        Assessment and Plan    Diagnoses and all orders for this visit:    1. Right hip pain (Primary)  -     lidocaine (LIDODERM) 5 %; Place 1 patch on the skin as directed by provider Daily. Remove & Discard patch within 12 hours or as directed by MD  Dispense: 30 each; Refill: 3    2. Fall, initial encounter    3. Hair loss    4. Hypothyroidism, unspecified type  -     TSH Rfx On  Abnormal To Free T4; Future    5. Primary hypertension    6. Encounter for subsequent annual wellness visit (AWV) in Medicare patient  -     Lipid panel; Future  -     TSH Rfx On Abnormal To Free T4; Future  -     CBC w AUTO Differential; Future  -     Comprehensive metabolic panel; Future    7. Prediabetes  -     Hemoglobin A1c; Future    8. Stage 3a chronic kidney disease  -     Comprehensive metabolic panel; Future      Fall,right hip pain:  -will trial lidocaine patch in addition to tramadol    Hair loss:  -improved on collagen    Misc:  -labs ordered in anticipation of medicare wellness in 3 months    There are no discontinued medications.       Follow Up   Return in about 3 months (around 10/10/2024) for Medicare Wellness.  Patient was given instructions and counseling regarding her condition or for health maintenance advice. Please see specific information pulled into the AVS if appropriate.       Daniel Hale MD  07/10/24  16:19 EDT

## 2024-07-10 ENCOUNTER — OFFICE VISIT (OUTPATIENT)
Dept: INTERNAL MEDICINE | Facility: CLINIC | Age: 79
End: 2024-07-10
Payer: MEDICARE

## 2024-07-10 VITALS
TEMPERATURE: 98.9 F | DIASTOLIC BLOOD PRESSURE: 76 MMHG | SYSTOLIC BLOOD PRESSURE: 134 MMHG | HEIGHT: 59 IN | OXYGEN SATURATION: 98 % | HEART RATE: 71 BPM | BODY MASS INDEX: 32.7 KG/M2 | WEIGHT: 162.2 LBS

## 2024-07-10 DIAGNOSIS — Z00.00 ENCOUNTER FOR SUBSEQUENT ANNUAL WELLNESS VISIT (AWV) IN MEDICARE PATIENT: ICD-10-CM

## 2024-07-10 DIAGNOSIS — N18.31 STAGE 3A CHRONIC KIDNEY DISEASE: ICD-10-CM
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
## 2024-07-10 DIAGNOSIS — R73.03 PREDIABETES: ICD-10-CM

## 2024-07-10 DIAGNOSIS — I10 PRIMARY HYPERTENSION: ICD-10-CM

## 2024-07-10 DIAGNOSIS — E03.9 HYPOTHYROIDISM, UNSPECIFIED TYPE: ICD-10-CM

## 2024-07-10 DIAGNOSIS — M25.551 RIGHT HIP PAIN: Primary | ICD-10-CM

## 2024-07-10 DIAGNOSIS — L65.9 HAIR LOSS: ICD-10-CM

## 2024-07-10 PROCEDURE — 3078F DIAST BP <80 MM HG: CPT | Performed by: STUDENT IN AN ORGANIZED HEALTH CARE EDUCATION/TRAINING PROGRAM

## 2024-07-10 PROCEDURE — 99214 OFFICE O/P EST MOD 30 MIN: CPT | Performed by: STUDENT IN AN ORGANIZED HEALTH CARE EDUCATION/TRAINING PROGRAM

## 2024-07-10 PROCEDURE — G2211 COMPLEX E/M VISIT ADD ON: HCPCS | Performed by: STUDENT IN AN ORGANIZED HEALTH CARE EDUCATION/TRAINING PROGRAM

## 2024-07-10 PROCEDURE — 1126F AMNT PAIN NOTED NONE PRSNT: CPT | Performed by: STUDENT IN AN ORGANIZED HEALTH CARE EDUCATION/TRAINING PROGRAM

## 2024-07-10 PROCEDURE — 3075F SYST BP GE 130 - 139MM HG: CPT | Performed by: STUDENT IN AN ORGANIZED HEALTH CARE EDUCATION/TRAINING PROGRAM

## 2024-07-10 RX ORDER — LIDOCAINE 50 MG/G
1 PATCH TOPICAL EVERY 24 HOURS
Qty: 30 EACH | Refills: 3 | Status: SHIPPED | OUTPATIENT
Start: 2024-07-10 | End: 2024-07-11 | Stop reason: SDUPTHER

## 2024-07-11 RX ORDER — LIDOCAINE 50 MG/G
1 PATCH TOPICAL EVERY 24 HOURS
Qty: 30 EACH | Refills: 3 | Status: SHIPPED | OUTPATIENT
Start: 2024-07-11

## 2024-07-12 ENCOUNTER — PRIOR AUTHORIZATION (OUTPATIENT)
Dept: INTERNAL MEDICINE | Facility: CLINIC | Age: 79
End: 2024-07-12
Payer: MEDICARE

## 2024-07-12 NOTE — TELEPHONE ENCOUNTER
PA REQUIRED FOR FOLLOWING MEDICATION:    lidocaine (LIDODERM) 5 % (07/11/2024)     INDEXED VIA [employer]

## 2024-07-24 ENCOUNTER — TELEPHONE (OUTPATIENT)
Dept: INTERNAL MEDICINE | Facility: CLINIC | Age: 79
End: 2024-07-24

## 2024-07-24 NOTE — TELEPHONE ENCOUNTER
Caller: Berna Leong    Relationship to patient: Self    Best call back number: [phone]     Chief complaint: PAIN FROM FALL    Type of visit: OFFICE    Requested date: ASAP      Additional notes:STATED SHE IS STILL HAVING PAIN FROM HER FALL AND THE LIDOCAINE PATCHES SHE WAS PRESCRIBED ARE NOT HELPING WITH THE PAIN. PLEASE CALL AD ADVISE.

## 2024-07-26 ENCOUNTER — OFFICE VISIT (OUTPATIENT)
Dept: INTERNAL MEDICINE | Facility: CLINIC | Age: 79
End: 2024-07-26
Payer: MEDICARE

## 2024-07-26 ENCOUNTER — TELEPHONE (OUTPATIENT)
Dept: INTERNAL MEDICINE | Facility: CLINIC | Age: 79
End: 2024-07-26

## 2024-07-26 ENCOUNTER — HOSPITAL ENCOUNTER (OUTPATIENT)
Facility: HOSPITAL | Age: 79
Discharge: HOME | End: 2024-07-26
Payer: MEDICARE

## 2024-07-26 VITALS
SYSTOLIC BLOOD PRESSURE: 132 MMHG | DIASTOLIC BLOOD PRESSURE: 82 MMHG | WEIGHT: 156.6 LBS | HEIGHT: 59 IN | TEMPERATURE: 97.8 F | OXYGEN SATURATION: 99 % | BODY MASS INDEX: 31.57 KG/M2 | HEART RATE: 85 BPM

## 2024-07-26 DIAGNOSIS — R22.42 LOCALIZED SWELLING OF LEFT LOWER LEG: ICD-10-CM

## 2024-07-26 DIAGNOSIS — Z78.0 POSTMENOPAUSAL: ICD-10-CM

## 2024-07-26 DIAGNOSIS — M25.551 RIGHT HIP PAIN: ICD-10-CM

## 2024-07-26 DIAGNOSIS — M25.562 ACUTE PAIN OF LEFT KNEE: ICD-10-CM
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
## 2024-07-26 DIAGNOSIS — S70.01XD CONTUSION OF RIGHT HIP, SUBSEQUENT ENCOUNTER: Primary | ICD-10-CM

## 2024-07-26 LAB
BH CV LOWER VASCULAR LEFT COMMON FEMORAL AUGMENT: NORMAL
BH CV LOWER VASCULAR LEFT COMMON FEMORAL COMPETENT: NORMAL
BH CV LOWER VASCULAR LEFT COMMON FEMORAL COMPRESS: NORMAL
BH CV LOWER VASCULAR LEFT COMMON FEMORAL PHASIC: NORMAL
BH CV LOWER VASCULAR LEFT COMMON FEMORAL SPONT: NORMAL
BH CV LOWER VASCULAR LEFT DISTAL FEMORAL COMPRESS: NORMAL
BH CV LOWER VASCULAR LEFT GASTRONEMIUS COMPRESS: NORMAL
BH CV LOWER VASCULAR LEFT GREATER SAPH AK COMPRESS: NORMAL
BH CV LOWER VASCULAR LEFT GREATER SAPH BK COMPRESS: NORMAL
BH CV LOWER VASCULAR LEFT MID FEMORAL AUGMENT: NORMAL
BH CV LOWER VASCULAR LEFT MID FEMORAL COMPETENT: NORMAL
BH CV LOWER VASCULAR LEFT MID FEMORAL COMPRESS: NORMAL
BH CV LOWER VASCULAR LEFT MID FEMORAL PHASIC: NORMAL
BH CV LOWER VASCULAR LEFT MID FEMORAL SPONT: NORMAL
BH CV LOWER VASCULAR LEFT PERONEAL COMPRESS: NORMAL
BH CV LOWER VASCULAR LEFT POPLITEAL AUGMENT: NORMAL
BH CV LOWER VASCULAR LEFT POPLITEAL COMPETENT: NORMAL
BH CV LOWER VASCULAR LEFT POPLITEAL COMPRESS: NORMAL
BH CV LOWER VASCULAR LEFT POPLITEAL PHASIC: NORMAL
BH CV LOWER VASCULAR LEFT POPLITEAL SPONT: NORMAL
BH CV LOWER VASCULAR LEFT POSTERIOR TIBIAL COMPRESS: NORMAL
BH CV LOWER VASCULAR LEFT PROFUNDA FEMORAL COMPRESS: NORMAL
BH CV LOWER VASCULAR LEFT PROXIMAL FEMORAL COMPRESS: NORMAL
BH CV LOWER VASCULAR LEFT SAPHENOFEMORAL JUNCTION COMPRESS: NORMAL
BH CV LOWER VASCULAR RIGHT COMMON FEMORAL AUGMENT: NORMAL
BH CV LOWER VASCULAR RIGHT COMMON FEMORAL COMPETENT: NORMAL
BH CV LOWER VASCULAR RIGHT COMMON FEMORAL COMPRESS: NORMAL
BH CV LOWER VASCULAR RIGHT COMMON FEMORAL PHASIC: NORMAL
BH CV LOWER VASCULAR RIGHT COMMON FEMORAL SPONT: NORMAL

## 2024-07-26 PROCEDURE — 1126F AMNT PAIN NOTED NONE PRSNT: CPT | Performed by: STUDENT IN AN ORGANIZED HEALTH CARE EDUCATION/TRAINING PROGRAM

## 2024-07-26 PROCEDURE — 99214 OFFICE O/P EST MOD 30 MIN: CPT | Performed by: STUDENT IN AN ORGANIZED HEALTH CARE EDUCATION/TRAINING PROGRAM

## 2024-07-26 PROCEDURE — G2211 COMPLEX E/M VISIT ADD ON: HCPCS | Performed by: STUDENT IN AN ORGANIZED HEALTH CARE EDUCATION/TRAINING PROGRAM

## 2024-07-26 PROCEDURE — 93971 EXTREMITY STUDY: CPT

## 2024-07-26 PROCEDURE — 3075F SYST BP GE 130 - 139MM HG: CPT | Performed by: STUDENT IN AN ORGANIZED HEALTH CARE EDUCATION/TRAINING PROGRAM

## 2024-07-26 PROCEDURE — 3079F DIAST BP 80-89 MM HG: CPT | Performed by: STUDENT IN AN ORGANIZED HEALTH CARE EDUCATION/TRAINING PROGRAM

## 2024-07-26 RX ORDER — LIDOCAINE 50 MG/G
1 PATCH TOPICAL EVERY 24 HOURS
Qty: 30 EACH | Refills: 3 | Status: SHIPPED | OUTPATIENT
Start: 2024-07-26

## 2024-07-26 RX ORDER — TRAMADOL HYDROCHLORIDE 50 MG/1
50 TABLET ORAL EVERY 8 HOURS PRN
Qty: 10 TABLET | Refills: 0 | Status: SHIPPED | OUTPATIENT
Start: 2024-07-26

## 2024-07-26 NOTE — PROGRESS NOTES
"Chief Complaint  Fall (Patient is still having pain from fall ) and Hip Pain (From fall )    Subjective          Berna Leong presents to [facility]  History of Present Illness    Although right hip pain is mildly improved, still having significant pain that is difficult to bear.  Lidocaine helping alleviate it though it is insufficient.  Tramadol did work previously, and was well tolerated.  Of note, fall had occurred on June 22nd.    Of note, she reports having some bruising on her left knee since the fall as well as noticing a knot on her left lower extremity.      Current Outpatient Medications   Medication Instructions    ascorbic acid (VITAMIN C) 1,000 mg, Oral, Daily    B Complex Vitamins (vitamin b complex) capsule capsule 1 capsule, Oral, Daily    levothyroxine (SYNTHROID) 75 mcg, Oral, Daily    lidocaine (LIDODERM) 5 % 1 patch, Transdermal, Every 24 Hours, Remove & Discard patch within 12 hours or as directed by MD    Multiple Vitamins-Minerals (EYE HEALTH PO) 2 tablets, Oral, Daily    polyethylene glycol (MIRALAX) 17 g, Oral, Daily    psyllium (Metamucil Smooth Texture) 58.6 % powder Oral, 3 Times Daily    traMADol (ULTRAM) 50 mg, Oral, Every 8 Hours PRN    Vitamin A 2,400 Units, Oral, Daily    Vitamin D 5,000 Units, Every 24 Hours, VIT D3    vitamin E 400 Units, Oral, Daily       The following portions of the patient's history were reviewed and updated as appropriate: allergies, current medications, past family history, past medical history, past social history, past surgical history, and problem list.    Objective   Vital Signs:   /82 (BP Location: Left arm, Patient Position: Sitting, Cuff Size: Adult)   Pulse 85   Temp 97.8 °F (36.6 °C) (Temporal)   Ht 149.9 cm (59\")   Wt 71 kg (156 lb 9.6 oz)   SpO2 99%   BMI 31.63 kg/m²     BP Readings from Last 3 Encounters:   07/26/24 132/82   07/10/24 134/76   06/22/24 144/59     Wt Readings from Last 3 " Encounters:   07/26/24 71 kg (156 lb 9.6 oz)   07/10/24 73.6 kg (162 lb 3.2 oz)   07/03/24 72.1 kg (159 lb)           Physical Exam  Vitals reviewed.   Constitutional:       General: She is not in acute distress.     Appearance: Normal appearance. She is not ill-appearing, toxic-appearing or diaphoretic.   HENT:      Head: Normocephalic and atraumatic.      Right Ear: External ear normal.      Left Ear: External ear normal.   Eyes:      Conjunctiva/sclera: Conjunctivae normal.   Cardiovascular:      Rate and Rhythm: Normal rate and regular rhythm.      Pulses: Normal pulses.      Heart sounds: Normal heart sounds. No murmur heard.     No friction rub. No gallop.   Pulmonary:      Effort: Pulmonary effort is normal. No respiratory distress.      Breath sounds: Normal breath sounds. No stridor. No wheezing, rhonchi or rales.   Chest:      Chest wall: No tenderness.   Abdominal:      General: Abdomen is flat.      Palpations: Abdomen is soft. There is no mass.      Tenderness: There is abdominal tenderness. There is no guarding or rebound.      Comments: Mild, diffuse TTP without guarding or rebound   Musculoskeletal:      Right lower leg: No edema.      Left lower leg: No edema.   Skin:     General: Skin is warm and dry.      Comments: Bruising noted, medial left knee.  A small knot noted, LLE around the shin   Neurological:      Mental Status: She is alert. Mental status is at baseline.   Psychiatric:         Behavior: Behavior normal.         Thought Content: Thought content normal.         Judgment: Judgment normal.        Result Review :   The following data was reviewed by: Daniel Hale MD on 07/26/2024:  Common labs          12/26/2023    10:38 5/23/2024    07:42   Common Labs   Glucose  100    BUN  26    Creatinine 1.10  1.13    Sodium  133    Potassium  3.6    Chloride  99    Calcium  9.4    Albumin  4.1    Total Bilirubin  0.7    Alkaline Phosphatase  112    AST (SGOT)  14    ALT (SGPT)  14    WBC  7.91     Hemoglobin  12.9    Hematocrit  40.5    Platelets  261             Lab Results   Component Value Date    BILIRUBINUR Negative 05/23/2024            Assessment and Plan    Diagnoses and all orders for this visit:    1. Contusion of right hip, subsequent encounter (Primary)  -     traMADol (ULTRAM) 50 MG tablet; Take 1 tablet by mouth Every 8 (Eight) Hours As Needed for Severe Pain.  Dispense: 10 tablet; Refill: 0    2. Postmenopausal  -     DEXA Bone Density Axial    3. Right hip pain  -     lidocaine (LIDODERM) 5 %; Place 1 patch on the skin as directed by provider Daily. Remove & Discard patch within 12 hours or as directed by MD  Dispense: 30 each; Refill: 3    4. Localized swelling of left lower leg  -     Duplex Venous Lower Extremity - Left CAR; Future    5. Acute pain of left knee  -     XR Knee 3 View Left; Future      Right hip pain s/p fall:  -will continue lidocaine patches, and have sent an additional 10 pills of tramadol      Medications Discontinued During This Encounter   Medication Reason    traMADol (ULTRAM) 50 MG tablet Reorder    lidocaine (LIDODERM) 5 % Reorder          Follow Up   Return if symptoms worsen or fail to improve.  Will keep previously scheduled 10/10/24 appointment  Patient was given instructions and counseling regarding her condition or for health maintenance advice. Please see specific information pulled into the AVS if appropriate.       Daniel Hale MD  07/26/24  11:04 EDT

## 2024-07-26 NOTE — TELEPHONE ENCOUNTER
Left message with pt friend Mayuri Renteria to contact our office asap.  Pt has a emergent referral for a venous study today.

## 2024-07-29 ENCOUNTER — TELEPHONE (OUTPATIENT)
Dept: INTERNAL MEDICINE | Facility: CLINIC | Age: 79
End: 2024-07-29
Payer: MEDICARE

## 2024-07-29 NOTE — TELEPHONE ENCOUNTER
Attempted to contact patient, number is not in service.    HUB TO READ:    ----- Message from Daniel Hale sent at 7/28/2024  9:44 AM EDT -----  There is no evidence of a clot in the left leg.  This is a reassuring result.

## 2024-07-29 NOTE — TELEPHONE ENCOUNTER
----- Message from Daniel Hale sent at 7/28/2024  9:44 AM EDT -----  There is no evidence of a clot in the left leg.  This is a reassuring result.

## 2024-07-31 ENCOUNTER — HOSPITAL ENCOUNTER (OUTPATIENT)
Dept: CT IMAGING | Facility: HOSPITAL | Age: 79
Discharge: HOME | End: 2024-07-31
Admitting: INTERNAL MEDICINE
Payer: MEDICARE

## 2024-07-31 DIAGNOSIS — C18.0 ADENOCARCINOMA OF CECUM: ICD-10-CM

## 2024-07-31 LAB
CREAT BLDA-MCNC: 1 MG/DL (ref 0.6–1.3)
EGFRCR SERPLBLD CKD-EPI 2021: 56.7 ML/MIN/1.73

## 2024-07-31 PROCEDURE — 74177 CT ABD & PELVIS W/CONTRAST: CPT

## 2024-07-31 PROCEDURE — 25510000001 IOPAMIDOL PER 1 ML: Performed by: INTERNAL MEDICINE

## 2024-07-31 PROCEDURE — 71260 CT THORAX DX C+: CPT

## 2024-07-31 PROCEDURE — 82565 ASSAY OF CREATININE: CPT

## 2024-07-31 RX ADMIN — IOPAMIDOL 100 ML: 755 INJECTION, SOLUTION INTRAVENOUS at 07:44

## 2024-08-08 ENCOUNTER — LAB (OUTPATIENT)
Dept: ONCOLOGY | Facility: HOSPITAL | Age: 79
End: 2024-08-08
Payer: MEDICARE

## 2024-08-08 ENCOUNTER — OFFICE VISIT (OUTPATIENT)
Dept: ONCOLOGY | Facility: HOSPITAL | Age: 79
End: 2024-08-08
Payer: MEDICARE

## 2024-08-08 VITALS
HEART RATE: 89 BPM | OXYGEN SATURATION: 99 % | RESPIRATION RATE: 20 BRPM | WEIGHT: 156 LBS | SYSTOLIC BLOOD PRESSURE: 146 MMHG | BODY MASS INDEX: 31.51 KG/M2 | DIASTOLIC BLOOD PRESSURE: 88 MMHG | TEMPERATURE: 97.6 F

## 2024-08-08 DIAGNOSIS — K59.04 CHRONIC IDIOPATHIC CONSTIPATION: ICD-10-CM

## 2024-08-08 DIAGNOSIS — C18.0 ADENOCARCINOMA OF CECUM: ICD-10-CM

## 2024-08-08 DIAGNOSIS — C18.0 ADENOCARCINOMA OF CECUM: Primary | ICD-10-CM

## 2024-08-08 LAB
ALBUMIN SERPL-MCNC: 4 G/DL (ref 3.5–5.2)
ALBUMIN/GLOB SERPL: 1.4 G/DL
ALP SERPL-CCNC: 92 U/L (ref 39–117)
ALT SERPL W P-5'-P-CCNC: 13 U/L (ref 1–33)
ANION GAP SERPL CALCULATED.3IONS-SCNC: 11.9 MMOL/L (ref 5–15)
AST SERPL-CCNC: 10 U/L (ref 1–32)
BASOPHILS # BLD AUTO: 0.03 10*3/MM3 (ref 0–0.2)
BASOPHILS NFR BLD AUTO: 0.5 % (ref 0–1.5)
BILIRUB SERPL-MCNC: 0.7 MG/DL (ref 0–1.2)
BUN SERPL-MCNC: 20 MG/DL (ref 8–23)
BUN/CREAT SERPL: 19.6 (ref 7–25)
CALCIUM SPEC-SCNC: 9.2 MG/DL (ref 8.6–10.5)
CEA SERPL-MCNC: 1.98 NG/ML
CHLORIDE SERPL-SCNC: 105 MMOL/L (ref 98–107)
CO2 SERPL-SCNC: 28.1 MMOL/L (ref 22–29)
CREAT SERPL-MCNC: 1.02 MG/DL (ref 0.57–1)
DEPRECATED RDW RBC AUTO: 43.7 FL (ref 37–54)
EGFRCR SERPLBLD CKD-EPI 2021: 55.4 ML/MIN/1.73
EOSINOPHIL # BLD AUTO: 0.25 10*3/MM3 (ref 0–0.4)
EOSINOPHIL NFR BLD AUTO: 3.8 % (ref 0.3–6.2)
ERYTHROCYTE [DISTWIDTH] IN BLOOD BY AUTOMATED COUNT: 12.7 % (ref 12.3–15.4)
GLOBULIN UR ELPH-MCNC: 2.9 GM/DL
GLUCOSE SERPL-MCNC: 99 MG/DL (ref 65–99)
HCT VFR BLD AUTO: 38.7 % (ref 34–46.6)
HGB BLD-MCNC: 12.3 G/DL (ref 12–15.9)
IMM GRANULOCYTES # BLD AUTO: 0 10*3/MM3 (ref 0–0.05)
IMM GRANULOCYTES NFR BLD AUTO: 0 % (ref 0–0.5)
LYMPHOCYTES # BLD AUTO: 2.28 10*3/MM3 (ref 0.7–3.1)
LYMPHOCYTES NFR BLD AUTO: 34.5 % (ref 19.6–45.3)
MCH RBC QN AUTO: 29.3 PG (ref 26.6–33)
MCHC RBC AUTO-ENTMCNC: 31.8 G/DL (ref 31.5–35.7)
MCV RBC AUTO: 92.1 FL (ref 79–97)
MONOCYTES # BLD AUTO: 0.33 10*3/MM3 (ref 0.1–0.9)
MONOCYTES NFR BLD AUTO: 5 % (ref 5–12)
NEUTROPHILS NFR BLD AUTO: 3.72 10*3/MM3 (ref 1.7–7)
NEUTROPHILS NFR BLD AUTO: 56.2 % (ref 42.7–76)
PLATELET # BLD AUTO: 257 10*3/MM3 (ref 140–450)
PMV BLD AUTO: 9.4 FL (ref 6–12)
POTASSIUM SERPL-SCNC: 3.8 MMOL/L (ref 3.5–5.2)
PROT SERPL-MCNC: 6.9 G/DL (ref 6–8.5)
RBC # BLD AUTO: 4.2 10*6/MM3 (ref 3.77–5.28)
SODIUM SERPL-SCNC: 145 MMOL/L (ref 136–145)
WBC NRBC COR # BLD AUTO: 6.61 10*3/MM3 (ref 3.4–10.8)

## 2024-08-08 PROCEDURE — 82378 CARCINOEMBRYONIC ANTIGEN: CPT

## 2024-08-08 PROCEDURE — 80053 COMPREHEN METABOLIC PANEL: CPT

## 2024-08-08 PROCEDURE — 36415 COLL VENOUS BLD VENIPUNCTURE: CPT

## 2024-08-08 PROCEDURE — G0463 HOSPITAL OUTPT CLINIC VISIT: HCPCS | Performed by: INTERNAL MEDICINE

## 2024-08-08 PROCEDURE — 85025 COMPLETE CBC W/AUTO DIFF WBC: CPT

## 2024-08-08 NOTE — PROGRESS NOTES
Chief Complaint: Adenocarcinoma of cecum        History of present illness:  Berna Leong is a 79 y.o. year old female who is here today for colon cancer follow up. She is doing well overall. She has low appetite and does not each much at each meal. This has been present much of her life. Weight is stable since last visit. She has chronic constipation. She has been trying to drink more. No bleeding. No fevers, chills. Recent CT scans reviewed and this showed no evidence of disease.  Patient had a recent fall in June.  She scraped her right arm and injured her right leg.  She is still recovering from this.  Has been pain and bruising.  Wears lidocaine patch to help.  Has not had a colonoscopy for follow-up yet.  Was having swelling in her left leg but that is now since resolved.        Oncology/Hematology History Overview Note   11/01/22: CT abdomen/pelvis with contrast obtained due to right flank pain: 6 cm cecal mass concerning for colonic adenocarcinoma.  There is some stranding in the adjacent fat worrisome for tumor infiltration and there are several adjacent mildly enlarged morphologically abnormal lymph nodes concerning for metastasis. 10 mm hypoattenuating lesion in liver segment 4 is unchanged from 2018, likely cyst or hemangioma.      11/07/22: Colonoscopy revealed a fungating partially obstructing large mass in the cecum. Biopsy with moderately differentiated adenocarcinoma    12/8/22: Right hemicolectomy: adenocarcinoma, mod differentiated, arisign in tubulovillous adenoma with high grade dysplasia, 6 cm. No LVI. All 26 lymph nodes negative for tumor. Staged at pT3pN0. MSI-S     Adenocarcinoma of cecum   11/8/2022 Initial Diagnosis    Adenocarcinoma of cecum (HCC)     12/30/2022 Cancer Staged    Staging form: Colon And Rectum, AJCC 8th Edition  - Pathologic: pT3, pN0 - Signed by Artur Aldridge MD on 12/30/2022       ROS    Review of Systems   Constitutional:  Positive for fatigue.   HENT:  Negative.     Eyes:  Positive for visual disturbance.   Respiratory: Negative.     Cardiovascular: Negative.    Gastrointestinal: Negative.    Endocrine: Negative.    Genitourinary: Negative.    Musculoskeletal: Negative.    Skin: Negative.    Allergic/Immunologic: Negative.    Neurological: Negative.    Hematological: Negative.    Psychiatric/Behavioral: Negative.     All other systems reviewed and are negative.                                                      Past Medical History:   Diagnosis Date    Arthritis     Brown recluse spider bite     HX OF RIGHT FRONT LEG 2004    Coma due to low thyroid 2015    Glaucoma     Heart attack     72 AND 81. DENIES CP BUT GETS SOA WITH EXERTION. STATES HAS BEEN ISSUE FOR LONG TIME. FOLLOWED BY PCP DR JOSHI. DECREASED ACTIVITY    Hypothyroidism     Kidney disease     Leg pain     Leg swelling     Macular degeneration     Malignant neoplasm of colon     Restless leg syndrome     SOB (shortness of breath)     Thyroid disorder     Uterine polyp     Wound dehiscence 04/20/2022    Sustained laceration December 2020 1 sutures removed by PCP with dehiscence seen at wound clinicby Dr Laina Bales February 2022 and folowed there--given silver sulfadiazine creme (Silvadene)               Past Surgical History:   Procedure Laterality Date    COLON RESECTION N/A 12/8/2022    Procedure: COLON RESECTION LAPAROSCOPIC RIGHT WITH ROBOT;  Surgeon: Abebe Drew MD;  Location: [facility] MAIN OR;  Service: Robotics - DaVinci;  Laterality: N/A;    COLONOSCOPY N/A 11/07/2022    Procedure: COLONOSCOPY, WITH bx;  Surgeon: Jimmie Zacarias MD;  Location: [facility] ENDOSCOPY;  Service: Gastroenterology;  Laterality: N/A;  CECUM MASS    DILATATION AND CURETTAGE  2018    KNEE SURGERY Right 2008    JOINT SURGERY       MEDICATIONS: The current medication list was reviewed and reconciled.     Allergies:  is allergic to penicillins and sulfa antibiotics.    Family History   Problem Relation Age of Onset     Heart disease Father     Heart disease Mother     Arthritis Mother     Cancer Brother     Stomach cancer Maternal Aunt     Arthritis Other     Cancer Other     Heart disease Other     Colon cancer Neg Hx     Malig Hyperthermia Neg Hx     Breast cancer Neg Hx     Uterine cancer Neg Hx     Ovarian cancer Neg Hx            Physical Exam  Well appearing patient in no acute distress on RA  Anicteric sclerae, no rash on exposed skin  Respirations non-labored  Awake, alert, and oriented x 4. Speech intact.   Appropriate mood and affect      Vitals:    08/08/24 0840   BP: 146/88   Pulse: 89   Resp: 20   Temp: 97.6 °F (36.4 °C)   SpO2: 99%         ECOG Performance Status: (0) Fully Active - Able to Carry On All Pre-disease Performance Without Restriction      Glucose   Date Value Ref Range Status   05/23/2024 100 (H) 65 - 99 mg/dL Final     BUN   Date Value Ref Range Status   05/23/2024 26 (H) 8 - 23 mg/dL Final     Creatinine   Date Value Ref Range Status   07/31/2024 1.00 0.60 - 1.30 mg/dL Final     Comment:     Serial Number: [id number]:  [id number]     Sodium   Date Value Ref Range Status   05/23/2024 133 (L) 136 - 145 mmol/L Final     Potassium   Date Value Ref Range Status   05/23/2024 3.6 3.5 - 5.2 mmol/L Final     Chloride   Date Value Ref Range Status   05/23/2024 99 98 - 107 mmol/L Final     CO2   Date Value Ref Range Status   05/23/2024 23.8 22.0 - 29.0 mmol/L Final     Calcium   Date Value Ref Range Status   05/23/2024 9.4 8.6 - 10.5 mg/dL Final     Total Protein   Date Value Ref Range Status   05/23/2024 7.0 6.0 - 8.5 g/dL Final     Albumin   Date Value Ref Range Status   05/23/2024 4.1 3.5 - 5.2 g/dL Final     ALT (SGPT)   Date Value Ref Range Status   05/23/2024 14 1 - 33 U/L Final     AST (SGOT)   Date Value Ref Range Status   05/23/2024 14 1 - 32 U/L Final     Alkaline Phosphatase   Date Value Ref Range Status   05/23/2024 112 39 - 117 U/L Final     Total Bilirubin   Date Value Ref Range Status    05/23/2024 0.7 0.0 - 1.2 mg/dL Final     eGFR Non  Amer   Date Value Ref Range Status   01/28/2022 53 (L) >60 mL/min/1.73 Final     A/G Ratio   Date Value Ref Range Status   03/24/2021 1.1 (L) 1.4 - 2.6 [ratio] Final     BUN/Creatinine Ratio   Date Value Ref Range Status   05/23/2024 23.0 7.0 - 25.0 Final     Anion Gap   Date Value Ref Range Status   05/23/2024 10.2 5.0 - 15.0 mmol/L Final      WBC   Date Value Ref Range Status   05/23/2024 7.91 3.40 - 10.80 10*3/mm3 Final     RBC   Date Value Ref Range Status   05/23/2024 4.51 3.77 - 5.28 10*6/mm3 Final     Hemoglobin   Date Value Ref Range Status   05/23/2024 12.9 12.0 - 15.9 g/dL Final     Hematocrit   Date Value Ref Range Status   05/23/2024 40.5 34.0 - 46.6 % Final     MCV   Date Value Ref Range Status   05/23/2024 89.8 79.0 - 97.0 fL Final     MCH   Date Value Ref Range Status   05/23/2024 28.6 26.6 - 33.0 pg Final     MCHC   Date Value Ref Range Status   05/23/2024 31.9 31.5 - 35.7 g/dL Final     RDW   Date Value Ref Range Status   05/23/2024 12.9 12.3 - 15.4 % Final     RDW-SD   Date Value Ref Range Status   05/23/2024 42.3 37.0 - 54.0 fl Final     MPV   Date Value Ref Range Status   05/23/2024 9.3 6.0 - 12.0 fL Final     Platelets   Date Value Ref Range Status   05/23/2024 261 140 - 450 10*3/mm3 Final     Neutrophil %   Date Value Ref Range Status   05/23/2024 61.3 42.7 - 76.0 % Final     Lymphocyte %   Date Value Ref Range Status   05/23/2024 29.2 19.6 - 45.3 % Final     Monocyte %   Date Value Ref Range Status   05/23/2024 6.1 5.0 - 12.0 % Final     Eosinophil %   Date Value Ref Range Status   05/23/2024 1.9 0.3 - 6.2 % Final     Basophil %   Date Value Ref Range Status   05/23/2024 1.1 0.0 - 1.5 % Final     Immature Grans %   Date Value Ref Range Status   05/23/2024 0.4 0.0 - 0.5 % Final     Neutrophils, Absolute   Date Value Ref Range Status   05/23/2024 4.85 1.70 - 7.00 10*3/mm3 Final     Lymphocytes, Absolute   Date Value Ref Range Status    05/23/2024 2.31 0.70 - 3.10 10*3/mm3 Final     Monocytes, Absolute   Date Value Ref Range Status   05/23/2024 0.48 0.10 - 0.90 10*3/mm3 Final     Eosinophils, Absolute   Date Value Ref Range Status   05/23/2024 0.15 0.00 - 0.40 10*3/mm3 Final     Basophils, Absolute   Date Value Ref Range Status   05/23/2024 0.09 0.00 - 0.20 10*3/mm3 Final     Immature Grans, Absolute   Date Value Ref Range Status   05/23/2024 0.03 0.00 - 0.05 10*3/mm3 Final     nRBC   Date Value Ref Range Status   05/23/2024 0.0 0.0 - 0.2 /100 WBC Final       Labs personally reviewed and stable. No anemia. CEA normal.     ED note personally reviewed    PCP note personally reviewed      Imaging    CT CAP from 12/2023 personally reviewed and per my read with no evidence of disease, no liver lesions.    CT Chest With Contrast Diagnostic    Result Date: 7/31/2024  Impression: 1. No convincing disease progression in the chest, abdomen or pelvis in patient with history of cecal malignancy status post hemicolectomy. 2. Small similar to slightly increasing omental nodules of uncertain clinical significance at this size (5 mm or less), potentially small lymph nodes. Recommend attention on follow-up imaging. 3. Posttraumatic seroma/hematoma with changes of evolving fat necrosis in the right gluteal subcutaneous tissues, in patient with history of recent trauma. No visualized displaced fracture. 4. Stable unilocular ovarian cystic lesions and left ovarian dermoid. 5. Other chronic/ancillary findings as above. Electronically Signed: Jaswant Mcleod MD  7/31/2024 3:40 PM EDT  Workstation ID: [id number]    CT Abdomen Pelvis With Contrast    Result Date: 7/31/2024  Impression: 1. No convincing disease progression in the chest, abdomen or pelvis in patient with history of cecal malignancy status post hemicolectomy. 2. Small similar to slightly increasing omental nodules of uncertain clinical significance at this size (5 mm or less), potentially small lymph nodes.  Recommend attention on follow-up imaging. 3. Posttraumatic seroma/hematoma with changes of evolving fat necrosis in the right gluteal subcutaneous tissues, in patient with history of recent trauma. No visualized displaced fracture. 4. Stable unilocular ovarian cystic lesions and left ovarian dermoid. 5. Other chronic/ancillary findings as above. Electronically Signed: Jaswant Mcleod MD  7/31/2024 3:40 PM EDT  Workstation ID: [id number]         Assessment and Plan:  Diagnoses and all orders for this visit:    1. Adenocarcinoma of cecum (Primary)  -     CT chest w contrast; Future  -     CT abdomen pelvis w contrast; Future  -     CBC & Differential; Future  -     Comprehensive Metabolic Panel; Future  -     CEA; Future    2. Chronic idiopathic constipation      Ms. Leong with low risk stage II colon cancer, MSI-S s/p right hemicolectomy on 12/8/22 with 0/26 lymph nodes involved and negative margins; pT3pN0 disease. Patient with low risk stage II disease and therefore adjuvant chemotherapy was not recommended. Proceed with surveillance. 12/2023 CT imaging with KATINA. 7/2024 CT imaging with KATINA. Plan to repeat CT imaging with contrast in 6 months (every 6-12 months for 5 years). 3/2023 CEA and 5/23/24 CEA normal. Plan to get CEA again in 6 months (every 6 months for 5 years). Colonoscopy due 1 year after surgery, which will be 12/2023, have referred back to surgery (Dr. Drew) for this.  She had a tough time with prior prep and request pills for prep for her colonoscopies.    Constipation  Discussed increased fluids, PRN stool softener, and metamucil.     Follow up: 6 months

## 2024-08-27 ENCOUNTER — PREP FOR SURGERY (OUTPATIENT)
Dept: OTHER | Facility: HOSPITAL | Age: 79
End: 2024-08-27
Payer: MEDICARE

## 2024-08-27 ENCOUNTER — OFFICE VISIT (OUTPATIENT)
Dept: SURGERY | Facility: CLINIC | Age: 79
End: 2024-08-27
Payer: MEDICARE

## 2024-08-27 VITALS
BODY MASS INDEX: 31.61 KG/M2 | SYSTOLIC BLOOD PRESSURE: 132 MMHG | HEART RATE: 100 BPM | WEIGHT: 156.8 LBS | DIASTOLIC BLOOD PRESSURE: 74 MMHG | HEIGHT: 59 IN

## 2024-08-27 DIAGNOSIS — Z85.038 PERSONAL HISTORY OF COLON CANCER: Primary | ICD-10-CM

## 2024-08-27 PROCEDURE — 3075F SYST BP GE 130 - 139MM HG: CPT | Performed by: SURGERY

## 2024-08-27 PROCEDURE — 3078F DIAST BP <80 MM HG: CPT | Performed by: SURGERY

## 2024-08-27 PROCEDURE — 1159F MED LIST DOCD IN RCRD: CPT | Performed by: SURGERY

## 2024-08-27 PROCEDURE — 1160F RVW MEDS BY RX/DR IN RCRD: CPT | Performed by: SURGERY

## 2024-08-27 PROCEDURE — 99212 OFFICE O/P EST SF 10 MIN: CPT | Performed by: SURGERY

## 2024-08-27 NOTE — PROGRESS NOTES
Chief Complaint:  Colonoscopy (NO COMPLAINTS)    Primary Care Provider: Daniel Hale MD    Referring Provider:  Dr. Aldridge    History of Present Illness  Berna Leong is a 79 y.o. female referred by Dr. Aldridge to have a colonoscopy.    Colonoscopy was done on 11/7/22 and showed a partially obstructing mass in the cecum. Biopsies were positive for adenocarcinoma.     I performed robotic right colectomy on 12/8/2022 for colon cancer. It was determined after visiting with medical oncology the chemotherapy was not needed.   Patient is seen by Dr. Aldridge.    Patient has still not had a follow-up colonoscopy.  We tried to schedule her for a colonoscopy one year after her diagnosis but she declined to have one done.        Allergies: Penicillins and Sulfa antibiotics    Outpatient Medications Marked as Taking for the 8/27/24 encounter (Office Visit) with Abebe Drew MD   Medication Sig Dispense Refill    ascorbic acid (VITAMIN C) 1000 MG tablet Take 1 tablet by mouth Daily.      B Complex Vitamins (vitamin b complex) capsule capsule Take 1 capsule by mouth Daily.      Cholecalciferol (Vitamin D) 50 MCG (2000 UT) capsule 5,000 Units Daily. VIT D3      levothyroxine (Synthroid) 75 MCG tablet Take 1 tablet by mouth Daily. 90 tablet 1    lidocaine (LIDODERM) 5 % Place 1 patch on the skin as directed by provider Daily. Remove & Discard patch within 12 hours or as directed by MD 30 each 3    Multiple Vitamins-Minerals (EYE HEALTH PO) Take 2 tablets by mouth Daily.      polyethylene glycol (MiraLax) 17 GM/SCOOP powder Take 17 g by mouth Daily. 238 g 1    psyllium (Metamucil Smooth Texture) 58.6 % powder Take  by mouth 3 (Three) Times a Day. 1 each 12    traMADol (ULTRAM) 50 MG tablet Take 1 tablet by mouth Every 8 (Eight) Hours As Needed for Severe Pain. 10 tablet 0    Vitamin A 3 MG (35680 UT) capsule Take 2,400 Units by mouth Daily.      vitamin E 400 UNIT capsule Take 1 capsule by mouth Daily.         Past  "Medical History:    Arthritis    Brown recluse spider bite    HX OF RIGHT FRONT LEG 2004    Coma due to low thyroid    Glaucoma    Heart attack    72 AND 81. DENIES CP BUT GETS SOA WITH EXERTION. STATES HAS BEEN ISSUE FOR LONG TIME. FOLLOWED BY PCP DR JOSHI. DECREASED ACTIVITY    Hypothyroidism    Kidney disease    Leg pain    Leg swelling    Macular degeneration    Malignant neoplasm of colon    Restless leg syndrome    SOB (shortness of breath)    Thyroid disorder    Uterine polyp    Wound dehiscence    Sustained laceration December 2020 1 sutures removed by PCP with dehiscence seen at wound clinicby Dr Laina Bales February 2022 and folowed there--given silver sulfadiazine creme (Silvadene)        Past Surgical History:    COLON RESECTION    Procedure: COLON RESECTION LAPAROSCOPIC RIGHT WITH ROBOT;  Surgeon: Abebe Drew MD;  Location: [facility] MAIN OR;  Service: Robotics - DaVinci;  Laterality: N/A;    COLONOSCOPY    Procedure: COLONOSCOPY, WITH bx;  Surgeon: Jimmie Zacarias MD;  Location: [facility] ENDOSCOPY;  Service: Gastroenterology;  Laterality: N/A;  CECUM MASS    DILATATION AND CURETTAGE    KNEE SURGERY    JOINT SURGERY       Family History:   Family History   Problem Relation Age of Onset    Heart disease Father     Heart disease Mother     Arthritis Mother     Cancer Brother     Stomach cancer Maternal Aunt     Arthritis Other     Cancer Other     Heart disease Other     Colon cancer Neg Hx     Malig Hyperthermia Neg Hx     Breast cancer Neg Hx     Uterine cancer Neg Hx     Ovarian cancer Neg Hx         Social History:  Social History     Tobacco Use    Smoking status: Never    Smokeless tobacco: Never   Substance Use Topics    Alcohol use: Never       Objective     Vital Signs:  /74 (BP Location: Left arm, Patient Position: Sitting, Cuff Size: Adult)   Pulse 100   Ht 149.9 cm (59\")   Wt 71.1 kg (156 lb 12.8 oz)   BMI 31.67 kg/m²   Respiratory:  breathing not labored, respiratory effort " appears normal  Cardiovascular:  heart regular rate  Abdomen:  soft, nontender, nondistended    Skin and subcutaneous tissue:  no visible concerning rashes or lesions, no jaundice  Musculoskeletal: moving all extremities symmetrically and purposefully  Neurologic:  no obvious motor or sensory deficits, speech clear  Psychiatric:  judgment and insight intact      Assessment:  Diagnoses and all orders for this visit:    1. Personal history of colon cancer (Primary)        Plan:  Colonoscopy  Patient wants to wait until beginning of next year to have the colonoscopy.  Will schedule the colonoscopy sometime early next year.    Discussion: Indications, options, risks, benefits, and expected outcomes of planned surgery were discussed with the patient and she agrees to proceed.    Abebe Drew MD  08/27/2024    Electronically signed by Abebe Drew MD, 08/27/24, 10:58 AM EDT.

## 2024-08-30 ENCOUNTER — TELEPHONE (OUTPATIENT)
Dept: OBSTETRICS AND GYNECOLOGY | Facility: CLINIC | Age: 79
End: 2024-08-30
Payer: MEDICARE

## 2024-08-30 ENCOUNTER — OFFICE VISIT (OUTPATIENT)
Dept: INTERNAL MEDICINE | Facility: CLINIC | Age: 79
End: 2024-08-30
Payer: MEDICARE

## 2024-08-30 VITALS
TEMPERATURE: 96.7 F | HEIGHT: 59 IN | OXYGEN SATURATION: 95 % | DIASTOLIC BLOOD PRESSURE: 79 MMHG | BODY MASS INDEX: 31.08 KG/M2 | SYSTOLIC BLOOD PRESSURE: 138 MMHG | WEIGHT: 154.2 LBS | HEART RATE: 97 BPM
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
## 2024-08-30 DIAGNOSIS — M54.50 ACUTE RIGHT-SIDED LOW BACK PAIN WITHOUT SCIATICA: ICD-10-CM

## 2024-08-30 NOTE — PROGRESS NOTES
Chief Complaint  Fall (Back/ hip is hurting from a fall back in June. Patient did go to the er and was seen )    Subjective          Berna Leong presents to [facility]  History of Present Illness    Since June 22nd fall, pain is significantly improved but still having substantial pain in right lower back/upper buttock area.  She has not been taking her tramadol or lidocaine as she doesn't like being on medicines and worries that they may be dangerous.  She has had no further interim falls.      PHQ-9 Depression Screening  Little interest or pleasure in doing things?     Feeling down, depressed, or hopeless?     Trouble falling or staying asleep, or sleeping too much?     Feeling tired or having little energy?     Poor appetite or overeating?     Feeling bad about yourself - or that you are a failure or have let yourself or your family down?     Trouble concentrating on things, such as reading the newspaper or watching television?     Moving or speaking so slowly that other people could have noticed? Or the opposite - being so fidgety or restless that you have been moving around a lot more than usual?     Thoughts that you would be better off dead, or of hurting yourself in some way?     PHQ-9 Total Score     If you checked off any problems, how difficult have these problems made it for you to do your work, take care of things at home, or get along with other people?           Current Outpatient Medications   Medication Instructions    ascorbic acid (VITAMIN C) 1,000 mg, Oral, Daily    B Complex Vitamins (vitamin b complex) capsule capsule 1 capsule, Oral, Daily    levothyroxine (SYNTHROID) 75 mcg, Oral, Daily    lidocaine (LIDODERM) 5 % 1 patch, Transdermal, Every 24 Hours, Remove & Discard patch within 12 hours or as directed by MD    Multiple Vitamins-Minerals (EYE HEALTH PO) 2 tablets, Oral, Daily    polyethylene glycol (MIRALAX) 17 g, Oral, Daily    psyllium  "(Metamucil Smooth Texture) 58.6 % powder Oral, 3 Times Daily    traMADol (ULTRAM) 50 mg, Oral, Every 8 Hours PRN    Vitamin A 2,400 Units, Oral, Daily    Vitamin D 5,000 Units, Every 24 Hours, VIT D3    vitamin E 400 Units, Oral, Daily       The following portions of the patient's history were reviewed and updated as appropriate: allergies, current medications, past family history, past medical history, past social history, past surgical history, and problem list.    Objective   Vital Signs:   /79 (BP Location: Left arm, Patient Position: Sitting, Cuff Size: Adult)   Pulse 97   Temp 96.7 °F (35.9 °C) (Temporal)   Ht 149.9 cm (59\")   Wt 69.9 kg (154 lb 3.2 oz)   SpO2 95%   BMI 31.14 kg/m²     BP Readings from Last 3 Encounters:   08/30/24 138/79   08/27/24 132/74   08/08/24 146/88     Wt Readings from Last 3 Encounters:   08/30/24 69.9 kg (154 lb 3.2 oz)   08/27/24 71.1 kg (156 lb 12.8 oz)   08/08/24 70.8 kg (156 lb)           Physical Exam  Vitals reviewed.   Constitutional:       General: She is not in acute distress.     Appearance: Normal appearance. She is not ill-appearing, toxic-appearing or diaphoretic.   HENT:      Head: Normocephalic and atraumatic.      Right Ear: External ear normal.      Left Ear: External ear normal.   Eyes:      Conjunctiva/sclera: Conjunctivae normal.   Cardiovascular:      Rate and Rhythm: Normal rate and regular rhythm.      Pulses: Normal pulses.      Heart sounds: Normal heart sounds. No murmur heard.     No friction rub. No gallop.   Pulmonary:      Effort: Pulmonary effort is normal. No respiratory distress.      Breath sounds: Normal breath sounds. No stridor. No wheezing, rhonchi or rales.   Chest:      Chest wall: No tenderness.   Abdominal:      General: Abdomen is flat.      Palpations: Abdomen is soft. There is no mass.      Tenderness: There is no abdominal tenderness.   Musculoskeletal:      Right lower leg: No edema.      Left lower leg: No edema.      " Comments: L-spine NTTP   Skin:     General: Skin is warm and dry.   Neurological:      General: No focal deficit present.      Mental Status: She is alert. Mental status is at baseline.   Psychiatric:         Behavior: Behavior normal.         Thought Content: Thought content normal.         Judgment: Judgment normal.          Result Review :   The following data was reviewed by: Daniel Hale MD on 08/30/2024:  Common labs          5/23/2024    07:42 7/31/2024    07:42 8/8/2024    08:32   Common Labs   Glucose 100   99    BUN 26   20    Creatinine 1.13  1.00  1.02    Sodium 133   145    Potassium 3.6   3.8    Chloride 99   105    Calcium 9.4   9.2    Albumin 4.1   4.0    Total Bilirubin 0.7   0.7    Alkaline Phosphatase 112   92    AST (SGOT) 14   10    ALT (SGPT) 14   13    WBC 7.91   6.61    Hemoglobin 12.9   12.3    Hematocrit 40.5   38.7    Platelets 261   257             Lab Results   Component Value Date    BILIRUBINUR Negative 05/23/2024            Assessment and Plan    Diagnoses and all orders for this visit:    1. Fall, subsequent encounter (Primary)    2. Acute right-sided low back pain without sciatica      Back pain:  -improved but still present  -reassured Miss Leong, and she is okay with re-starting lidocaine patches PRN and tramadol PRN  -will keep previously scheduled follow up with me in 6 weeks time  -At this time, Miss Leong declines offer of MRI.  Had CT ab/pelvis which showed no evidence of vertebral issues.  If fails to resolve by next visit, will discuss obtaining an MRI    There are no discontinued medications.       Follow Up   Return if symptoms worsen or fail to improve.  Patient was given instructions and counseling regarding her condition or for health maintenance advice. Please see specific information pulled into the AVS if appropriate.       Daniel Hale MD  08/30/24  11:53 EDT

## 2024-09-19 ENCOUNTER — TELEPHONE (OUTPATIENT)
Dept: OBSTETRICS AND GYNECOLOGY | Facility: CLINIC | Age: 79
End: 2024-09-19
Payer: MEDICARE

## 2024-09-20 ENCOUNTER — TELEPHONE (OUTPATIENT)
Dept: INTERNAL MEDICINE | Facility: CLINIC | Age: 79
End: 2024-09-20
Payer: MEDICARE

## 2024-09-26 ENCOUNTER — OFFICE VISIT (OUTPATIENT)
Dept: OBSTETRICS AND GYNECOLOGY | Facility: CLINIC | Age: 79
End: 2024-09-26
Payer: MEDICARE

## 2024-09-26 VITALS
BODY MASS INDEX: 31.51 KG/M2 | HEART RATE: 76 BPM | DIASTOLIC BLOOD PRESSURE: 74 MMHG | WEIGHT: 156 LBS | SYSTOLIC BLOOD PRESSURE: 166 MMHG

## 2024-09-26 DIAGNOSIS — D27.1 DERMOID CYST OF LEFT OVARY: Primary | ICD-10-CM

## 2024-09-26 DIAGNOSIS — N83.202 BILATERAL OVARIAN CYSTS: ICD-10-CM

## 2024-09-26 DIAGNOSIS — N83.201 BILATERAL OVARIAN CYSTS: ICD-10-CM

## 2024-10-08 ENCOUNTER — HOSPITAL ENCOUNTER (OUTPATIENT)
Dept: BONE DENSITY | Facility: HOSPITAL | Age: 79
Discharge: HOME | End: 2024-10-08
Admitting: STUDENT IN AN ORGANIZED HEALTH CARE EDUCATION/TRAINING PROGRAM
Payer: MEDICARE

## 2024-10-08 PROCEDURE — 77080 DXA BONE DENSITY AXIAL: CPT

## 2024-10-08 NOTE — PROGRESS NOTES
Subjective   The ABCs of the Annual Wellness Visit  Medicare Wellness Visit      Berna Leong is a 79 y.o. patient who presents for a Medicare Wellness Visit.    The following portions of the patient's history were reviewed and   updated as appropriate: allergies, current medications, past family history, past medical history, past social history, past surgical history, and problem list.    Compared to one year ago, the patient's physical   health is worse.  Compared to one year ago, the patient's mental   health is the same.    Recent Hospitalizations:  She was not admitted to the hospital during the last year.     Current Medical Providers:  Patient Care Team:  Daniel Hale MD as PCP - General (Internal Medicine)  Sujey Lemon RN as Registered Nurse (Oncology)  Artur Aldridge MD as Consulting Physician (Hematology and Oncology)  Isadora Holliday APRN as Nurse Practitioner (Nurse Practitioner)  Abebe Drew MD as Consulting Physician (General Surgery)    Outpatient Medications Prior to Visit   Medication Sig Dispense Refill    ascorbic acid (VITAMIN C) 1000 MG tablet Take 1 tablet by mouth Daily.      B Complex Vitamins (vitamin b complex) capsule capsule Take 1 capsule by mouth Daily.      Cholecalciferol (Vitamin D) 50 MCG (2000 UT) capsule 5,000 Units Daily. VIT D3      levothyroxine (Synthroid) 75 MCG tablet Take 1 tablet by mouth Daily. 90 tablet 1    Multiple Vitamins-Minerals (EYE HEALTH PO) Take 2 tablets by mouth Daily.      Vitamin A 3 MG (49777 UT) capsule Take 2,400 Units by mouth Daily.      vitamin E 400 UNIT capsule Take 1 capsule by mouth Daily.      lidocaine (LIDODERM) 5 % Place 1 patch on the skin as directed by provider Daily. Remove & Discard patch within 12 hours or as directed by MD (Patient not taking: Reported on 9/26/2024) 30 each 3    alendronate (FOSAMAX) 10 MG tablet Take 1 tablet by mouth Every Morning Before Breakfast. (Patient not taking: Reported on  "10/10/2024) 90 tablet 2     No facility-administered medications prior to visit.     No opioid medication identified on active medication list. I have reviewed chart for other potential  high risk medication/s and harmful drug interactions in the elderly.      Aspirin is not on active medication list.  Aspirin use is indicated based on review of current medical condition/s. Pros and cons of this therapy have been discussed with this patient. Benefits of this medication outweigh potential harm.  Patient has been instructed to start taking this medication..    Patient Active Problem List   Diagnosis    Adnexal cyst    Arthritis    Hypothyroidism    Hx of knee surgery    Macular degeneration    Glaucoma    Obesity (BMI 35.0-39.9 without comorbidity)    History of MI (myocardial infarction)    Stage 3a chronic kidney disease    History of Graves' disease    History of coma    Spondylosis of lumbar region without myelopathy or radiculopathy    Cervicalgia    Restless leg syndrome    Osteopenia    Bilateral carpal tunnel syndrome    Chronic fatigue syndrome    Hypertension    Vitamin D deficiency    Cellulitis of left lower extremity    Pure hypercholesterolemia    Trigger finger of left thumb    Osteoarthritis of both knees    Abnormal CT scan, colon    Chronic RLQ pain    Adenocarcinoma of cecum    Idiopathic osteoarthritis    Bilateral ovarian cysts    Dermoid cyst of left ovary    Prediabetes    Encounter for subsequent annual wellness visit (AWV) in Medicare patient    Pelvic pain    Overweight (BMI 25.0-29.9)    Personal history of colon cancer     Advance Care Planning Advance Directive is not on file.  ACP discussion was held with the patient during this visit. Patient does not have an advance directive, information provided.            Objective   Vitals:    10/10/24 1057   BP: 137/65   Pulse: 85   Temp: 98 °F (36.7 °C)   SpO2: 99%   Weight: 70.8 kg (156 lb)   Height: 149.9 cm (59\")   PainSc:   5       Estimated " "body mass index is 31.51 kg/m² as calculated from the following:    Height as of this encounter: 149.9 cm (59\").    Weight as of this encounter: 70.8 kg (156 lb).            Does the patient have evidence of cognitive impairment? No                                                                                                Health  Risk Assessment    Smoking Status:  Social History     Tobacco Use   Smoking Status Never   Smokeless Tobacco Never     Alcohol Consumption:  Social History     Substance and Sexual Activity   Alcohol Use Never       Fall Risk Screen  STEADI Fall Risk Assessment was completed, and patient is at HIGH risk for falls. Assessment completed on:10/10/2024    Depression Screening:      10/10/2024    11:16 AM   PHQ-2/PHQ-9 Depression Screening   Little Interest or Pleasure in Doing Things 0-->not at all   Feeling Down, Depressed or Hopeless 0-->not at all   PHQ-9: Brief Depression Severity Measure Score 0     Health Habits and Functional and Cognitive Screening:      10/10/2024    11:11 AM   Functional & Cognitive Status   Do you have difficulty preparing food and eating? No   Do you have difficulty bathing yourself, getting dressed or grooming yourself? No   Do you have difficulty using the toilet? No   Do you have difficulty moving around from place to place? No   Do you have trouble with steps or getting out of a bed or a chair? No   Current Diet Other   Dental Exam Up to date   Eye Exam Up to date   Exercise (times per week) 0 times per week   Current Exercises Include No Regular Exercise   Do you need help using the phone?  No   Are you deaf or do you have serious difficulty hearing?  No   Do you need help to go to places out of walking distance? No   Do you need help shopping? No   Do you need help preparing meals?  No   Do you need help with housework?  No   Do you need help with laundry? No   Do you need help taking your medications? No   Do you need help managing money? No   Do you " ever drive or ride in a car without wearing a seat belt? No   Have you felt unusual stress, anger or loneliness in the last month? No   Who do you live with? Alone   If you need help, do you have trouble finding someone available to you? No   Have you been bothered in the last four weeks by sexual problems? No   Do you have difficulty concentrating, remembering or making decisions? No           Age-appropriate Screening Schedule:  Refer to the list below for future screening recommendations based on patient's age, sex and/or medical conditions. Orders for these recommended tests are listed in the plan section. The patient has been provided with a written plan.    Health Maintenance List  Health Maintenance   Topic Date Due    DIABETIC EYE EXAM  07/24/2021    COLONOSCOPY  11/07/2023    HEMOGLOBIN A1C  11/12/2023    LIPID PANEL  05/12/2024    ZOSTER VACCINE (2 of 2) 09/07/2024    INFLUENZA VACCINE  03/31/2025 (Originally 8/1/2024)    RSV Vaccine - Adults (1 - 1-dose 60+ series) 07/10/2025 (Originally 2/8/2003)    Pneumococcal Vaccine 65+ (2 of 2 - PCV) 07/10/2025 (Originally 12/30/2016)    URINE MICROALBUMIN  05/23/2025    BMI FOLLOWUP  07/10/2025    ANNUAL WELLNESS VISIT  10/10/2025    DXA SCAN  10/08/2026    TDAP/TD VACCINES (3 - Td or Tdap) 06/25/2034    COVID-19 Vaccine  Completed                                                                                                                                                CMS Preventative Services Quick Reference  Risk Factors Identified During Encounter  Chronic Pain:  referred to ortho for hip injection  Fall Risk-High or Moderate: Discussed Fall Prevention in the home  Inactivity/Sedentary: Patient was advised to exercise at least 150 minutes a week per CDC recommendations.    The above risks/problems have been discussed with the patient.  Pertinent information has been shared with the patient in the After Visit Summary.  An After Visit Summary and PPPS were  "made available to the patient.    Follow Up:   Next Medicare Wellness visit to be scheduled in 1 year.          Additional E&M Note during same encounter follows:  Patient has multiple medical problems which are significant and separately identifiable that require additional work above and beyond the Medicare Wellness Visit.      Chief Complaint  Annual Exam, dexa results, Hip Pain (R hip still hurting from injury in June. 5-6/10 pain scale), and leg pain (R leg)    Berna Leong is a 79 y.o. female who presents to [facility]     Reports though improved a bit, still having a lot of pain in right hip since June fall.  No further falls in interim.      Objective   Vital Signs:   Vitals:    10/10/24 1057   BP: 137/65   Pulse: 85   Temp: 98 °F (36.7 °C)   SpO2: 99%   Weight: 70.8 kg (156 lb)   Height: 149.9 cm (59\")   PainSc:   5       Wt Readings from Last 3 Encounters:   10/10/24 70.8 kg (156 lb)   09/26/24 70.8 kg (156 lb)   08/30/24 69.9 kg (154 lb 3.2 oz)     BP Readings from Last 3 Encounters:   10/10/24 137/65   09/26/24 166/74   08/30/24 138/79       Physical Exam  Vitals reviewed.   Constitutional:       General: She is not in acute distress.     Appearance: Normal appearance. She is not ill-appearing, toxic-appearing or diaphoretic.   HENT:      Head: Normocephalic and atraumatic.      Right Ear: External ear normal.      Left Ear: External ear normal.   Eyes:      Conjunctiva/sclera: Conjunctivae normal.   Cardiovascular:      Rate and Rhythm: Normal rate and regular rhythm.      Pulses: Normal pulses.      Heart sounds: Normal heart sounds. No murmur heard.     No friction rub. No gallop.   Pulmonary:      Effort: Pulmonary effort is normal. No respiratory distress.      Breath sounds: Normal breath sounds. No stridor. No wheezing, rhonchi or rales.   Chest:      Chest wall: No tenderness.   Abdominal:      General: Abdomen is flat.      Palpations: Abdomen is " soft. There is no mass.      Tenderness: There is abdominal tenderness. There is no guarding or rebound.      Comments: Mild right sided TTP without guarding or rebound   Musculoskeletal:      Right lower leg: Edema present.      Left lower leg: Edema present.      Comments: Trace LE edema   Skin:     General: Skin is warm and dry.   Neurological:      General: No focal deficit present.      Mental Status: She is alert. Mental status is at baseline.   Psychiatric:         Behavior: Behavior normal.         Thought Content: Thought content normal.         Judgment: Judgment normal.         The following data was reviewed by Daniel Hale MD on 10/10/2024        Assessment & Plan   Diagnoses and all orders for this visit:    1. Medicare annual wellness visit, subsequent (Primary)  -     TSH Rfx On Abnormal To Free T4  -     Lipid Panel  -     Comprehensive Metabolic Panel  -     CBC & Differential  -     Hemoglobin A1c  -     Vitamin B12  -     Folate    2. Osteoporosis, unspecified osteoporosis type, unspecified pathological fracture presence  -     alendronate (FOSAMAX) 10 MG tablet; Take 1 tablet by mouth Every Morning Before Breakfast.  Dispense: 90 tablet; Refill: 2    3. Primary hypertension  -     Comprehensive Metabolic Panel    4. Stage 3a chronic kidney disease  -     Comprehensive Metabolic Panel    5. Prediabetes  -     Comprehensive Metabolic Panel  -     Hemoglobin A1c    6. B12 deficiency  -     CBC & Differential  -     Vitamin B12  -     Folate    7. History of MI (myocardial infarction)    8. Class 1 obesity due to excess calories with serious comorbidity and body mass index (BMI) of 31.0 to 31.9 in adult    9. Right hip pain  -     Ambulatory Referral to Orthopedic Surgery    Other orders  -     aspirin 81 MG EC tablet; Take 1 tablet by mouth Daily.  Dispense: 90 tablet; Refill: 2      CKD:  -counseled to avoid NSAIDs.  Okay to take low dose aspirin.  Okay to take tylenol as needed for MSK  pain    Osteoporosis:  -after discussing DEXA results as well as risks and benefits of alendronate, Miss Leong is agreeable to starting alendronate     Health Maintenance:  -nutritional counseling on the components of a heart healthy diet  -exercise counseling, recommending at least 2.5 hours of moderate exercise weekly         FOLLOW UP  Return in about 4 months (around 2/10/2025) for CKD, right hip pain.  Patient was given instructions and counseling regarding her condition or for health maintenance advice. Please see specific information pulled into the AVS if appropriate.     Daniel Hale MD  10/10/24  12:01 EDT

## 2024-10-09 DIAGNOSIS — M81.0 OSTEOPOROSIS, UNSPECIFIED OSTEOPOROSIS TYPE, UNSPECIFIED PATHOLOGICAL FRACTURE PRESENCE: Primary | ICD-10-CM

## 2024-10-09 RX ORDER — ALENDRONATE SODIUM 10 MG/1
10 TABLET ORAL
Qty: 90 TABLET | Refills: 2 | Status: SHIPPED | OUTPATIENT
Start: 2024-10-09 | End: 2024-10-10 | Stop reason: SDUPTHER

## 2024-10-10 ENCOUNTER — OFFICE VISIT (OUTPATIENT)
Dept: INTERNAL MEDICINE | Facility: CLINIC | Age: 79
End: 2024-10-10
Payer: MEDICARE

## 2024-10-10 VITALS
SYSTOLIC BLOOD PRESSURE: 137 MMHG | HEART RATE: 85 BPM | DIASTOLIC BLOOD PRESSURE: 65 MMHG | WEIGHT: 156 LBS | HEIGHT: 59 IN | BODY MASS INDEX: 31.45 KG/M2 | OXYGEN SATURATION: 99 % | TEMPERATURE: 98 F

## 2024-10-10 DIAGNOSIS — E53.8 B12 DEFICIENCY: ICD-10-CM

## 2024-10-10 DIAGNOSIS — N18.31 STAGE 3A CHRONIC KIDNEY DISEASE: ICD-10-CM

## 2024-10-10 DIAGNOSIS — E66.09 CLASS 1 OBESITY DUE TO EXCESS CALORIES WITH SERIOUS COMORBIDITY AND BODY MASS INDEX (BMI) OF 31.0 TO 31.9 IN ADULT: ICD-10-CM

## 2024-10-10 DIAGNOSIS — Z00.00 MEDICARE ANNUAL WELLNESS VISIT, SUBSEQUENT: Primary | ICD-10-CM

## 2024-10-10 DIAGNOSIS — R73.03 PREDIABETES: ICD-10-CM

## 2024-10-10 DIAGNOSIS — M81.0 OSTEOPOROSIS, UNSPECIFIED OSTEOPOROSIS TYPE, UNSPECIFIED PATHOLOGICAL FRACTURE PRESENCE: ICD-10-CM

## 2024-10-10 DIAGNOSIS — E66.811 CLASS 1 OBESITY DUE TO EXCESS CALORIES WITH SERIOUS COMORBIDITY AND BODY MASS INDEX (BMI) OF 31.0 TO 31.9 IN ADULT: ICD-10-CM

## 2024-10-10 DIAGNOSIS — M25.551 RIGHT HIP PAIN: ICD-10-CM

## 2024-10-10 DIAGNOSIS — I25.2 HISTORY OF MI (MYOCARDIAL INFARCTION): ICD-10-CM

## 2024-10-10 DIAGNOSIS — I10 PRIMARY HYPERTENSION: ICD-10-CM

## 2024-10-10 RX ORDER — ASPIRIN 81 MG/1
81 TABLET ORAL DAILY
Qty: 90 TABLET | Refills: 2 | Status: SHIPPED | OUTPATIENT
Start: 2024-10-10

## 2024-10-10 RX ORDER — ALENDRONATE SODIUM 10 MG/1
10 TABLET ORAL
Qty: 90 TABLET | Refills: 2 | Status: SHIPPED | OUTPATIENT
Start: 2024-10-10

## 2024-10-17 ENCOUNTER — TRANSCRIBE ORDERS (OUTPATIENT)
Dept: ADMINISTRATIVE | Facility: HOSPITAL | Age: 79
End: 2024-10-17
Payer: MEDICARE

## 2024-10-17 DIAGNOSIS — R09.89 BRUIT: Primary | ICD-10-CM

## 2024-10-18 ENCOUNTER — TRANSCRIBE ORDERS (OUTPATIENT)
Dept: ADMINISTRATIVE | Facility: HOSPITAL | Age: 79
End: 2024-10-18
Payer: MEDICARE

## 2024-10-18 DIAGNOSIS — R07.9 CHEST PAIN, UNSPECIFIED TYPE: Primary | ICD-10-CM

## 2024-10-18 NOTE — PRE-PROCEDURE INSTRUCTIONS
Attempted to call pt, number was not in service, will call back again.   Reminded of arrival time at 0800        , Entrance C of the [facility] hospital. Instructed to bring or have a  over the age of 18 set up to drive you home the day of procedure.    Instructed on clear liquid diet the day before, nothing red or purple. Call with any questions about the prep or if in need of the prep.  Reminded them not to eat or drink anything am of procedure unless its a sip of water with medications.  Hold aspirin am of procedure.

## 2024-10-23 ENCOUNTER — OFFICE VISIT (OUTPATIENT)
Dept: ORTHOPEDIC SURGERY | Facility: CLINIC | Age: 79
End: 2024-10-23
Payer: MEDICARE

## 2024-10-23 VITALS
HEIGHT: 59 IN | OXYGEN SATURATION: 99 % | DIASTOLIC BLOOD PRESSURE: 72 MMHG | BODY MASS INDEX: 31.45 KG/M2 | HEART RATE: 87 BPM | WEIGHT: 156 LBS | SYSTOLIC BLOOD PRESSURE: 155 MMHG

## 2024-10-23 DIAGNOSIS — M70.61 TROCHANTERIC BURSITIS OF RIGHT HIP: Primary | ICD-10-CM

## 2024-10-23 RX ORDER — TRIAMCINOLONE ACETONIDE 40 MG/ML
40 INJECTION, SUSPENSION INTRA-ARTICULAR; INTRAMUSCULAR
Status: COMPLETED | OUTPATIENT
Start: 2024-10-23 | End: 2024-10-23

## 2024-10-23 RX ORDER — LIDOCAINE HYDROCHLORIDE 10 MG/ML
5 INJECTION, SOLUTION EPIDURAL; INFILTRATION; INTRACAUDAL; PERINEURAL
Status: COMPLETED | OUTPATIENT
Start: 2024-10-23 | End: 2024-10-23

## 2024-10-23 RX ADMIN — TRIAMCINOLONE ACETONIDE 40 MG: 40 INJECTION, SUSPENSION INTRA-ARTICULAR; INTRAMUSCULAR at 09:23

## 2024-10-23 RX ADMIN — LIDOCAINE HYDROCHLORIDE 5 ML: 10 INJECTION, SOLUTION EPIDURAL; INFILTRATION; INTRACAUDAL; PERINEURAL at 09:23

## 2024-10-23 NOTE — PROGRESS NOTES
"Chief Complaint  Pain and Initial Evaluation of the Right Hip    Subjective          Berna Leong presents to [facility] for an evaluation  of her right hip.     History of Present Illness    The patient presents here today for an evaluation of her right hip. She reports she fell and landed on the concrete after her trash can got away from her. She reports this happened back in June. She locates her pain to the lateral hip. She has pain at nighttime and has a hard time laying on her right hip due to the pain. She went to her family doctor back in June where she had x-rays done on her hip.          Allergies   Allergen Reactions    Penicillins Rash    Sulfa Antibiotics Rash        Social History     Socioeconomic History    Marital status: Single   Tobacco Use    Smoking status: Never    Smokeless tobacco: Never   Vaping Use    Vaping status: Never Used   Substance and Sexual Activity    Alcohol use: Never    Drug use: Never    Sexual activity: Defer     Partners: Male     Birth control/protection: Post-menopausal        I reviewed the patient's chief complaint, history of present illness, review of systems, past medical history, surgical history, family history, social history, medications, and allergy list.     REVIEW OF SYSTEMS    Constitutional: Denies fevers, chills, weight loss  Cardiovascular: Denies chest pain, shortness of breath  Skin: Denies rashes, acute skin changes  Neurologic: Denies headache, loss of consciousness  MSK: Right hip pain       Objective   Vital Signs:   /72   Pulse 87   Ht 149.9 cm (59\")   Wt 70.8 kg (156 lb)   SpO2 99%   BMI 31.51 kg/m²     Body mass index is 31.51 kg/m².    Physical Exam    General: Alert. No acute distress.   Right lower extremity: internal rotation to 20 degrees, external rotation  to 30 degrees, flexion to 90 degrees, intact hip flexion, tender over the lateral hip, calf soft, distal neurovascularly intact, positive EHL, " FHL, GS, and TA. Sensation intact to all 5 nerves of the foot.      Large Joint Arthrocentesis  Date/Time: 10/23/2024 9:23 AM  Consent given by: patient  Site marked: site marked  Timeout: Immediately prior to procedure a time out was called to verify the correct patient, procedure, equipment, support staff and site/side marked as required   Supporting Documentation  Indications: pain   Procedure Details  Location: hip (right) -   Needle gauge: 21g.  Medications administered: 5 mL lidocaine PF 1% 1 %; 40 mg triamcinolone acetonide 40 MG/ML  Patient tolerance: patient tolerated the procedure well with no immediate complications    This injection documentation was Scribed for Doug Browne MD by Mariam Mccrary MA.  10/23/24   09:24 EDT    Imaging Results (Most Recent)       None                     Assessment and Plan        DEXA Bone Density Axial    Result Date: 10/8/2024  Narrative: DEXA BONE DENSITY AXIAL Date of Exam: 10/8/2024 8:13 AM EDT Indication: screen. Comparison: 6/8/2022 Findings: Lumbar Spine The BMD measured in the L1-L4 (L3) region is 1.051 g/cm2 with a T-score of -1.1. This patient is considered osteopenic according to World Health Organization (WHO) criteria. When compared to the previous examination dated 6/8/2022, the bone mineral density of the L1-L4 (L3) spine has apparently increased 1.1%. Femoral Neck The BMD measured at the left femoral neck is 0.752 g/cm2 with a T-score of -2.1. The BMD measured at the right femoral neck is 0.662 g/cm2 with a T-score of -2.7. The BMD of the mean femoral neck is 0.707 g/cm2 with a T score of -2.4. This patient is considered osteoporotic according to World Health Organization (WHO) criteria. When compared to the previous examination dated 6/8/2022, the bone mineral density of the femoral neck mean has apparently decreased 1.7%. FRAX Score: The estimated 10 year risk of a major osteoporotic fracture is calculated to be 24.9% and a hip fracture of 7.9%.      Impression: Impression: Osteoporosis. Report dictated by: Berna Dorothygatito  I have personally reviewed this case and agree with the findings above: Electronically Signed: Bo Witt MD  10/8/2024 5:08 PM EDT  Workstation ID: [id number]      Diagnoses and all orders for this visit:    1. Trochanteric bursitis of right hip (Primary)        The patient presents here today for an evaluation  of her right hip.     Discussed the risks and benefits of a right hip bursa injection today in the office. Patient expressed understanding and wishes to proceed. She tolerated the injection well and without any complications.     Home exercises given today and diclofenac gel sent into the pharmacy.        Call or return if worsening symptoms.    Scribed for Doug Browne MD by Isabella Santana  10/23/2024   09:16 EDT         Follow Up       PRN     Patient was given instructions and counseling regarding her condition or for health maintenance advice. Please see specific information pulled into the AVS if appropriate.     I have personally performed the services described in this document as scribed by the above individual and it is both accurate and complete. Doug Browne MD 10/23/24 11:34 EDT

## 2024-10-29 ENCOUNTER — ANESTHESIA EVENT (OUTPATIENT)
Dept: GASTROENTEROLOGY | Facility: HOSPITAL | Age: 79
End: 2024-10-29
Payer: MEDICARE

## 2024-10-29 ENCOUNTER — PREP FOR SURGERY (OUTPATIENT)
Dept: OTHER | Facility: HOSPITAL | Age: 79
End: 2024-10-29
Payer: MEDICARE

## 2024-10-29 DIAGNOSIS — Z85.038 PERSONAL HISTORY OF COLON CANCER: Primary | ICD-10-CM

## 2024-10-29 NOTE — ANESTHESIA PREPROCEDURE EVALUATION
Anesthesia Evaluation     Patient summary reviewed and Nursing notes reviewed   no history of anesthetic complications:   NPO Solid Status: > 8 hours  NPO Liquid Status: > 6 hours           Airway   Mallampati: II  TM distance: >3 FB  Neck ROM: full  No difficulty expected  Dental          Pulmonary - normal exam   (+) ,shortness of breath  Cardiovascular - normal exam  Exercise tolerance: good (4-7 METS)    ECG reviewed    (+) hypertension, past MI (MILD MI 1974 & 1983 -- NO STENTS) , dysrhythmias (STATES R/T TO THYROID ISSUES) Tachycardia, hyperlipidemia      Neuro/Psych  (+) numbness  GI/Hepatic/Renal/Endo    (+) obesity, morbid obesity, renal disease- CRI, thyroid problem hypothyroidism    ROS Comment: S/P COLON RESECTION 2022-- DR CALLAHAN    Musculoskeletal     (+) neck pain  Abdominal    Substance History      OB/GYN          Other   arthritis,   history of cancer (COLON CA 2022)    ROS/Med Hx Other: Previous colon CA    EKG 11/16/22: HR 71, SR    ECHO 05/18/22:  ·Left ventricular ejection fraction appears to be 61 - 65%.  ·Left ventricular diastolic function is consistent with (grade I) impaired relaxation.   There were no apparent intracardiac masses, vegetations or thrombi.                      Anesthesia Plan    ASA 3     general   total IV anesthesia  (Total IV Anesthesia    Patient understands anesthesia not responsible for dental damage.    PT REFUSED BLOOD PRODUCTS, IF NECESSARY  )  intravenous induction     Anesthetic plan, risks, benefits, and alternatives have been provided, discussed and informed consent has been obtained with: patient.    Use of blood products discussed with  Did not consent to blood products.    Plan discussed with CRNA.      CODE STATUS:

## 2024-10-29 NOTE — H&P
Chief Complaint:  Colonoscopy (NO COMPLAINTS)    Patient is here to have a colonoscopy.  Following is a copy of the HPI from the patient's office visit at the surgery clinic.    Primary Care Provider: Daniel Hale MD    Referring Provider:  Dr. Aldridge    History of Present Illness  Berna Leong is a 79 y.o. female referred by Dr. Aldridge to have a colonoscopy.    Colonoscopy was done on 11/7/22 and showed a partially obstructing mass in the cecum. Biopsies were positive for adenocarcinoma.     I performed robotic right colectomy on 12/8/2022 for colon cancer. It was determined after visiting with medical oncology the chemotherapy was not needed.   Patient is seen by Dr. Aldridge.    Patient has still not had a follow-up colonoscopy.  We tried to schedule her for a colonoscopy one year after her diagnosis but she declined to have one done.        Allergies: Penicillins and Sulfa antibiotics    Outpatient Medications Marked as Taking for the 8/27/24 encounter (Office Visit) with Abebe Drew MD   Medication Sig Dispense Refill    ascorbic acid (VITAMIN C) 1000 MG tablet Take 1 tablet by mouth Daily.      B Complex Vitamins (vitamin b complex) capsule capsule Take 1 capsule by mouth Daily.      Cholecalciferol (Vitamin D) 50 MCG (2000 UT) capsule 5,000 Units Daily. VIT D3      levothyroxine (Synthroid) 75 MCG tablet Take 1 tablet by mouth Daily. 90 tablet 1    lidocaine (LIDODERM) 5 % Place 1 patch on the skin as directed by provider Daily. Remove & Discard patch within 12 hours or as directed by MD 30 each 3    Multiple Vitamins-Minerals (EYE HEALTH PO) Take 2 tablets by mouth Daily.      polyethylene glycol (MiraLax) 17 GM/SCOOP powder Take 17 g by mouth Daily. 238 g 1    psyllium (Metamucil Smooth Texture) 58.6 % powder Take  by mouth 3 (Three) Times a Day. 1 each 12    traMADol (ULTRAM) 50 MG tablet Take 1 tablet by mouth Every 8 (Eight) Hours As Needed for Severe Pain. 10 tablet 0    Vitamin A 3 MG  (44155 UT) capsule Take 2,400 Units by mouth Daily.      vitamin E 400 UNIT capsule Take 1 capsule by mouth Daily.         Past Medical History:    Arthritis    Brown recluse spider bite    HX OF RIGHT FRONT LEG 2004    Coma due to low thyroid    Glaucoma    Heart attack    72 AND 81. DENIES CP BUT GETS SOA WITH EXERTION. STATES HAS BEEN ISSUE FOR LONG TIME. FOLLOWED BY PCP DR JOSHI. DECREASED ACTIVITY    Hypothyroidism    Kidney disease    Leg pain    Leg swelling    Macular degeneration    Malignant neoplasm of colon    Restless leg syndrome    SOB (shortness of breath)    Thyroid disorder    Uterine polyp    Wound dehiscence    Sustained laceration December 2020 1 sutures removed by PCP with dehiscence seen at wound clinicby Dr Laina Bales February 2022 and folowed there--given silver sulfadiazine creme (Silvadene)        Past Surgical History:    COLON RESECTION    Procedure: COLON RESECTION LAPAROSCOPIC RIGHT WITH ROBOT;  Surgeon: bAebe Drew MD;  Location: [facility] MAIN OR;  Service: Robotics - DaVinci;  Laterality: N/A;    COLONOSCOPY    Procedure: COLONOSCOPY, WITH bx;  Surgeon: Jimmie Zacarias MD;  Location: [facility] ENDOSCOPY;  Service: Gastroenterology;  Laterality: N/A;  CECUM MASS    DILATATION AND CURETTAGE    KNEE SURGERY    JOINT SURGERY       Family History:   Family History   Problem Relation Age of Onset    Heart disease Father     Heart disease Mother     Arthritis Mother     Cancer Brother     Stomach cancer Maternal Aunt     Arthritis Other     Cancer Other     Heart disease Other     Colon cancer Neg Hx     Malig Hyperthermia Neg Hx     Breast cancer Neg Hx     Uterine cancer Neg Hx     Ovarian cancer Neg Hx         Social History:  Social History     Tobacco Use    Smoking status: Never    Smokeless tobacco: Never   Substance Use Topics    Alcohol use: Never       Objective     Physical Exam  Vitals - Available in the EMR.   Respiratory:  breathing not labored, respiratory effort  appears normal  Cardiovascular:  heart regular rate  Skin and subcutaneous tissue:  warm and dry  Musculoskeletal: moving all extremities symmetrically and purposefully  Neurologic:  no obvious motor or sensory deficits, speech clear  Psychiatric:  judgment and insight intact, mood normal      Assessment   Personal history of colon cancer    Plan  Colonoscopy    Risks and benefits discussed    Abebe Drew M.D.  10/29/24    Electronically signed by Abebe Drew MD, 10/29/24, 1:09 PM EDT.

## 2024-10-30 ENCOUNTER — HOSPITAL ENCOUNTER (OUTPATIENT)
Facility: HOSPITAL | Age: 79
Setting detail: HOSPITAL OUTPATIENT SURGERY
Discharge: HOME | End: 2024-10-30
Attending: SURGERY | Admitting: SURGERY
Payer: MEDICARE

## 2024-10-30 ENCOUNTER — ANESTHESIA (OUTPATIENT)
Dept: GASTROENTEROLOGY | Facility: HOSPITAL | Age: 79
End: 2024-10-30
Payer: MEDICARE

## 2024-10-30 VITALS
HEART RATE: 72 BPM | SYSTOLIC BLOOD PRESSURE: 123 MMHG | BODY MASS INDEX: 31.35 KG/M2 | TEMPERATURE: 97.1 F | RESPIRATION RATE: 18 BRPM | DIASTOLIC BLOOD PRESSURE: 53 MMHG | WEIGHT: 155.2 LBS | OXYGEN SATURATION: 99 %

## 2024-10-30 PROBLEM — Z85.038 PERSONAL HISTORY OF COLON CANCER: Status: RESOLVED | Noted: 2024-08-27 | Resolved: 2024-10-30

## 2024-10-30 PROCEDURE — 25010000002 PROPOFOL 10 MG/ML EMULSION: Performed by: NURSE ANESTHETIST, CERTIFIED REGISTERED

## 2024-10-30 PROCEDURE — 25810000003 LACTATED RINGERS PER 1000 ML

## 2024-10-30 PROCEDURE — 25010000002 LIDOCAINE PF 2% 2 % SOLUTION: Performed by: NURSE ANESTHETIST, CERTIFIED REGISTERED

## 2024-10-30 RX ORDER — PROPOFOL 10 MG/ML
VIAL (ML) INTRAVENOUS AS NEEDED
Status: DISCONTINUED | OUTPATIENT
Start: 2024-10-30 | End: 2024-10-30 | Stop reason: SURG

## 2024-10-30 RX ORDER — LIDOCAINE HYDROCHLORIDE 20 MG/ML
INJECTION, SOLUTION EPIDURAL; INFILTRATION; INTRACAUDAL; PERINEURAL AS NEEDED
Status: DISCONTINUED | OUTPATIENT
Start: 2024-10-30 | End: 2024-10-30 | Stop reason: SURG

## 2024-10-30 RX ORDER — SODIUM CHLORIDE, SODIUM LACTATE, POTASSIUM CHLORIDE, CALCIUM CHLORIDE 600; 310; 30; 20 MG/100ML; MG/100ML; MG/100ML; MG/100ML
30 INJECTION, SOLUTION INTRAVENOUS CONTINUOUS
Status: DISCONTINUED | OUTPATIENT
Start: 2024-10-30 | End: 2024-10-30 | Stop reason: HOSPADM

## 2024-10-30 RX ADMIN — LIDOCAINE HYDROCHLORIDE 50 MG: 20 INJECTION, SOLUTION EPIDURAL; INFILTRATION; INTRACAUDAL; PERINEURAL at 09:51

## 2024-10-30 RX ADMIN — PROPOFOL 50 MG: 10 INJECTION, EMULSION INTRAVENOUS at 09:51

## 2024-10-30 RX ADMIN — SODIUM CHLORIDE, POTASSIUM CHLORIDE, SODIUM LACTATE AND CALCIUM CHLORIDE 30 ML/HR: 600; 310; 30; 20 INJECTION, SOLUTION INTRAVENOUS at 09:12

## 2024-10-30 RX ADMIN — PROPOFOL 175 MCG/KG/MIN: 10 INJECTION, EMULSION INTRAVENOUS at 09:51

## 2024-10-30 NOTE — ANESTHESIA POSTPROCEDURE EVALUATION
Patient: Berna Leong    Procedure Summary       Date: 10/30/24 Room / Location: [facility] ENDOSCOPY 1 / [facility] ENDOSCOPY    Anesthesia Start: 0947 Anesthesia Stop: 1010    Procedure: Colonoscopy Diagnosis:       Personal history of colon cancer      (Personal history of colon cancer [Z85.038])    Surgeons: Abebe Drew MD Provider: Roxane Woodard CRNA    Anesthesia Type: general ASA Status: 3            Anesthesia Type: general    Vitals  Vitals Value Taken Time   /53 10/30/24 1025   Temp 36.2 °C (97.1 °F) 10/30/24 1010   Pulse 72 10/30/24 1025   Resp 18 10/30/24 1010   SpO2 99 % 10/30/24 1025           Post Anesthesia Care and Evaluation    Post-procedure mental status: acceptable.  Pain management: satisfactory to patient    Airway patency: patent  Anesthetic complications: No anesthetic complications    Cardiovascular status: acceptable  Respiratory status: acceptable    Comments: Per chart review

## 2024-11-20 ENCOUNTER — HOSPITAL ENCOUNTER (OUTPATIENT)
Dept: NUCLEAR MEDICINE | Facility: HOSPITAL | Age: 79
Discharge: HOME | End: 2024-11-20
Payer: MEDICARE

## 2024-11-20 DIAGNOSIS — R07.9 CHEST PAIN, UNSPECIFIED TYPE: ICD-10-CM

## 2024-11-20 LAB
BH CV IMMEDIATE POST TECH DATA BLOOD PRESSURE: NORMAL MMHG
BH CV IMMEDIATE POST TECH DATA HEART RATE: 89 BPM
BH CV IMMEDIATE POST TECH DATA OXYGEN SATS: 98 %
BH CV REST NUCLEAR ISOTOPE DOSE: 9.6 MCI
BH CV SIX MINUTE RECOVERY TECH DATA BLOOD PRESSURE: NORMAL
BH CV SIX MINUTE RECOVERY TECH DATA HEART RATE: 84 BPM
BH CV SIX MINUTE RECOVERY TECH DATA OXYGEN SATURATION: 100 %
BH CV STRESS BP STAGE 1: NORMAL
BH CV STRESS COMMENTS STAGE 1: NORMAL
BH CV STRESS DOSE REGADENOSON STAGE 1: 0.4
BH CV STRESS DURATION MIN STAGE 1: 0
BH CV STRESS DURATION SEC STAGE 1: 10
BH CV STRESS HR STAGE 1: 84
BH CV STRESS NUCLEAR ISOTOPE DOSE: 34.4 MCI
BH CV STRESS O2 STAGE 1: 97
BH CV STRESS PROTOCOL 1: NORMAL
BH CV STRESS RECOVERY BP: NORMAL MMHG
BH CV STRESS RECOVERY HR: 84 BPM
BH CV STRESS RECOVERY O2: 100 %
BH CV STRESS STAGE 1: 1
BH CV THREE MINUTE POST TECH DATA BLOOD PRESSURE: NORMAL MMHG
BH CV THREE MINUTE POST TECH DATA HEART RATE: 94 BPM
BH CV THREE MINUTE POST TECH DATA OXYGEN SATURATION: 98 %
LV EF NUC BP: 71 %
MAXIMAL PREDICTED HEART RATE: 139 BPM
PERCENT MAX PREDICTED HR: 69.06 %
STRESS BASELINE BP: NORMAL MMHG
STRESS BASELINE HR: 75 BPM
STRESS O2 SAT REST: 99 %
STRESS PERCENT HR: 81 %
STRESS POST O2 SAT PEAK: 100 %
STRESS POST PEAK BP: NORMAL MMHG
STRESS POST PEAK HR: 96 BPM
STRESS TARGET HR: 118 BPM

## 2024-11-20 PROCEDURE — 93017 CV STRESS TEST TRACING ONLY: CPT

## 2024-11-20 PROCEDURE — 25010000002 REGADENOSON 0.4 MG/5ML SOLUTION: Performed by: SPECIALIST

## 2024-11-20 PROCEDURE — 34310000005 TECHNETIUM SESTAMIBI: Performed by: SPECIALIST

## 2024-11-20 PROCEDURE — A9500 TC99M SESTAMIBI: HCPCS | Performed by: SPECIALIST

## 2024-11-20 PROCEDURE — 78452 HT MUSCLE IMAGE SPECT MULT: CPT

## 2024-11-20 RX ORDER — REGADENOSON 0.08 MG/ML
0.4 INJECTION, SOLUTION INTRAVENOUS
Status: CANCELLED | OUTPATIENT
Start: 2024-11-20 | End: 2024-11-20

## 2024-11-20 RX ORDER — REGADENOSON 0.08 MG/ML
0.4 INJECTION, SOLUTION INTRAVENOUS
Status: COMPLETED | OUTPATIENT
Start: 2024-11-20 | End: 2024-11-20

## 2024-11-20 RX ADMIN — TECHNETIUM TC 99M SESTAMIBI 1 DOSE: 1 INJECTION INTRAVENOUS at 08:21

## 2024-11-20 RX ADMIN — TECHNETIUM TC 99M SESTAMIBI 1 DOSE: 1 INJECTION INTRAVENOUS at 06:49

## 2024-11-20 RX ADMIN — REGADENOSON 0.4 MG: 0.08 INJECTION, SOLUTION INTRAVENOUS at 08:21

## 2024-11-21 ENCOUNTER — LAB (OUTPATIENT)
Facility: HOSPITAL | Age: 79
End: 2024-11-21
Payer: MEDICARE

## 2024-11-21 LAB
ALBUMIN SERPL-MCNC: 4.1 G/DL (ref 3.5–5.2)
ALBUMIN/GLOB SERPL: 1.2 G/DL
ALP SERPL-CCNC: 115 U/L (ref 39–117)
ALT SERPL W P-5'-P-CCNC: 16 U/L (ref 1–33)
ANION GAP SERPL CALCULATED.3IONS-SCNC: 11.3 MMOL/L (ref 5–15)
AST SERPL-CCNC: 18 U/L (ref 1–32)
BASOPHILS # BLD AUTO: 0.07 10*3/MM3 (ref 0–0.2)
BASOPHILS NFR BLD AUTO: 0.9 % (ref 0–1.5)
BILIRUB SERPL-MCNC: 0.6 MG/DL (ref 0–1.2)
BUN SERPL-MCNC: 22 MG/DL (ref 8–23)
BUN/CREAT SERPL: 20.2 (ref 7–25)
CALCIUM SPEC-SCNC: 9.8 MG/DL (ref 8.6–10.5)
CHLORIDE SERPL-SCNC: 102 MMOL/L (ref 98–107)
CHOLEST SERPL-MCNC: 218 MG/DL (ref 0–200)
CO2 SERPL-SCNC: 26.7 MMOL/L (ref 22–29)
CREAT SERPL-MCNC: 1.09 MG/DL (ref 0.57–1)
DEPRECATED RDW RBC AUTO: 40 FL (ref 37–54)
EGFRCR SERPLBLD CKD-EPI 2021: 51.1 ML/MIN/1.73
EOSINOPHIL # BLD AUTO: 0.21 10*3/MM3 (ref 0–0.4)
EOSINOPHIL NFR BLD AUTO: 2.6 % (ref 0.3–6.2)
ERYTHROCYTE [DISTWIDTH] IN BLOOD BY AUTOMATED COUNT: 12.4 % (ref 12.3–15.4)
FOLATE SERPL-MCNC: >20 NG/ML (ref 4.78–24.2)
GLOBULIN UR ELPH-MCNC: 3.5 GM/DL
GLUCOSE SERPL-MCNC: 139 MG/DL (ref 65–99)
HBA1C MFR BLD: 5.3 % (ref 4.8–5.6)
HCT VFR BLD AUTO: 41.2 % (ref 34–46.6)
HDLC SERPL-MCNC: 89 MG/DL (ref 40–60)
HGB BLD-MCNC: 13.6 G/DL (ref 12–15.9)
IMM GRANULOCYTES # BLD AUTO: 0.02 10*3/MM3 (ref 0–0.05)
IMM GRANULOCYTES NFR BLD AUTO: 0.3 % (ref 0–0.5)
LDLC SERPL CALC-MCNC: 117 MG/DL (ref 0–100)
LDLC/HDLC SERPL: 1.29 {RATIO}
LYMPHOCYTES # BLD AUTO: 2.55 10*3/MM3 (ref 0.7–3.1)
LYMPHOCYTES NFR BLD AUTO: 32 % (ref 19.6–45.3)
MCH RBC QN AUTO: 29.8 PG (ref 26.6–33)
MCHC RBC AUTO-ENTMCNC: 33 G/DL (ref 31.5–35.7)
MCV RBC AUTO: 90.2 FL (ref 79–97)
MONOCYTES # BLD AUTO: 0.44 10*3/MM3 (ref 0.1–0.9)
MONOCYTES NFR BLD AUTO: 5.5 % (ref 5–12)
NEUTROPHILS NFR BLD AUTO: 4.68 10*3/MM3 (ref 1.7–7)
NEUTROPHILS NFR BLD AUTO: 58.7 % (ref 42.7–76)
NRBC BLD AUTO-RTO: 0 /100 WBC (ref 0–0.2)
PLATELET # BLD AUTO: 276 10*3/MM3 (ref 140–450)
PMV BLD AUTO: 9.4 FL (ref 6–12)
POTASSIUM SERPL-SCNC: 4.1 MMOL/L (ref 3.5–5.2)
PROT SERPL-MCNC: 7.6 G/DL (ref 6–8.5)
RBC # BLD AUTO: 4.57 10*6/MM3 (ref 3.77–5.28)
SODIUM SERPL-SCNC: 140 MMOL/L (ref 136–145)
TRIGL SERPL-MCNC: 71 MG/DL (ref 0–150)
TSH SERPL DL<=0.05 MIU/L-ACNC: 3.11 UIU/ML (ref 0.27–4.2)
VIT B12 BLD-MCNC: 522 PG/ML (ref 211–946)
VLDLC SERPL-MCNC: 12 MG/DL (ref 5–40)
WBC NRBC COR # BLD AUTO: 7.97 10*3/MM3 (ref 3.4–10.8)

## 2024-11-21 PROCEDURE — 83036 HEMOGLOBIN GLYCOSYLATED A1C: CPT | Performed by: STUDENT IN AN ORGANIZED HEALTH CARE EDUCATION/TRAINING PROGRAM

## 2024-11-21 PROCEDURE — 80053 COMPREHEN METABOLIC PANEL: CPT | Performed by: STUDENT IN AN ORGANIZED HEALTH CARE EDUCATION/TRAINING PROGRAM

## 2024-11-21 PROCEDURE — 84443 ASSAY THYROID STIM HORMONE: CPT | Performed by: STUDENT IN AN ORGANIZED HEALTH CARE EDUCATION/TRAINING PROGRAM

## 2024-11-21 PROCEDURE — 85025 COMPLETE CBC W/AUTO DIFF WBC: CPT | Performed by: STUDENT IN AN ORGANIZED HEALTH CARE EDUCATION/TRAINING PROGRAM

## 2024-11-21 PROCEDURE — 82746 ASSAY OF FOLIC ACID SERUM: CPT | Performed by: STUDENT IN AN ORGANIZED HEALTH CARE EDUCATION/TRAINING PROGRAM

## 2024-11-21 PROCEDURE — 82607 VITAMIN B-12: CPT | Performed by: STUDENT IN AN ORGANIZED HEALTH CARE EDUCATION/TRAINING PROGRAM

## 2024-11-21 PROCEDURE — 80061 LIPID PANEL: CPT | Performed by: STUDENT IN AN ORGANIZED HEALTH CARE EDUCATION/TRAINING PROGRAM

## 2024-11-25 ENCOUNTER — HOSPITAL ENCOUNTER (EMERGENCY)
Facility: HOSPITAL | Age: 79
Discharge: HOME | End: 2024-11-25
Attending: EMERGENCY MEDICINE | Admitting: EMERGENCY MEDICINE
Payer: MEDICARE

## 2024-11-25 ENCOUNTER — APPOINTMENT (OUTPATIENT)
Dept: GENERAL RADIOLOGY | Facility: HOSPITAL | Age: 79
End: 2024-11-25
Payer: MEDICARE

## 2024-11-25 ENCOUNTER — OFFICE VISIT (OUTPATIENT)
Dept: FAMILY MEDICINE CLINIC | Facility: CLINIC | Age: 79
End: 2024-11-25
Payer: MEDICARE

## 2024-11-25 VITALS
HEIGHT: 59 IN | WEIGHT: 154 LBS | TEMPERATURE: 98.5 F | DIASTOLIC BLOOD PRESSURE: 84 MMHG | SYSTOLIC BLOOD PRESSURE: 142 MMHG | BODY MASS INDEX: 31.04 KG/M2 | HEART RATE: 120 BPM | OXYGEN SATURATION: 98 % | RESPIRATION RATE: 18 BRPM

## 2024-11-25 VITALS
HEIGHT: 59 IN | TEMPERATURE: 96.5 F | DIASTOLIC BLOOD PRESSURE: 88 MMHG | SYSTOLIC BLOOD PRESSURE: 151 MMHG | HEART RATE: 120 BPM | OXYGEN SATURATION: 97 % | BODY MASS INDEX: 31.21 KG/M2 | WEIGHT: 154.8 LBS

## 2024-11-25 DIAGNOSIS — E03.9 HYPOTHYROIDISM (ACQUIRED): ICD-10-CM

## 2024-11-25 DIAGNOSIS — G56.31 RADIAL NERVE PALSY, RIGHT: Primary | ICD-10-CM

## 2024-11-25 DIAGNOSIS — N18.9 CHRONIC KIDNEY DISEASE, UNSPECIFIED CKD STAGE: ICD-10-CM

## 2024-11-25 DIAGNOSIS — E78.00 PURE HYPERCHOLESTEROLEMIA: ICD-10-CM

## 2024-11-25 DIAGNOSIS — N18.31 STAGE 3A CHRONIC KIDNEY DISEASE: ICD-10-CM

## 2024-11-25 DIAGNOSIS — M79.641 RIGHT HAND PAIN: Primary | ICD-10-CM

## 2024-11-25 DIAGNOSIS — C18.0 ADENOCARCINOMA OF CECUM: ICD-10-CM

## 2024-11-25 DIAGNOSIS — M19.041 OSTEOARTHRITIS OF RIGHT HAND, UNSPECIFIED OSTEOARTHRITIS TYPE: ICD-10-CM

## 2024-11-25 DIAGNOSIS — I10 PRIMARY HYPERTENSION: ICD-10-CM

## 2024-11-25 DIAGNOSIS — E03.9 HYPOTHYROIDISM, UNSPECIFIED TYPE: Primary | ICD-10-CM

## 2024-11-25 PROCEDURE — 1125F AMNT PAIN NOTED PAIN PRSNT: CPT | Performed by: STUDENT IN AN ORGANIZED HEALTH CARE EDUCATION/TRAINING PROGRAM

## 2024-11-25 PROCEDURE — 3077F SYST BP >= 140 MM HG: CPT | Performed by: STUDENT IN AN ORGANIZED HEALTH CARE EDUCATION/TRAINING PROGRAM

## 2024-11-25 PROCEDURE — 99205 OFFICE O/P NEW HI 60 MIN: CPT | Performed by: STUDENT IN AN ORGANIZED HEALTH CARE EDUCATION/TRAINING PROGRAM

## 2024-11-25 PROCEDURE — 3079F DIAST BP 80-89 MM HG: CPT | Performed by: STUDENT IN AN ORGANIZED HEALTH CARE EDUCATION/TRAINING PROGRAM

## 2024-11-25 PROCEDURE — 1159F MED LIST DOCD IN RCRD: CPT | Performed by: STUDENT IN AN ORGANIZED HEALTH CARE EDUCATION/TRAINING PROGRAM

## 2024-11-25 PROCEDURE — 73130 X-RAY EXAM OF HAND: CPT

## 2024-11-25 PROCEDURE — 1160F RVW MEDS BY RX/DR IN RCRD: CPT | Performed by: STUDENT IN AN ORGANIZED HEALTH CARE EDUCATION/TRAINING PROGRAM

## 2024-11-25 PROCEDURE — 99283 EMERGENCY DEPT VISIT LOW MDM: CPT

## 2024-11-25 RX ORDER — METHYLPREDNISOLONE 4 MG/1
TABLET ORAL
Qty: 21 TABLET | Refills: 0 | Status: SHIPPED | OUTPATIENT
Start: 2024-11-25

## 2024-11-25 RX ORDER — LEVOTHYROXINE SODIUM 75 UG/1
75 TABLET ORAL DAILY
Qty: 90 TABLET | Refills: 1 | Status: SHIPPED | OUTPATIENT
Start: 2024-11-25

## 2024-11-25 NOTE — PROGRESS NOTES
"Chief Complaint  Annual Exam and Establish Care (New pt needs pcp)    Subjective      Berna Leong is a 79 y.o. female who presents to [facility]     New patient:  Patient comes to establish care.   She has a pmh of hypothyroidism, colon cancer remission, CKD3, MI x 2, HLD, HTN, MD.    Patient refers feeling good. Labs reviewed.   Medication reviewed. Labs reviewed.    Extended conversation about conditions, follow up and management.       Objective   Vital Signs:   Vitals:    11/25/24 0818   BP: 151/88   Pulse: 120   Temp: 96.5 °F (35.8 °C)   TempSrc: Temporal   SpO2: 97%   Weight: 70.2 kg (154 lb 12.8 oz)   Height: 149.9 cm (59.02\")     Body mass index is 31.25 kg/m².    Wt Readings from Last 3 Encounters:   11/25/24 70.2 kg (154 lb 12.8 oz)   10/30/24 70.4 kg (155 lb 3.3 oz)   10/23/24 70.8 kg (156 lb)     BP Readings from Last 3 Encounters:   11/25/24 151/88   10/30/24 123/53   10/23/24 155/72       Health Maintenance   Topic Date Due    DIABETIC FOOT EXAM  Never done    DIABETIC EYE EXAM  07/24/2021    ZOSTER VACCINE (2 of 2) 09/07/2024    INFLUENZA VACCINE  03/31/2025 (Originally 8/1/2024)    RSV Vaccine - Adults (1 - 1-dose 75+ series) 07/10/2025 (Originally 2/8/2018)    Pneumococcal Vaccine 65+ (2 of 2 - PCV) 07/10/2025 (Originally 12/30/2016)    HEMOGLOBIN A1C  05/21/2025    URINE MICROALBUMIN  05/23/2025    BMI FOLLOWUP  07/10/2025    ANNUAL WELLNESS VISIT  10/10/2025    LIPID PANEL  11/21/2025    DXA SCAN  10/08/2026    TDAP/TD VACCINES (3 - Td or Tdap) 06/25/2034    COVID-19 Vaccine  Completed    COLONOSCOPY  Discontinued       Physical Exam  Vitals reviewed.   HENT:      Head: Normocephalic.      Mouth/Throat:      Mouth: Mucous membranes are moist.   Eyes:      Pupils: Pupils are equal, round, and reactive to light.   Cardiovascular:      Rate and Rhythm: Normal rate.   Abdominal:      General: Abdomen is flat.   Musculoskeletal:         General: Normal range of " motion.      Cervical back: Normal range of motion.   Skin:     General: Skin is warm.      Capillary Refill: Capillary refill takes less than 2 seconds.   Neurological:      Mental Status: She is alert.            Assessment & Plan  Hypothyroidism (acquired)    Orders:    levothyroxine (Synthroid) 75 MCG tablet; Take 1 tablet by mouth Daily.    Hypothyroidism, unspecified type         Primary hypertension  Hypertension is stable and controlled  Continue current treatment regimen.  Blood pressure will be reassessed in 6 months.         Pure hypercholesterolemia   Lipid abnormalities are stable    Plan:  Continue same medication/s without change.      Discussed medication dosage, use, side effects, and goals of treatment in detail.    Counseled patient on lifestyle modifications to help control hyperlipidemia.     Patient Treatment Goals:   LDL goal is less than 70    Followup in 6 months.         Stage 3a chronic kidney disease  Renal condition is stable.  Continue current treatment regimen.  Renal condition will be reassessed in 3 months.         Adenocarcinoma of cecum  Follows with oncology        Chronic kidney disease, unspecified CKD stage  Renal condition is stable.  Continue current treatment regimen.  Renal condition will be reassessed in 3 months.                      I spent 60 minutes caring for Berna on this date of service. This time includes time spent by me in the following activities:preparing for the visit, reviewing tests, obtaining and/or reviewing a separately obtained history, performing a medically appropriate examination and/or evaluation , counseling and educating the patient/family/caregiver, ordering medications, tests, or procedures, referring and communicating with other health care professionals , documenting information in the medical record, independently interpreting results and communicating that information with the patient/family/caregiver, and care coordination  FOLLOW UP  Return  in about 3 months (around 2/25/2025).  Patient was given instructions and counseling regarding her condition or for health maintenance advice. Please see specific information pulled into the AVS if appropriate.       Navneet Palafox MD  11/25/24  09:15 EST    CURRENT & DISCONTINUED MEDICATIONS  Current Outpatient Medications   Medication Instructions    alendronate (FOSAMAX) 10 mg, Oral, Every Morning Before Breakfast    ascorbic acid (VITAMIN C) 1,000 mg, Daily    aspirin 81 mg, Oral, Daily    B Complex Vitamins (vitamin b complex) capsule capsule 1 capsule, Daily    Diclofenac Sodium (VOLTAREN) 4 g, Topical, 4 Times Daily    levothyroxine (SYNTHROID) 75 mcg, Oral, Daily    lidocaine (LIDODERM) 5 % 1 patch, Transdermal, Every 24 Hours, Remove & Discard patch within 12 hours or as directed by MD    Multiple Vitamins-Minerals (EYE HEALTH PO) 2 tablets, Daily    Vitamin A 2,400 Units, Daily    Vitamin D 5,000 Units, Every 24 Hours    vitamin E 400 Units, Daily       Medications Discontinued During This Encounter   Medication Reason    levothyroxine (Synthroid) 75 MCG tablet Reorder

## 2024-11-25 NOTE — ED PROVIDER NOTES
Time: 2:18 PM EST  Date of encounter:  11/25/2024  Independent Historian/Clinical History and Information was obtained by:   Patient    History is limited by: N/A    Chief Complaint: Right hand pain      History of Present Illness:  Patient is a 79 y.o. year old female who presents to the emergency department for evaluation of right hand pain that radiates into her wrist with numbness that began 2 days ago after waking up from sleeping.  Patient denies injury/trauma.      Patient Care Team  Primary Care Provider: Navneet Myers MD    Past Medical History:     Allergies   Allergen Reactions    Penicillins Rash    Sulfa Antibiotics Rash     Past Medical History:   Diagnosis Date    Arthritis     Brown recluse spider bite     HX OF RIGHT FRONT LEG 2004    Coma due to low thyroid 2015    Glaucoma     Heart attack     72 AND 81. DENIES CP BUT GETS SOA WITH EXERTION. STATES HAS BEEN ISSUE FOR LONG TIME. FOLLOWED BY PCP DR JOSHI. DECREASED ACTIVITY    Hypothyroidism     Kidney disease     Leg pain     Leg swelling     Macular degeneration     Malignant neoplasm of colon     Ovarian cyst     Restless leg syndrome     SOB (shortness of breath)     Thyroid disorder     Uterine polyp     Wound dehiscence 04/20/2022    Sustained laceration December 2020 1 sutures removed by PCP with dehiscence seen at wound clinicby Dr Laina Bales February 2022 and folowed there--given silver sulfadiazine creme (Silvadene)     Past Surgical History:   Procedure Laterality Date    COLON RESECTION N/A 12/8/2022    Procedure: COLON RESECTION LAPAROSCOPIC RIGHT WITH ROBOT;  Surgeon: Abebe Drew MD;  Location: [facility] MAIN OR;  Service: Robotics - DaVinci;  Laterality: N/A;    COLONOSCOPY N/A 11/07/2022    Procedure: COLONOSCOPY, WITH bx;  Surgeon: Jimmie Zacarias MD;  Location: [facility] ENDOSCOPY;  Service: Gastroenterology;  Laterality: N/A;  CECUM MASS    COLONOSCOPY N/A 10/30/2024    Procedure: Colonoscopy;  Surgeon: Rajendra  Abebe VERDE MD;  Location: [facility] ENDOSCOPY;  Service: General;  Laterality: N/A;  NORMAL COLONOSCOPY    DILATATION AND CURETTAGE  2018    KNEE SURGERY Right 2008    JOINT SURGERY     Family History   Problem Relation Age of Onset    Heart disease Father     Heart disease Mother     Arthritis Mother     Cancer Brother     Stomach cancer Maternal Aunt     Arthritis Other     Cancer Other     Heart disease Other     Colon cancer Neg Hx     Malig Hyperthermia Neg Hx     Breast cancer Neg Hx     Uterine cancer Neg Hx     Ovarian cancer Neg Hx        Home Medications:  Prior to Admission medications    Medication Sig Start Date End Date Taking? Authorizing Provider   alendronate (FOSAMAX) 10 MG tablet Take 1 tablet by mouth Every Morning Before Breakfast. 10/10/24   Daniel Hale MD   ascorbic acid (VITAMIN C) 1000 MG tablet Take 1 tablet by mouth Daily.    Emergency, Nurse Epic, RN   aspirin 81 MG EC tablet Take 1 tablet by mouth Daily. 10/10/24   Daniel Hale MD   B Complex Vitamins (vitamin b complex) capsule capsule Take 1 capsule by mouth Daily.    ProviderPaulette MD   Cholecalciferol (Vitamin D) 50 MCG (2000 UT) capsule 5,000 Units Daily. VIT D3    Paulette Estrella MD   Diclofenac Sodium (VOLTAREN) 1 % gel gel Apply 4 g topically to the appropriate area as directed 4 (Four) Times a Day. 10/23/24   Doug Browne MD   levothyroxine (Synthroid) 75 MCG tablet Take 1 tablet by mouth Daily. 11/25/24   Navneet Myers MD   lidocaine (LIDODERM) 5 % Place 1 patch on the skin as directed by provider Daily. Remove & Discard patch within 12 hours or as directed by MD 7/26/24   Daniel Hale MD   Multiple Vitamins-Minerals (EYE HEALTH PO) Take 2 tablets by mouth Daily.    ProviderPaulette MD   Vitamin A 3 MG (76115 UT) capsule Take 2,400 Units by mouth Daily.    Paulette Estrella MD   vitamin E 400 UNIT capsule Take 1 capsule by mouth Daily.    Paulette Estrella MD   levothyroxine  "(Synthroid) 75 MCG tablet Take 1 tablet by mouth Daily. 7/3/24 11/25/24  Gurwinder Cohn MD        Social History:   Social History     Tobacco Use    Smoking status: Never    Smokeless tobacco: Never   Vaping Use    Vaping status: Never Used   Substance Use Topics    Alcohol use: Never    Drug use: Never         Review of Systems:  Review of Systems   Constitutional:  Negative for chills and fever.   HENT:  Negative for congestion, rhinorrhea and sore throat.    Eyes:  Negative for pain and visual disturbance.   Respiratory:  Negative for apnea, cough, chest tightness and shortness of breath.    Cardiovascular:  Negative for chest pain and palpitations.   Gastrointestinal:  Negative for abdominal pain, diarrhea, nausea and vomiting.   Genitourinary:  Negative for difficulty urinating and dysuria.   Musculoskeletal:  Positive for arthralgias. Negative for joint swelling and myalgias.   Skin:  Negative for color change.   Neurological:  Positive for numbness. Negative for seizures and headaches.   Psychiatric/Behavioral: Negative.     All other systems reviewed and are negative.       Physical Exam:  /84 (BP Location: Left arm, Patient Position: Sitting)   Pulse 120   Temp 98.5 °F (36.9 °C) (Oral)   Resp 18   Ht 149.9 cm (59\")   Wt 69.9 kg (154 lb)   SpO2 98%   BMI 31.10 kg/m²     Physical Exam  Vitals and nursing note reviewed.   Constitutional:       General: She is not in acute distress.     Appearance: Normal appearance. She is not toxic-appearing.   HENT:      Head: Normocephalic and atraumatic.      Jaw: There is normal jaw occlusion.   Eyes:      General: Lids are normal.      Extraocular Movements: Extraocular movements intact.      Conjunctiva/sclera: Conjunctivae normal.      Pupils: Pupils are equal, round, and reactive to light.   Cardiovascular:      Rate and Rhythm: Normal rate and regular rhythm.      Pulses: Normal pulses.      Heart sounds: Normal heart sounds.   Pulmonary:      Effort: " Pulmonary effort is normal. No respiratory distress.      Breath sounds: Normal breath sounds. No wheezing or rhonchi.   Abdominal:      General: Abdomen is flat.      Palpations: Abdomen is soft.      Tenderness: There is no abdominal tenderness. There is no guarding or rebound.   Musculoskeletal:         General: Tenderness (Mild appreciated upon palpation to right hand/wrist) present. Normal range of motion.      Cervical back: Normal range of motion and neck supple.      Right lower leg: No edema.      Left lower leg: No edema.   Skin:     General: Skin is warm and dry.   Neurological:      Mental Status: She is alert and oriented to person, place, and time. Mental status is at baseline.   Psychiatric:         Mood and Affect: Mood normal.                  Procedures:  Procedures      Medical Decision Making:      Comorbidities that affect care:    Thyroid disease, kidney disease, heart attack    External Notes reviewed:          The following orders were placed and all results were independently analyzed by me:  Orders Placed This Encounter   Procedures    XR Hand 3+ View Right       Medications Given in the Emergency Department:  Medications - No data to display     ED Course:         Labs:    Lab Results (last 24 hours)       ** No results found for the last 24 hours. **             Imaging:    XR Hand 3+ View Right    Result Date: 11/25/2024  XR HAND 3+ VW RIGHT Date of Exam: 11/25/2024 1:25 PM EST Indication: pain Comparison: 3 view right wrist dated 10/26/2015 Findings: The bones appear diffusely osteopenic. Moderate to severe osteoarthritic changes are noted involving the first carpal metacarpal and triscaphe joints. Soft tissue swelling is noted around the wrist. No fracture or malalignment is evident.     Impression: Wrist soft tissue swelling. Moderate to severe osteoarthritic changes of the first carpal metacarpal and triscaphe joints. Electronically Signed: Bo Witt MD  11/25/2024 1:41 PM EST   Workstation ID: [id number]       Differential Diagnosis and Discussion:    Extremity Pain: Differential diagnosis includes but is not limited to soft tissue sprain, tendonitis, tendon injury, dislocation, fracture, deep vein thrombosis, arterial insufficiency, osteoarthritis, bursitis, and ligamentous damage.    All X-rays impressions were independently interpreted by me.    MDM       X-ray right hand shows Moderate to severe osteoarthritic changes of the first carpal metacarpal and triscaphe joints.  Patient has radial nerve palsy.  I will send patient home with steroids.  I instructed patient to follow-up with her PCP.  I instructed patient to return to ED if she develops any new or worsening symptoms.  Patient states she understands and agrees with plan of care.              Patient Care Considerations:          Consultants/Shared Management Plan:    None    Social Determinants of Health:    Patient is independent, reliable, and has access to care.       Disposition and Care Coordination:    Discharged: The patient is suitable and stable for discharge with no need for consideration of admission.    I have explained the patient´s condition, diagnoses and treatment plan based on the information available to me at this time. I have answered questions and addressed any concerns. The patient has a good  understanding of the patient´s diagnosis, condition, and treatment plan as can be expected at this point. The vital signs have been stable. The patient´s condition is stable and appropriate for discharge from the emergency department.      The patient will pursue further outpatient evaluation with the primary care physician or other designated or consulting physician as outlined in the discharge instructions. They are agreeable to this plan of care and follow-up instructions have been explained in detail. The patient has received these instructions in written format and has expressed an understanding of the discharge  instructions. The patient is aware that any significant change in condition or worsening of symptoms should prompt an immediate return to this or the closest emergency department or call to 911.  I have explained discharge medications and the need for follow up with the patient/caretakers. This was also printed in the discharge instructions. Patient was discharged with the following medications and follow up:      Medication List        New Prescriptions      methylPREDNISolone 4 MG dose pack  Commonly known as: MEDROL  Take as directed on package instructions.               Where to Get Your Medications        These medications were [place] [facility] [place] [address] - [phone]  - [phone]  [address], [place] [address]      Phone: [phone]   methylPREDNISolone 4 MG dose pack      Navneet Myers MD  [address] [place] [address] [address] [place] [id number] [phone]    Call in 1 day  To schedule follow-up       Final diagnoses:   Radial nerve palsy, right   Osteoarthritis of right hand, unspecified osteoarthritis type        ED Disposition       ED Disposition   Discharge    Condition   Stable    Comment   --               This medical record created using voice recognition software.             Roni Gomez PA-C  11/25/24 [id number]

## 2024-11-25 NOTE — ASSESSMENT & PLAN NOTE
Renal condition is stable.  Continue current treatment regimen.  Renal condition will be reassessed in 3 months.

## 2024-11-27 ENCOUNTER — TELEPHONE (OUTPATIENT)
Dept: FAMILY MEDICINE CLINIC | Facility: CLINIC | Age: 79
End: 2024-11-27
Payer: MEDICARE

## 2024-11-27 DIAGNOSIS — G89.29 CHRONIC HAND PAIN, UNSPECIFIED LATERALITY: Primary | ICD-10-CM

## 2024-11-27 DIAGNOSIS — M79.643 CHRONIC HAND PAIN, UNSPECIFIED LATERALITY: Primary | ICD-10-CM

## 2024-11-27 NOTE — TELEPHONE ENCOUNTER
PATIENT ADVISED HER PHONE DID NOT WORK AT THIS TIME AND WILL COME BACK INTO OFFICE TODAY TO  THE REFERRAL. REFERRAL IS PRINTED AND WAITING AT THE  FOR HER.

## 2024-12-02 ENCOUNTER — HOSPITAL ENCOUNTER (OUTPATIENT)
Dept: CARDIOLOGY | Facility: HOSPITAL | Age: 79
Discharge: HOME | End: 2024-12-02
Admitting: SPECIALIST
Payer: MEDICARE

## 2024-12-02 DIAGNOSIS — R09.89 BRUIT: ICD-10-CM

## 2024-12-02 LAB
BH CV XLRA MEAS LEFT CAROTID BULB EDV: 19.9 CM/SEC
BH CV XLRA MEAS LEFT CAROTID BULB PSV: 62.7 CM/SEC
BH CV XLRA MEAS LEFT DIST CCA EDV: 17.6 CM/SEC
BH CV XLRA MEAS LEFT DIST CCA PSV: 66.5 CM/SEC
BH CV XLRA MEAS LEFT DIST ICA EDV: 26.1 CM/SEC
BH CV XLRA MEAS LEFT DIST ICA PSV: 69 CM/SEC
BH CV XLRA MEAS LEFT ICA/CCA RATIO: 1
BH CV XLRA MEAS LEFT MID ICA EDV: 20.4 CM/SEC
BH CV XLRA MEAS LEFT MID ICA PSV: 60.4 CM/SEC
BH CV XLRA MEAS LEFT PROX CCA EDV: 19 CM/SEC
BH CV XLRA MEAS LEFT PROX CCA PSV: 74.5 CM/SEC
BH CV XLRA MEAS LEFT PROX ECA EDV: 12.3 CM/SEC
BH CV XLRA MEAS LEFT PROX ECA PSV: 65.5 CM/SEC
BH CV XLRA MEAS LEFT PROX ICA EDV: 20 CM/SEC
BH CV XLRA MEAS LEFT PROX ICA PSV: 65.6 CM/SEC
BH CV XLRA MEAS LEFT VERTEBRAL A EDV: 12.3 CM/SEC
BH CV XLRA MEAS LEFT VERTEBRAL A PSV: 52.5 CM/SEC
BH CV XLRA MEAS RIGHT CAROTID BULB EDV: 15 CM/SEC
BH CV XLRA MEAS RIGHT CAROTID BULB PSV: 74.9 CM/SEC
BH CV XLRA MEAS RIGHT DIST CCA EDV: 17.7 CM/SEC
BH CV XLRA MEAS RIGHT DIST CCA PSV: 76 CM/SEC
BH CV XLRA MEAS RIGHT DIST ICA EDV: 17.1 CM/SEC
BH CV XLRA MEAS RIGHT DIST ICA PSV: 61.2 CM/SEC
BH CV XLRA MEAS RIGHT ICA/CCA RATIO: 0.9
BH CV XLRA MEAS RIGHT MID ICA EDV: 18.8 CM/SEC
BH CV XLRA MEAS RIGHT MID ICA PSV: 57.7 CM/SEC
BH CV XLRA MEAS RIGHT PROX CCA EDV: 10.6 CM/SEC
BH CV XLRA MEAS RIGHT PROX CCA PSV: 75.9 CM/SEC
BH CV XLRA MEAS RIGHT PROX ECA EDV: 12.3 CM/SEC
BH CV XLRA MEAS RIGHT PROX ECA PSV: 68.1 CM/SEC
BH CV XLRA MEAS RIGHT PROX ICA EDV: 21.1 CM/SEC
BH CV XLRA MEAS RIGHT PROX ICA PSV: 69.9 CM/SEC
BH CV XLRA MEAS RIGHT VERTEBRAL A EDV: 12.3 CM/SEC
BH CV XLRA MEAS RIGHT VERTEBRAL A PSV: 51.2 CM/SEC
LEFT ARM BP: NORMAL MMHG
RIGHT ARM BP: NORMAL MMHG

## 2024-12-02 PROCEDURE — 93880 EXTRACRANIAL BILAT STUDY: CPT

## 2024-12-02 PROCEDURE — 93880 EXTRACRANIAL BILAT STUDY: CPT | Performed by: SURGERY

## 2024-12-06 ENCOUNTER — TELEPHONE (OUTPATIENT)
Dept: FAMILY MEDICINE CLINIC | Facility: CLINIC | Age: 79
End: 2024-12-06

## 2024-12-06 ENCOUNTER — OFFICE VISIT (OUTPATIENT)
Dept: FAMILY MEDICINE CLINIC | Facility: CLINIC | Age: 79
End: 2024-12-06
Payer: MEDICARE

## 2024-12-06 VITALS
BODY MASS INDEX: 31.04 KG/M2 | SYSTOLIC BLOOD PRESSURE: 157 MMHG | DIASTOLIC BLOOD PRESSURE: 84 MMHG | OXYGEN SATURATION: 100 % | HEART RATE: 118 BPM | HEIGHT: 59 IN | TEMPERATURE: 97.5 F | WEIGHT: 154 LBS

## 2024-12-06 DIAGNOSIS — M79.2 NEUROPATHIC PAIN, ARM: ICD-10-CM

## 2024-12-06 DIAGNOSIS — M25.551 PAIN OF RIGHT HIP: Primary | ICD-10-CM

## 2024-12-06 DIAGNOSIS — M19.049 OSTEOARTHRITIS OF FINGER, UNSPECIFIED LATERALITY: ICD-10-CM

## 2024-12-06 DIAGNOSIS — M18.9 OSTEOARTHRITIS OF CARPOMETACARPAL (CMC) JOINT OF THUMB, UNSPECIFIED LATERALITY, UNSPECIFIED OSTEOARTHRITIS TYPE: ICD-10-CM

## 2024-12-06 NOTE — TELEPHONE ENCOUNTER
Caller: Berna Leong      Relationship: Self    Best call back number: [phone]    Who are you requesting to speak with (clinical staff, provider,  specific staff member): ERVIN    What was the call regarding:  She will be going to Dr. Doug Browne for pain management.

## 2024-12-06 NOTE — PROGRESS NOTES
"Chief Complaint  Hand Pain and Numbness    Subjective      Berna Leong is a 79 y.o. female who presents to [facility]     Patient comes with cc of hand thumb pain and hip pain R. Also, pt has ulnar neuropathic pain R.   She has a pmh of hypothyroidism, colon cancer remission, CKD3, MI x 2, HLD, HTN, MD.     Patient might benefit from orthopedic eval for possible infiltrations.     Objective   Vital Signs:   Vitals:    12/06/24 1413   BP: 157/84   Pulse: 118   Temp: 97.5 °F (36.4 °C)   TempSrc: Temporal   SpO2: 100%   Weight: 69.9 kg (154 lb)   Height: 149.9 cm (59.02\")     Body mass index is 31.09 kg/m².    Wt Readings from Last 3 Encounters:   12/06/24 69.9 kg (154 lb)   11/25/24 69.9 kg (154 lb)   11/25/24 70.2 kg (154 lb 12.8 oz)     BP Readings from Last 3 Encounters:   12/06/24 157/84   11/25/24 142/84   11/25/24 151/88       Health Maintenance   Topic Date Due    DIABETIC FOOT EXAM  Never done    DIABETIC EYE EXAM  07/24/2021    ZOSTER VACCINE (2 of 2) 09/07/2024    INFLUENZA VACCINE  03/31/2025 (Originally 7/1/2024)    RSV Vaccine - Adults (1 - 1-dose 75+ series) 07/10/2025 (Originally 2/8/2018)    Pneumococcal Vaccine 65+ (2 of 2 - PCV) 07/10/2025 (Originally 12/30/2016)    HEMOGLOBIN A1C  05/21/2025    URINE MICROALBUMIN  05/23/2025    BMI FOLLOWUP  07/10/2025    ANNUAL WELLNESS VISIT  10/10/2025    LIPID PANEL  11/21/2025    DXA SCAN  10/08/2026    TDAP/TD VACCINES (3 - Td or Tdap) 06/25/2034    COVID-19 Vaccine  Completed    COLONOSCOPY  Discontinued       Physical Exam  Vitals reviewed.   Constitutional:       Comments: R thumb tender to palpation    HENT:      Head: Normocephalic.      Mouth/Throat:      Mouth: Mucous membranes are moist.   Eyes:      Pupils: Pupils are equal, round, and reactive to light.   Cardiovascular:      Rate and Rhythm: Normal rate.   Abdominal:      General: Abdomen is flat.   Musculoskeletal:         General: Normal range of motion.      " Cervical back: Normal range of motion.   Skin:     General: Skin is warm.      Capillary Refill: Capillary refill takes less than 2 seconds.   Neurological:      Mental Status: She is alert.            Assessment & Plan  Pain of right hip    Orders:    Ambulatory Referral to Orthopedic Surgery    Ibuprofen 3 %, Gabapentin 10 %, lidocaine 4 %; Apply 1-2 g topically to the appropriate area as directed 3 (Three) to 4 (Four) times daily.    Osteoarthritis of finger, unspecified laterality    Orders:    Ambulatory Referral to Orthopedic Surgery    Ibuprofen 3 %, Gabapentin 10 %, lidocaine 4 %; Apply 1-2 g topically to the appropriate area as directed 3 (Three) to 4 (Four) times daily.    Osteoarthritis of carpometacarpal (CMC) joint of thumb, unspecified laterality, unspecified osteoarthritis type    Orders:    Ambulatory Referral to Orthopedic Surgery    Ibuprofen 3 %, Gabapentin 10 %, lidocaine 4 %; Apply 1-2 g topically to the appropriate area as directed 3 (Three) to 4 (Four) times daily.    Neuropathic pain, arm    Orders:    Ibuprofen 3 %, Gabapentin 10 %, lidocaine 4 %; Apply 1-2 g topically to the appropriate area as directed 3 (Three) to 4 (Four) times daily.               I spent 20 minutes caring for Berna on this date of service. This time includes time spent by me in the following activities:preparing for the visit, reviewing tests, obtaining and/or reviewing a separately obtained history, performing a medically appropriate examination and/or evaluation, counseling and educating the patient/family/caregiver, ordering medications, tests, or procedures, referring and communicating with other health care professionals, documenting information in the medical record, independently interpreting results and communicating that information with the patient/family/caregiver, care coordination.    FOLLOW UP  No follow-ups on file.  Patient was given instructions and counseling regarding her condition or for health  maintenance advice. Please see specific information pulled into the AVS if appropriate.       Navneet Palafox MD  12/06/24  15:43 EST    CURRENT & DISCONTINUED MEDICATIONS  Current Outpatient Medications   Medication Instructions    alendronate (FOSAMAX) 10 mg, Oral, Every Morning Before Breakfast    ascorbic acid (VITAMIN C) 1,000 mg, Daily    aspirin 81 mg, Oral, Daily    B Complex Vitamins (vitamin b complex) capsule capsule 1 capsule, Daily    Ibuprofen 3 %, Gabapentin 10 %, lidocaine 4 % 1-2 g, Topical, 3 to 4 Times Daily    levothyroxine (SYNTHROID) 75 mcg, Oral, Daily    lidocaine (LIDODERM) 5 % 1 patch, Transdermal, Every 24 Hours, Remove & Discard patch within 12 hours or as directed by MD    methylPREDNISolone (MEDROL) 4 MG dose pack Take as directed on package instructions.    Multiple Vitamins-Minerals (EYE HEALTH PO) 2 tablets, Daily    Vitamin A 2,400 Units, Daily    Vitamin D 5,000 Units, Every 24 Hours    vitamin E 400 Units, Daily       Medications Discontinued During This Encounter   Medication Reason    Diclofenac Sodium (VOLTAREN) 1 % gel gel *Therapy completed

## 2024-12-16 ENCOUNTER — OFFICE VISIT (OUTPATIENT)
Dept: ORTHOPEDIC SURGERY | Facility: CLINIC | Age: 79
End: 2024-12-16
Payer: MEDICARE

## 2024-12-16 VITALS
OXYGEN SATURATION: 99 % | SYSTOLIC BLOOD PRESSURE: 169 MMHG | DIASTOLIC BLOOD PRESSURE: 90 MMHG | WEIGHT: 154 LBS | BODY MASS INDEX: 31.09 KG/M2 | HEART RATE: 90 BPM

## 2024-12-16 DIAGNOSIS — M18.11 PRIMARY OSTEOARTHRITIS OF FIRST CARPOMETACARPAL JOINT OF RIGHT HAND: Primary | ICD-10-CM

## 2024-12-16 PROCEDURE — 99213 OFFICE O/P EST LOW 20 MIN: CPT | Performed by: STUDENT IN AN ORGANIZED HEALTH CARE EDUCATION/TRAINING PROGRAM

## 2024-12-16 PROCEDURE — 1160F RVW MEDS BY RX/DR IN RCRD: CPT | Performed by: STUDENT IN AN ORGANIZED HEALTH CARE EDUCATION/TRAINING PROGRAM

## 2024-12-16 PROCEDURE — 20600 DRAIN/INJ JOINT/BURSA W/O US: CPT | Performed by: STUDENT IN AN ORGANIZED HEALTH CARE EDUCATION/TRAINING PROGRAM

## 2024-12-16 PROCEDURE — 3080F DIAST BP >= 90 MM HG: CPT | Performed by: STUDENT IN AN ORGANIZED HEALTH CARE EDUCATION/TRAINING PROGRAM

## 2024-12-16 PROCEDURE — 1159F MED LIST DOCD IN RCRD: CPT | Performed by: STUDENT IN AN ORGANIZED HEALTH CARE EDUCATION/TRAINING PROGRAM

## 2024-12-16 PROCEDURE — 3077F SYST BP >= 140 MM HG: CPT | Performed by: STUDENT IN AN ORGANIZED HEALTH CARE EDUCATION/TRAINING PROGRAM

## 2024-12-16 RX ORDER — TRIAMCINOLONE ACETONIDE 40 MG/ML
40 INJECTION, SUSPENSION INTRA-ARTICULAR; INTRAMUSCULAR
Status: COMPLETED | OUTPATIENT
Start: 2024-12-16 | End: 2024-12-16

## 2024-12-16 RX ORDER — LIDOCAINE HYDROCHLORIDE 10 MG/ML
1 INJECTION, SOLUTION INFILTRATION; PERINEURAL
Status: COMPLETED | OUTPATIENT
Start: 2024-12-16 | End: 2024-12-16

## 2024-12-16 RX ADMIN — TRIAMCINOLONE ACETONIDE 40 MG: 40 INJECTION, SUSPENSION INTRA-ARTICULAR; INTRAMUSCULAR at 13:15

## 2024-12-16 RX ADMIN — LIDOCAINE HYDROCHLORIDE 1 ML: 10 INJECTION, SOLUTION INFILTRATION; PERINEURAL at 13:15

## 2024-12-16 NOTE — PROGRESS NOTES
Chief Complaint  Pain and Initial Evaluation of the Right Hand    Subjective          Berna Leong presents to [facility] for an evaluation  of her right hand.     History of Present Illness    The patient presents here today for an evaluation  of her right hand. She got up on 11/24/24 to go to the bathroom where she was not able to move her fingers at all due to increase in pain. She reports she is able to move her fingers now but is still having pain. She went to the emergency room on 11/25/24 where she had x-rays. She has a history of carpal tunnel. She locates her pain to the left thumb CMC.     Allergies   Allergen Reactions    Penicillins Rash    Sulfa Antibiotics Rash        Social History     Socioeconomic History    Marital status: Single   Tobacco Use    Smoking status: Never    Smokeless tobacco: Never   Vaping Use    Vaping status: Never Used   Substance and Sexual Activity    Alcohol use: Never    Drug use: Never    Sexual activity: Defer     Partners: Male     Birth control/protection: Post-menopausal        I reviewed the patient's chief complaint, history of present illness, review of systems, past medical history, surgical history, family history, social history, medications, and allergy list.     REVIEW OF SYSTEMS    Constitutional: Denies fevers, chills, weight loss  Cardiovascular: Denies chest pain, shortness of breath  Skin: Denies rashes, acute skin changes  Neurologic: Denies headache, loss of consciousness  MSK: Right hand pain       Objective   Vital Signs:   /90   Pulse 90   Wt 69.9 kg (154 lb)   SpO2 99%   BMI 31.09 kg/m²     Body mass index is 31.09 kg/m².    Physical Exam    General: Alert. No acute distress.   Right upper extremity: intact wrist flexion and extension, arthritis and stiffness with range of motion, tender to the STT and thumb CMC, 60 degrees flexion, 40 degrees extension, mild muscle atrophy, positive  grind test, increase in pain  with Phalen's compression, neurovascularly intact, positive  pulses      Small Joint Arthrocentesis  Consent given by: patient  Site marked: site marked  Timeout: Immediately prior to procedure a time out was called to verify the correct patient, procedure, equipment, support staff and site/side marked as required   Supporting Documentation  Indications: pain   Procedure Details  Location: thumb (right) -   Needle gauge: 23g.  Medications administered: 1 mL lidocaine 1 %; 40 mg triamcinolone acetonide 40 MG/ML  Patient tolerance: patient tolerated the procedure well with no immediate complications    This injection documentation was Scribed for Doug Browne MD by Mariam Mccrary MA.  12/16/24   14:01 EST    Imaging Results (Most Recent)       None                     Assessment and Plan        Duplex carotid ultrasound CAR    Result Date: 12/2/2024  Narrative:   There is mild left carotid bulb calcified plaque.   Right internal carotid artery demonstrates normal flow without evidence of hemodynamically significant stenosis.   All other right side carotid system vessels are normal.   Left internal carotid artery demonstrates normal flow without evidence of hemodynamically significant stenosis.   All other left side carotid system vessels are normal.   Antegrade right vertebral flow.   Antegrade left vertebral flow.     XR Hand 3+ View Right    Result Date: 11/25/2024  Narrative: XR HAND 3+ VW RIGHT Date of Exam: 11/25/2024 1:25 PM EST Indication: pain Comparison: 3 view right wrist dated 10/26/2015 Findings: The bones appear diffusely osteopenic. Moderate to severe osteoarthritic changes are noted involving the first carpal metacarpal and triscaphe joints. Soft tissue swelling is noted around the wrist. No fracture or malalignment is evident.     Impression: Impression: Wrist soft tissue swelling. Moderate to severe osteoarthritic changes of the first carpal metacarpal and triscaphe joints. Electronically Signed:  Bo Witt MD  11/25/2024 1:41 PM EST  Workstation ID: [id number]    Stress test with myocardial perfusion one day    Result Date: 11/20/2024  Narrative:   Left ventricular ejection fraction is hyperdynamic (Calculated EF > 70%).   Low probability of ischemia in this study, the apical defect may represent tissue artifact..   Findings consistent with an equivocal ECG stress test.       Diagnoses and all orders for this visit:    1. Primary osteoarthritis of first carpometacarpal joint of right hand (Primary)        The patient presents here today for an evaluation  of her right hand.     Discussed the risks and benefits of a right thumb steroid injection today in the office. Patient expressed understanding and wishes to proceed. She tolerated the injection well and without any complications.     Home exercises given today and will continue current medications for pain control.      Call or return if worsening symptoms.    Scribed for Doug Browne MD by Isbaella aSntana  12/16/2024   13:35 EST         Follow Up       6 - 8 weeks     Patient was given instructions and counseling regarding her condition or for health maintenance advice. Please see specific information pulled into the AVS if appropriate.       I have personally performed the services described in this document as scribed by the above individual and it is both accurate and complete. Doug Browne MD 12/16/24 14:38 EST

## 2025-01-06 NOTE — PROGRESS NOTES
[x] Prior Auth    Medication: Pradaxa 75 MG      Dose/Frequency:  TAKE 1 CAPSULE BY MOUTH EVERY 12 HOURS     Quantity: 60    Ordering Provider:   [x] Speciality/Provider - Jacki Mary MD      "  Chief Complaint   Patient presents with   • Results     3 Months labs    • Leg Pain     Right leg pain      Subjective   Berna Leong is a 77 y.o. female who presents to the office for follow-up.     Pt presents for f/u of a deep wound/laceration of the right lower (anterior ) leg. The leg pain is better.    The thyroid is mildly hyperfunctioning. She is on the 75 mcg daily.           The following portions of the patient's history were reviewed and updated as appropriate: allergies, current medications, past family history, past medical history, past social history, past surgical history and problem list.    Review of Systems   Constitutional: Negative for chills, fever and unexpected weight loss.   Respiratory: Negative for cough, chest tightness and shortness of breath.    Cardiovascular: Negative for chest pain and palpitations.   Gastrointestinal: Negative for abdominal pain, constipation, diarrhea, nausea and vomiting.        Objective   Vitals:    03/21/22 1013   BP: 138/77   Pulse: 80   Resp: 20   Temp: 98.2 °F (36.8 °C)   SpO2: 97%   Weight: 86.9 kg (191 lb 9.6 oz)   Height: 157.5 cm (62\")     Body mass index is 35.04 kg/m².  Physical Exam  Vitals reviewed.   Constitutional:       General: She is not in acute distress.     Appearance: Normal appearance. She is not ill-appearing.   HENT:      Head: Normocephalic.   Eyes:      Conjunctiva/sclera: Conjunctivae normal.   Cardiovascular:      Rate and Rhythm: Normal rate and regular rhythm.   Pulmonary:      Effort: Pulmonary effort is normal.      Breath sounds: Normal breath sounds.   Skin:     Findings: No lesion or rash.   Neurological:      Mental Status: She is alert and oriented to person, place, and time.   Psychiatric:         Mood and Affect: Mood normal.          Assessment/Plan   Diagnoses and all orders for this visit:    1. Hypothyroidism, acquired  Comments:  reduce to 50 mcg on Sat and Sun. continue 75 mcg all  other days    2. Laceration of " right lower extremity, subsequent encounter  Comments:  better.    Other orders  -     levothyroxine (Synthroid) 50 MCG tablet; Take 1 tablet po on Saturdays and Sundays  Dispense: 30 tablet; Refill: 0               No follow-ups on file.   PHQ-2/PHQ-9 Depression Screening 3/21/2022   Retired PHQ-9 Total Score -   Retired Total Score -   Little Interest or Pleasure in Doing Things 0-->not at all   Feeling Down, Depressed or Hopeless 0-->not at all   PHQ-9: Brief Depression Severity Measure Score 0

## 2025-02-13 ENCOUNTER — TELEPHONE (OUTPATIENT)
Dept: FAMILY MEDICINE CLINIC | Facility: CLINIC | Age: 80
End: 2025-02-13
Payer: MEDICARE

## 2025-02-13 DIAGNOSIS — I25.2 HX OF ACUTE MYOCARDIAL INFARCTION: Primary | ICD-10-CM

## 2025-02-13 DIAGNOSIS — R07.89 CHEST DISCOMFORT: ICD-10-CM

## 2025-02-13 NOTE — TELEPHONE ENCOUNTER
Needs referral to see new cardiologist -     Richard Gomez MD  [address] - [address]  [phone]  Fax [phone]

## 2025-02-24 ENCOUNTER — TELEPHONE (OUTPATIENT)
Dept: FAMILY MEDICINE CLINIC | Facility: CLINIC | Age: 80
End: 2025-02-24
Payer: MEDICARE

## 2025-02-24 DIAGNOSIS — R73.09 HIGH GLUCOSE: Primary | ICD-10-CM

## 2025-02-24 DIAGNOSIS — E78.5 HYPERLIPIDEMIA, UNSPECIFIED HYPERLIPIDEMIA TYPE: ICD-10-CM

## 2025-02-24 NOTE — TELEPHONE ENCOUNTER
Pt has rescheduled her appt w/ Dr. Myers to March 19th at 2:45. Pt would like to  lab orders prior to her appointment.    [phone]

## 2025-03-13 ENCOUNTER — LAB (OUTPATIENT)
Facility: HOSPITAL | Age: 80
End: 2025-03-13
Payer: MEDICARE

## 2025-03-14 ENCOUNTER — LAB (OUTPATIENT)
Facility: HOSPITAL | Age: 80
End: 2025-03-14
Payer: MEDICARE

## 2025-03-14 DIAGNOSIS — R73.09 HIGH GLUCOSE: ICD-10-CM

## 2025-03-14 LAB
ALBUMIN SERPL-MCNC: 4 G/DL (ref 3.5–5.2)
ALBUMIN UR-MCNC: <1.2 MG/DL
ALBUMIN/GLOB SERPL: 1.2 G/DL
ALP SERPL-CCNC: 129 U/L (ref 39–117)
ALT SERPL W P-5'-P-CCNC: 15 U/L (ref 1–33)
ANION GAP SERPL CALCULATED.3IONS-SCNC: 12.8 MMOL/L (ref 5–15)
AST SERPL-CCNC: 22 U/L (ref 1–32)
BASOPHILS # BLD AUTO: 0.07 10*3/MM3 (ref 0–0.2)
BASOPHILS NFR BLD AUTO: 1.1 % (ref 0–1.5)
BILIRUB SERPL-MCNC: 0.5 MG/DL (ref 0–1.2)
BUN SERPL-MCNC: 20 MG/DL (ref 8–23)
BUN/CREAT SERPL: 17.9 (ref 7–25)
CALCIUM SPEC-SCNC: 9.3 MG/DL (ref 8.6–10.5)
CHLORIDE SERPL-SCNC: 102 MMOL/L (ref 98–107)
CHOLEST SERPL-MCNC: 214 MG/DL (ref 0–200)
CO2 SERPL-SCNC: 22.2 MMOL/L (ref 22–29)
CREAT SERPL-MCNC: 1.12 MG/DL (ref 0.57–1)
CREAT UR-MCNC: 27.7 MG/DL
CREAT UR-MCNC: 30.5 MG/DL
DEPRECATED RDW RBC AUTO: 39.7 FL (ref 37–54)
EGFRCR SERPLBLD CKD-EPI 2021: 49.2 ML/MIN/1.73
EOSINOPHIL # BLD AUTO: 0.26 10*3/MM3 (ref 0–0.4)
EOSINOPHIL NFR BLD AUTO: 4.1 % (ref 0.3–6.2)
ERYTHROCYTE [DISTWIDTH] IN BLOOD BY AUTOMATED COUNT: 12.3 % (ref 12.3–15.4)
GLOBULIN UR ELPH-MCNC: 3.3 GM/DL
GLUCOSE SERPL-MCNC: 99 MG/DL (ref 65–99)
HBA1C MFR BLD: 5.2 % (ref 4.8–5.6)
HCT VFR BLD AUTO: 35.1 % (ref 34–46.6)
HDLC SERPL-MCNC: 78 MG/DL (ref 40–60)
HGB BLD-MCNC: 11.7 G/DL (ref 12–15.9)
IMM GRANULOCYTES # BLD AUTO: 0.01 10*3/MM3 (ref 0–0.05)
IMM GRANULOCYTES NFR BLD AUTO: 0.2 % (ref 0–0.5)
LDLC SERPL CALC-MCNC: 122 MG/DL (ref 0–100)
LDLC/HDLC SERPL: 1.54 {RATIO}
LYMPHOCYTES # BLD AUTO: 3.18 10*3/MM3 (ref 0.7–3.1)
LYMPHOCYTES NFR BLD AUTO: 50.6 % (ref 19.6–45.3)
MCH RBC QN AUTO: 29.7 PG (ref 26.6–33)
MCHC RBC AUTO-ENTMCNC: 33.3 G/DL (ref 31.5–35.7)
MCV RBC AUTO: 89.1 FL (ref 79–97)
MICROALBUMIN/CREAT UR: NORMAL MG/G{CREAT}
MONOCYTES # BLD AUTO: 0.39 10*3/MM3 (ref 0.1–0.9)
MONOCYTES NFR BLD AUTO: 6.2 % (ref 5–12)
NEUTROPHILS NFR BLD AUTO: 2.37 10*3/MM3 (ref 1.7–7)
NEUTROPHILS NFR BLD AUTO: 37.8 % (ref 42.7–76)
NRBC BLD AUTO-RTO: 0 /100 WBC (ref 0–0.2)
PLATELET # BLD AUTO: 309 10*3/MM3 (ref 140–450)
PMV BLD AUTO: 10 FL (ref 6–12)
POTASSIUM SERPL-SCNC: 4 MMOL/L (ref 3.5–5.2)
PROT ?TM UR-MCNC: 4.1 MG/DL
PROT SERPL-MCNC: 7.3 G/DL (ref 6–8.5)
PROT/CREAT UR: 0.13 MG/G{CREAT}
RBC # BLD AUTO: 3.94 10*6/MM3 (ref 3.77–5.28)
SODIUM SERPL-SCNC: 137 MMOL/L (ref 136–145)
TRIGL SERPL-MCNC: 80 MG/DL (ref 0–150)
TSH SERPL DL<=0.05 MIU/L-ACNC: 4.1 UIU/ML (ref 0.27–4.2)
VLDLC SERPL-MCNC: 14 MG/DL (ref 5–40)
WBC NRBC COR # BLD AUTO: 6.28 10*3/MM3 (ref 3.4–10.8)

## 2025-03-14 PROCEDURE — 84443 ASSAY THYROID STIM HORMONE: CPT | Performed by: STUDENT IN AN ORGANIZED HEALTH CARE EDUCATION/TRAINING PROGRAM

## 2025-03-14 PROCEDURE — 80061 LIPID PANEL: CPT | Performed by: STUDENT IN AN ORGANIZED HEALTH CARE EDUCATION/TRAINING PROGRAM

## 2025-03-14 PROCEDURE — 82570 ASSAY OF URINE CREATININE: CPT

## 2025-03-14 PROCEDURE — 85025 COMPLETE CBC W/AUTO DIFF WBC: CPT | Performed by: STUDENT IN AN ORGANIZED HEALTH CARE EDUCATION/TRAINING PROGRAM

## 2025-03-14 PROCEDURE — 80053 COMPREHEN METABOLIC PANEL: CPT | Performed by: STUDENT IN AN ORGANIZED HEALTH CARE EDUCATION/TRAINING PROGRAM

## 2025-03-14 PROCEDURE — 82043 UR ALBUMIN QUANTITATIVE: CPT

## 2025-03-14 PROCEDURE — 83036 HEMOGLOBIN GLYCOSYLATED A1C: CPT | Performed by: STUDENT IN AN ORGANIZED HEALTH CARE EDUCATION/TRAINING PROGRAM

## 2025-03-14 PROCEDURE — 84156 ASSAY OF PROTEIN URINE: CPT

## 2025-03-19 ENCOUNTER — OFFICE VISIT (OUTPATIENT)
Dept: FAMILY MEDICINE CLINIC | Facility: CLINIC | Age: 80
End: 2025-03-19
Payer: MEDICARE

## 2025-03-19 ENCOUNTER — OFFICE VISIT (OUTPATIENT)
Dept: ORTHOPEDIC SURGERY | Facility: CLINIC | Age: 80
End: 2025-03-19
Payer: MEDICARE

## 2025-03-19 VITALS
OXYGEN SATURATION: 99 % | DIASTOLIC BLOOD PRESSURE: 90 MMHG | HEART RATE: 83 BPM | SYSTOLIC BLOOD PRESSURE: 152 MMHG | BODY MASS INDEX: 33.95 KG/M2 | WEIGHT: 168.4 LBS | HEIGHT: 59 IN

## 2025-03-19 VITALS
TEMPERATURE: 97.1 F | HEIGHT: 59 IN | HEART RATE: 88 BPM | OXYGEN SATURATION: 97 % | BODY MASS INDEX: 33.87 KG/M2 | DIASTOLIC BLOOD PRESSURE: 87 MMHG | WEIGHT: 168 LBS | SYSTOLIC BLOOD PRESSURE: 139 MMHG

## 2025-03-19 DIAGNOSIS — R79.9 ABNORMAL FINDING OF BLOOD CHEMISTRY, UNSPECIFIED: ICD-10-CM

## 2025-03-19 DIAGNOSIS — E03.9 HYPOTHYROIDISM, UNSPECIFIED TYPE: ICD-10-CM

## 2025-03-19 DIAGNOSIS — M79.641 RIGHT HAND PAIN: Primary | ICD-10-CM

## 2025-03-19 DIAGNOSIS — G56.03 BILATERAL CARPAL TUNNEL SYNDROME: ICD-10-CM

## 2025-03-19 DIAGNOSIS — C18.0 ADENOCARCINOMA OF CECUM: ICD-10-CM

## 2025-03-19 DIAGNOSIS — N18.31 STAGE 3A CHRONIC KIDNEY DISEASE: ICD-10-CM

## 2025-03-19 DIAGNOSIS — E78.00 PURE HYPERCHOLESTEROLEMIA: ICD-10-CM

## 2025-03-19 DIAGNOSIS — R20.2 NUMBNESS AND TINGLING IN RIGHT HAND: ICD-10-CM

## 2025-03-19 DIAGNOSIS — I10 PRIMARY HYPERTENSION: Primary | ICD-10-CM

## 2025-03-19 DIAGNOSIS — R20.0 NUMBNESS AND TINGLING IN RIGHT HAND: ICD-10-CM

## 2025-03-19 NOTE — ASSESSMENT & PLAN NOTE
Orders:    TSH; Future    Comprehensive Metabolic Panel; Future    Lipid Panel; Future    CBC & Differential; Future    Hemoglobin A1c; Future

## 2025-03-19 NOTE — ASSESSMENT & PLAN NOTE
Bring BP log in 2 weeks.     Orders:    TSH; Future    Comprehensive Metabolic Panel; Future    Lipid Panel; Future    CBC & Differential; Future    Hemoglobin A1c; Future

## 2025-03-19 NOTE — PROGRESS NOTES
"Chief Complaint  Hypothyroidism (FOLLOW UP ), Hand Pain (Still hurts ), Foot Swelling, and Bleeding/Bruising (Legs bruising does not know why on left leg )    Subjective      Berna Leong is a 80 y.o. female who presents to [facility]     hypothyroidism (FOLLOW UP ), Hand Pain (Still hurts ), Foot Swelling, and Bleeding/Bruising (Legs bruising does not know why on left leg ).     Labs reviewed.     Patient's hands pain refers weakness. She is following with ortho for hand pain. They will reeval.   On the other hand hypothyroidism controlled. No change in levothyroxine dose.     HTN bring BP log in 2 weeks.   Objective   Vital Signs:   Vitals:    03/19/25 1440 03/19/25 1451   BP: 139/87 139/87   Pulse: 88    Temp: 97.1 °F (36.2 °C)    TempSrc: Temporal    SpO2: 97%    Weight: 76.2 kg (168 lb)    Height: 149.9 cm (59.02\")      Body mass index is 33.91 kg/m².    Wt Readings from Last 3 Encounters:   03/19/25 76.2 kg (168 lb)   03/19/25 76.4 kg (168 lb 6.4 oz)   12/16/24 69.9 kg (154 lb)     BP Readings from Last 3 Encounters:   03/19/25 139/87   03/19/25 152/90   12/16/24 169/90       Health Maintenance   Topic Date Due    DIABETIC FOOT EXAM  Never done    DIABETIC EYE EXAM  07/24/2021    COVID-19 Vaccine (10 - 2024-25 season) 03/06/2025    INFLUENZA VACCINE  03/31/2025 (Originally 7/1/2024)    Pneumococcal Vaccine 50+ (2 of 2 - PCV) 07/10/2025 (Originally 12/30/2016)    BMI FOLLOWUP  07/10/2025    HEMOGLOBIN A1C  09/14/2025    ANNUAL WELLNESS VISIT  10/10/2025    LIPID PANEL  03/14/2026    URINE MICROALBUMIN-CREATININE RATIO (uACR)  03/14/2026    DXA SCAN  10/08/2026    TDAP/TD VACCINES (3 - Td or Tdap) 06/25/2034    RSV Vaccine - Adults  Completed    ZOSTER VACCINE  Completed    COLONOSCOPY  Discontinued       Physical Exam  Vitals reviewed.   HENT:      Head: Normocephalic.      Mouth/Throat:      Mouth: Mucous membranes are moist.   Eyes:      Pupils: Pupils are equal, round, and " reactive to light.   Cardiovascular:      Rate and Rhythm: Normal rate.   Abdominal:      General: Abdomen is flat.   Musculoskeletal:         General: Normal range of motion.      Cervical back: Normal range of motion.   Skin:     General: Skin is warm.      Capillary Refill: Capillary refill takes less than 2 seconds.   Neurological:      Mental Status: She is alert.            Assessment & Plan  Primary hypertension  Bring BP log in 2 weeks.     Orders:    TSH; Future    Comprehensive Metabolic Panel; Future    Lipid Panel; Future    CBC & Differential; Future    Hemoglobin A1c; Future    Pure hypercholesterolemia       Orders:    TSH; Future    Comprehensive Metabolic Panel; Future    Lipid Panel; Future    CBC & Differential; Future    Hemoglobin A1c; Future    Hypothyroidism, unspecified type  On levothyroxine        Stage 3a chronic kidney disease      Orders:    TSH; Future    Comprehensive Metabolic Panel; Future    Lipid Panel; Future    CBC & Differential; Future    Hemoglobin A1c; Future    Adenocarcinoma of cecum  Follows with Oncology                  I spent 25 minutes caring for Berna on this date of service. This time includes time spent by me in the following activities:preparing for the visit, reviewing tests, obtaining and/or reviewing a separately obtained history, performing a medically appropriate examination and/or evaluation, counseling and educating the patient/family/caregiver, ordering medications, tests, or procedures, referring and communicating with other health care professionals, documenting information in the medical record, independently interpreting results and communicating that information with the patient/family/caregiver, care coordination.    FOLLOW UP  No follow-ups on file.  Patient was given instructions and counseling regarding her condition or for health maintenance advice. Please see specific information pulled into the AVS if appropriate.       Navneet Myers  MD Javy  03/19/25  14:52 EDT    CURRENT & DISCONTINUED MEDICATIONS  Current Outpatient Medications   Medication Instructions    alendronate (FOSAMAX) 10 mg, Oral, Every Morning Before Breakfast    ascorbic acid (VITAMIN C) 1,000 mg, Daily    aspirin 81 mg, Oral, Daily    B Complex Vitamins (vitamin b complex) capsule capsule 1 capsule, Daily    Ibuprofen 3 %, Gabapentin 10 %, lidocaine 4 % 1-2 g, Topical, 3 to 4 Times Daily    levothyroxine (SYNTHROID) 75 mcg, Oral, Daily    lidocaine (LIDODERM) 5 % 1 patch, Transdermal, Every 24 Hours, Remove & Discard patch within 12 hours or as directed by MD    methylPREDNISolone (MEDROL) 4 MG dose pack Take as directed on package instructions.    Multiple Vitamins-Minerals (EYE HEALTH PO) 2 tablets, Daily    Vitamin A 2,400 Units, Daily    Vitamin D 5,000 Units, Every 24 Hours    vitamin E 400 Units, Daily       There are no discontinued medications.

## 2025-03-19 NOTE — PROGRESS NOTES
"Chief Complaint  Follow-up of the Right Hand     Subjective      Berna Leong presents to [facility] for follow up of the right hand.  She had a fall and injured her hip and right hand.  The injury was 6/22/24.  She is having pain in the hand and notes stiffness.  She had a steroid injection 12/16/24. She is getting knots on her thumb.  She has decrease  and FMC tasks.  She has had carpal tunnel symptoms for years.  She notes triggering of the right thumb at times.     Allergies   Allergen Reactions    Penicillins Rash    Sulfa Antibiotics Rash        Social History     Socioeconomic History    Marital status: Single   Tobacco Use    Smoking status: Never    Smokeless tobacco: Never   Vaping Use    Vaping status: Never Used   Substance and Sexual Activity    Alcohol use: Never    Drug use: Never    Sexual activity: Defer     Partners: Male     Birth control/protection: Post-menopausal        I reviewed the patient's chief complaint, history of present illness, review of systems, past medical history, surgical history, family history, social history, medications, and allergy list.     Review of Systems     Constitutional: Denies fevers, chills, weight loss  Cardiovascular: Denies chest pain, shortness of breath  Skin: Denies rashes, acute skin changes  Neurologic: Denies headache, loss of consciousness        Vital Signs:   /90   Pulse 83   Ht 149.9 cm (59.02\")   Wt 76.4 kg (168 lb 6.4 oz)   SpO2 99%   BMI 33.99 kg/m²          Physical Exam  General: Alert. No acute distress    Ortho Exam        Right upper extremity: intact wrist flexion and extension, arthritis and stiffness with range of motion, tender to the STT and thumb CMC,  mild muscle atrophy, positive grind test, increase in pain with Phalen's compression, neurovascularly intact, positive pulses       Procedures        Imaging Results (Most Recent)       Procedure Component Value Units Date/Time    XR Hand 2 " View Right [[id number]] Resulted: 03/19/25 0903     Updated: 03/19/25 0905             Result Review :     X-Ray Report:  Right hand  X-Ray  Indication: Evaluation of the right hand  AP/Lateral view(s)  Findings: Moderate to severe osteoarthritic changes of the first carpal metacarpal   Prior studies available for comparison: yes             Assessment and Plan     Diagnoses and all orders for this visit:    1. Right hand pain (Primary)  -     XR Hand 2 View Right    2. Numbness and tingling in right hand        Discussed the treatment plan with the patient. I reviewed the X-rays that were obtained today with the patient.     EMG of the right upper extremity.        Call or return if worsening symptoms.    Follow Up     After EMG of the right upper extremity.       Patient was given instructions and counseling regarding her condition or for health maintenance advice. Please see specific information pulled into the AVS if appropriate.     Scribed for Lorenzo Wick MD by Deidra Collins MA.  03/19/25   09:05 EDT    I have personally performed the services described in this document as scribed by the above individual and it is both accurate and complete. Lorenzo Wick MD 03/19/25

## 2025-03-24 ENCOUNTER — HOSPITAL ENCOUNTER (OUTPATIENT)
Dept: CT IMAGING | Facility: HOSPITAL | Age: 80
Discharge: HOME | End: 2025-03-24
Admitting: INTERNAL MEDICINE
Payer: MEDICARE

## 2025-03-24 DIAGNOSIS — C18.0 ADENOCARCINOMA OF CECUM: ICD-10-CM

## 2025-03-24 PROCEDURE — 74177 CT ABD & PELVIS W/CONTRAST: CPT

## 2025-03-24 PROCEDURE — 25510000001 IOPAMIDOL PER 1 ML: Performed by: INTERNAL MEDICINE

## 2025-03-24 RX ORDER — IOPAMIDOL 755 MG/ML
100 INJECTION, SOLUTION INTRAVASCULAR
Status: COMPLETED | OUTPATIENT
Start: 2025-03-24 | End: 2025-03-24

## 2025-03-24 RX ADMIN — IOPAMIDOL 100 ML: 755 INJECTION, SOLUTION INTRAVENOUS at 17:08

## 2025-03-27 NOTE — PROGRESS NOTES
GYN Visit    CC: dermoid cyst    HPI:   80 y.o. Contraception or HRT: Post menopausal, using no HRT    History of Present Illness  The patient is an 80-year-old female who presents for evaluation of a dermoid cyst.    She has been diagnosed with colon cancer and is currently under the care of Dr. Aldridge. A CT scan was performed, and she is scheduled to receive the results tomorrow. She reports experiencing pain in the area of her ovary or colon, the exact location she indicates is left side at the level of the umbilicus.  She has a history of surgical intervention, including a D and C procedure due to the presence of polyps.     History: PMHx, Meds, Allergies, PSHx, Social Hx, and POBHx all reviewed and updated.  Physical Exam   PHYSICAL EXAM:  /83   Pulse 80   Wt 75.3 kg (166 lb)   Breastfeeding No   BMI 33.51 kg/m²   General- NAD, alert and oriented, appropriate  Psych- Normal mood, good memory      CT Abdomen Pelvis With Contrast (2025 17:08)    CT Chest With Contrast Diagnostic (2023 10:56)      ASSESSMENT AND PLAN:  Diagnoses and all orders for this visit:    1. Dermoid cyst of left ovary (Primary)    2. Bilateral ovarian cysts        Assessment & Plan  1. Dermoid cyst.  The dermoid cyst on the left ovary has remained stable for the past 2 years, as confirmed by the CT scan done on 2025. There is no evidence of growth or change in the cyst's characteristics. The pain experienced is unlikely to be associated with the cyst, given its stability and the location of the discomfort. The pain could be related to scar tissue from previous surgery. No further gynecological interventions are necessary at this time.  Could try over-the-counter Tylenol for discomfort    2.  Elevated BP  Being monitored by PCP.  Pt is monitoring and recording BP at home      PROCEDURE  The patient has a history of surgical intervention, including a D and C procedure due to the presence of polyps.          Follow Up:  Return if symptoms worsen or fail to improve.          Kacey Cox MD  04/07/2025    Patient or patient representative verbalized consent for the use of Ambient Listening during the visit with  Kacey Cox MD for chart documentation. 4/7/2025  10:57 EDT

## 2025-04-02 ENCOUNTER — OFFICE VISIT (OUTPATIENT)
Dept: CARDIOLOGY | Facility: CLINIC | Age: 80
End: 2025-04-02
Payer: MEDICARE

## 2025-04-02 VITALS
HEART RATE: 79 BPM | BODY MASS INDEX: 32.86 KG/M2 | SYSTOLIC BLOOD PRESSURE: 148 MMHG | WEIGHT: 163 LBS | DIASTOLIC BLOOD PRESSURE: 81 MMHG | HEIGHT: 59 IN

## 2025-04-02 DIAGNOSIS — E78.2 HYPERLIPEMIA, MIXED: Primary | ICD-10-CM

## 2025-04-02 DIAGNOSIS — I25.2 HISTORY OF HEART ATTACK: ICD-10-CM

## 2025-04-02 PROCEDURE — 99204 OFFICE O/P NEW MOD 45 MIN: CPT | Performed by: INTERNAL MEDICINE

## 2025-04-02 PROCEDURE — 3079F DIAST BP 80-89 MM HG: CPT | Performed by: INTERNAL MEDICINE

## 2025-04-02 PROCEDURE — 3077F SYST BP >= 140 MM HG: CPT | Performed by: INTERNAL MEDICINE

## 2025-04-02 PROCEDURE — 1159F MED LIST DOCD IN RCRD: CPT | Performed by: INTERNAL MEDICINE

## 2025-04-02 PROCEDURE — 1160F RVW MEDS BY RX/DR IN RCRD: CPT | Performed by: INTERNAL MEDICINE

## 2025-04-02 PROCEDURE — 93000 ELECTROCARDIOGRAM COMPLETE: CPT | Performed by: INTERNAL MEDICINE

## 2025-04-02 RX ORDER — ASPIRIN 81 MG/1
81 TABLET ORAL DAILY
Start: 2025-04-02

## 2025-04-02 RX ORDER — ROSUVASTATIN CALCIUM 20 MG/1
20 TABLET, COATED ORAL DAILY
Qty: 90 TABLET | Refills: 3 | Status: SHIPPED | OUTPATIENT
Start: 2025-04-02

## 2025-04-02 NOTE — PROGRESS NOTES
Chief Complaint  Hx of acute myocardial infarction and Chest Pain    Subjective            Berna Leong presents to [facility] CARDIOLOGY  Chest Pain   Associated symptoms include palpitations. Pertinent negatives include no malaise/fatigue or shortness of breath.       80-year-old female.  She reports a cardiac history of having 2 mild heart attacks in the 1970s and 1980s.  This was prior to cardiac catheterization being available.  We do not have any of those records.  She has not seen a cardiologist in a long time until just a preop assessment recently.  She has infrequent palpitations.  She denies chest pain or excessive shortness of breath.  She had a nuclear stress test in November 2024 which showed no significant ischemia with normal ejection fraction.    PMH  Past Medical History:   Diagnosis Date    Arthritis     Brown recluse spider bite     HX OF RIGHT FRONT LEG 2004    Coma due to low thyroid 2015    Glaucoma     Heart attack     72 AND 81. DENIES CP BUT GETS SOA WITH EXERTION. STATES HAS BEEN ISSUE FOR LONG TIME. FOLLOWED BY PCP DR JOSHI. DECREASED ACTIVITY    Hypothyroidism     Kidney disease     Leg pain     Leg swelling     Macular degeneration     Malignant neoplasm of colon     Ovarian cyst     Restless leg syndrome     SOB (shortness of breath)     Thyroid disorder     Uterine polyp     Wound dehiscence 04/20/2022    Sustained laceration December 2020 1 sutures removed by PCP with dehiscence seen at wound clinicby Dr Laina Bales February 2022 and folowed there--given silver sulfadiazine creme (Silvadene)         SURGICALHX  Past Surgical History:   Procedure Laterality Date    COLON RESECTION N/A 12/8/2022    Procedure: COLON RESECTION LAPAROSCOPIC RIGHT WITH ROBOT;  Surgeon: Abebe Drew MD;  Location: [facility];  Service: Robotics - [facility];  Laterality: N/A;    COLONOSCOPY N/A 11/07/2022    Procedure: COLONOSCOPY, WITH bx;  Surgeon: Jimmie Zacarias MD;   Location: [facility] END[facility];  Service: Gastroenterology;  Laterality: N/A;  CECUM MASS    COLONOSCOPY N/A 10/30/2024    Procedure: Colonoscopy;  Surgeon: Abebe Drew MD;  Location: [facility] ENDOSCOPY;  Service: General;  Laterality: N/A;  NORMAL COLONOSCOPY    DILATATION AND CURETTAGE  2018    KNEE SURGERY Right 2008    JOINT SURGERY        SOC  Social History     Socioeconomic History    Marital status: Single   Tobacco Use    Smoking status: Never    Smokeless tobacco: Never   Vaping Use    Vaping status: Never Used   Substance and Sexual Activity    Alcohol use: Never    Drug use: Never    Sexual activity: Defer     Partners: Male     Birth control/protection: Post-menopausal         FAMHX  Family History   Problem Relation Age of Onset    Heart disease Father     Heart disease Mother     Arthritis Mother     Cancer Brother     Stomach cancer Maternal Aunt     Arthritis Other     Cancer Other     Heart disease Other     Colon cancer Neg Hx     Malig Hyperthermia Neg Hx     Breast cancer Neg Hx     Uterine cancer Neg Hx     Ovarian cancer Neg Hx           ALLERGY  Allergies   Allergen Reactions    Penicillins Rash    Sulfa Antibiotics Rash        MEDSCURRENT    Current Outpatient Medications:     alendronate (FOSAMAX) 10 MG tablet, Take 1 tablet by mouth Every Morning Before Breakfast., Disp: 90 tablet, Rfl: 2    ascorbic acid (VITAMIN C) 1000 MG tablet, Take 1 tablet by mouth Daily., Disp: , Rfl:     aspirin 81 MG EC tablet, Take 1 tablet by mouth Daily., Disp: , Rfl:     B Complex Vitamins (vitamin b complex) capsule capsule, Take 1 capsule by mouth Daily., Disp: , Rfl:     Cholecalciferol (Vitamin D) 50 MCG (2000 UT) capsule, 5,000 Units Daily. VIT D3, Disp: , Rfl:     levothyroxine (Synthroid) 75 MCG tablet, Take 1 tablet by mouth Daily., Disp: 90 tablet, Rfl: 1    Multiple Vitamins-Minerals (EYE HEALTH PO), Take 2 tablets by mouth Daily., Disp: , Rfl:     Vitamin A 3 MG (70750 UT) capsule, Take 2,400 Units  "by mouth Daily., Disp: , Rfl:     vitamin E 400 UNIT capsule, Take 1 capsule by mouth Daily., Disp: , Rfl:     Ibuprofen 3 %, Gabapentin 10 %, lidocaine 4 %, Apply 1-2 g topically to the appropriate area as directed 3 (Three) to 4 (Four) times daily. (Patient not taking: Reported on 4/2/2025), Disp: 90 g, Rfl: 0    lidocaine (LIDODERM) 5 %, Place 1 patch on the skin as directed by provider Daily. Remove & Discard patch within 12 hours or as directed by MD (Patient not taking: Reported on 4/2/2025), Disp: 30 each, Rfl: 3    methylPREDNISolone (MEDROL) 4 MG dose pack, Take as directed on package instructions. (Patient not taking: Reported on 4/2/2025), Disp: 21 tablet, Rfl: 0    rosuvastatin (CRESTOR) 20 MG tablet, Take 1 tablet by mouth Daily., Disp: 90 tablet, Rfl: 3      Review of Systems   Constitutional: Negative. Negative for malaise/fatigue.   HENT: Negative.     Eyes: Negative.    Cardiovascular:  Positive for palpitations. Negative for chest pain.   Respiratory: Negative.  Negative for shortness of breath.    Endocrine: Negative.    Hematologic/Lymphatic: Negative.    Skin: Negative.    Musculoskeletal: Negative.    Gastrointestinal: Negative.    Genitourinary: Negative.    Neurological: Negative.    Psychiatric/Behavioral: Negative.          Objective     /81   Pulse 79   Ht 149.9 cm (59.02\")   Wt 73.9 kg (163 lb)   BMI 32.90 kg/m²       General Appearance:   well developed  well nourished  HENT:   oropharynx moist  lips not cyanotic  Neck:  thyroid not enlarged  supple  Respiratory:  no respiratory distress  normal breath sounds  no rales  Cardiovascular:  no jugular venous distention  regular rhythm  apical impulse normal  S1 normal, S2 normal  no S3, no S4   no murmur  no rub, no thrill  carotid pulses normal; no bruit  pedal pulses normal  lower extremity edema: none    Musculoskeletal:  no clubbing of fingers.   normocephalic, head atraumatic  Skin:   warm, dry  Psychiatric:  judgement and " insight appropriate  normal mood and affect      Result Review :     The following data was reviewed by: Alex Gomez MD on 04/02/2025:    CMP          8/8/2024    08:32 11/21/2024    07:25 3/14/2025    06:23   CMP   Glucose 99  139  99    BUN 20  22  20    Creatinine 1.02  1.09  1.12    EGFR 55.4  51.1  49.2    Sodium 145  140  137    Potassium 3.8  4.1  4.0    Chloride 105  102  102    Calcium 9.2  9.8  9.3    Total Protein 6.9  7.6  7.3    Albumin 4.0  4.1  4.0    Globulin 2.9  3.5  3.3    Total Bilirubin 0.7  0.6  0.5    Alkaline Phosphatase 92  115  129    AST (SGOT) 10  18  22    ALT (SGPT) 13  16  15    Albumin/Globulin Ratio 1.4  1.2  1.2    BUN/Creatinine Ratio 19.6  20.2  17.9    Anion Gap 11.9  11.3  12.8      CBC          8/8/2024    08:32 11/21/2024    07:25 3/14/2025    06:23   CBC   WBC 6.61  7.97  6.28    RBC 4.20  4.57  3.94    Hemoglobin 12.3  13.6  11.7    Hematocrit 38.7  41.2  35.1    MCV 92.1  90.2  89.1    MCH 29.3  29.8  29.7    MCHC 31.8  33.0  33.3    RDW 12.7  12.4  12.3    Platelets 257  276  309      Lipid Panel          11/21/2024    07:25 3/14/2025    06:23   Lipid Panel   Total Cholesterol 218  214    Triglycerides 71  80    HDL Cholesterol 89  78    VLDL Cholesterol 12  14    LDL Cholesterol  117  122    LDL/HDL Ratio 1.29  1.54      TSH          5/23/2024    07:42 11/21/2024    07:25 3/14/2025    06:23   TSH   TSH 0.415  3.110  4.100        Data reviewed : Primary care records reviewed, previous nuclear stress test reviewed        ECG 12 Lead    Date/Time: 4/2/2025 9:39 AM  Performed by: JENNIE Gomez MD    Authorized by: JENNIE Gomez MD  Comparison: not compared with previous ECG   Previous ECG: no previous ECG available  Rhythm: sinus rhythm  Conduction: conduction normal  ST Segments: ST segments normal  T Waves: T waves normal  QRS axis: normal  Other: no other findings    Clinical impression: normal ECG                 Assessment and Plan         ASSESSMENT:  Encounter Diagnoses   Name Primary?    Hyperlipemia, mixed Yes    History of heart attack          PLAN:    1.  History of heart attack-the details are not clear.  These events were some 40 to 50 years ago.  Her baseline EKG is normal.  Stress imaging last year showed no significant ischemia or LV dysfunction/scar.  I would recommend that she take a daily aspirin given that reported history.  Additionally her cholesterol needs to be lower with goal LDL less than 70 if she has had any type of mild heart attack before.  2.  Mixed hyperlipidemia-, start Crestor 20 mg daily, repeat lipid panel in 3 months  3.  She will be followed annually otherwise          Patient was given instructions and counseling regarding her condition or for health maintenance advice. Please see specific information pulled into the AVS if appropriate.             JENNIE Gomez MD  4/2/2025    09:38 EDT

## 2025-04-07 ENCOUNTER — OFFICE VISIT (OUTPATIENT)
Dept: OBSTETRICS AND GYNECOLOGY | Age: 80
End: 2025-04-07
Payer: MEDICARE

## 2025-04-07 VITALS
SYSTOLIC BLOOD PRESSURE: 175 MMHG | DIASTOLIC BLOOD PRESSURE: 83 MMHG | HEART RATE: 80 BPM | BODY MASS INDEX: 33.51 KG/M2 | WEIGHT: 166 LBS

## 2025-04-07 DIAGNOSIS — D27.1 DERMOID CYST OF LEFT OVARY: Primary | ICD-10-CM

## 2025-04-07 PROCEDURE — 1160F RVW MEDS BY RX/DR IN RCRD: CPT | Performed by: OBSTETRICS & GYNECOLOGY

## 2025-04-07 PROCEDURE — 3079F DIAST BP 80-89 MM HG: CPT | Performed by: OBSTETRICS & GYNECOLOGY

## 2025-04-07 PROCEDURE — 1159F MED LIST DOCD IN RCRD: CPT | Performed by: OBSTETRICS & GYNECOLOGY

## 2025-04-07 PROCEDURE — 99213 OFFICE O/P EST LOW 20 MIN: CPT | Performed by: OBSTETRICS & GYNECOLOGY

## 2025-04-07 PROCEDURE — 3077F SYST BP >= 140 MM HG: CPT | Performed by: OBSTETRICS & GYNECOLOGY

## 2025-04-08 ENCOUNTER — OFFICE VISIT (OUTPATIENT)
Dept: ONCOLOGY | Facility: HOSPITAL | Age: 80
End: 2025-04-08
Payer: MEDICARE

## 2025-04-08 ENCOUNTER — LAB (OUTPATIENT)
Dept: ONCOLOGY | Facility: HOSPITAL | Age: 80
End: 2025-04-08
Payer: MEDICARE

## 2025-04-08 VITALS
HEART RATE: 74 BPM | BODY MASS INDEX: 33.73 KG/M2 | DIASTOLIC BLOOD PRESSURE: 79 MMHG | TEMPERATURE: 97.2 F | HEIGHT: 59 IN | RESPIRATION RATE: 18 BRPM | OXYGEN SATURATION: 100 % | WEIGHT: 167.33 LBS | SYSTOLIC BLOOD PRESSURE: 194 MMHG

## 2025-04-08 DIAGNOSIS — K59.00 CONSTIPATION, UNSPECIFIED CONSTIPATION TYPE: ICD-10-CM

## 2025-04-08 DIAGNOSIS — C18.0 ADENOCARCINOMA OF CECUM: ICD-10-CM

## 2025-04-08 DIAGNOSIS — C18.0 ADENOCARCINOMA OF CECUM: Primary | ICD-10-CM

## 2025-04-08 DIAGNOSIS — R10.32 LEFT LOWER QUADRANT ABDOMINAL PAIN: ICD-10-CM

## 2025-04-08 DIAGNOSIS — N83.202 CYST OF LEFT OVARY: ICD-10-CM

## 2025-04-08 LAB — CEA SERPL-MCNC: 2.25 NG/ML

## 2025-04-08 PROCEDURE — G0463 HOSPITAL OUTPT CLINIC VISIT: HCPCS | Performed by: INTERNAL MEDICINE

## 2025-04-08 PROCEDURE — 36415 COLL VENOUS BLD VENIPUNCTURE: CPT

## 2025-04-08 PROCEDURE — 82378 CARCINOEMBRYONIC ANTIGEN: CPT

## 2025-04-08 PROCEDURE — 3077F SYST BP >= 140 MM HG: CPT | Performed by: INTERNAL MEDICINE

## 2025-04-08 PROCEDURE — 3078F DIAST BP <80 MM HG: CPT | Performed by: INTERNAL MEDICINE

## 2025-04-08 PROCEDURE — G2211 COMPLEX E/M VISIT ADD ON: HCPCS | Performed by: INTERNAL MEDICINE

## 2025-04-08 PROCEDURE — 99214 OFFICE O/P EST MOD 30 MIN: CPT | Performed by: INTERNAL MEDICINE

## 2025-04-08 PROCEDURE — 1125F AMNT PAIN NOTED PAIN PRSNT: CPT | Performed by: INTERNAL MEDICINE

## 2025-04-08 RX ORDER — DICYCLOMINE HYDROCHLORIDE 10 MG/1
10 CAPSULE ORAL
Qty: 30 CAPSULE | Refills: 2 | Status: SHIPPED | OUTPATIENT
Start: 2025-04-08 | End: 2025-04-14

## 2025-04-08 NOTE — PROGRESS NOTES
Chief Complaint:  FOLLOW UP 2         History of present illness:  Berna Leong is a 80 y.o. year old female who is here today for colon cancer follow up. She is doing well overall. She has low appetite and does not each much at each meal. This has been present much of her life. Weight is stable since last visit. She has chronic constipation.  She reports her stool is soft but she has trouble initiating a bowel movement.  She denies any blood or dark stool.  She reports for the last several weeks she has had left-sided abdominal pain.  Reports it is sharp.  Does not happen daily.  No associated nausea or vomiting.  Does not improve with bowel movement.  Does not worsen with food.  She feels like there are knots in there.  She has been seen by gynecology for ovarian cyst.  Gynecologist does not think her pain is from the cyst.  She had colonoscopy in October which was normal.  She took pills for prep for this.  She is otherwise doing well.  Repeat CT scan showed no evidence of disease.  No evidence on imaging of anything that would be causing abdominal pain outside of the ovarian cysts. No blockage noted. No fevers or chills reported.         Oncology/Hematology History Overview Note   11/01/22: CT abdomen/pelvis with contrast obtained due to right flank pain: 6 cm cecal mass concerning for colonic adenocarcinoma.  There is some stranding in the adjacent fat worrisome for tumor infiltration and there are several adjacent mildly enlarged morphologically abnormal lymph nodes concerning for metastasis. 10 mm hypoattenuating lesion in liver segment 4 is unchanged from 2018, likely cyst or hemangioma.      11/07/22: Colonoscopy revealed a fungating partially obstructing large mass in the cecum. Biopsy with moderately differentiated adenocarcinoma    12/8/22: Right hemicolectomy: adenocarcinoma, mod differentiated, arisign in tubulovillous adenoma with high grade dysplasia, 6 cm. No LVI. All 26 lymph nodes negative for  tumor. Staged at pT3pN0. MSI-S     Adenocarcinoma of cecum   11/8/2022 Initial Diagnosis    Adenocarcinoma of cecum (HCC)     12/30/2022 Cancer Staged    Staging form: Colon And Rectum, AJCC 8th Edition  - Pathologic: pT3, pN0 - Signed by Artur Aldridge MD on 12/30/2022       ROS    Review of Systems   Constitutional:  Positive for fatigue.   HENT: Negative.     Eyes:  Positive for visual disturbance.   Respiratory: Negative.     Cardiovascular: Negative.    Gastrointestinal: Negative.    Endocrine: Negative.    Genitourinary: Negative.    Musculoskeletal: Negative.    Skin: Negative.    Allergic/Immunologic: Negative.    Neurological: Negative.    Hematological: Negative.    Psychiatric/Behavioral: Negative.     All other systems reviewed and are negative.                                                      Past Medical History:   Diagnosis Date    Arthritis     Brown recluse spider bite     HX OF RIGHT FRONT LEG 2004    Coma due to low thyroid 2015    Glaucoma     Heart attack     72 AND 81. DENIES CP BUT GETS SOA WITH EXERTION. STATES HAS BEEN ISSUE FOR LONG TIME. FOLLOWED BY PCP DR JOSHI. DECREASED ACTIVITY    Hypothyroidism     Kidney disease     Leg pain     Leg swelling     Macular degeneration     Malignant neoplasm of colon     Ovarian cyst     Restless leg syndrome     SOB (shortness of breath)     Thyroid disorder     Uterine polyp     Wound dehiscence 04/20/2022    Sustained laceration December 2020 1 sutures removed by PCP with dehiscence seen at wound clinicby Dr Laina Bales February 2022 and folowed there--given silver sulfadiazine creme (Silvadene)               Past Surgical History:   Procedure Laterality Date    COLON RESECTION N/A 12/8/2022    Procedure: COLON RESECTION LAPAROSCOPIC RIGHT WITH ROBOT;  Surgeon: Abebe Drew MD;  Location: [facility];  Service: Robotics - [facility];  Laterality: N/A;    COLONOSCOPY N/A 11/07/2022    Procedure: COLONOSCOPY, WITH bx;  Surgeon:  Jimmie Zacarias MD;  Location: [facility] ENDOSCOPY;  Service: Gastroenterology;  Laterality: N/A;  CECUM MASS    COLONOSCOPY N/A 10/30/2024    Procedure: Colonoscopy;  Surgeon: Abebe Drew MD;  Location: [facility] ENDOSCOPY;  Service: General;  Laterality: N/A;  NORMAL COLONOSCOPY    DILATATION AND CURETTAGE  2018    KNEE SURGERY Right 2008    JOINT SURGERY       MEDICATIONS: The current medication list was reviewed and reconciled.     Allergies:  is allergic to penicillins and sulfa antibiotics.    Family History   Problem Relation Age of Onset    Heart disease Father     Heart disease Mother     Arthritis Mother     Cancer Brother     Stomach cancer Maternal Aunt     Arthritis Other     Cancer Other     Heart disease Other     Colon cancer Neg Hx     Malig Hyperthermia Neg Hx     Breast cancer Neg Hx     Uterine cancer Neg Hx     Ovarian cancer Neg Hx            Physical Exam  Well appearing patient in no acute distress on RA  Anicteric sclerae, no rash on exposed skin  Respirations non-labored  Awake, alert, and oriented x 4. Speech intact.   Appropriate mood and affect      Vitals:    04/08/25 0827   BP: (!) 194/79   Pulse: 74   Resp: 18   Temp: 97.2 °F (36.2 °C)   SpO2: 100%           ECOG Performance Status: (0) Fully Active - Able to Carry On All Pre-disease Performance Without Restriction      Glucose   Date Value Ref Range Status   03/14/2025 99 65 - 99 mg/dL Final     BUN   Date Value Ref Range Status   03/14/2025 20 8 - 23 mg/dL Final     Creatinine   Date Value Ref Range Status   03/14/2025 1.12 (H) 0.57 - 1.00 mg/dL Final   07/31/2024 1.00 0.60 - 1.30 mg/dL Final     Comment:     Serial Number: [id number]:  [id number]     Sodium   Date Value Ref Range Status   03/14/2025 137 136 - 145 mmol/L Final     Potassium   Date Value Ref Range Status   03/14/2025 4.0 3.5 - 5.2 mmol/L Final     Comment:     Slight hemolysis detected by analyzer. Result may be falsely elevated.     Chloride   Date Value Ref  Range Status   03/14/2025 102 98 - 107 mmol/L Final     CO2   Date Value Ref Range Status   03/14/2025 22.2 22.0 - 29.0 mmol/L Final     Calcium   Date Value Ref Range Status   03/14/2025 9.3 8.6 - 10.5 mg/dL Final     Total Protein   Date Value Ref Range Status   03/14/2025 7.3 6.0 - 8.5 g/dL Final     Albumin   Date Value Ref Range Status   03/14/2025 4.0 3.5 - 5.2 g/dL Final     ALT (SGPT)   Date Value Ref Range Status   03/14/2025 15 1 - 33 U/L Final     AST (SGOT)   Date Value Ref Range Status   03/14/2025 22 1 - 32 U/L Final     Alkaline Phosphatase   Date Value Ref Range Status   03/14/2025 129 (H) 39 - 117 U/L Final     Total Bilirubin   Date Value Ref Range Status   03/14/2025 0.5 0.0 - 1.2 mg/dL Final     eGFR Non  Amer   Date Value Ref Range Status   01/28/2022 53 (L) >60 mL/min/1.73 Final     BUN/Creatinine Ratio   Date Value Ref Range Status   03/14/2025 17.9 7.0 - 25.0 Final     Anion Gap   Date Value Ref Range Status   03/14/2025 12.8 5.0 - 15.0 mmol/L Final      WBC   Date Value Ref Range Status   03/14/2025 6.28 3.40 - 10.80 10*3/mm3 Final     RBC   Date Value Ref Range Status   03/14/2025 3.94 3.77 - 5.28 10*6/mm3 Final     Hemoglobin   Date Value Ref Range Status   03/14/2025 11.7 (L) 12.0 - 15.9 g/dL Final     Hematocrit   Date Value Ref Range Status   03/14/2025 35.1 34.0 - 46.6 % Final     MCV   Date Value Ref Range Status   03/14/2025 89.1 79.0 - 97.0 fL Final     MCH   Date Value Ref Range Status   03/14/2025 29.7 26.6 - 33.0 pg Final     MCHC   Date Value Ref Range Status   03/14/2025 33.3 31.5 - 35.7 g/dL Final     RDW   Date Value Ref Range Status   03/14/2025 12.3 12.3 - 15.4 % Final     RDW-SD   Date Value Ref Range Status   03/14/2025 39.7 37.0 - 54.0 fl Final     MPV   Date Value Ref Range Status   03/14/2025 10.0 6.0 - 12.0 fL Final     Platelets   Date Value Ref Range Status   03/14/2025 309 140 - 450 10*3/mm3 Final     Neutrophil %   Date Value Ref Range Status   03/14/2025  37.8 (L) 42.7 - 76.0 % Final     Lymphocyte %   Date Value Ref Range Status   03/14/2025 50.6 (H) 19.6 - 45.3 % Final     Monocyte %   Date Value Ref Range Status   03/14/2025 6.2 5.0 - 12.0 % Final     Eosinophil %   Date Value Ref Range Status   03/14/2025 4.1 0.3 - 6.2 % Final     Basophil %   Date Value Ref Range Status   03/14/2025 1.1 0.0 - 1.5 % Final     Immature Grans %   Date Value Ref Range Status   03/14/2025 0.2 0.0 - 0.5 % Final     Neutrophils, Absolute   Date Value Ref Range Status   03/14/2025 2.37 1.70 - 7.00 10*3/mm3 Final     Lymphocytes, Absolute   Date Value Ref Range Status   03/14/2025 3.18 (H) 0.70 - 3.10 10*3/mm3 Final     Monocytes, Absolute   Date Value Ref Range Status   03/14/2025 0.39 0.10 - 0.90 10*3/mm3 Final     Eosinophils, Absolute   Date Value Ref Range Status   03/14/2025 0.26 0.00 - 0.40 10*3/mm3 Final     Basophils, Absolute   Date Value Ref Range Status   03/14/2025 0.07 0.00 - 0.20 10*3/mm3 Final     Immature Grans, Absolute   Date Value Ref Range Status   03/14/2025 0.01 0.00 - 0.05 10*3/mm3 Final     nRBC   Date Value Ref Range Status   03/14/2025 0.0 0.0 - 0.2 /100 WBC Final       Labs personally reviewed and stable. No anemia. CEA normal.     PCP note personally reviewed    Cardiology note personally reviewed    Gyn note personally reviewed        Imaging    CT CAP from 3/2025 personally reviewed and per my read with no evidence of disease, no liver lesions.    CT Abdomen Pelvis With Contrast  Result Date: 3/27/2025  Impression: 1.No evidence of metastatic disease within the abdomen or pelvis. 2.Postsurgical changes from partial right colectomy. 3.Additional findings as given above. Electronically Signed: Samm Ruiz MD  3/27/2025 9:51 AM EDT  Workstation ID: [id number]          Assessment and Plan:  Diagnoses and all orders for this visit:    1. Adenocarcinoma of cecum (Primary)  -     CT chest w contrast; Future  -     CT abdomen pelvis w contrast; Future  -     CEA;  Future    2. Constipation, unspecified constipation type    3. Left lower quadrant abdominal pain    4. Cyst of left ovary    Other orders  -     dicyclomine (BENTYL) 10 MG capsule; Take 1 capsule by mouth 4 (Four) Times a Day Before Meals & at Bedtime.  Dispense: 30 capsule; Refill: 2        Ms. Leong with low risk stage II colon cancer, MSI-S s/p right hemicolectomy on 12/8/22 with 0/26 lymph nodes involved and negative margins; pT3pN0 disease. Patient with low risk stage II disease and therefore adjuvant chemotherapy was not recommended. Proceed with surveillance. 3/2025 CT imaging with KATINA. Plan to repeat CT imaging with contrast in 6 months (every 6-12 months for 5 years). 3/2023 CEA and 5/23/24 and 8/8/2024 CEA normal. CEA today. Plan to get CEA again in 6 months (every 6 months for 5 years). Colonoscopy 10/2024 normal, repeat due in 3 years.  She had a tough time with prior prep and request pills for prep for her colonoscopies.    Constipation  Discussed increased fluids, PRN stool softener, and metamucil.     Abdominal pain  No concerning symptoms. Patient passing stool. No symptoms of blockage or bleeding. Reassured by recent CT imaging. Symptoms not daily. Trial bentyl as needed. Discussed to call us if symptoms worsen.    Ovarian cysts  Bilateral. Has dermoid cyst on left. Follows with gyn who does not believe her abdominal pain is related to the cysts.     Follow up: 6 months

## 2025-04-11 ENCOUNTER — RESULTS FOLLOW-UP (OUTPATIENT)
Dept: FAMILY MEDICINE CLINIC | Facility: CLINIC | Age: 80
End: 2025-04-11
Payer: MEDICARE

## 2025-04-11 ENCOUNTER — HOSPITAL ENCOUNTER (OUTPATIENT)
Facility: HOSPITAL | Age: 80
Discharge: HOME | End: 2025-04-11
Payer: MEDICARE

## 2025-04-11 DIAGNOSIS — G56.03 BILATERAL CARPAL TUNNEL SYNDROME: ICD-10-CM

## 2025-04-11 DIAGNOSIS — G56.00 SEVERE CARPAL TUNNEL SYNDROME, UNSPECIFIED LATERALITY: Primary | ICD-10-CM

## 2025-04-11 PROCEDURE — 95911 NRV CNDJ TEST 9-10 STUDIES: CPT

## 2025-04-11 PROCEDURE — 95886 MUSC TEST DONE W/N TEST COMP: CPT

## 2025-04-11 NOTE — THERAPY EVALUATION
Physical Therapy Evaluation    SUBJECTIVE  Please see EMG report for details    OBJECTIVE  Please see EMG report for details.    ASSESSMENT  This was an abnormal study.     Please see EMG report for details.    PLAN  LTG 1:  Today:  Complete EMG / NCV study as appropriate and provide full report to the referring provider.   Treatment:  None    Frequency/Duration:  Evaluation only and discharge today.    Pt/responsible party agrees with plan of care and has been informed of all alternatives, risks and benefits.

## 2025-04-14 ENCOUNTER — OFFICE VISIT (OUTPATIENT)
Dept: FAMILY MEDICINE CLINIC | Facility: CLINIC | Age: 80
End: 2025-04-14
Payer: MEDICARE

## 2025-04-14 VITALS
HEIGHT: 59 IN | SYSTOLIC BLOOD PRESSURE: 135 MMHG | BODY MASS INDEX: 33.26 KG/M2 | DIASTOLIC BLOOD PRESSURE: 53 MMHG | WEIGHT: 165 LBS | HEART RATE: 87 BPM | OXYGEN SATURATION: 100 %

## 2025-04-14 DIAGNOSIS — M19.90 IDIOPATHIC OSTEOARTHRITIS: ICD-10-CM

## 2025-04-14 DIAGNOSIS — R10.32 LEFT LOWER QUADRANT ABDOMINAL PAIN: Primary | ICD-10-CM

## 2025-04-14 DIAGNOSIS — N18.31 STAGE 3A CHRONIC KIDNEY DISEASE: ICD-10-CM

## 2025-04-14 DIAGNOSIS — C18.0 ADENOCARCINOMA OF CECUM: ICD-10-CM

## 2025-04-14 DIAGNOSIS — K59.00 CONSTIPATION, UNSPECIFIED CONSTIPATION TYPE: ICD-10-CM

## 2025-04-14 PROCEDURE — 1125F AMNT PAIN NOTED PAIN PRSNT: CPT | Performed by: FAMILY MEDICINE

## 2025-04-14 PROCEDURE — 3078F DIAST BP <80 MM HG: CPT | Performed by: FAMILY MEDICINE

## 2025-04-14 PROCEDURE — 3075F SYST BP GE 130 - 139MM HG: CPT | Performed by: FAMILY MEDICINE

## 2025-04-14 PROCEDURE — 99214 OFFICE O/P EST MOD 30 MIN: CPT | Performed by: FAMILY MEDICINE

## 2025-04-14 RX ORDER — POLYETHYLENE GLYCOL 3350 17 G/17G
17 POWDER, FOR SOLUTION ORAL DAILY
Qty: 30 PACKET | Refills: 2 | Status: SHIPPED | OUTPATIENT
Start: 2025-04-14

## 2025-04-14 NOTE — PATIENT INSTRUCTIONS
Constipation:  Miralax- fiber supplement- start 1 capful daily and can adjust down (half a capful or every other day) or adjust up to 2-3 capfuls daily in some cases.   Colace-stool softener- 1 to 2x daily  Senna-bowel stimulant-1 to 2 x daily  (Senna plus is senna and colace in a combo pill)

## 2025-04-14 NOTE — PROGRESS NOTES
Chief Complaint  Establish Care    Subjective     Problem List  Visit Diagnosis   Encounters  Notes  Medications  Labs  Result Review Imaging  Media    Berna Leong presents to [facility]  History of Present Illness    History of Present Illness  The patient is an 80-year-old female who presents to [facility].    She has a significant past medical history of adenocarcinoma of the colon, status post surgery in 2022. She continues to follow up with oncology every 6 months for repeat scans.    She has had persistent left lower abdominal pain since her surgery, which she believes is secondary to her scar or possibly a hernia. A previous CT scan of her abdomen did not reveal a hernia, but this is the area where she experiences pain. An OB workup in the past noted an ovarian cyst, but it was not considered the source of her pain.    She also reports issues with intermittent constipation, which she has discussed with her oncologist and has a current regimen. She is interested in trying MiraLAX as she noted Colace did not help much in the past and sometimes Metamucil exacerbates her constipation.    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    She also reports issues with knots in her legs. On exam, she has tortuous varicose veins, some with associated knots and bruising likely secondary to hematomas or SVTs. She does have compression socks that she does not wear regularly. She used to have Unna boots prescribed by a previous podiatrist or specialist; however, they are very old and no longer effective. She was advised to contact us with the specific type of boots she had so we can potentially provide new ones, which may benefit her legs due to her history of poor circulation and likely peripheral vascular disease. If there are issues getting  "these approved, we can refer her to the lymphedema clinic for a second opinion.    She has a history of chronic kidney disease and elevated alkaline phosphatase levels. She notes that she gets blood work regularly from her specialist. If she does not, she was advised to follow up with us to recheck these levels.    Supplemental Information  She had a history of a coma secondary to stress in 2015 and scarlet fever at the age of 6.    MEDICATIONS  Past: Colace, Metamucil       Objective   Vital Signs:   /53 (BP Location: Left arm, Patient Position: Sitting, Cuff Size: Large Adult)   Pulse 87   Ht 149.9 cm (59.02\")   Wt 74.8 kg (165 lb)   SpO2 100%   BMI 33.31 kg/m²     Physical Exam  Vitals reviewed.   Constitutional:       General: She is not in acute distress.     Appearance: Normal appearance.   HENT:      Head: Normocephalic and atraumatic.      Mouth/Throat:      Mouth: Mucous membranes are moist.   Eyes:      Conjunctiva/sclera: Conjunctivae normal.   Neck:      Vascular: No carotid bruit.   Cardiovascular:      Rate and Rhythm: Normal rate and regular rhythm.      Heart sounds: Normal heart sounds.   Pulmonary:      Effort: Pulmonary effort is normal.      Breath sounds: Normal breath sounds.   Abdominal:      Palpations: Abdomen is soft.      Tenderness: There is abdominal tenderness in the left lower quadrant. There is no guarding or rebound.          Comments: Red line represents the scar from patient's prior abdominal surgery, the blue [place] represents the area of patient's pain on exam which she notes is worse with palpation and movement.  A hernia was not palpable on exam   Musculoskeletal:      Cervical back: Normal range of motion and neck supple.      Right lower leg: Edema present.      Left lower leg: Edema present.      Comments: Trace bilateral extremity edema, varicose veins present on bilateral lower extremities.  1 area of hematoma noted to right medial shin, 2 areas on the left calf " with some residual firmness but no current bruising or hematoma noted.    Arthritis noted of multiple digits at the MCPs and PIP joints.   Skin:     General: Skin is warm.   Neurological:      General: No focal deficit present.      Mental Status: She is alert.   Psychiatric:         Mood and Affect: Mood normal.         Behavior: Behavior normal.          Physical Exam  Tortuous varicose veins are present on the skin, some with associated knots and bruising likely secondary to hematomas or SVTs.    Result Review :Labs  Result Review  Imaging  Med Tab  Media  Procedures       Common labs          8/8/2024    08:32 11/21/2024    07:25 3/14/2025    06:23   Common Labs   Glucose 99  139  99    BUN 20  22  20    Creatinine 1.02  1.09  1.12    Sodium 145  140  137    Potassium 3.8  4.1  4.0    Chloride 105  102  102    Calcium 9.2  9.8  9.3    Albumin 4.0  4.1  4.0    Total Bilirubin 0.7  0.6  0.5    Alkaline Phosphatase 92  115  129    AST (SGOT) 10  18  22    ALT (SGPT) 13  16  15    WBC 6.61  7.97  6.28    Hemoglobin 12.3  13.6  11.7    Hematocrit 38.7  41.2  35.1    Platelets 257  276  309    Total Cholesterol  218  214    Triglycerides  71  80    HDL Cholesterol  89  78    LDL Cholesterol   117  122    Hemoglobin A1C  5.30  5.20    Microalbumin, Urine   <1.2        Results for orders placed or performed in visit on 04/08/25   CEA    Collection Time: 04/08/25  8:58 AM    Specimen: Arm, Left; Blood   Result Value Ref Range    CEA 2.25 ng/mL       Results  Imaging  CT of abdomen did not show a hernia.           Procedures        Assessment and Plan CC Problem List  Visit Diagnosis   ROS  Review (Popup)  [facility] Maintenance  Quality  BestPractice  Medications  SmartSets  SnapShot Encounters  Media   Diagnoses and all orders for this visit:    1. Left lower quadrant abdominal pain (Primary)  -     US Abdomen Limited; Future    2. Constipation, unspecified constipation type  -     polyethylene glycol  (MIRALAX) 17 g packet; Take 17 g by mouth Daily.  Dispense: 30 packet; Refill: 2    3. Stage 3a chronic kidney disease    4. Adenocarcinoma of cecum    5. Idiopathic osteoarthritis        Assessment & Plan  1. Adenocarcinoma of the colon.  She continues to follow up with oncology every 6 months for repeat scans.    2. Left lower abdominal pain.  She reports persistent left lower abdominal pain, which she believes is secondary to her surgical scar or a possible hernia. Previous CT scans did not show a hernia. An ultrasound of her abdomen will be ordered to assess for a hernia or other complications.    3. Intermittent constipation.  She has discussed her intermittent constipation with her oncologist and has a current regimen. She is interested in trying MiraLAX as Colace did not help much in the past and Metamucil sometimes exacerbates her constipation. A prescription for MiraLAX will be sent to manage her constipation.    4. Right hand radial nerve palsy with carpal tunnel syndrome.  She has a follow-up appointment with her hand specialist on 04/21/2025 to review her EMG results.    5. Varicose veins.  She has tortuous varicose veins with associated knots and bruising, likely secondary to hematomas or superficial venous thrombosis (SVTs). She has compression socks but does not wear them regularly. She was advised that support may help prevent complications. She used to have Unna boots prescribed by a previous podiatrist or specialist; however, they are very old and no longer effective. She was advised to contact us with the specific type of boots she had so we can potentially provide new ones, which may benefit her legs due to her history of poor circulation and likely peripheral vascular disease. If there are issues getting these approved, we can refer her to the lymphedema clinic for a second opinion.    6. Chronic kidney disease.  She has a history of chronic kidney disease and elevated alkaline phosphatase  levels. She notes that she gets blood work regularly from her specialist. If she does not, she was advised to follow up with us to recheck these levels.    PROCEDURE  The patient underwent surgery for adenocarcinoma of the colon in 2022.              Follow Up Instructions Charge Capture  Follow-up Communications   Return in about 6 months (around 10/14/2025), or if symptoms worsen or fail to improve.  Patient was given instructions and counseling regarding her condition or for health maintenance advice. Please see specific information pulled into the AVS if appropriate.       Transcribed from ambient dictation for Pablo Jones DO by Pablo Jones DO.  04/14/25   11:41 EDT

## 2025-04-21 ENCOUNTER — PREP FOR SURGERY (OUTPATIENT)
Dept: OTHER | Facility: HOSPITAL | Age: 80
End: 2025-04-21
Payer: MEDICARE

## 2025-04-21 ENCOUNTER — OFFICE VISIT (OUTPATIENT)
Dept: ORTHOPEDIC SURGERY | Facility: CLINIC | Age: 80
End: 2025-04-21
Payer: MEDICARE

## 2025-04-21 VITALS
OXYGEN SATURATION: 98 % | HEIGHT: 59 IN | DIASTOLIC BLOOD PRESSURE: 84 MMHG | SYSTOLIC BLOOD PRESSURE: 163 MMHG | WEIGHT: 165 LBS | BODY MASS INDEX: 33.26 KG/M2 | HEART RATE: 73 BPM

## 2025-04-21 DIAGNOSIS — G56.01 CARPAL TUNNEL SYNDROME ON RIGHT: Primary | ICD-10-CM

## 2025-04-21 DIAGNOSIS — M18.12 ARTHRITIS OF CARPOMETACARPAL (CMC) JOINT OF LEFT THUMB: ICD-10-CM

## 2025-04-21 NOTE — PROGRESS NOTES
"Chief Complaint  Follow-up of the Right Hand      Subjective      Berna Leong presents to [facility] for follow up of the right hand.  She had a fall and injured her hip and right hand. The injury was 6/22/24. She is having pain in the hand and notes stiffness. She had a steroid injection 12/16/24. She had an EMG and is here to review.  She is complaining of a lot of pain in the left thumb.  She is in the process of moving.  She has some arthritis signs in the left thumb.      Allergies   Allergen Reactions    Penicillins Rash    Sulfa Antibiotics Rash        Social History     Socioeconomic History    Marital status: Single   Tobacco Use    Smoking status: Never    Smokeless tobacco: Never   Vaping Use    Vaping status: Never Used   Substance and Sexual Activity    Alcohol use: Never    Drug use: Never    Sexual activity: Defer     Partners: Male     Birth control/protection: Post-menopausal        I reviewed the patient's chief complaint, history of present illness, review of systems, past medical history, surgical history, family history, social history, medications, and allergy list.     Review of Systems     Constitutional: Denies fevers, chills, weight loss  Cardiovascular: Denies chest pain, shortness of breath  Skin: Denies rashes, acute skin changes  Neurologic: Denies headache, loss of consciousness        Vital Signs:   /84   Pulse 73   Ht 149.9 cm (59\")   Wt 74.8 kg (165 lb)   SpO2 98%   BMI 33.33 kg/m²          Physical Exam  General: Alert. No acute distress    Ortho Exam        Right upper extremity: intact wrist flexion and extension, arthritis and stiffness with range of motion, tender to the STT and thumb CMC, mild muscle atrophy, positive grind test, increase in pain with Phalen's compression, neurovascularly intact, positive pulses Positive Compression testing/ Positive Tinels. NegativeFinkelsteins. Negative Weber's testing. Negative CMC grind testing. " Positive Phalens. Full ROM of the hand, fingers, elbow and wrist. Sensation grossly intact to light touch, median, radial and ulnar nerve. Positive AIN, PIN and ulnar nerve motor function intact. Axillary nerve intact. Positive pulses.     LEFT HAND Negative Compression testing/ Negative Tinels. NegativeFinkelsteins. Negative Weber's testing. Positive CMC grind testing. Negative Phalens. Full ROM of the hand, fingers, elbow and wrist. Negative Triggering of the digit. Sensation grossly intact to light touch, median, radial and ulnar nerve. Positive AIN, PIN and ulnar nerve motor function intact. Axillary nerve intact. Positive pulses.          Procedures        Imaging Results (Most Recent)       None             Result Review :     IMPRESSION: This is an abnormal study. There is current electrophysiologic evidence consistent with the followin. 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 Mild, sensorimotor, demyelinating neuropathy affecting the left median nerve at or about the wrist (carpal tunnel). There is no electrophysiologic evidence of additional focal neuropathy, proximal nerve or plexus pathology, nerve root pathology, myopathy, polyneuropathy, or motor neuron pathology in the tested extremities    EMG & Nerve Conduction Test  Result Date: 2025  Narrative: See attached EMG/NCS report. Electronically signed by Jaswant Escobar, PT, 25, 10:06 AM EDT.             Assessment and Plan     Diagnoses and all orders for this visit:    1. Carpal tunnel syndrome on right (Primary)    2. Arthritis of carpometacarpal (CMC) joint of left thumb        Discussed the treatment plan with the patient. I reviewed the EMG of the right upper extremity.  Discussed she may have irreversible changes if surgery is not performed.      She is starting to loose the signal going to the  muscle.      Discussed the treatment options with the patient, operative vs non-operative. The patient expressed understanding and wished to proceed with a right carpal tunnel release.      Discussed a thumb injection, but doing to packing and moving and when she comes for follow up of the 2 week follow up of the right carpal tunnel release I will give a left thumb CMC injection.      Discussed surgery., Risks/benefits discussed with patient including, but not limited to: infection, bleeding, neurovascular damage, re-rupture, aesthetic deformity, need for further surgery, and death., Surgery pamphlet given., and Call or return if worsening symptoms.    Follow Up     2 weeks postoperatively I will inject the left thumb CMC at the follow up appointment of the right carpal tunnel release.       Patient was given instructions and counseling regarding her condition or for health maintenance advice. Please see specific information pulled into the AVS if appropriate.     Scribed for Lorenzo Wick MD by Deidra Collins MA.  04/21/25   09:19 EDT      I have personally performed the services described in this document as scribed by the above individual and it is both accurate and complete. Lorenzo Wick MD 04/22/25

## 2025-04-25 ENCOUNTER — TELEPHONE (OUTPATIENT)
Dept: FAMILY MEDICINE CLINIC | Facility: CLINIC | Age: 80
End: 2025-04-25
Payer: MEDICARE

## 2025-04-25 DIAGNOSIS — E03.9 HYPOTHYROIDISM (ACQUIRED): ICD-10-CM

## 2025-04-25 NOTE — TELEPHONE ENCOUNTER
Patient came into office requesting blood work be placed for TSH and cholesterol. Patient is also needing a refill for her synthroid around 06/02/2025.     Pharmacy is   [facility] - [place], [place] - [address] Khal[address] - [address] - [phone]  - [phone] FX  [address] [facility], [place] [address]  Phone: [phone]  Fax: [phone]

## 2025-04-28 RX ORDER — LEVOTHYROXINE SODIUM 75 UG/1
75 TABLET ORAL DAILY
Qty: 90 TABLET | Refills: 1 | Status: SHIPPED | OUTPATIENT
Start: 2025-04-28

## 2025-04-28 NOTE — TELEPHONE ENCOUNTER
Patient has had extensive lab work in March 2025.  She is not due for her TSH or cholesterol recheck.  She saw the cardiologist in April which told her to repeat her lipid level in 3 months, so patient can complete this in July.    It looks like thyroid function has been good over the last couple years, generally if it is out of range we will recheck it approximately 2 months after adjusting medicine, however if it is stable generally this is only checked once or twice a year.    For cholesterol this is generally just checked once a year unless medications have been changed, it looks like cardiology started a statin medicine and that is why they wanted to recheck it in 3 months.

## (undated) DEVICE — ENDOPATH XCEL BLADELESS TROCARS WITH STABILITY SLEEVES: Brand: ENDOPATH XCEL

## (undated) DEVICE — KT ANTI FOG W/FLD AND SPNG

## (undated) DEVICE — SLV SCD KN/LEN ADJ EXPRSS BLENDED MD 1P/U

## (undated) DEVICE — SOL IRRG H2O PL/BG 1000ML STRL

## (undated) DEVICE — ANTIBACTERIAL VIOLET BRAIDED (POLYGLACTIN 910), SYNTHETIC ABSORBABLE SUTURE: Brand: COATED VICRYL

## (undated) DEVICE — SOL IRR NACL 0.9PCT BT 1000ML

## (undated) DEVICE — ENDOPATH PNEUMONEEDLE INSUFFLATION NEEDLES WITH LUER LOCK CONNECTORS 120MM: Brand: ENDOPATH

## (undated) DEVICE — GOWN,REINFORCE,POLY,SIRUS,BREATH SLV,XLG: Brand: MEDLINE

## (undated) DEVICE — LAPAROSCOPIC ACCESS SYSTEM: Brand: ALEXIS LAPAROSCOPIC SYSTEM

## (undated) DEVICE — LINER SURG CANSTR SXN S/RIGD 1500CC

## (undated) DEVICE — LAP PORT CLOSURE GUIDES 5MM AND 10/12MM: Brand: LAP PORT CLOSURE GUIDES 5MM AND 10/12MM

## (undated) DEVICE — SHEET,DRAPE,70X85,STERILE: Brand: MEDLINE

## (undated) DEVICE — TISSUE RETRIEVAL SYSTEM: Brand: INZII RETRIEVAL SYSTEM

## (undated) DEVICE — PENCL E/S SMOKEEVAC W/TELESCP CANN

## (undated) DEVICE — REDUCER: Brand: ENDOWRIST

## (undated) DEVICE — DRSNG WND GZ CURAD OIL EMULSION 3X3IN STRL

## (undated) DEVICE — INTENDED FOR TISSUE SEPARATION, AND OTHER PROCEDURES THAT REQUIRE A SHARP SURGICAL BLADE TO PUNCTURE OR CUT.: Brand: BARD-PARKER ® CARBON RIB-BACK BLADES

## (undated) DEVICE — SOLIDIFIER LIQLOC PLS 1500CC BT

## (undated) DEVICE — Device: Brand: DEFENDO AIR/WATER/SUCTION AND BIOPSY VALVE

## (undated) DEVICE — SEAL

## (undated) DEVICE — LAPAROVUE VISIBILITY SYSTEM LAPAROSCOPIC SOLUTIONS: Brand: LAPAROVUE

## (undated) DEVICE — SUT VIC PLS CTD BR 0 TIE 18IN VIL

## (undated) DEVICE — Device

## (undated) DEVICE — ADHS SKIN SURG TISS VISC PREMIERPRO EXOFIN HI/VISC FAST/DRY

## (undated) DEVICE — ARM DRAPE

## (undated) DEVICE — 3 RING SUTURE PASSER - 16 CM: Brand: 3 RING SUTURE PASSER - 16 CM

## (undated) DEVICE — DAVINCI-LF: Brand: MEDLINE INDUSTRIES, INC.

## (undated) DEVICE — SUT PDS 1 CT1 36IN Z347H

## (undated) DEVICE — CANNULA SEAL

## (undated) DEVICE — GLV SURG BIOGEL LTX PF 7 1/2

## (undated) DEVICE — TIP COVER ACCESSORY

## (undated) DEVICE — TOTAL TRAY, 16FR 10ML SIL FOLEY, URN: Brand: MEDLINE

## (undated) DEVICE — SINGLE-USE BIOPSY FORCEPS: Brand: RADIAL JAW 4

## (undated) DEVICE — VESSEL SEALER EXTEND: Brand: ENDOWRIST

## (undated) DEVICE — SOL NACL 0.9PCT 1000ML

## (undated) DEVICE — CONN JET HYDRA H20 AUXILIARY DISP

## (undated) DEVICE — STAPLER 60: Brand: SUREFORM